# Patient Record
Sex: FEMALE | Race: WHITE | NOT HISPANIC OR LATINO | Employment: OTHER | URBAN - METROPOLITAN AREA
[De-identification: names, ages, dates, MRNs, and addresses within clinical notes are randomized per-mention and may not be internally consistent; named-entity substitution may affect disease eponyms.]

---

## 2017-01-12 DIAGNOSIS — R52 PAIN: ICD-10-CM

## 2017-01-12 NOTE — TELEPHONE ENCOUNTER
----- Message from Delvin Maloney sent at 1/12/2017 10:25 AM CST -----  Contact: Patient  Patient needs a refill on Hydrocodone called into   [facility] - [address] [address] [place] - [phone]  [place] [address]  Phone: [phone] Fax: [phone]  Please call patient at [phone] if you have any questions. Thanks!

## 2017-01-13 RX ORDER — HYDROCODONE BITARTRATE AND ACETAMINOPHEN 10; 325 MG/1; MG/1
1 TABLET ORAL 3 TIMES DAILY PRN
Qty: 90 TABLET | Refills: 0 | Status: SHIPPED | OUTPATIENT
Start: 2017-01-13 | End: 2017-02-13 | Stop reason: SDUPTHER

## 2017-01-25 ENCOUNTER — HOSPITAL ENCOUNTER (OUTPATIENT)
Dept: RADIOLOGY | Facility: HOSPITAL | Age: 82
Discharge: HOME | End: 2017-01-25
Attending: FAMILY MEDICINE
Payer: MEDICARE

## 2017-01-25 ENCOUNTER — OFFICE VISIT (OUTPATIENT)
Dept: FAMILY MEDICINE | Facility: CLINIC | Age: 82
End: 2017-01-25
Payer: MEDICARE

## 2017-01-25 VITALS
HEART RATE: 80 BPM | HEIGHT: 60 IN | DIASTOLIC BLOOD PRESSURE: 68 MMHG | RESPIRATION RATE: 16 BRPM | BODY MASS INDEX: 33.72 KG/M2 | SYSTOLIC BLOOD PRESSURE: 104 MMHG | WEIGHT: 171.75 LBS

## 2017-01-25 DIAGNOSIS — G40.909 SEIZURE DISORDER: ICD-10-CM

## 2017-01-25 DIAGNOSIS — K59.00 CONSTIPATION, UNSPECIFIED CONSTIPATION TYPE: ICD-10-CM

## 2017-01-25 DIAGNOSIS — S09.90XA HEAD INJURY, INITIAL ENCOUNTER: Primary | ICD-10-CM

## 2017-01-25 DIAGNOSIS — M51.36 DDD (DEGENERATIVE DISC DISEASE), LUMBAR: ICD-10-CM

## 2017-01-25 DIAGNOSIS — R05.3 CHRONIC COUGH: ICD-10-CM

## 2017-01-25 DIAGNOSIS — E78.5 HYPERLIPIDEMIA, UNSPECIFIED HYPERLIPIDEMIA TYPE: ICD-10-CM

## 2017-01-25 DIAGNOSIS — I10 ESSENTIAL HYPERTENSION: ICD-10-CM

## 2017-01-25 DIAGNOSIS — J44.9 CHRONIC OBSTRUCTIVE PULMONARY DISEASE, UNSPECIFIED COPD TYPE: ICD-10-CM

## 2017-01-25 DIAGNOSIS — I27.20 PULMONARY HYPERTENSION: ICD-10-CM

## 2017-01-25 DIAGNOSIS — M50.30 DDD (DEGENERATIVE DISC DISEASE), CERVICAL: ICD-10-CM

## 2017-01-25 PROCEDURE — 3078F DIAST BP <80 MM HG: CPT | Mod: S$GLB,,, | Performed by: FAMILY MEDICINE

## 2017-01-25 PROCEDURE — 74000 XR ABDOMEN AP 1 VIEW: CPT | Mod: 26,,, | Performed by: RADIOLOGY

## 2017-01-25 PROCEDURE — 99215 OFFICE O/P EST HI 40 MIN: CPT | Mod: S$GLB,,, | Performed by: FAMILY MEDICINE

## 2017-01-25 PROCEDURE — 1159F MED LIST DOCD IN RCRD: CPT | Mod: S$GLB,,, | Performed by: FAMILY MEDICINE

## 2017-01-25 PROCEDURE — 1125F AMNT PAIN NOTED PAIN PRSNT: CPT | Mod: S$GLB,,, | Performed by: FAMILY MEDICINE

## 2017-01-25 PROCEDURE — 1157F ADVNC CARE PLAN IN RCRD: CPT | Mod: S$GLB,,, | Performed by: FAMILY MEDICINE

## 2017-01-25 PROCEDURE — 99499 UNLISTED E&M SERVICE: CPT | Mod: S$GLB,,, | Performed by: FAMILY MEDICINE

## 2017-01-25 PROCEDURE — 74000 XR ABDOMEN AP 1 VIEW: CPT | Mod: TC,PO

## 2017-01-25 PROCEDURE — 1160F RVW MEDS BY RX/DR IN RCRD: CPT | Mod: S$GLB,,, | Performed by: FAMILY MEDICINE

## 2017-01-25 PROCEDURE — 3074F SYST BP LT 130 MM HG: CPT | Mod: S$GLB,,, | Performed by: FAMILY MEDICINE

## 2017-01-25 PROCEDURE — 99999 PR PBB SHADOW E&M-EST. PATIENT-LVL III: CPT | Mod: PBBFAC,,, | Performed by: FAMILY MEDICINE

## 2017-01-25 NOTE — PROGRESS NOTES
Subjective:     THIS DOCUMENT WAS MADE IN PART WITH [employer] SOFTWARE. OCCASIONALLY THIS SOFTWARE MAY MISINTERPRET WORDS OR PHRASES.     Patient ID: Summer Inman is a 81 y.o. female.    Chief Complaint: Shoulder Pain (right ); Abdominal Pain (lower abdomen); degenerative disc disease (3 month follow up ); and Gait Problem    HPI    she's here today with chronic pain. She gets some relief with her Norco but not complete. She has back pain, neck pain, right shoulder pain.   She is on chronic narcotics.     some reports worsening balance problem pupils. Has noticed speech change, sometimes mouse carisa versus weak voice versus hoarseness. Apparently she hit her head very hard with one fall a month ago. They did not contact anybody did not go to the emergency room. She does not think she lost consciousness. She doesn't remember all the details other than she fell backwards walking into her house and hit the back of her head.     She has a seizure disorder. She is not followed with neurology recently. She did not contact them after head injury. She did not contact them when they notice balance problems. Did not contact neurology when they noticed speech problems.     Complains of generalized abdominal discomfort. Migrating at times. No nausea or vomiting. No fever. She says she has frequent small round firm bowel movements. No diarrhea. No blood in her stool.      or hypertension and hyperlipidemia has been stable.     Just history of COPD, pulmonary hypertension and chronic dyspnea. It is stable. She is a frequent cough, typically nonproductive or sometimes a small white sputum.    Active Ambulatory Problems     Diagnosis Date Noted    Hyperlipidemia     Gastroesophageal reflux disease     Seizure disorder     Chronic cough 03/08/2016    Anemia 03/10/2016    DDD (degenerative disc disease), lumbar 06/15/2016    Lumbosacral radiculopathy 06/15/2016    Right hip pain 06/15/2016    Coronary artery  disease involving native coronary artery of native heart without angina pectoris 10/17/2016    Essential hypertension 10/17/2016    Chronic obstructive pulmonary disease 10/17/2016    History of stroke 10/17/2016    Depression, major, recurrent, mild 10/17/2016    Insomnia 10/17/2016    Pulmonary hypertension 10/17/2016    Osteopenia 10/17/2016     Resolved Ambulatory Problems     Diagnosis Date Noted    Chronic kidney disease, stage II (mild) 12/26/2012    Dysphagia 10/08/2014    Cervical radiculopathy 02/02/2015    UTI (urinary tract infection) 01/31/2016    Elevated lactic acid level 01/31/2016    Abdominal pain 01/31/2016    Cervical spondylosis with myelopathy 03/07/2016    Shortness of breath 03/08/2016    Obesity, Class I, BMI 30-34.9 03/08/2016    Hypokalemia 03/09/2016     Past Medical History   Diagnosis Date    Anticoagulant long-term use     Arthritis     Asthma     Atrial premature beats     Benign neoplasm of adrenal gland     Colon polyp     COPD (chronic obstructive pulmonary disease)     Coronary artery disease     Depression     Dizziness     First degree AV block     General anesthetics causing adverse effect in therapeutic use     Hypertension     Impaired mobility     Neck pain     Schatzki's ring     Seasonal allergies     Seizures     Stroke     Trouble in sleeping     Wears dentures     Wears glasses      Current Outpatient Prescriptions on File Prior to Visit   Medication Sig Dispense Refill    amlodipine (NORVASC) 5 MG tablet Take 1 tablet (5 mg total) by mouth once daily. (Patient taking differently: Take 5 mg by mouth once daily. ) 90 tablet 1    carvedilol (COREG) 6.25 MG tablet Take 1 tablet (6.25 mg total) by mouth 2 (two) times daily with meals. (Patient taking differently: Take 6.25 mg by mouth 2 (two) times daily with meals. ) 180 tablet 1    diclofenac sodium (VOLTAREN) 1 % Gel Apply 2 g topically once daily. (Patient taking differently:  Apply 2 g topically once daily. ) 300 g 5    donepezil (ARICEPT) 10 MG tablet Take 10 mg by mouth every morning.       duloxetine (CYMBALTA) 60 MG capsule Take 1 capsule (60 mg total) by mouth once daily. 90 capsule 1    gabapentin (NEURONTIN) 300 MG capsule Take 1 capsule (300 mg total) by mouth 3 (three) times daily. 270 capsule 1    hydrochlorothiazide (HYDRODIURIL) 25 MG tablet Take 1 tablet (25 mg total) by mouth once daily. (Patient taking differently: Take 25 mg by mouth once daily. ) 90 tablet 3    hydrocodone-acetaminophen 10-325mg (NORCO)  mg Tab Take 1 tablet by mouth 3 (three) times daily as needed. 90 tablet 0    hydrOXYzine pamoate (VISTARIL) 25 MG Cap Take 1 capsule (25 mg total) by mouth 2 (two) times daily as needed (for anxiety). 30 capsule 0    lamotrigine (LAMICTAL) 100 MG tablet Take 300 mg by mouth nightly.      lamotrigine (LAMICTAL) 200 MG tablet Take 200 mg by mouth every morning.       neomycin-polymyxin-dexamethasone (DEXACINE) 3.5 mg/g-10,000 unit/g-0.1 % Oint Apply to affected area right upper lid 2 times daily 3.5 g 0    nitroGLYCERIN (NITROSTAT) 0.4 MG SL tablet Place 1 tablet (0.4 mg total) under the tongue every 5 (five) minutes as needed for Chest pain. For chest pain. Repeat in five minutes if pain persists. May repeat again, for total of 3 tablets.  Call 911 at the third tablet. 20 tablet 3    omeprazole (PRILOSEC) 40 MG capsule Take 1 capsule (40 mg total) by mouth once daily. (Patient taking differently: Take 40 mg by mouth once daily. ) 90 capsule 3    ondansetron (ZOFRAN-ODT) 4 MG TbDL Take 1 tablet (4 mg total) by mouth every 8 (eight) hours as needed. 60 tablet 2    pravastatin (PRAVACHOL) 40 MG tablet Take 1 tablet (40 mg total) by mouth once daily. (Patient taking differently: Take 40 mg by mouth nightly. ) 90 tablet 3    ranitidine (ZANTAC) 300 MG tablet Take 1 tablet (300 mg total) by mouth every evening. 30 tablet 11    sertraline (ZOLOFT) 100 MG  tablet Take 1 tablet (100 mg total) by mouth once daily. (Patient taking differently: Take 100 mg by mouth once daily. ) 90 tablet 1    FLUZONE HIGH-DOSE 2016-17, PF, 180 mcg/0.5 mL Syrg       meclizine (ANTIVERT) 25 mg tablet Take 1 tablet (25 mg total) by mouth 3 (three) times daily as needed. (Patient taking differently: Take 25 mg by mouth 3 (three) times daily as needed for Dizziness. ) 15 tablet 0    PREVNAR 13, PF, 0.5 mL Syrg        No current facility-administered medications on file prior to visit.      Review of patient's allergies indicates:   Allergen Reactions    Codeine Shortness Of Breath     Tongue swelled    Nifedipine Anaphylaxis    Ace inhibitors      Other reaction(s): Angioedema    Ketorolac      Other reaction(s): tongue swell    Milk      Other reaction(s): Stomach upset    Tramadol      Other reaction(s): seizure     Social History   Substance Use Topics    Smoking status: Former Smoker     Packs/day: 0.50     Years: 19.00     Types: Cigarettes     Start date: 3/12/1984     Quit date: 10/8/2013    Smokeless tobacco: Never Used    Alcohol use No     Family History   Problem Relation Age of Onset    Heart disease Mother     Cancer Mother      Colon    Colon cancer Mother     Hypertension Daughter     Heart disease Father     Hypertension Son     Diabetes Son     Arthritis Son     Rheum arthritis Daughter     Cancer Daughter      Thyroid and Breast    Glaucoma Neg Hx          Review of Systems   Constitutional: Positive for appetite change and fatigue. Negative for fever and unexpected weight change.   HENT: Negative for congestion, sinus pressure and trouble swallowing.    Eyes: Positive for visual disturbance. Negative for photophobia.   Respiratory: Positive for cough and shortness of breath ( Stable).    Cardiovascular: Negative for chest pain, palpitations and leg swelling.   Gastrointestinal: Positive for abdominal pain and constipation. Negative for anal bleeding,  diarrhea, nausea and vomiting.   Endocrine: Negative for polydipsia and polyuria.   Genitourinary: Positive for difficulty urinating. Negative for dysuria, hematuria, pelvic pain and urgency.   Neurological: Positive for dizziness, speech difficulty, weakness, numbness and headaches. Negative for syncope and light-headedness.   Hematological: Negative for adenopathy.   Psychiatric/Behavioral: Positive for dysphoric mood. Negative for sleep disturbance and suicidal ideas. The patient is not nervous/anxious.        Objective:      Physical Exam   Constitutional: She is oriented to person, place, and time. She appears well-developed and well-nourished. No distress.   HENT:   Head: Normocephalic and atraumatic.   Right Ear: External ear normal.   Left Ear: External ear normal.   Nose: Nose normal.   Mouth/Throat: Oropharynx is clear and moist. No oropharyngeal exudate.   No visible or remaining trauma   Eyes: Conjunctivae and EOM are normal. Pupils are equal, round, and reactive to light. No scleral icterus.   Neck: Normal range of motion. Neck supple. No thyromegaly present.   Cardiovascular: Normal rate, regular rhythm and intact distal pulses.    Murmur heard.  Pulmonary/Chest: Effort normal and breath sounds normal. No respiratory distress. She has no wheezes. She exhibits no tenderness.   Abdominal:    Abdomen is soft. Bowel sounds are presentBut someone hypoactive. There is nonspecific mild diffuse tenderness. There is no focal tenderness. No guarding a rebound. Obese abdomen but no obvious distention   Musculoskeletal: Normal range of motion. She exhibits no edema or deformity.   Lymphadenopathy:     She has no cervical adenopathy.   Neurological: She is alert and oriented to person, place, and time. She has normal reflexes. She displays normal reflexes. Coordination normal.   Skin: Skin is warm and dry. She is not diaphoretic. No erythema.   Psychiatric: She has a normal mood and affect. Her behavior is normal.    Nursing note and vitals reviewed.      Assessment:       1. Head injury, initial encounter    2. Constipation, unspecified constipation type    3. Essential hypertension    4. Hyperlipidemia, unspecified hyperlipidemia type    5. Pulmonary hypertension    6. Chronic cough    7. Chronic obstructive pulmonary disease, unspecified COPD type    8. Seizure disorder    9. DDD (degenerative disc disease), lumbar    10. DDD (degenerative disc disease), cervical        Plan:       Summer was seen today for shoulder pain, abdominal pain, degenerative disc disease and gait problem.    Diagnoses and all orders for this visit:    Head injury, initial encounter  -     CT Head Without Contrast; Future   Advise that it is inappropriate to not have contacted us or sought emergency attention after head injury in this elderly female with multiple medical problems. Since it has been a month it's reasonable to schedule an outpatient CT in the next 24 hours. In the future though it is ever happens again she should go straight to the emergency room    Constipation, unspecified constipation type  -     X-Ray Abdomen AP 1 View; Future  Begin a stool softener 100 mg daily.  MiraLAX 1 dose daily  Initially can use milk of magnesia or magnesium citrate to help get things started . Suspect abdominal pain is constipation. No signs of infection or acute abdomen but they must watch closely.    Essential hypertension   stable    Hyperlipidemia, unspecified hyperlipidemia type   stable    Pulmonary hypertension  Chronic cough  Chronic obstructive pulmonary disease, unspecified COPD type   stable    Seizure disorder   she should follow back with her neurologist for this and other ongoing symptoms. If CT is normal then neurology needs to evaluate her to help us with her symptoms of poor balance and speech change    DDD (degenerative disc disease), lumbar  DDD (degenerative disc disease), cervical   stable, she will need to continue to follow back  with her neurosurgeon. I will continue to feel her pain medication but there's really nothing else I can do to help her with her pain. Advise the pain medication is contributing to her constipation so one possible she should reduce frequency of dosing.

## 2017-01-25 NOTE — MR AVS SNAPSHOT
[facility]  [address]  [place] [address]  Phone: [phone]  Fax: [phone]                  Summer Inman   2017 3:20 PM   Office Visit    Description:  Female : 1935   Provider:  Ludwig Wilson MD   Department:  [facility]           Reason for Visit     Shoulder Pain     Abdominal Pain     degenerative disc disease     Gait Problem           Diagnoses this Visit        Comments    Head injury, initial encounter    -  Primary     Constipation, unspecified constipation type                To Do List           Future Appointments        Provider Department Dept Phone    2017 4:15 PM [facility] XRFL1 [facility] [phone]    2017 2:30 PM [facility] CT1 LIMIT 450 LBS [facility] [phone]      Goals (5 Years of Data)     None      [facility]     [facility] Nurse Care Line - / Assistance  Registered nurses in the [facility] provide clinical advisement, health education, appointment booking, and other advisory services.  Call for this free service at [phone].             Medications           Message regarding Medications     Verify the changes and/or additions to your medication regime listed below are the same as discussed with your clinician today.  If any of these changes or additions are incorrect, please notify your healthcare provider.             Verify that the below list of medications is an accurate representation of the medications you are currently taking.  If none reported, the list may be blank. If incorrect, please contact your healthcare provider. Carry this list with you in case of emergency.           Current Medications     amlodipine (NORVASC) 5 MG tablet Take 1 tablet (5 mg total) by mouth once daily.    carvedilol (COREG) 6.25 MG tablet Take 1 tablet (6.25 mg total) by mouth 2 (two) times daily with meals.    diclofenac sodium (VOLTAREN) 1 % Gel Apply  2 g topically once daily.    donepezil (ARICEPT) 10 MG tablet Take 10 mg by mouth every morning.     duloxetine (CYMBALTA) 60 MG capsule Take 1 capsule (60 mg total) by mouth once daily.    gabapentin (NEURONTIN) 300 MG capsule Take 1 capsule (300 mg total) by mouth 3 (three) times daily.    hydrochlorothiazide (HYDRODIURIL) 25 MG tablet Take 1 tablet (25 mg total) by mouth once daily.    hydrocodone-acetaminophen 10-325mg (NORCO)  mg Tab Take 1 tablet by mouth 3 (three) times daily as needed.    hydrOXYzine pamoate (VISTARIL) 25 MG Cap Take 1 capsule (25 mg total) by mouth 2 (two) times daily as needed (for anxiety).    lamotrigine (LAMICTAL) 100 MG tablet Take 300 mg by mouth nightly.    lamotrigine (LAMICTAL) 200 MG tablet Take 200 mg by mouth every morning.     neomycin-polymyxin-dexamethasone (DEXACINE) 3.5 mg/g-10,000 unit/g-0.1 % Oint Apply to affected area right upper lid 2 times daily    nitroGLYCERIN (NITROSTAT) 0.4 MG SL tablet Place 1 tablet (0.4 mg total) under the tongue every 5 (five) minutes as needed for Chest pain. For chest pain. Repeat in five minutes if pain persists. May repeat again, for total of 3 tablets.  Call 911 at the third tablet.    omeprazole (PRILOSEC) 40 MG capsule Take 1 capsule (40 mg total) by mouth once daily.    ondansetron (ZOFRAN-ODT) 4 MG TbDL Take 1 tablet (4 mg total) by mouth every 8 (eight) hours as needed.    pravastatin (PRAVACHOL) 40 MG tablet Take 1 tablet (40 mg total) by mouth once daily.    ranitidine (ZANTAC) 300 MG tablet Take 1 tablet (300 mg total) by mouth every evening.    sertraline (ZOLOFT) 100 MG tablet Take 1 tablet (100 mg total) by mouth once daily.    FLUZONE HIGH-DOSE 2016-17, PF, 180 mcg/0.5 mL Syrg     meclizine (ANTIVERT) 25 mg tablet Take 1 tablet (25 mg total) by mouth 3 (three) times daily as needed.    PREVNAR 13, PF, 0.5 mL Syrg            Clinical Reference Information           Vital Signs - Last Recorded  Most recent update:  1/25/2017  3:30 PM by Kaci Garg LPN    BP Pulse Resp Ht Wt BMI    104/68 80 16 5' (1.524 m) 77.9 kg (171 lb 11.8 oz) 33.54 kg/m2      Blood Pressure          Most Recent Value    BP  104/68      Allergies as of 1/25/2017     Codeine    Nifedipine    Ace Inhibitors    Ketorolac    Milk    Tramadol      Immunizations Administered on Date of Encounter - 1/25/2017     None      Orders Placed During Today's Visit     Future Labs/Procedures Expected by Expires    CT Head Without Contrast  1/25/2017 1/25/2018    X-Ray Abdomen AP 1 View  1/25/2017 1/25/2018

## 2017-01-26 ENCOUNTER — HOSPITAL ENCOUNTER (OUTPATIENT)
Dept: RADIOLOGY | Facility: HOSPITAL | Age: 82
Discharge: HOME | End: 2017-01-26
Attending: FAMILY MEDICINE
Payer: MEDICARE

## 2017-01-26 DIAGNOSIS — S09.90XA HEAD INJURY, INITIAL ENCOUNTER: ICD-10-CM

## 2017-01-26 PROCEDURE — 70450 CT HEAD/BRAIN W/O DYE: CPT | Mod: 26,,, | Performed by: RADIOLOGY

## 2017-01-26 PROCEDURE — 70450 CT HEAD/BRAIN W/O DYE: CPT | Mod: TC,PO

## 2017-02-10 ENCOUNTER — TELEPHONE (OUTPATIENT)
Dept: FAMILY MEDICINE | Facility: CLINIC | Age: 82
End: 2017-02-10

## 2017-02-10 NOTE — TELEPHONE ENCOUNTER
Spoke with daughter and she stated that the cymbalta is not working for patient depression. She said it may be helping with the pain but not the depression. She would like to see about getting patient back on the zoloft because it seemed to help her depression better. Please advise

## 2017-02-13 DIAGNOSIS — R52 PAIN: ICD-10-CM

## 2017-02-13 RX ORDER — HYDROCODONE BITARTRATE AND ACETAMINOPHEN 10; 325 MG/1; MG/1
1 TABLET ORAL 3 TIMES DAILY PRN
Qty: 90 TABLET | Refills: 0 | Status: SHIPPED | OUTPATIENT
Start: 2017-02-13 | End: 2017-03-13 | Stop reason: SDUPTHER

## 2017-02-13 NOTE — TELEPHONE ENCOUNTER
----- Message from Aure Russell sent at 2/13/2017  1:04 PM CST -----  Contact: self   Patient need refill medication on hydrocodone please send to     [facility] - [address] [place] - [id number]  [address]  [place] [address]  Phone: [phone] Fax: [phone]

## 2017-02-14 NOTE — TELEPHONE ENCOUNTER
Spoke with pt daughter, she would like to take a lower dose of the zoloft and the cymbalta. Send to [facility] for the first month,  Then to [facility] pharmacy.   She has episodes of crying and the daughter said it probably would not be a bad idea for her to see psychiatry. She will talk to mother first. Has to be careful to consult with her mother first, so she wont feel like the children are making decisions for her.

## 2017-03-13 DIAGNOSIS — R52 PAIN: ICD-10-CM

## 2017-03-13 RX ORDER — HYDROCODONE BITARTRATE AND ACETAMINOPHEN 10; 325 MG/1; MG/1
TABLET ORAL
Qty: 90 TABLET | Refills: 0 | Status: SHIPPED | OUTPATIENT
Start: 2017-03-13 | End: 2017-05-09 | Stop reason: SDUPTHER

## 2017-03-13 RX ORDER — HYDROCODONE BITARTRATE AND ACETAMINOPHEN 10; 325 MG/1; MG/1
1 TABLET ORAL 3 TIMES DAILY PRN
Qty: 90 TABLET | Refills: 0 | Status: SHIPPED | OUTPATIENT
Start: 2017-03-13 | End: 2017-04-07 | Stop reason: SDUPTHER

## 2017-03-13 NOTE — TELEPHONE ENCOUNTER
----- Message from Brisa Culver sent at 3/13/2017  1:47 PM CDT -----  Contact: self  Patient is requesting a refill on Norco.      Please send to    [facility] - [address] [place] - [id number]  [id number]  [place] [address]  Phone: [phone] Fax: [phone]

## 2017-04-06 ENCOUNTER — TELEPHONE (OUTPATIENT)
Dept: FAMILY MEDICINE | Facility: CLINIC | Age: 82
End: 2017-04-06

## 2017-04-06 NOTE — TELEPHONE ENCOUNTER
Her eye hurts. She thinks she has a sty on her eye. It hurts. Appointment made for tomorrow she is so happy

## 2017-04-06 NOTE — TELEPHONE ENCOUNTER
----- Message from Ana Inman sent at 4/6/2017  3:15 PM CDT -----  Please call [phone] Pt is requesting an antibiotic / states she has had the problem before (can't remember name)

## 2017-04-07 ENCOUNTER — OFFICE VISIT (OUTPATIENT)
Dept: FAMILY MEDICINE | Facility: CLINIC | Age: 82
End: 2017-04-07
Payer: MEDICARE

## 2017-04-07 VITALS
OXYGEN SATURATION: 95 % | WEIGHT: 172.19 LBS | HEIGHT: 60 IN | RESPIRATION RATE: 17 BRPM | DIASTOLIC BLOOD PRESSURE: 62 MMHG | BODY MASS INDEX: 33.8 KG/M2 | HEART RATE: 75 BPM | SYSTOLIC BLOOD PRESSURE: 120 MMHG

## 2017-04-07 DIAGNOSIS — H02.823: ICD-10-CM

## 2017-04-07 DIAGNOSIS — I10 ESSENTIAL HYPERTENSION: ICD-10-CM

## 2017-04-07 DIAGNOSIS — R52 PAIN: ICD-10-CM

## 2017-04-07 DIAGNOSIS — F32.A DEPRESSION, UNSPECIFIED DEPRESSION TYPE: ICD-10-CM

## 2017-04-07 DIAGNOSIS — M50.30 DEGENERATIVE CERVICAL DISC: Primary | ICD-10-CM

## 2017-04-07 DIAGNOSIS — R05.9 COUGH: ICD-10-CM

## 2017-04-07 PROCEDURE — 3074F SYST BP LT 130 MM HG: CPT | Mod: S$GLB,,, | Performed by: FAMILY MEDICINE

## 2017-04-07 PROCEDURE — 3078F DIAST BP <80 MM HG: CPT | Mod: S$GLB,,, | Performed by: FAMILY MEDICINE

## 2017-04-07 PROCEDURE — 1159F MED LIST DOCD IN RCRD: CPT | Mod: S$GLB,,, | Performed by: FAMILY MEDICINE

## 2017-04-07 PROCEDURE — 1160F RVW MEDS BY RX/DR IN RCRD: CPT | Mod: S$GLB,,, | Performed by: FAMILY MEDICINE

## 2017-04-07 PROCEDURE — 99499 UNLISTED E&M SERVICE: CPT | Mod: S$GLB,,, | Performed by: FAMILY MEDICINE

## 2017-04-07 PROCEDURE — 99214 OFFICE O/P EST MOD 30 MIN: CPT | Mod: S$GLB,,, | Performed by: FAMILY MEDICINE

## 2017-04-07 PROCEDURE — 1126F AMNT PAIN NOTED NONE PRSNT: CPT | Mod: S$GLB,,, | Performed by: FAMILY MEDICINE

## 2017-04-07 PROCEDURE — 99999 PR PBB SHADOW E&M-EST. PATIENT-LVL III: CPT | Mod: PBBFAC,,, | Performed by: FAMILY MEDICINE

## 2017-04-07 RX ORDER — HYDROCODONE BITARTRATE AND ACETAMINOPHEN 10; 325 MG/1; MG/1
1 TABLET ORAL 3 TIMES DAILY PRN
Qty: 90 TABLET | Refills: 0 | Status: SHIPPED | OUTPATIENT
Start: 2017-04-07 | End: 2017-10-05 | Stop reason: SDUPTHER

## 2017-04-07 RX ORDER — SERTRALINE HYDROCHLORIDE 100 MG/1
100 TABLET, FILM COATED ORAL DAILY
Qty: 90 TABLET | Refills: 1 | Status: SHIPPED | OUTPATIENT
Start: 2017-04-07 | End: 2017-06-26 | Stop reason: SDUPTHER

## 2017-04-07 RX ORDER — DULOXETIN HYDROCHLORIDE 60 MG/1
60 CAPSULE, DELAYED RELEASE ORAL DAILY
Qty: 90 CAPSULE | Refills: 1 | Status: CANCELLED | OUTPATIENT
Start: 2017-04-07 | End: 2018-04-07

## 2017-04-07 RX ORDER — NEOMYCIN SULFATE, POLYMYXIN B SULFATE, AND DEXAMETHASONE 3.5; 10000; 1 MG/G; [USP'U]/G; MG/G
OINTMENT OPHTHALMIC
Qty: 3.5 G | Refills: 0 | Status: SHIPPED | OUTPATIENT
Start: 2017-04-07 | End: 2018-08-28

## 2017-04-07 NOTE — PROGRESS NOTES
Subjective:     THIS DOCUMENT WAS MADE IN PART WITH [employer] SOFTWARE.  OCCASIONALLY THIS SOFTWARE WILL MISINTERPRET WORDS OR PHRASES.     Patient ID: Summer Inman is a 81 y.o. female.    Chief Complaint: Eye Pain (right eye/ pt states it burst on last night)    HPI     Depression/ temper, worse on cymbalta, wants to go back to zoloft. I had changed to Cymbalta thinking it would help with her chronic pain but she seems to be worsening. No suicidal thoughts, just more on edge    Right eye sty, already drained, spontaneously. Previously seen by optometry, question refill and antibiotic ointment     reports recent exacerbation in cough, exposed to some chemicals at house remodeling. Not shortness of breath, no wheezing. No fever, no hemoptysis,     cervical degenerative disc disease with chronic pain, stable.    Active Ambulatory Problems     Diagnosis Date Noted    Hyperlipidemia     Gastroesophageal reflux disease     Seizure disorder     Chronic cough 03/08/2016    Anemia 03/10/2016    DDD (degenerative disc disease), lumbar 06/15/2016    Lumbosacral radiculopathy 06/15/2016    Right hip pain 06/15/2016    Coronary artery disease involving native coronary artery of native heart without angina pectoris 10/17/2016    Essential hypertension 10/17/2016    Chronic obstructive pulmonary disease 10/17/2016    History of stroke 10/17/2016    Depression, major, recurrent, mild 10/17/2016    Insomnia 10/17/2016    Pulmonary hypertension 10/17/2016    Osteopenia 10/17/2016     Resolved Ambulatory Problems     Diagnosis Date Noted    Chronic kidney disease, stage II (mild) 12/26/2012    Dysphagia 10/08/2014    Cervical radiculopathy 02/02/2015    UTI (urinary tract infection) 01/31/2016    Elevated lactic acid level 01/31/2016    Abdominal pain 01/31/2016    Cervical spondylosis with myelopathy 03/07/2016    Shortness of breath 03/08/2016    Obesity, Class I, BMI 30-34.9 03/08/2016     Hypokalemia 03/09/2016     Past Medical History:   Diagnosis Date    Anticoagulant long-term use     Arthritis     Asthma     Atrial premature beats     Benign neoplasm of adrenal gland     Colon polyp     COPD (chronic obstructive pulmonary disease)     Coronary artery disease     Depression     Dizziness     First degree AV block     Gastroesophageal reflux disease     General anesthetics causing adverse effect in therapeutic use     Hyperlipidemia     Hypertension     Impaired mobility     Neck pain     Schatzki's ring     Seasonal allergies     Seizures     Stroke     Trouble in sleeping     Wears dentures     Wears glasses          Review of Systems   Constitutional: Positive for fatigue. Negative for chills and fever.   Respiratory: Positive for cough. Negative for shortness of breath and wheezing.    Cardiovascular: Negative for chest pain.   Musculoskeletal: Positive for arthralgias, back pain, neck pain and neck stiffness.   Skin: Negative for rash.   Psychiatric/Behavioral: Positive for dysphoric mood. Negative for suicidal ideas. The patient is nervous/anxious.        Objective:      Physical Exam   Constitutional: She is oriented to person, place, and time. She appears well-developed and well-nourished. No distress.   HENT:   Head: Normocephalic and atraumatic.   Eyes: No scleral icterus.   Sty right lower eyelid. Mild redness. No pustule. Conjunctiva or normal bilaterally.   Pulmonary/Chest: Effort normal and breath sounds normal. No respiratory distress.   Lungs were clear. No  Wheezing,  No course breath sounds   Neurological: She is alert and oriented to person, place, and time.   Ambulatory with a walker   Skin: She is not diaphoretic.   Psychiatric: She has a normal mood and affect. Her behavior is normal.   Vitals reviewed.      Assessment:       1. Degenerative cervical disc    2. Pain    3. Cyst, eyelid sebaceous, right    4. Depression, unspecified depression type    5.  Cough    6. Essential hypertension        Plan:       Summer was seen today for eye pain.    Diagnoses and all orders for this visit:    Degenerative cervical disc    Pain  -     hydrocodone-acetaminophen 10-325mg (NORCO)  mg Tab; Take 1 tablet by mouth 3 (three) times daily as needed.    Cyst, eyelid sebaceous, right  -     neomycin-polymyxin-dexamethasone (DEXACINE) 3.5 mg/g-10,000 unit/g-0.1 % Oint; Apply to affected area right upper lid 2 times daily    Depression, unspecified depression type  Resume Zoloft. Cymbalta    Cough   lungs are clear. Possibly allergies are possibly mild chemical irritation/pneumonitis. No specific treatment other than closely monitoring symptoms and reporting of the worsen    Essential hypertension    Other orders  -     sertraline (ZOLOFT) 100 MG tablet; Take 1 tablet (100 mg total) by mouth once daily.  -     Cancel: duloxetine (CYMBALTA) 60 MG capsule; Take 1 capsule (60 mg total) by mouth once daily.

## 2017-04-07 NOTE — MR AVS SNAPSHOT
[facility]  [address]  [place] [address]  Phone: [phone]  Fax: [phone]                  Summer Inman   2017 1:40 PM   Office Visit    Description:  Female : 1935   Provider:  Ludwig Wilson MD   Department:  [facility]           Reason for Visit     Eye Pain           Diagnoses this Visit        Comments    Pain         Cyst, eyelid sebaceous, right                To Do List           Future Appointments        Provider Department Dept Phone    2017 3:40 PM Ludwig Wilson MD [facility] [phone]      Goals (5 Years of Data)     None       These Medications        Disp Refills Start End    sertraline (ZOLOFT) 100 MG tablet 90 tablet 1 2017     Take 1 tablet (100 mg total) by mouth once daily. - Oral    Pharmacy: [facility] [place] - [address] [address] Ph #: [phone]       hydrocodone-acetaminophen 10-325mg (NORCO)  mg Tab 90 tablet 0 2017     Take 1 tablet by mouth 3 (three) times daily as needed. - Oral    Pharmacy: [facility] [place]  [address] [address] Ph #: [phone]       neomycin-polymyxin-dexamethasone (DEXACINE) 3.5 mg/g-10,000 unit/g-0.1 % Oint 3.5 g 0 2017     Apply to affected area right upper lid 2 times daily    [facility] [place] [facility] [address] [address] Ph #: [phone]         [facility]     [facility] -  Assistance  Unless otherwise directed by your provider, please contact [facility], our nurse care line that is available for  assistance.     Registered nurses in the [facility] provide: appointment scheduling, clinical advisement, health education, and other advisory services.  Call: [phone] (toll free)               Medications           Message regarding Medications     Verify the changes and/or additions to your medication  regime listed below are the same as discussed with your clinician today.  If any of these changes or additions are incorrect, please notify your healthcare provider.        STOP taking these medications     FLUZONE HIGH-DOSE 2016-17, PF, 180 mcg/0.5 mL Syrg     PREVNAR 13, PF, 0.5 mL Syrg            Verify that the below list of medications is an accurate representation of the medications you are currently taking.  If none reported, the list may be blank. If incorrect, please contact your healthcare provider. Carry this list with you in case of emergency.           Current Medications     amlodipine (NORVASC) 5 MG tablet Take 1 tablet (5 mg total) by mouth once daily.    carvedilol (COREG) 6.25 MG tablet Take 1 tablet (6.25 mg total) by mouth 2 (two) times daily with meals.    diclofenac sodium (VOLTAREN) 1 % Gel Apply 2 g topically once daily.    donepezil (ARICEPT) 10 MG tablet Take 10 mg by mouth every morning.     duloxetine (CYMBALTA) 60 MG capsule Take 1 capsule (60 mg total) by mouth once daily.    gabapentin (NEURONTIN) 300 MG capsule Take 1 capsule (300 mg total) by mouth 3 (three) times daily.    hydrochlorothiazide (HYDRODIURIL) 25 MG tablet Take 1 tablet (25 mg total) by mouth once daily.    hydrocodone-acetaminophen 10-325mg (NORCO)  mg Tab Take 1 tablet by mouth 3 (three) times daily as needed.    hydrOXYzine pamoate (VISTARIL) 25 MG Cap Take 1 capsule (25 mg total) by mouth 2 (two) times daily as needed (for anxiety).    lamotrigine (LAMICTAL) 100 MG tablet Take 300 mg by mouth nightly.    lamotrigine (LAMICTAL) 200 MG tablet Take 200 mg by mouth every morning.     meclizine (ANTIVERT) 25 mg tablet Take 1 tablet (25 mg total) by mouth 3 (three) times daily as needed.    neomycin-polymyxin-dexamethasone (DEXACINE) 3.5 mg/g-10,000 unit/g-0.1 % Oint Apply to affected area right upper lid 2 times daily    nitroGLYCERIN (NITROSTAT) 0.4 MG SL tablet Place 1 tablet (0.4 mg total) under the tongue every  5 (five) minutes as needed for Chest pain. For chest pain. Repeat in five minutes if pain persists. May repeat again, for total of 3 tablets.  Call 911 at the third tablet.    omeprazole (PRILOSEC) 40 MG capsule Take 1 capsule (40 mg total) by mouth once daily.    ondansetron (ZOFRAN-ODT) 4 MG TbDL Take 1 tablet (4 mg total) by mouth every 8 (eight) hours as needed.    pravastatin (PRAVACHOL) 40 MG tablet Take 1 tablet (40 mg total) by mouth once daily.    ranitidine (ZANTAC) 300 MG tablet Take 1 tablet (300 mg total) by mouth every evening.    sertraline (ZOLOFT) 100 MG tablet Take 1 tablet (100 mg total) by mouth once daily.    hydrocodone-acetaminophen 10-325mg (NORCO)  mg Tab TAKE ONE TABLET BY MOUTH THREE TIMES DAILY AS NEEDED           Clinical Reference Information           Your Vitals Were     BP Pulse Resp Height Weight SpO2    120/62 (BP Location: Left arm, Patient Position: Sitting, BP Method: Manual) 75 17 5' (1.524 m) 78.1 kg (172 lb 2.9 oz) 95%    BMI                33.63 kg/m2          Blood Pressure          Most Recent Value    BP  120/62      Allergies as of 4/7/2017     Codeine    Nifedipine    Ace Inhibitors    Ketorolac    Milk    Tramadol      Immunizations Administered on Date of Encounter - 4/7/2017     None      Language Assistance Services     ATTENTION: Language assistance services are available, free of charge. Please call [phone].      ATENCIÓN: Si habla español, tiene a galvan disposición servicios gratuitos de asistencia lingüística. Llame al [phone].     CHÚ Ý: N?u b?n nói Ti?ng Vi?t, có các d?ch v? h? tr? ngôn ng? mi?n phí dành cho b?n. G?i s? [phone].         [facility] complies with applicable Federal civil rights laws and does not discriminate on the basis of race, color, national origin, age, disability, or sex.

## 2017-04-20 DIAGNOSIS — R11.0 NAUSEA: ICD-10-CM

## 2017-04-20 DIAGNOSIS — K21.9 GASTROESOPHAGEAL REFLUX DISEASE WITHOUT ESOPHAGITIS: ICD-10-CM

## 2017-04-20 RX ORDER — CARVEDILOL 6.25 MG/1
TABLET ORAL
Qty: 180 TABLET | Refills: 1 | Status: SHIPPED | OUTPATIENT
Start: 2017-04-20 | End: 2018-01-16 | Stop reason: SDUPTHER

## 2017-04-20 RX ORDER — ONDANSETRON 4 MG/1
TABLET, ORALLY DISINTEGRATING ORAL
Qty: 60 TABLET | Refills: 2 | Status: SHIPPED | OUTPATIENT
Start: 2017-04-20 | End: 2018-01-17 | Stop reason: SDUPTHER

## 2017-04-20 RX ORDER — HYDROCHLOROTHIAZIDE 25 MG/1
TABLET ORAL
Qty: 90 TABLET | Refills: 3 | Status: SHIPPED | OUTPATIENT
Start: 2017-04-20 | End: 2017-12-13 | Stop reason: SDUPTHER

## 2017-04-20 RX ORDER — PRAVASTATIN SODIUM 40 MG/1
TABLET ORAL
Qty: 90 TABLET | Refills: 3 | Status: SHIPPED | OUTPATIENT
Start: 2017-04-20 | End: 2018-01-16 | Stop reason: SDUPTHER

## 2017-04-20 RX ORDER — OMEPRAZOLE 40 MG/1
CAPSULE, DELAYED RELEASE ORAL
Qty: 90 CAPSULE | Refills: 3 | Status: SHIPPED | OUTPATIENT
Start: 2017-04-20 | End: 2018-02-05 | Stop reason: SDUPTHER

## 2017-04-20 RX ORDER — AMLODIPINE BESYLATE 5 MG/1
TABLET ORAL
Qty: 90 TABLET | Refills: 1 | Status: SHIPPED | OUTPATIENT
Start: 2017-04-20 | End: 2018-01-16 | Stop reason: SDUPTHER

## 2017-04-21 ENCOUNTER — TELEPHONE (OUTPATIENT)
Dept: FAMILY MEDICINE | Facility: CLINIC | Age: 82
End: 2017-04-21

## 2017-04-21 NOTE — TELEPHONE ENCOUNTER
GIANNI: Spoke with daughter and she stated that patient has ortho appt on Wed. Gave Dr Pace recommendations. Daughter stated that she  was going to call the orthopedic office and see if they could do something for splinting until her appointment.

## 2017-04-21 NOTE — TELEPHONE ENCOUNTER
She really should be talking to orthopedics about that because the x-ray reports that she has a distal radius fracture.  Has she seen an orthopedic surgeon yet?  Anytime somebody has a fracture they should've been referred to orthopedics

## 2017-04-21 NOTE — TELEPHONE ENCOUNTER
----- Message from Kacy Campbellan sent at 4/21/2017 10:22 AM CDT -----  Patients daughter states that she need to know if the doctor can put a split on patients arm in the office.  Call Stefani at [phone].

## 2017-04-21 NOTE — TELEPHONE ENCOUNTER
Spoke with pt daughter brought mom to ER and splint was put on arm, pt has taken splint off stated it was rubbing against arm hand, cast is to be put on on Wed. Can pt come to office and get a splint put on or does pt needs to back to ER. Please advise.

## 2017-04-24 RX ORDER — GABAPENTIN 300 MG/1
CAPSULE ORAL
Qty: 270 CAPSULE | Refills: 1 | Status: SHIPPED | OUTPATIENT
Start: 2017-04-24 | End: 2017-10-24 | Stop reason: SDUPTHER

## 2017-04-26 PROBLEM — S52.531A CLOSED COLLES' FRACTURE OF RIGHT RADIUS: Status: ACTIVE | Noted: 2017-04-26

## 2017-05-09 DIAGNOSIS — R52 PAIN: ICD-10-CM

## 2017-05-09 RX ORDER — DULOXETIN HYDROCHLORIDE 60 MG/1
60 CAPSULE, DELAYED RELEASE ORAL DAILY
Qty: 90 CAPSULE | Refills: 1 | Status: SHIPPED | OUTPATIENT
Start: 2017-05-09 | End: 2018-03-26

## 2017-05-09 RX ORDER — HYDROCODONE BITARTRATE AND ACETAMINOPHEN 10; 325 MG/1; MG/1
1 TABLET ORAL 3 TIMES DAILY PRN
Qty: 90 TABLET | Refills: 0 | Status: SHIPPED | OUTPATIENT
Start: 2017-05-09 | End: 2017-06-06 | Stop reason: SDUPTHER

## 2017-05-09 NOTE — TELEPHONE ENCOUNTER
----- Message from Santi Crandall sent at 5/9/2017  1:58 PM CDT -----  Contact: pt  Pt is requesting a refill on her Cymblta  Call Back#[phone]  Thanks      [facility] [place] [facility] [address] [address]  [address]  [place] [address]  Phone: [phone] Fax: [phone]

## 2017-05-09 NOTE — TELEPHONE ENCOUNTER
----- Message from Adrianne Burr sent at 5/9/2017 12:55 PM CDT -----  Patient requested refill on  [place]  call into [facility] at  [phone]. Please call patient at  [phone] if you have any questions. Thank you.

## 2017-05-22 ENCOUNTER — PATIENT OUTREACH (OUTPATIENT)
Dept: ADMINISTRATIVE | Facility: HOSPITAL | Age: 82
End: 2017-05-22
Payer: MEDICARE

## 2017-05-22 DIAGNOSIS — I10 ESSENTIAL HYPERTENSION: Primary | ICD-10-CM

## 2017-05-22 DIAGNOSIS — M85.80 OSTEOPENIA, UNSPECIFIED LOCATION: ICD-10-CM

## 2017-05-22 DIAGNOSIS — M85.831 OTHER SPECIFIED DISORDERS OF BONE DENSITY AND STRUCTURE, RIGHT FOREARM: ICD-10-CM

## 2017-05-22 DIAGNOSIS — S52.531D CLOSED COLLES' FRACTURE OF RIGHT RADIUS WITH ROUTINE HEALING, SUBSEQUENT ENCOUNTER: ICD-10-CM

## 2017-05-22 NOTE — PROGRESS NOTES
Due for DEXA per [facility] [facility] report,     Also due for lipids and shingles immunization    Scheduled dexa

## 2017-06-06 ENCOUNTER — TELEPHONE (OUTPATIENT)
Dept: FAMILY MEDICINE | Facility: CLINIC | Age: 82
End: 2017-06-06

## 2017-06-06 DIAGNOSIS — R52 PAIN: ICD-10-CM

## 2017-06-06 RX ORDER — HYDROCODONE BITARTRATE AND ACETAMINOPHEN 10; 325 MG/1; MG/1
1 TABLET ORAL 3 TIMES DAILY PRN
Qty: 90 TABLET | Refills: 0 | Status: SHIPPED | OUTPATIENT
Start: 2017-06-06 | End: 2017-07-07 | Stop reason: SDUPTHER

## 2017-06-06 NOTE — TELEPHONE ENCOUNTER
----- Message from Ana Inman sent at 6/6/2017  2:05 PM CDT -----  Pt requesting refill for hydrocodone-acetaminophen 10-325mg (NORCO)  mg Tab /please call pt at [phone]   [facility] [place] [address] [address]  [address] [address]  [facility] [address]  Phone: [phone] Fax: [phone]

## 2017-06-08 ENCOUNTER — HOSPITAL ENCOUNTER (OUTPATIENT)
Dept: RADIOLOGY | Facility: HOSPITAL | Age: 82
Discharge: HOME | End: 2017-06-08
Attending: FAMILY MEDICINE
Payer: MEDICARE

## 2017-06-08 DIAGNOSIS — M85.831 OTHER SPECIFIED DISORDERS OF BONE DENSITY AND STRUCTURE, RIGHT FOREARM: ICD-10-CM

## 2017-06-08 DIAGNOSIS — S52.531D CLOSED COLLES' FRACTURE OF RIGHT RADIUS WITH ROUTINE HEALING, SUBSEQUENT ENCOUNTER: ICD-10-CM

## 2017-06-08 PROCEDURE — 77080 DXA BONE DENSITY AXIAL: CPT | Mod: 26,,, | Performed by: RADIOLOGY

## 2017-06-08 PROCEDURE — 77080 DXA BONE DENSITY AXIAL: CPT | Mod: TC,PO

## 2017-06-22 ENCOUNTER — PATIENT OUTREACH (OUTPATIENT)
Dept: ADMINISTRATIVE | Facility: HOSPITAL | Age: 82
End: 2017-06-22

## 2017-06-22 NOTE — LETTER
June 28, 2017    Summer Inman  [address] [facility] [address]             [facility]  [address]  [facility] [address]  Phone: [phone] Dear Mrs. Inman:    We have tried to reach you by mychart unsuccessfully.    [facility] is committed to your overall health.  To help you get the most out of each of your visits, we will review your information to make sure you are up to date on all of your recommended tests and/or procedures.       Dr. Ludwig Wilson has found that you may be due for your fasting cholesterol labs and a shingles immunization.     Medicare does not cover all immunizations to be given in the clinic.  Check your benefits to ensure that you do not need to receive your immunizations at the pharmacy.     If you have had any of the above done at another facility, please bring the records or information with you so that your record at [facility] will be complete.  If you would like to schedule any of these, please contact me.     If you are currently taking medication, please bring it with you to your appointment for review.     Also, if you have any type of Advanced Directives, please bring them with you to your office visit so we may scan them into your chart.     If you have any questions or concerns, please don't hesitate to call.    Thank you for letting us care for you,  Stephenie Culver LPN Clinical Care Coordinator  [facility] [place]  [phone]

## 2017-06-26 RX ORDER — SERTRALINE HYDROCHLORIDE 100 MG/1
100 TABLET, FILM COATED ORAL DAILY
Qty: 90 TABLET | Refills: 1 | Status: SHIPPED | OUTPATIENT
Start: 2017-06-26 | End: 2017-10-17

## 2017-06-26 NOTE — TELEPHONE ENCOUNTER
----- Message from Rebekah Acevedo sent at 6/26/2017  9:21 AM CDT -----  Patient is requesting to change her pharmacy to [facility] please call [phone]

## 2017-07-07 ENCOUNTER — NURSE TRIAGE (OUTPATIENT)
Dept: ADMINISTRATIVE | Facility: CLINIC | Age: 82
End: 2017-07-07

## 2017-07-07 ENCOUNTER — OFFICE VISIT (OUTPATIENT)
Dept: FAMILY MEDICINE | Facility: CLINIC | Age: 82
End: 2017-07-07
Payer: MEDICARE

## 2017-07-07 VITALS
SYSTOLIC BLOOD PRESSURE: 98 MMHG | RESPIRATION RATE: 18 BRPM | BODY MASS INDEX: 32.59 KG/M2 | HEART RATE: 70 BPM | DIASTOLIC BLOOD PRESSURE: 74 MMHG | HEIGHT: 60 IN | WEIGHT: 166 LBS

## 2017-07-07 DIAGNOSIS — R52 PAIN: ICD-10-CM

## 2017-07-07 DIAGNOSIS — I10 ESSENTIAL HYPERTENSION: ICD-10-CM

## 2017-07-07 DIAGNOSIS — M50.30 DEGENERATIVE CERVICAL DISC: Primary | ICD-10-CM

## 2017-07-07 DIAGNOSIS — R29.6 FREQUENT FALLS: ICD-10-CM

## 2017-07-07 DIAGNOSIS — F11.20 CHRONIC NARCOTIC DEPENDENCE: ICD-10-CM

## 2017-07-07 PROCEDURE — 99999 PR PBB SHADOW E&M-EST. PATIENT-LVL III: CPT | Mod: PBBFAC,,, | Performed by: FAMILY MEDICINE

## 2017-07-07 PROCEDURE — 1159F MED LIST DOCD IN RCRD: CPT | Mod: S$GLB,,, | Performed by: FAMILY MEDICINE

## 2017-07-07 PROCEDURE — 1125F AMNT PAIN NOTED PAIN PRSNT: CPT | Mod: S$GLB,,, | Performed by: FAMILY MEDICINE

## 2017-07-07 PROCEDURE — 99214 OFFICE O/P EST MOD 30 MIN: CPT | Mod: S$GLB,,, | Performed by: FAMILY MEDICINE

## 2017-07-07 PROCEDURE — 99499 UNLISTED E&M SERVICE: CPT | Mod: S$GLB,,, | Performed by: FAMILY MEDICINE

## 2017-07-07 RX ORDER — HYDROCODONE BITARTRATE AND ACETAMINOPHEN 10; 325 MG/1; MG/1
TABLET ORAL
Qty: 90 TABLET | Refills: 0 | Status: SHIPPED | OUTPATIENT
Start: 2017-07-07 | End: 2017-08-04 | Stop reason: SDUPTHER

## 2017-07-07 RX ORDER — HYDROCODONE BITARTRATE AND ACETAMINOPHEN 10; 325 MG/1; MG/1
1 TABLET ORAL 3 TIMES DAILY PRN
Qty: 90 TABLET | Refills: 0 | Status: CANCELLED | OUTPATIENT
Start: 2017-07-07

## 2017-07-07 RX ORDER — ALENDRONATE SODIUM 70 MG/1
70 TABLET ORAL
Qty: 4 TABLET | Refills: 11 | Status: SHIPPED | OUTPATIENT
Start: 2017-07-07 | End: 2018-11-20 | Stop reason: SDUPTHER

## 2017-07-07 RX ORDER — NAPROXEN 500 MG/1
TABLET ORAL
Qty: 30 TABLET | Refills: 0 | Status: SHIPPED | OUTPATIENT
Start: 2017-07-07 | End: 2018-02-26 | Stop reason: ALTCHOICE

## 2017-07-07 NOTE — PROGRESS NOTES
Subjective:     THIS DOCUMENT WAS MADE IN PART WITH [employer] DICTATION SOFTWARE.  OCCASIONALLY THIS SOFTWARE WILL MISINTERPRET WORDS OR PHRASES.     Patient ID: Summer Inman is a 81 y.o. female.    Chief Complaint: Headache (patient states that she just does not feel good; this started today ); Nausea; Dizziness; Arm Pain (right arm pain; ortho appt Tuesday ); Insomnia (sleeps during day and stays up all night ); and Fall (July 3 denies any injury )    HPI      she's here with her son and daughter. Predominately year for pain management. She has chronic back pain especially of the neck. She has been controlled with hydrocodone for some time now. Recently she had a fall and a fracture and has been on oxycodone for orthopedics. She suffered a nondisplaced radial fracture. She is currently out of the splint but complains of moderate to severe pain.     She said multiple falls mostly trips because she wasn't careful she reports. No syncope no loss of consciousness.     She reports emulation is impaired because she has difficulty using the walker because of pain in her right hand there's a tight tender band of tissue between the base of the thumb and finger this hurts when she grabs her walker     hypertension has been stable and well-controlled    Active Ambulatory Problems     Diagnosis Date Noted    Hyperlipidemia     Gastroesophageal reflux disease     Seizure disorder     Chronic cough 03/08/2016    Anemia 03/10/2016    DDD (degenerative disc disease), lumbar 06/15/2016    Lumbosacral radiculopathy 06/15/2016    Right hip pain 06/15/2016    Coronary artery disease involving native coronary artery of native heart without angina pectoris 10/17/2016    Essential hypertension 10/17/2016    Chronic obstructive pulmonary disease 10/17/2016    History of stroke 10/17/2016    Depression, major, recurrent, mild 10/17/2016    Insomnia 10/17/2016    Pulmonary hypertension 10/17/2016    Osteopenia 10/17/2016     Closed Colles' fracture of right radius 04/26/2017     Resolved Ambulatory Problems     Diagnosis Date Noted    Chronic kidney disease, stage II (mild) 12/26/2012    Dysphagia 10/08/2014    Cervical radiculopathy 02/02/2015    UTI (urinary tract infection) 01/31/2016    Elevated lactic acid level 01/31/2016    Abdominal pain 01/31/2016    Cervical spondylosis with myelopathy 03/07/2016    Shortness of breath 03/08/2016    Obesity, Class I, BMI 30-34.9 03/08/2016    Hypokalemia 03/09/2016     Past Medical History:   Diagnosis Date    Anticoagulant long-term use     Arthritis     Asthma     Atrial premature beats     Benign neoplasm of adrenal gland     Colon polyp     COPD (chronic obstructive pulmonary disease)     Coronary artery disease     Depression     Dizziness     First degree AV block     Gastroesophageal reflux disease     General anesthetics causing adverse effect in therapeutic use     Hyperlipidemia     Hypertension     Impaired mobility     Neck pain     Schatzki's ring     Seasonal allergies     Seizures     Stroke     Trouble in sleeping     Wears dentures     Wears glasses      Current Outpatient Prescriptions on File Prior to Visit   Medication Sig Dispense Refill    amlodipine (NORVASC) 5 MG tablet TAKE 1 TABLET (5 MG TOTAL) BY MOUTH ONCE DAILY. 90 tablet 1    benzocaine-menthol 15-2.6 mg Lozg Take 1 lozenge by mouth 3 (three) times daily.       carvedilol (COREG) 6.25 MG tablet TAKE 1 TABLET (6.25 MG TOTAL) BY MOUTH 2 (TWO) TIMES DAILY WITH MEALS. 180 tablet 1    diclofenac sodium (VOLTAREN) 1 % Gel Apply 2 g topically once daily. (Patient taking differently: Apply 2 g topically once daily. ) 300 g 5    diphenhydrAMINE (BENADRYL) 25 mg capsule Take 25 mg by mouth every 6 (six) hours as needed for Itching.      donepezil (ARICEPT) 10 MG tablet Take 10 mg by mouth every morning.       duloxetine (CYMBALTA) 60 MG capsule Take 1 capsule (60 mg total) by mouth  once daily. 90 capsule 1    gabapentin (NEURONTIN) 300 MG capsule TAKE 1 CAPSULE THREE TIMES DAILY 270 capsule 1    hydrochlorothiazide (HYDRODIURIL) 25 MG tablet TAKE 1 TABLET (25 MG TOTAL) BY MOUTH ONCE DAILY. 90 tablet 3    hydrocodone-acetaminophen 10-325mg (NORCO)  mg Tab Take 1 tablet by mouth 3 (three) times daily as needed. 90 tablet 0    hydrOXYzine pamoate (VISTARIL) 25 MG Cap Take 1 capsule (25 mg total) by mouth 2 (two) times daily as needed (for anxiety). 30 capsule 0    lamotrigine (LAMICTAL) 100 MG tablet Take 300 mg by mouth nightly.      lamotrigine (LAMICTAL) 100 MG tablet Take 200 mg by mouth every morning.      meclizine (ANTIVERT) 25 mg tablet Take 1 tablet (25 mg total) by mouth 3 (three) times daily as needed. (Patient taking differently: Take 25 mg by mouth 3 (three) times daily as needed for Dizziness. ) 15 tablet 0    neomycin-polymyxin-dexamethasone (DEXACINE) 3.5 mg/g-10,000 unit/g-0.1 % Oint Apply to affected area right upper lid 2 times daily 3.5 g 0    nitroGLYCERIN (NITROSTAT) 0.4 MG SL tablet Place 1 tablet (0.4 mg total) under the tongue every 5 (five) minutes as needed for Chest pain. For chest pain. Repeat in five minutes if pain persists. May repeat again, for total of 3 tablets.  Call 911 at the third tablet. 20 tablet 3    omeprazole (PRILOSEC) 40 MG capsule TAKE 1 CAPSULE (40 MG TOTAL) BY MOUTH ONCE DAILY. 90 capsule 3    ondansetron (ZOFRAN-ODT) 4 MG TbDL DISSOLVE 1 TABLET IN MOUTH EVERY 8 HOURS AS NEEDED 60 tablet 2    oxycodone-acetaminophen (PERCOCET) 5-325 mg per tablet Take 1 tablet by mouth every 6 (six) hours as needed for Pain. 10 tablet 0    oxycodone-acetaminophen (PERCOCET) 5-325 mg per tablet Take 1 tablet by mouth every 8 (eight) hours as needed for Pain. 30 tablet 0    pravastatin (PRAVACHOL) 40 MG tablet TAKE 1 TABLET (40 MG TOTAL) BY MOUTH ONCE DAILY. 90 tablet 3    ranitidine (ZANTAC) 300 MG tablet TAKE 1 TABLET EVERY EVENING 90 tablet 1     sertraline (ZOLOFT) 100 MG tablet Take 1 tablet (100 mg total) by mouth once daily. 90 tablet 1     No current facility-administered medications on file prior to visit.      Review of patient's allergies indicates:   Allergen Reactions    Codeine Shortness Of Breath     Tongue swelled    Nifedipine Anaphylaxis    Ace inhibitors      Other reaction(s): Angioedema    Ketorolac      Other reaction(s): tongue swell    Milk      Other reaction(s): Stomach upset    Tramadol      Other reaction(s): seizure         Review of Systems   Constitutional: Positive for fatigue. Negative for appetite change and unexpected weight change.   HENT: Negative for trouble swallowing and voice change.    Respiratory: Negative for shortness of breath.    Cardiovascular: Negative for chest pain and leg swelling.   Gastrointestinal: Negative for blood in stool, constipation and vomiting.   Genitourinary: Negative for dysuria.   Musculoskeletal: Positive for arthralgias, back pain, gait problem and neck pain.   Psychiatric/Behavioral: Negative for dysphoric mood and suicidal ideas. The patient is nervous/anxious.        Objective:      Physical Exam   Constitutional: She is oriented to person, place, and time. She appears well-developed and well-nourished. No distress.   HENT:   Head: Normocephalic and atraumatic.   Right Ear: External ear normal.   Left Ear: External ear normal.   Mouth/Throat: No oropharyngeal exudate.   Eyes: No scleral icterus.   Neck:   As muscle tightness and decreased range of motion in the cervical and trapezius region. No noticeable change   Cardiovascular: Normal rate, regular rhythm and normal heart sounds.    Pulmonary/Chest: Effort normal and breath sounds normal. No respiratory distress.   Musculoskeletal:   Right hand reveals mild Dupuytren's  Contracture. Also there is a tight band between the base of the thumb and second finger that is tender   Neurological: She is alert and oriented to person,  place, and time.   Ambulatory with a walker   Skin: She is not diaphoretic.   Psychiatric: She has a normal mood and affect. Her behavior is normal.   Vitals reviewed.      Assessment:       1. Degenerative cervical disc    2. Pain    3. Essential hypertension    4. Chronic narcotic dependence    5. Frequent falls        Plan:       Summer was seen today for headache, nausea, dizziness, arm pain, insomnia and fall.    Diagnoses and all orders for this visit:    Degenerative cervical disc   stable see below    Pain  -     hydrocodone-acetaminophen 10-325mg (NORCO)  mg Tab; Take 1 tablet by mouth 3 (three) times daily as needed.    Essential hypertension   stable    Chronic narcotic dependence   stable, she should try to reduce her narcotic usage. Time to get off of the oxycodone that she was given for the fracture. I'll refill the hydrocodone but I need her to start limiting this over time at least attempt to    Other orders  -     alendronate (FOSAMAX) 70 MG tablet; Take 1 tablet (70 mg total) by mouth every 7 days.            right hand pain she has a classic but mild contraction of the fascia but has an unusual type band between the base of the first and second digits. Recommended that she may want to see a hand specialist because this causes pain when using a cane or walker. But she declined to let me arrange that now. I've also recommended physical therapy help improve gait and balance. States that she's willing to just doesn't know if she can get transportation or not. I've asked family to look into this and see what they can do. Home health can be considered but she's not completely homebound and I think they could do more for her at an outpatient clinic

## 2017-07-07 NOTE — TELEPHONE ENCOUNTER
Spoke to on call provider who agreed to prescribe naproxen 500 mg bid prn. Called in to [facility] pharmacy. Advised patient.    Noted that naproxen comes up as a contraindication with ketoralac allergy but spoke to on call MD who recommended if she has taken aleve before without difficulty she could take naproxen. Daughter states that she has taken it before.

## 2017-07-07 NOTE — TELEPHONE ENCOUNTER
"    Reason for Disposition   [1] Request for URGENT new prescription or refill of "essential" medication (i.e., likelihood of harm to patient if not taken) AND [2] triager unable to fill per unit policy    Protocols used: ST MEDICATION QUESTION CALL-A-AH    Daughter is calling on patient's behalf stating her mother was supposed to receive a refill on pain medication from Dr. Wilson. Was going to see if on call MD could reach Dr Wilson regarding this patient he saw today. Attempted 3x to reach on call provider without success. Sent a message to Dr. Wilson as well through epic. Advised daughter I will call her if I hear anything from on call MD over the next hour. She would like prescription sent to local pharmacy listed in chart. Please contact caller with any further care advice.   "

## 2017-07-27 ENCOUNTER — PATIENT MESSAGE (OUTPATIENT)
Dept: FAMILY MEDICINE | Facility: CLINIC | Age: 82
End: 2017-07-27

## 2017-07-27 DIAGNOSIS — M79.641 BILATERAL HAND PAIN: Primary | ICD-10-CM

## 2017-07-27 DIAGNOSIS — M79.642 BILATERAL HAND PAIN: Primary | ICD-10-CM

## 2017-08-04 DIAGNOSIS — R52 PAIN: ICD-10-CM

## 2017-08-04 RX ORDER — HYDROCODONE BITARTRATE AND ACETAMINOPHEN 10; 325 MG/1; MG/1
1 TABLET ORAL 3 TIMES DAILY PRN
Qty: 90 TABLET | Refills: 0 | Status: SHIPPED | OUTPATIENT
Start: 2017-08-04 | End: 2017-09-06 | Stop reason: SDUPTHER

## 2017-08-04 NOTE — TELEPHONE ENCOUNTER
----- Message from Kacy Ximena sent at 8/4/2017 12:38 PM CDT -----  Refill on pain medication.  [facility].  Call patient when called in at [phone].

## 2017-09-05 DIAGNOSIS — R52 PAIN: ICD-10-CM

## 2017-09-05 NOTE — TELEPHONE ENCOUNTER
----- Message from Israel Morgan sent at 9/5/2017  9:20 AM CDT -----  Contact: same  Patient called in and has requested a refill on her Rx [place] 10/325.      [facility] [place] - [address]  [address]  [place] [address]  Phone: [phone] Fax: [phone]    Patient call back number is [phone]

## 2017-09-06 RX ORDER — HYDROCODONE BITARTRATE AND ACETAMINOPHEN 10; 325 MG/1; MG/1
1 TABLET ORAL 3 TIMES DAILY PRN
Qty: 90 TABLET | Refills: 0 | Status: SHIPPED | OUTPATIENT
Start: 2017-09-06 | End: 2017-12-01 | Stop reason: SDUPTHER

## 2017-09-06 NOTE — TELEPHONE ENCOUNTER
----- Message from Cynthia Rojas sent at 9/6/2017 10:20 AM CDT -----  Contact: Patient  Patient states that she left a previous message about a refill for her medication.  It's for the hydrocodone-acetaminophen 10-325mg (NORCO)  mg Tablets.  Can you please look in to this matter and if any questions, please call [phone].  Thank you      [facility] [place]  [address]  [address]  [place] [address]  Phone: [phone] Fax: [phone]

## 2017-09-25 ENCOUNTER — PATIENT OUTREACH (OUTPATIENT)
Dept: ADMINISTRATIVE | Facility: HOSPITAL | Age: 82
End: 2017-09-25

## 2017-09-25 NOTE — LETTER
October 2, 2017    Summer Inman  [address] [facility] [address]             [facility]  [address]  [facility] [address]  Phone: [phone] Dear Mrs. Inman:    We have tried to reach you by mychart unsuccessfully.    [facility] is committed to your overall health.  To help you get the most out of each of your visits, we will review your information to make sure you are up to date on all of your recommended tests and/or procedures.       Dr. Ludwig Wilson has found that you may be due for your fasting cholesterol labs and possibly shingles and flu immunizations.     Medicare does not cover all immunizations to be given in the clinic.  Check your benefits to ensure that you do not need to receive your immunizations at the pharmacy.     If you have had any of the above done at another facility, please bring the records or information with you so that your record at [facility] will be complete.  If you would like to schedule any of these, please contact me.     If you are currently taking medication, please bring it with you to your appointment for review.     Also, if you have any type of Advanced Directives, please bring them with you to your office visit so we may scan them into your chart.     If you have any questions or concerns, please don't hesitate to call.    Thank you for letting us care for you,  Stephenie Culver LPN Clinical Care Coordinator  [facility] [place]  [phone]

## 2017-10-05 DIAGNOSIS — R52 PAIN: ICD-10-CM

## 2017-10-05 RX ORDER — HYDROCODONE BITARTRATE AND ACETAMINOPHEN 10; 325 MG/1; MG/1
1 TABLET ORAL 3 TIMES DAILY PRN
Qty: 90 TABLET | Refills: 0 | Status: SHIPPED | OUTPATIENT
Start: 2017-10-05 | End: 2017-11-03 | Stop reason: SDUPTHER

## 2017-10-05 NOTE — TELEPHONE ENCOUNTER
----- Message from Afua Laughlin sent at 10/5/2017 11:54 AM CDT -----  Contact: self   Patient need a refill for rx hydrocodone-acetaminophen 10-325mg (NORCO)  mg Tab, please send to [facility],any questions please call back at [phone]      [facility] [place] - [address]  [address]  [place] [address]  Phone: [phone] Fax: [phone]

## 2017-10-11 ENCOUNTER — OFFICE VISIT (OUTPATIENT)
Dept: FAMILY MEDICINE | Facility: CLINIC | Age: 82
End: 2017-10-11
Payer: MEDICARE

## 2017-10-11 ENCOUNTER — LAB VISIT (OUTPATIENT)
Dept: LAB | Facility: HOSPITAL | Age: 82
End: 2017-10-11
Attending: FAMILY MEDICINE
Payer: MEDICARE

## 2017-10-11 VITALS
HEIGHT: 60 IN | WEIGHT: 154 LBS | TEMPERATURE: 98 F | BODY MASS INDEX: 30.23 KG/M2 | SYSTOLIC BLOOD PRESSURE: 126 MMHG | OXYGEN SATURATION: 95 % | HEART RATE: 74 BPM | DIASTOLIC BLOOD PRESSURE: 70 MMHG

## 2017-10-11 DIAGNOSIS — F33.0 DEPRESSION, MAJOR, RECURRENT, MILD: ICD-10-CM

## 2017-10-11 DIAGNOSIS — M25.50 MULTIPLE JOINT PAIN: ICD-10-CM

## 2017-10-11 DIAGNOSIS — M79.10 MYALGIA: Primary | ICD-10-CM

## 2017-10-11 DIAGNOSIS — G47.00 INSOMNIA, UNSPECIFIED TYPE: ICD-10-CM

## 2017-10-11 DIAGNOSIS — I10 ESSENTIAL HYPERTENSION: ICD-10-CM

## 2017-10-11 DIAGNOSIS — I25.10 CORONARY ARTERY DISEASE INVOLVING NATIVE CORONARY ARTERY OF NATIVE HEART WITHOUT ANGINA PECTORIS: ICD-10-CM

## 2017-10-11 DIAGNOSIS — F11.20 CHRONIC NARCOTIC DEPENDENCE: ICD-10-CM

## 2017-10-11 DIAGNOSIS — M54.9 BACK PAIN, UNSPECIFIED BACK LOCATION, UNSPECIFIED BACK PAIN LATERALITY, UNSPECIFIED CHRONICITY: ICD-10-CM

## 2017-10-11 DIAGNOSIS — M50.30 DDD (DEGENERATIVE DISC DISEASE), CERVICAL: ICD-10-CM

## 2017-10-11 DIAGNOSIS — Z86.73 HISTORY OF STROKE: ICD-10-CM

## 2017-10-11 PROCEDURE — 99999 PR PBB SHADOW E&M-EST. PATIENT-LVL III: CPT | Mod: PBBFAC,,, | Performed by: FAMILY MEDICINE

## 2017-10-11 PROCEDURE — 99499 UNLISTED E&M SERVICE: CPT | Mod: S$GLB,,, | Performed by: FAMILY MEDICINE

## 2017-10-11 PROCEDURE — 99215 OFFICE O/P EST HI 40 MIN: CPT | Mod: S$GLB,,, | Performed by: FAMILY MEDICINE

## 2017-10-11 RX ORDER — NITROGLYCERIN 0.4 MG/1
0.4 TABLET SUBLINGUAL EVERY 5 MIN PRN
Qty: 20 TABLET | Refills: 3 | Status: SHIPPED | OUTPATIENT
Start: 2017-10-11 | End: 2022-08-11 | Stop reason: SDUPTHER

## 2017-10-11 NOTE — PROGRESS NOTES
Subjective:     THIS DOCUMENT WAS MADE IN PART WITH [employer] DICTATION SOFTWARE. OCCASIONALLY THIS SOFTWARE MAY MISINTERPRET WORDS OR PHRASES.     Patient ID: Summer Inman is a 81 y.o. female.    Chief Complaint: Follow-up (3 month follow up) and Flank Pain (pt c/o kidney pain, pt seems slightly confused)    HPI     Feeling bad, 'I just took sick'  Tired, not sleeping, chills, not sure about fever  Constipation, nausea  Worsening low back pain  'skin hurts', muscles hurt   No chest pain. No shortness of breath. No headache. No seizures.     She has multiple chronic problems that will be addressed below. But it's important to mention that after I had spoken to her for about 10 minutes she suddenly stated oh yes and I have been taking in my medications for two weeks. She can explain why. She just stopped taking them. Her son was apparently surprised at this. Apparently she was taking her pain medication but nothing else.     She has issued a seizure disorder. No recent seizures but she stopped her medicine two weeks ago     hypertension, even though she has been off medication her blood pressure today is satisfactory     chronic cough and COPD, occasional cough, no acute dyspnea or wheezing     coronary artery disease. She does state she has occasional angina but not in the last week. Last took Ntg about 10 days ago and it worked well within a few minutes. She does not know when she last saw cardiology    Active Ambulatory Problems     Diagnosis Date Noted    Hyperlipidemia     Gastroesophageal reflux disease     Seizure disorder     Chronic cough 03/08/2016    Anemia 03/10/2016    DDD (degenerative disc disease), lumbar 06/15/2016    Lumbosacral radiculopathy 06/15/2016    Right hip pain 06/15/2016    Coronary artery disease involving native coronary artery of native heart without angina pectoris 10/17/2016    Essential hypertension 10/17/2016    Chronic obstructive pulmonary disease 10/17/2016    History  of stroke 10/17/2016    Depression, major, recurrent, mild 10/17/2016    Insomnia 10/17/2016    Pulmonary hypertension 10/17/2016    Osteopenia 10/17/2016    Closed Colles' fracture of right radius 04/26/2017     Resolved Ambulatory Problems     Diagnosis Date Noted    Chronic kidney disease, stage II (mild) 12/26/2012    Dysphagia 10/08/2014    Cervical radiculopathy 02/02/2015    UTI (urinary tract infection) 01/31/2016    Elevated lactic acid level 01/31/2016    Abdominal pain 01/31/2016    Cervical spondylosis with myelopathy 03/07/2016    Shortness of breath 03/08/2016    Obesity, Class I, BMI 30-34.9 03/08/2016    Hypokalemia 03/09/2016     Past Medical History:   Diagnosis Date    Anticoagulant long-term use     Arthritis     Asthma     Atrial premature beats     Benign neoplasm of adrenal gland     Colon polyp     COPD (chronic obstructive pulmonary disease)     Coronary artery disease     Depression     Dizziness     First degree AV block     Gastroesophageal reflux disease     General anesthetics causing adverse effect in therapeutic use     Hyperlipidemia     Hypertension     Impaired mobility     Neck pain     Schatzki's ring     Seasonal allergies     Seizures     Stroke     Trouble in sleeping     Wears dentures     Wears glasses      Current Outpatient Prescriptions on File Prior to Visit   Medication Sig Dispense Refill    alendronate (FOSAMAX) 70 MG tablet Take 1 tablet (70 mg total) by mouth every 7 days. 4 tablet 11    amlodipine (NORVASC) 5 MG tablet TAKE 1 TABLET (5 MG TOTAL) BY MOUTH ONCE DAILY. 90 tablet 1    benzocaine-menthol 15-2.6 mg Lozg Take 1 lozenge by mouth 3 (three) times daily.       carvedilol (COREG) 6.25 MG tablet TAKE 1 TABLET (6.25 MG TOTAL) BY MOUTH 2 (TWO) TIMES DAILY WITH MEALS. 180 tablet 1    diclofenac sodium (VOLTAREN) 1 % Gel Apply 2 g topically once daily. (Patient taking differently: Apply 2 g topically once daily. ) 300 g 5     diphenhydrAMINE (BENADRYL) 25 mg capsule Take 25 mg by mouth every 6 (six) hours as needed for Itching.      donepezil (ARICEPT) 10 MG tablet Take 10 mg by mouth every morning.       duloxetine (CYMBALTA) 60 MG capsule Take 1 capsule (60 mg total) by mouth once daily. 90 capsule 1    gabapentin (NEURONTIN) 300 MG capsule TAKE 1 CAPSULE THREE TIMES DAILY 270 capsule 1    hydrochlorothiazide (HYDRODIURIL) 25 MG tablet TAKE 1 TABLET (25 MG TOTAL) BY MOUTH ONCE DAILY. 90 tablet 3    hydrocodone-acetaminophen 10-325mg (NORCO)  mg Tab Take 1 tablet by mouth 3 (three) times daily as needed. 90 tablet 0    hydrOXYzine pamoate (VISTARIL) 25 MG Cap Take 1 capsule (25 mg total) by mouth 2 (two) times daily as needed (for anxiety). 30 capsule 0    lamotrigine (LAMICTAL) 100 MG tablet Take 300 mg by mouth nightly.      meclizine (ANTIVERT) 25 mg tablet Take 1 tablet (25 mg total) by mouth 3 (three) times daily as needed. (Patient taking differently: Take 25 mg by mouth 3 (three) times daily as needed for Dizziness. ) 15 tablet 0    neomycin-polymyxin-dexamethasone (DEXACINE) 3.5 mg/g-10,000 unit/g-0.1 % Oint Apply to affected area right upper lid 2 times daily 3.5 g 0    omeprazole (PRILOSEC) 40 MG capsule TAKE 1 CAPSULE (40 MG TOTAL) BY MOUTH ONCE DAILY. 90 capsule 3    ondansetron (ZOFRAN-ODT) 4 MG TbDL DISSOLVE 1 TABLET IN MOUTH EVERY 8 HOURS AS NEEDED 60 tablet 2    pravastatin (PRAVACHOL) 40 MG tablet TAKE 1 TABLET (40 MG TOTAL) BY MOUTH ONCE DAILY. 90 tablet 3    ranitidine (ZANTAC) 300 MG tablet TAKE 1 TABLET EVERY EVENING 90 tablet 1    sertraline (ZOLOFT) 100 MG tablet Take 1 tablet (100 mg total) by mouth once daily. 90 tablet 1    [DISCONTINUED] nitroGLYCERIN (NITROSTAT) 0.4 MG SL tablet Place 1 tablet (0.4 mg total) under the tongue every 5 (five) minutes as needed for Chest pain. For chest pain. Repeat in five minutes if pain persists. May repeat again, for total of 3 tablets.  Call 911 at  the third tablet. 20 tablet 3    hydrocodone-acetaminophen 10-325mg (NORCO)  mg Tab Take 1 tablet by mouth 3 (three) times daily as needed. 90 tablet 0    lamotrigine (LAMICTAL) 100 MG tablet Take 200 mg by mouth every morning.      naproxen (EC NAPROSYN) 500 MG EC tablet Take 1 tablet by mouth twice daily as needed for pain. 30 tablet 0    oxycodone-acetaminophen (PERCOCET) 5-325 mg per tablet Take 1 tablet by mouth every 8 (eight) hours as needed for Pain. 30 tablet 0    [DISCONTINUED] oxycodone-acetaminophen (PERCOCET) 5-325 mg per tablet Take 1 tablet by mouth every 6 (six) hours as needed for Pain. 10 tablet 0     No current facility-administered medications on file prior to visit.      Review of patient's allergies indicates:   Allergen Reactions    Codeine Shortness Of Breath     Tongue swelled    Nifedipine Anaphylaxis    Ace inhibitors      Other reaction(s): Angioedema    Ketorolac      Other reaction(s): tongue swell    Milk      Other reaction(s): Stomach upset    Tramadol      Other reaction(s): seizure     Social History   Substance Use Topics    Smoking status: Former Smoker     Packs/day: 0.50     Years: 19.00     Types: Cigarettes     Start date: 3/12/1984     Quit date: 10/8/2013    Smokeless tobacco: Never Used    Alcohol use No         Review of Systems   Constitutional: Positive for chills and fatigue. Negative for appetite change, fever and unexpected weight change.   HENT: Negative for congestion, sore throat, trouble swallowing and voice change.    Eyes: Negative for visual disturbance ( no acute visual changes).   Respiratory: Negative for shortness of breath.    Cardiovascular: Positive for chest pain ( none recently, see above). Negative for leg swelling.   Gastrointestinal: Negative for blood in stool, constipation and vomiting.   Genitourinary: Positive for urgency. Negative for dysuria, frequency and hematuria.   Musculoskeletal: Positive for arthralgias, back pain,  gait problem, myalgias, neck pain and neck stiffness.   Neurological: Positive for weakness, light-headedness and headaches. Negative for seizures and speech difficulty.   Psychiatric/Behavioral: Negative for dysphoric mood and suicidal ideas. The patient is nervous/anxious.        Objective:      Physical Exam   Constitutional: She is oriented to person, place, and time.  Non-toxic appearance. No distress.   Elderly  female. She's in a wheelchair, she's awake and alert. She is not distressed   HENT:   Head: Normocephalic and atraumatic.   Right Ear: Tympanic membrane, external ear and ear canal normal.   Left Ear: Tympanic membrane, external ear and ear canal normal.   Nose: Nose normal.   Mouth/Throat: Oropharynx is clear and moist. No oropharyngeal exudate.   Eyes: Conjunctivae and EOM are normal. Pupils are equal, round, and reactive to light. No scleral icterus.   Neck: Normal range of motion. Neck supple. No JVD present. No tracheal deviation present. No thyromegaly present.   Cardiovascular: Normal rate, regular rhythm and intact distal pulses.  PMI is not displaced.  Exam reveals no gallop and no friction rub.    Murmur heard.  Pulmonary/Chest: Effort normal and breath sounds normal. No respiratory distress. She has no wheezes. She has no rales.   Abdominal: Soft. Bowel sounds are normal. She exhibits no distension and no mass. There is no tenderness. There is no rebound and no guarding.   Musculoskeletal:    There is some mild tenderness with palpation over the shoulders upper arms hips and thighs.  No joint redness or swelling   Lymphadenopathy:     She has no cervical adenopathy.   Neurological: She is alert and oriented to person, place, and time. She displays normal reflexes. No cranial nerve deficit. She exhibits normal muscle tone.   Skin: Skin is dry. No rash noted. She is not diaphoretic. No pallor.   Psychiatric: She has a normal mood and affect. Her behavior is normal.   Vitals  reviewed.      Assessment:       1. Myalgia    2. Multiple joint pain    3. Back pain, unspecified back location, unspecified back pain laterality, unspecified chronicity    4. Essential hypertension    5. Chronic narcotic dependence    6. Coronary artery disease involving native coronary artery of native heart without angina pectoris    7. DDD (degenerative disc disease), cervical    8. History of stroke    9. Insomnia, unspecified type    10. Depression, major, recurrent, mild        Plan:       Summer was seen today for follow-up and flank pain.    Diagnoses and all orders for this visit:    Myalgia  -     C-reactive protein; Future  -     Sedimentation rate, manual; Future  -     TSH; Future  -     CBC auto differential; Future    Multiple joint pain  -     C-reactive protein; Future  -     Sedimentation rate, manual; Future  -     TSH; Future  -     CBC auto differential; Future    Back pain, unspecified back location, unspecified back pain laterality, unspecified chronicity  -     Urinalysis; Future  -     Urine culture; Future  -     Urinalysis Microscopic; Future    Essential hypertension   satisfactory today even though she's off her medication. But will hold HCTZ and monitor blood pressure at home. She should resume or other blood pressure medications and cardiac medications    Chronic narcotic dependence   stable because of possible forgetfulness and medication noncompliance I have asked her son to help her carefully with her pain medicine as well as all other medications    Coronary artery disease involving native coronary artery of native heart without angina pectoris   she has had some occasional angina that has resolved with sublingual and glycerin. No signs of unstable symptoms but she should monitor and follow with her cardiologist    DDD (degenerative disc disease), cervical   as above    History of stroke    Insomnia, unspecified type   careful with medications, some will help  supervise    Depression, major, recurrent, mild   she's been off Cymbalta and sertraline for two weeks and I think some of her symptoms or withdrawal. I do not want her to restart the search at this point. But she should restart the Cymbalta which will help with depression and may also help with her chronic pains    Other orders  -     nitroGLYCERIN (NITROSTAT) 0.4 MG SL tablet; Place 1 tablet (0.4 mg total) under the tongue every 5 (five) minutes as needed for Chest pain. For chest pain. Repeat in five minutes if pain persists. May repeat again, for total of 3 tablets.  Call 911 at the third tablet.           Stop sertraline  Hold HCTZ   follow back in two weeks

## 2017-10-12 ENCOUNTER — TELEPHONE (OUTPATIENT)
Dept: FAMILY MEDICINE | Facility: CLINIC | Age: 82
End: 2017-10-12

## 2017-10-12 DIAGNOSIS — E87.6 HYPOKALEMIA: Primary | ICD-10-CM

## 2017-10-12 RX ORDER — POTASSIUM CHLORIDE 750 MG/1
TABLET, EXTENDED RELEASE ORAL
Qty: 20 TABLET | Refills: 1 | Status: SHIPPED | OUTPATIENT
Start: 2017-10-12 | End: 2018-11-13

## 2017-10-12 NOTE — TELEPHONE ENCOUNTER
Discussed with pt's son, he states pt is same as yesterday, he will  RX for pt and have labs done early next week.

## 2017-10-12 NOTE — TELEPHONE ENCOUNTER
Received call from Summer with [facility] lab with CV for pt's labs.     K+ 2.7. Dr. Wilson notified immediately.

## 2017-10-13 ENCOUNTER — TELEPHONE (OUTPATIENT)
Dept: FAMILY MEDICINE | Facility: CLINIC | Age: 82
End: 2017-10-13

## 2017-10-13 RX ORDER — PREDNISONE 5 MG/1
15 TABLET ORAL DAILY
Qty: 30 TABLET | Refills: 0 | Status: SHIPPED | OUTPATIENT
Start: 2017-10-13 | End: 2017-10-23

## 2017-10-13 NOTE — TELEPHONE ENCOUNTER
Please call her son or a responsible family member with lab results.     The patient's inflammation levels are very high. I'm concerned about a condition called polymyalgia rheumatica.     The treatment consists of prednisone. I want her to start this immediately. Ultimately I will need to get her in to see  Rheumatology but I don't want to delay starting treatment.     I sent in a prescription for prednisone 5 mg, she will take three pills daily ( 15 mg daily) at least for the first week or so.     If she feels almost immediately better than this is highly suggested PMR. I want her to update me Monday or Tuesday with her response.     It is very important that she start the prednisone right away.     Please let me know when you spoke to the family so I know she is starting.

## 2017-10-13 NOTE — TELEPHONE ENCOUNTER
Spoke w/ pts daughter, raul. Informed jasen about the medication sent to pharmacy and that this needs to be started ASAP. Also that the medication needed to be taken 3 times daily. Jasen understood and verbalized that she was picking up the medication in just a moment, and giving pt the first dose upon getting home.    Informed jasen about pts inflammation noted on lab work and informed her about possible PMR. Then further explained what PMR was, and that the pts pcp was going to place a referral for rheumatology, but this will wait until possibly Monday or Tuesday.     Jasen was then informed to call office back on Monday to let us know if pt is doing the same or is she is doing better. Jasen stated that she will definitely call us on Monday and if she forgot she will take a call from us later in the afternoon.    Jasen then requested that I send her the name of the possible diagnosis. Informed jasen that I would along with some information I found to simplify the medical term into layman's terms. She then stated understanding and that she will check on the message later and more into PMR.

## 2017-10-13 NOTE — TELEPHONE ENCOUNTER
Tried to reach pt's family. No answer, left msg to call back.    Tried both numbers list for family members. Will try again later before leaving this afternoon.

## 2017-10-16 NOTE — TELEPHONE ENCOUNTER
Spoke w/ jasen. Jasen stated that pt was not doing well yesterday, but will check later today and give a call back.

## 2017-10-17 ENCOUNTER — TELEPHONE (OUTPATIENT)
Dept: FAMILY MEDICINE | Facility: CLINIC | Age: 82
End: 2017-10-17

## 2017-10-17 ENCOUNTER — OFFICE VISIT (OUTPATIENT)
Dept: FAMILY MEDICINE | Facility: CLINIC | Age: 82
End: 2017-10-17
Payer: MEDICARE

## 2017-10-17 VITALS
WEIGHT: 161.69 LBS | DIASTOLIC BLOOD PRESSURE: 72 MMHG | SYSTOLIC BLOOD PRESSURE: 120 MMHG | BODY MASS INDEX: 31.74 KG/M2 | TEMPERATURE: 98 F | HEIGHT: 60 IN | HEART RATE: 80 BPM

## 2017-10-17 DIAGNOSIS — R89.9 ABNORMAL LABORATORY TEST: ICD-10-CM

## 2017-10-17 DIAGNOSIS — R11.2 NAUSEA AND VOMITING, INTRACTABILITY OF VOMITING NOT SPECIFIED, UNSPECIFIED VOMITING TYPE: ICD-10-CM

## 2017-10-17 DIAGNOSIS — R30.0 DYSURIA: Primary | ICD-10-CM

## 2017-10-17 DIAGNOSIS — R10.84 GENERALIZED ABDOMINAL PAIN: ICD-10-CM

## 2017-10-17 DIAGNOSIS — R82.81 PYURIA: ICD-10-CM

## 2017-10-17 LAB
BILIRUB SERPL-MCNC: ABNORMAL MG/DL
BLOOD URINE, POC: ABNORMAL
COLOR, POC UA: YELLOW
GLUCOSE UR QL STRIP: NORMAL
KETONES UR QL STRIP: NEGATIVE
LEUKOCYTE ESTERASE URINE, POC: ABNORMAL
NITRITE, POC UA: ABNORMAL
PH, POC UA: 5
PROTEIN, POC: ABNORMAL
SPECIFIC GRAVITY, POC UA: 1
UROBILINOGEN, POC UA: NORMAL

## 2017-10-17 PROCEDURE — 99214 OFFICE O/P EST MOD 30 MIN: CPT | Mod: 25,S$GLB,, | Performed by: PHYSICIAN ASSISTANT

## 2017-10-17 PROCEDURE — 87186 SC STD MICRODIL/AGAR DIL: CPT

## 2017-10-17 PROCEDURE — 87088 URINE BACTERIA CULTURE: CPT

## 2017-10-17 PROCEDURE — 99999 PR PBB SHADOW E&M-EST. PATIENT-LVL V: CPT | Mod: PBBFAC,,, | Performed by: PHYSICIAN ASSISTANT

## 2017-10-17 PROCEDURE — 87077 CULTURE AEROBIC IDENTIFY: CPT

## 2017-10-17 PROCEDURE — 87086 URINE CULTURE/COLONY COUNT: CPT

## 2017-10-17 PROCEDURE — 81001 URINALYSIS AUTO W/SCOPE: CPT | Mod: S$GLB,,, | Performed by: PHYSICIAN ASSISTANT

## 2017-10-17 NOTE — PROGRESS NOTES
Subjective:       Patient ID: Summer Inman is a 81 y.o. female.    Chief Complaint: Urinary Tract Infection    HPI   pt in for dysuria x 2 or 3 days  Pt also had abdominal pain x 6 wks  Constipation until 1 wk ago and started with diarrhea pt says stools are now partially formed x 4 daily  Eating very little with active nausea and vomiting with everything she ate until yesterday  Food make abd. Pain worse   Current pain level 9 on pain scale  Pt has past hx of bowel obstruction  Pt. Also has had worsening sx of arthralgias and myalgias all over  Recent rx with prednisone  Recent lab showed elevated ESR and CRP also with elevated liver enzymes   Review of Systems   Constitutional: Positive for activity change, appetite change, fatigue and unexpected weight change. Negative for chills, diaphoresis and fever.   HENT: Negative.    Eyes: Negative.    Respiratory: Negative.  Negative for cough, shortness of breath and wheezing.    Cardiovascular: Negative.  Negative for chest pain, palpitations and leg swelling.   Gastrointestinal: Positive for abdominal distention, abdominal pain, constipation, diarrhea, nausea and vomiting. Negative for anal bleeding, blood in stool and rectal pain.   Endocrine: Negative.    Genitourinary: Positive for dysuria, enuresis and flank pain. Negative for frequency, hematuria, pelvic pain and urgency.   Musculoskeletal: Positive for arthralgias and myalgias.   Skin: Negative.  Negative for rash.       Objective:      Physical Exam   Constitutional: She appears well-developed and well-nourished. No distress.   HENT:   Head: Normocephalic and atraumatic.   Right Ear: External ear normal.   Left Ear: External ear normal.   Nose: Nose normal.   Mouth/Throat: Oropharynx is clear and moist. No oropharyngeal exudate.   Sinuses non tender   Eyes: Conjunctivae are normal. No scleral icterus.   Neck: Normal range of motion. Neck supple. No tracheal deviation present. No thyromegaly present.    Cardiovascular: Normal rate, regular rhythm, normal heart sounds and intact distal pulses.  Exam reveals no gallop and no friction rub.    No murmur heard.  Pulmonary/Chest: Effort normal and breath sounds normal. No respiratory distress. She has no wheezes. She has no rales.   Abdominal: She exhibits distension. She exhibits no mass. There is tenderness. There is rebound and guarding.   Nearly absent bowel sounds  Very tender mid and upper abd.  Distended abd with 1 to 2 + rebound tenderness  Moderate R CVA tenderness     Musculoskeletal: She exhibits no edema.   Lymphadenopathy:     She has no cervical adenopathy.   Skin: Skin is warm and dry. No rash noted.   Vitals reviewed.      Assessment:       1. Dysuria    2. Generalized abdominal pain    3. Nausea and vomiting, intractability of vomiting not specified, unspecified vomiting type    4. Abnormal laboratory test    5. Pyuria        Plan:       Summer was seen today for urinary tract infection.    Diagnoses and all orders for this visit:    Dysuria  -     Urine culture  -     POCT urinalysis, dipstick or tablet reag    Generalized abdominal pain    Nausea and vomiting, intractability of vomiting not specified, unspecified vomiting type    UA shows 2 + leukocytes  Discussed Pt. With Dr márquez and we agree Pt. Has evidence of bowel obstruction or acute abd.  Pt has been sent to [facility] for further eval  Pt is with her son today that will transport her  [facility] ER has been notified

## 2017-10-17 NOTE — TELEPHONE ENCOUNTER
----- Message from Afua Laughlin sent at 10/17/2017 11:24 AM CDT -----  Contact: daughter Stefani   Daughter Stefain want to speak with the nurse regarding patient current condition. Please call back at [phone]

## 2017-10-17 NOTE — TELEPHONE ENCOUNTER
Spoke w/ pt daughter.    Pt is thinking better and her head feels less cloudy.    Pain in hip is slightly better, however pt is having pain in abd and thinks it might be related to a UTI.    Pt has an apt today to see QUEENIE Segura, at 1600.

## 2017-10-17 NOTE — TELEPHONE ENCOUNTER
Sounds good, have Mr. Segura notify me when she is here because I would like to discuss the case with him.

## 2017-10-19 ENCOUNTER — TELEPHONE (OUTPATIENT)
Dept: FAMILY MEDICINE | Facility: CLINIC | Age: 82
End: 2017-10-19

## 2017-10-19 DIAGNOSIS — N39.0 URINARY TRACT INFECTION WITHOUT HEMATURIA, SITE UNSPECIFIED: Primary | ICD-10-CM

## 2017-10-19 RX ORDER — CIPROFLOXACIN 500 MG/1
500 TABLET ORAL 2 TIMES DAILY
Qty: 20 TABLET | Refills: 0 | Status: SHIPPED | OUTPATIENT
Start: 2017-10-19 | End: 2018-02-26

## 2017-10-19 NOTE — TELEPHONE ENCOUNTER
Called pt and notified her the urine culture shows infection  Pt still has abd. Sx after ER eval  Pt to see Dr Pinto next week if not improved on cipro

## 2017-10-20 LAB — BACTERIA UR CULT: NORMAL

## 2017-10-24 RX ORDER — GABAPENTIN 300 MG/1
CAPSULE ORAL
Qty: 270 CAPSULE | Refills: 1 | Status: SHIPPED | OUTPATIENT
Start: 2017-10-24 | End: 2018-02-05 | Stop reason: SDUPTHER

## 2017-10-25 ENCOUNTER — TELEPHONE (OUTPATIENT)
Dept: FAMILY MEDICINE | Facility: CLINIC | Age: 82
End: 2017-10-25

## 2017-10-25 NOTE — TELEPHONE ENCOUNTER
----- Message from Rebekah Acevedo sent at 10/25/2017  2:41 PM CDT -----  Please call patient in regards to a appt on 11/07/17 for a follow up, patient was offered first available & declined, [phone] (home)

## 2017-11-03 DIAGNOSIS — R52 PAIN: ICD-10-CM

## 2017-11-03 RX ORDER — HYDROCODONE BITARTRATE AND ACETAMINOPHEN 10; 325 MG/1; MG/1
1 TABLET ORAL 3 TIMES DAILY PRN
Qty: 90 TABLET | Refills: 0 | Status: SHIPPED | OUTPATIENT
Start: 2017-11-03 | End: 2017-12-28 | Stop reason: SDUPTHER

## 2017-11-03 NOTE — TELEPHONE ENCOUNTER
----- Message from Ana De La Cruz sent at 11/3/2017  3:59 PM CDT -----  Contact: self  Patient needs to get refill on hydrocodone-acetaminophen 10-325mg (NORCO)  mg Tab        [facility] [place] - [address]  [place] [address]  Phone: [phone] Fax: [phone]

## 2017-11-03 NOTE — TELEPHONE ENCOUNTER
Last seen on: 10-11-17     Last refill: 10-5-17    Allergies: confirmed     Pharmacy: [facility]    Please review! Thank you!

## 2017-11-07 NOTE — TELEPHONE ENCOUNTER
Tried to reach pt. Not available. Left msg w/ pt's son Tyrone for her to call back to schedule her appt.

## 2017-11-07 NOTE — TELEPHONE ENCOUNTER
Tried to reach pt. No answer, left msg to call back.    Contacting to see how pt is doing. [phone] was dialed for call.

## 2017-11-07 NOTE — TELEPHONE ENCOUNTER
Spoke w/ Levon. Pt is dizzy, weak, and nauseated. BP is 129/79, P 66. Pt has been sleeping a lot. Pt has fall x2 w/o injury.    Advised  to monitor BP and if systolic get over 140 or diastolic gets over 100 pt needs to go directly to [facility].  repeated orders back and stated he will take her to [facility].     Pt has an appt w/ Colton JEROME on 11-9-17 @ 1600, and pt plans to keep appt unless she goes to [facility].

## 2017-11-07 NOTE — TELEPHONE ENCOUNTER
Tried to reach pt. No answer, left msg to call back.    Contacting to see about pt. Called [phone].

## 2017-11-07 NOTE — TELEPHONE ENCOUNTER
----- Message from Israel Morgan sent at 11/7/2017  1:34 PM CST -----  Contact: /Levon  Levon called in regarding the attached patient (wife) and wanted to see if Jose Segura had any appts this week?  Levon stated patient has been feeling weak & dizzy and if it was to get worse Levon stated he will bring patient to the ER .  Levon's call back number is [phone] or [phone]

## 2017-11-09 ENCOUNTER — TELEPHONE (OUTPATIENT)
Dept: FAMILY MEDICINE | Facility: CLINIC | Age: 82
End: 2017-11-09

## 2017-11-09 NOTE — TELEPHONE ENCOUNTER
Pt came to [facility] [place] and stated she had an appt with Jose Segura, and was unaware he was in [place].  Pt stated she had a swollen tongue and had been feeling dizzy.  Advised pt and pt's son to go to the urgent care down the street, both agreed.  Gave pt's son the address and directions to urgent care.  Stated they planned to go there upon leaving this facility.  Advised them to call us if they have any further questions or concerns.

## 2017-12-01 DIAGNOSIS — R52 PAIN: ICD-10-CM

## 2017-12-01 RX ORDER — HYDROCODONE BITARTRATE AND ACETAMINOPHEN 10; 325 MG/1; MG/1
1 TABLET ORAL 3 TIMES DAILY PRN
Qty: 90 TABLET | Refills: 0 | Status: SHIPPED | OUTPATIENT
Start: 2017-12-01 | End: 2018-02-26 | Stop reason: SDUPTHER

## 2017-12-01 NOTE — TELEPHONE ENCOUNTER
----- Message from Rebekah Acevedo sent at 12/1/2017  1:51 PM CST -----  hydrocodone-acetaminophen 10-325mg (NORCO)  mg Tab refill    [phone] (home)     [facility] - [place] - [address]  [address]  [place] [address]  Phone: [phone] Fax: [phone]

## 2017-12-04 ENCOUNTER — OFFICE VISIT (OUTPATIENT)
Dept: FAMILY MEDICINE | Facility: CLINIC | Age: 82
End: 2017-12-04
Payer: MEDICARE

## 2017-12-04 VITALS
HEIGHT: 60 IN | TEMPERATURE: 98 F | SYSTOLIC BLOOD PRESSURE: 132 MMHG | OXYGEN SATURATION: 97 % | HEART RATE: 68 BPM | WEIGHT: 155.19 LBS | BODY MASS INDEX: 30.47 KG/M2 | DIASTOLIC BLOOD PRESSURE: 78 MMHG

## 2017-12-04 DIAGNOSIS — M50.30 DDD (DEGENERATIVE DISC DISEASE), CERVICAL: ICD-10-CM

## 2017-12-04 DIAGNOSIS — I25.10 CORONARY ARTERY DISEASE INVOLVING NATIVE CORONARY ARTERY OF NATIVE HEART WITHOUT ANGINA PECTORIS: ICD-10-CM

## 2017-12-04 DIAGNOSIS — R19.4 CHANGE IN BOWEL HABITS: ICD-10-CM

## 2017-12-04 DIAGNOSIS — R10.13 EPIGASTRIC PAIN: ICD-10-CM

## 2017-12-04 DIAGNOSIS — R63.4 WEIGHT LOSS: ICD-10-CM

## 2017-12-04 DIAGNOSIS — K21.9 GASTROESOPHAGEAL REFLUX DISEASE WITHOUT ESOPHAGITIS: ICD-10-CM

## 2017-12-04 DIAGNOSIS — R52 PAIN: ICD-10-CM

## 2017-12-04 DIAGNOSIS — I10 ESSENTIAL HYPERTENSION: Primary | ICD-10-CM

## 2017-12-04 DIAGNOSIS — R70.0 ELEVATED SED RATE: ICD-10-CM

## 2017-12-04 PROCEDURE — 90662 IIV NO PRSV INCREASED AG IM: CPT | Mod: S$GLB,,, | Performed by: FAMILY MEDICINE

## 2017-12-04 PROCEDURE — 99499 UNLISTED E&M SERVICE: CPT | Mod: S$GLB,,, | Performed by: FAMILY MEDICINE

## 2017-12-04 PROCEDURE — 99999 PR PBB SHADOW E&M-EST. PATIENT-LVL IV: CPT | Mod: PBBFAC,,, | Performed by: FAMILY MEDICINE

## 2017-12-04 PROCEDURE — G0008 ADMIN INFLUENZA VIRUS VAC: HCPCS | Mod: S$GLB,,, | Performed by: FAMILY MEDICINE

## 2017-12-04 PROCEDURE — 99215 OFFICE O/P EST HI 40 MIN: CPT | Mod: S$GLB,,, | Performed by: FAMILY MEDICINE

## 2017-12-04 NOTE — PROGRESS NOTES
"Subjective:       Patient ID: Summer Inman is a 82 y.o. female.    Chief Complaint: Follow-up    HPI    Follow up several chronic conditions. Overall she reports she has improved since last seen.     She has long-standing hypertension which is stable and satisfactory. She has a history of coronary artery disease. She has no chest pains. She does have mild disc with exertion but this is stable.   She does report that she was advised I believe in the hospital to stop her HCTZ. She says after several weeks she began to cough and have some orthopnea and shortness of breath. She resorted the hard-core thiazide and symptoms are better. No orthopnea currently.    She does have chronic pain, known degenerative disc disease, she has been on chronic narcotics but stable. Recently had an increase in diffuse muscle pains, sedimentation rate was elevated. So I placed her on prednisone as a precaution. But she didn't appear to have any dramatic improvement in the family said she became "too mean" on the medicine and stopped it. History is somewhat sketchy but doesn't appear to be any dramatic improvement as would be expected in the case of PMR but is unclear how long she actually remained on the prednisone. Her son doesn't recall and she doesn't recall.     She still reports abdominal pain predominately bilateral lower but sometimes mid abdominal pain. She has occasional constipation which is helped by Miralax. But typically one or two doses of Miralax will cause a bowel movement followed by some loose stools so she doesn't take this regularly. She also has been continuing to gradually lose weight she reports decrease in appetite and abdominal symptoms exacerbated by eating. She said no vomiting recently. Transient nausea. No blood in her stools.    Active Ambulatory Problems     Diagnosis Date Noted    Hyperlipidemia     Gastroesophageal reflux disease     Seizure disorder     Chronic cough 03/08/2016    Anemia 03/10/2016 "    DDD (degenerative disc disease), lumbar 06/15/2016    Lumbosacral radiculopathy 06/15/2016    Right hip pain 06/15/2016    Coronary artery disease involving native coronary artery of native heart without angina pectoris 10/17/2016    Essential hypertension 10/17/2016    Chronic obstructive pulmonary disease 10/17/2016    History of stroke 10/17/2016    Depression, major, recurrent, mild 10/17/2016    Insomnia 10/17/2016    Pulmonary hypertension 10/17/2016    Osteopenia 10/17/2016    Closed Colles' fracture of right radius 04/26/2017     Resolved Ambulatory Problems     Diagnosis Date Noted    Chronic kidney disease, stage II (mild) 12/26/2012    Dysphagia 10/08/2014    Cervical radiculopathy 02/02/2015    UTI (urinary tract infection) 01/31/2016    Elevated lactic acid level 01/31/2016    Abdominal pain 01/31/2016    Cervical spondylosis with myelopathy 03/07/2016    Shortness of breath 03/08/2016    Obesity, Class I, BMI 30-34.9 03/08/2016    Hypokalemia 03/09/2016     Past Medical History:   Diagnosis Date    Anticoagulant long-term use     Arthritis     Asthma     Atrial premature beats     Benign neoplasm of adrenal gland     Colon polyp     COPD (chronic obstructive pulmonary disease)     Coronary artery disease     Depression     Dizziness     First degree AV block     Gastroesophageal reflux disease     General anesthetics causing adverse effect in therapeutic use     Hyperlipidemia     Hypertension     Impaired mobility     Neck pain     Schatzki's ring     Seasonal allergies     Seizures     Stroke     Trouble in sleeping     Wears dentures     Wears glasses      Current Outpatient Prescriptions on File Prior to Visit   Medication Sig Dispense Refill    alendronate (FOSAMAX) 70 MG tablet Take 1 tablet (70 mg total) by mouth every 7 days. 4 tablet 11    amlodipine (NORVASC) 5 MG tablet TAKE 1 TABLET (5 MG TOTAL) BY MOUTH ONCE DAILY. 90 tablet 1     carvedilol (COREG) 6.25 MG tablet TAKE 1 TABLET (6.25 MG TOTAL) BY MOUTH 2 (TWO) TIMES DAILY WITH MEALS. 180 tablet 1    diclofenac sodium (VOLTAREN) 1 % Gel Apply 2 g topically once daily. (Patient taking differently: Apply 2 g topically once daily. ) 300 g 5    diphenhydrAMINE (BENADRYL) 25 mg capsule Take 25 mg by mouth every 6 (six) hours as needed for Itching.      donepezil (ARICEPT) 10 MG tablet Take 10 mg by mouth every morning.       duloxetine (CYMBALTA) 60 MG capsule Take 1 capsule (60 mg total) by mouth once daily. 90 capsule 1    gabapentin (NEURONTIN) 300 MG capsule TAKE 1 CAPSULE THREE TIMES DAILY 270 capsule 1    hydrochlorothiazide (HYDRODIURIL) 25 MG tablet TAKE 1 TABLET (25 MG TOTAL) BY MOUTH ONCE DAILY. 90 tablet 3    hydrocodone-acetaminophen 10-325mg (NORCO)  mg Tab Take 1 tablet by mouth 3 (three) times daily as needed. 90 tablet 0    hydrocodone-acetaminophen 10-325mg (NORCO)  mg Tab Take 1 tablet by mouth 3 (three) times daily as needed. 90 tablet 0    hydrOXYzine pamoate (VISTARIL) 25 MG Cap Take 1 capsule (25 mg total) by mouth 2 (two) times daily as needed (for anxiety). 30 capsule 0    lamotrigine (LAMICTAL) 100 MG tablet Take 300 mg by mouth nightly.      lamotrigine (LAMICTAL) 100 MG tablet Take 200 mg by mouth every morning.      meclizine (ANTIVERT) 25 mg tablet Take 1 tablet (25 mg total) by mouth 3 (three) times daily as needed. (Patient taking differently: Take 25 mg by mouth 3 (three) times daily as needed for Dizziness. ) 15 tablet 0    naproxen (EC NAPROSYN) 500 MG EC tablet Take 1 tablet by mouth twice daily as needed for pain. 30 tablet 0    neomycin-polymyxin-dexamethasone (DEXACINE) 3.5 mg/g-10,000 unit/g-0.1 % Oint Apply to affected area right upper lid 2 times daily 3.5 g 0    nitroGLYCERIN (NITROSTAT) 0.4 MG SL tablet Place 1 tablet (0.4 mg total) under the tongue every 5 (five) minutes as needed for Chest pain. For chest pain. Repeat in five  minutes if pain persists. May repeat again, for total of 3 tablets.  Call 911 at the third tablet. 20 tablet 3    omeprazole (PRILOSEC) 40 MG capsule TAKE 1 CAPSULE (40 MG TOTAL) BY MOUTH ONCE DAILY. 90 capsule 3    ondansetron (ZOFRAN) 4 MG tablet Take 1 tablet (4 mg total) by mouth every 6 (six) hours as needed. 12 tablet 0    ondansetron (ZOFRAN-ODT) 4 MG TbDL DISSOLVE 1 TABLET IN MOUTH EVERY 8 HOURS AS NEEDED 60 tablet 2    oxycodone-acetaminophen (PERCOCET) 5-325 mg per tablet Take 1 tablet by mouth every 8 (eight) hours as needed for Pain. 30 tablet 0    polyethylene glycol (GLYCOLAX) 17 gram/dose powder Take 17 g by mouth once daily. 510 g 0    potassium chloride SA (K-DUR,KLOR-CON) 10 MEQ tablet Take 2 pills immediately.  Then take 1 pill every 8 hours for 3 days.  Then 1 pill twice a day for 3 days. 20 tablet 1    pravastatin (PRAVACHOL) 40 MG tablet TAKE 1 TABLET (40 MG TOTAL) BY MOUTH ONCE DAILY. 90 tablet 3    [DISCONTINUED] ranitidine (ZANTAC) 300 MG tablet TAKE 1 TABLET EVERY EVENING 90 tablet 1    benzocaine-menthol 15-2.6 mg Lozg Take 1 lozenge by mouth 3 (three) times daily.       ciprofloxacin HCl (CIPRO) 500 MG tablet Take 1 tablet (500 mg total) by mouth 2 (two) times daily. 20 tablet 0     No current facility-administered medications on file prior to visit.      Review of patient's allergies indicates:   Allergen Reactions    Codeine Shortness Of Breath     Tongue swelled    Nifedipine Anaphylaxis    Ace inhibitors      Other reaction(s): Angioedema    Ketorolac      Other reaction(s): tongue swell    Milk      Other reaction(s): Stomach upset    Tramadol      Other reaction(s): seizure     Social History   Substance Use Topics    Smoking status: Former Smoker     Packs/day: 0.50     Years: 19.00     Types: Cigarettes     Start date: 3/12/1984     Quit date: 10/8/2013    Smokeless tobacco: Never Used    Alcohol use No         Review of Systems   Constitutional: Positive for  activity change, appetite change, fatigue and unexpected weight change. Negative for fever.   HENT: Positive for trouble swallowing. Negative for voice change.    Respiratory: Positive for cough and shortness of breath.    Cardiovascular: Negative for chest pain and leg swelling.   Gastrointestinal: Positive for abdominal pain, constipation, diarrhea and nausea. Negative for blood in stool and vomiting.   Endocrine: Negative for polydipsia.   Genitourinary: Negative for dysuria and hematuria.   Musculoskeletal: Positive for arthralgias, back pain, gait problem, myalgias, neck pain and neck stiffness.   Neurological: Positive for weakness.   Psychiatric/Behavioral: Positive for dysphoric mood ( Although improved). Negative for suicidal ideas.       Objective:      Physical Exam   Constitutional: She is oriented to person, place, and time.  Non-toxic appearance. No distress.   Elderly  female. She's in a wheelchair, she's awake and alert. She is not distressed   HENT:   Head: Normocephalic and atraumatic.   Right Ear: Tympanic membrane, external ear and ear canal normal.   Left Ear: Tympanic membrane, external ear and ear canal normal.   Nose: Nose normal.   Mouth/Throat: Oropharynx is clear and moist. No oropharyngeal exudate.   Eyes: Conjunctivae and EOM are normal. Pupils are equal, round, and reactive to light. No scleral icterus.   Neck: Normal range of motion. Neck supple. No JVD present. No tracheal deviation present. No thyromegaly present.   Cardiovascular: Normal rate, regular rhythm and intact distal pulses.  PMI is not displaced.  Exam reveals no gallop and no friction rub.    Murmur heard.  Pulmonary/Chest: Effort normal and breath sounds normal. No respiratory distress. She has no wheezes. She has no rales.   Abdominal: Soft. Bowel sounds are normal. She exhibits no distension and no mass. There is no tenderness. There is no rebound and no guarding.   Musculoskeletal:   There is muscle tightness  and mild tenderness in the trapezius region and cervical paraspinous muscles. She's not tender with palpation over the upper arms, shoulders, or thighs. She is able to stand but requires using her hands to help push off the chair. She uses a walker.   Lymphadenopathy:     She has no cervical adenopathy.   Neurological: She is alert and oriented to person, place, and time. She displays normal reflexes. No cranial nerve deficit. She exhibits normal muscle tone.   Skin: Skin is dry. No rash noted. She is not diaphoretic. No pallor.   Psychiatric: She has a normal mood and affect. Her behavior is normal.   Vitals reviewed.      Assessment:       1. Essential hypertension    2. Elevated sed rate    3. DDD (degenerative disc disease), cervical    4. Pain    5. Epigastric pain    6. Weight loss    7. Gastroesophageal reflux disease without esophagitis    8. Coronary artery disease involving native coronary artery of native heart without angina pectoris    9. Change in bowel habits        Plan:       Summer was seen today for follow-up.    Diagnoses and all orders for this visit:    Essential hypertension  -     Comprehensive metabolic panel; Future  -     TSH; Future    Elevated sed rate  -     Sedimentation rate, manual; Future   reevaluate sedimentation rate. She did not appear to have a rapid dramatic improvement after I began prednisone but it's unclear how long she remained on this and history is unclear. If this remains elevated I may wish to try a short course of prednisone again or consider rheumatology consultation.    DDD (degenerative disc disease), cervical   Chronic, stable but with chronic pain, shall continue her hydrocodone and follow every three months    Pain   as above    Epigastric pain  -     Ambulatory referral to Gastroenterology    Weight loss  -     Ambulatory referral to Gastroenterology    Gastroesophageal reflux disease without esophagitis  -     ranitidine (ZANTAC) 300 MG tablet; Take 1 tablet  (300 mg total) by mouth every evening.    Coronary artery disease involving native coronary artery of native heart without angina pectoris   she's not having any angina    Change in bowel habits   possible chronic constipation with intermittent overflow diarrhea. Recommend that she take the Miralax every day and drink more water.   Reviewed her emergency room consultation and CT scan from a month ago, no acute intra-abdominal process was apparent  Other orders  -     Influenza - High Dose (65+) (PF) (IM)           miralax to daily  Increase water intake

## 2017-12-04 NOTE — TELEPHONE ENCOUNTER
Spoke with pt and advised her NORCO was called in to her pharmacy.  Also confirmed appt for today.

## 2017-12-11 ENCOUNTER — LAB VISIT (OUTPATIENT)
Dept: LAB | Facility: HOSPITAL | Age: 82
End: 2017-12-11
Attending: FAMILY MEDICINE
Payer: MEDICARE

## 2017-12-11 DIAGNOSIS — R70.0 ELEVATED SED RATE: ICD-10-CM

## 2017-12-11 DIAGNOSIS — I10 ESSENTIAL HYPERTENSION: ICD-10-CM

## 2017-12-11 DIAGNOSIS — E87.6 HYPOKALEMIA: ICD-10-CM

## 2017-12-11 LAB
ALBUMIN SERPL BCP-MCNC: 3.5 G/DL
ALP SERPL-CCNC: 135 U/L
ALT SERPL W/O P-5'-P-CCNC: 12 U/L
ANION GAP SERPL CALC-SCNC: 8 MMOL/L
ANION GAP SERPL CALC-SCNC: 8 MMOL/L
AST SERPL-CCNC: 16 U/L
BILIRUB SERPL-MCNC: 0.4 MG/DL
BUN SERPL-MCNC: 21 MG/DL
BUN SERPL-MCNC: 21 MG/DL
CALCIUM SERPL-MCNC: 10 MG/DL
CALCIUM SERPL-MCNC: 10 MG/DL
CHLORIDE SERPL-SCNC: 100 MMOL/L
CHLORIDE SERPL-SCNC: 100 MMOL/L
CO2 SERPL-SCNC: 30 MMOL/L
CO2 SERPL-SCNC: 30 MMOL/L
CREAT SERPL-MCNC: 1.2 MG/DL
CREAT SERPL-MCNC: 1.2 MG/DL
ERYTHROCYTE [SEDIMENTATION RATE] IN BLOOD BY WESTERGREN METHOD: 28 MM/HR
EST. GFR  (AFRICAN AMERICAN): 49 ML/MIN/1.73 M^2
EST. GFR  (AFRICAN AMERICAN): 49 ML/MIN/1.73 M^2
EST. GFR  (NON AFRICAN AMERICAN): 42.5 ML/MIN/1.73 M^2
EST. GFR  (NON AFRICAN AMERICAN): 42.5 ML/MIN/1.73 M^2
GLUCOSE SERPL-MCNC: 105 MG/DL
GLUCOSE SERPL-MCNC: 105 MG/DL
POTASSIUM SERPL-SCNC: 3.2 MMOL/L
POTASSIUM SERPL-SCNC: 3.2 MMOL/L
PROT SERPL-MCNC: 7.4 G/DL
SODIUM SERPL-SCNC: 138 MMOL/L
SODIUM SERPL-SCNC: 138 MMOL/L
TSH SERPL DL<=0.005 MIU/L-ACNC: 1.77 UIU/ML

## 2017-12-11 PROCEDURE — 85651 RBC SED RATE NONAUTOMATED: CPT | Mod: PO

## 2017-12-11 PROCEDURE — 84443 ASSAY THYROID STIM HORMONE: CPT

## 2017-12-11 PROCEDURE — 36415 COLL VENOUS BLD VENIPUNCTURE: CPT | Mod: PO

## 2017-12-11 PROCEDURE — 80053 COMPREHEN METABOLIC PANEL: CPT

## 2017-12-13 ENCOUNTER — TELEPHONE (OUTPATIENT)
Dept: FAMILY MEDICINE | Facility: CLINIC | Age: 82
End: 2017-12-13

## 2017-12-13 RX ORDER — HYDROCHLOROTHIAZIDE 25 MG/1
12.5 TABLET ORAL DAILY
Refills: 0
Start: 2017-12-13 | End: 2018-01-16 | Stop reason: SDUPTHER

## 2017-12-13 NOTE — TELEPHONE ENCOUNTER
Spoke with pt and stated advised of lab results per Dr. Wilson- potassium mildly low so increase dietary potassium.  Gave pt some suggestions of potassium rich foods, white neans, spinach, dark leafy greens, salmon, etc.  Pt verbalized understanding.  Also advised pt per Dr. Wilson to start taking only half of hydrochlorothiazide pill in the morning.  Currently taking 25 mg and to start taking half of the tablet in the morning.

## 2017-12-13 NOTE — TELEPHONE ENCOUNTER
Please call with lab results.  Her potassium is mildly low.  I recommend that she increase dietary potassium.  And I also recommend that she reduce the dose of her hydrochlorothiazide.  Currently on a 25 mg tablet.  I recommend she start taking one half in the morning.

## 2017-12-14 ENCOUNTER — TELEPHONE (OUTPATIENT)
Dept: FAMILY MEDICINE | Facility: CLINIC | Age: 82
End: 2017-12-14

## 2017-12-14 NOTE — TELEPHONE ENCOUNTER
Spoke with pt and advised per yesterdays message from Dr. Wilson that she should only be taking half of her hydrochlorothiazide pill.  Pt states she forgot what we had discussed yesterday, so she is writing it down this time and keeping it somewhere where she will easily be able to see it.  Advised that she change it on her list of medications, instead of 25 mg to only take half of the tablet every morning.  Pt verbalized understanding.

## 2017-12-14 NOTE — TELEPHONE ENCOUNTER
----- Message from Quyen Luis sent at 12/14/2017  8:11 AM CST -----  Contact: Patient  Patient needs someone to call her because she said that the doctor changed up some of the dosages on her medications and she doesn't remember which ones and she has been up all night because of this, please give her a call as soon as possible.  Call Back#[phone]  Thanks

## 2017-12-28 DIAGNOSIS — R52 PAIN: ICD-10-CM

## 2017-12-28 RX ORDER — HYDROCODONE BITARTRATE AND ACETAMINOPHEN 10; 325 MG/1; MG/1
1 TABLET ORAL 3 TIMES DAILY PRN
Qty: 90 TABLET | Refills: 0 | Status: SHIPPED | OUTPATIENT
Start: 2017-12-28 | End: 2018-01-27 | Stop reason: SDUPTHER

## 2017-12-28 NOTE — TELEPHONE ENCOUNTER
----- Message from Mar Reyes sent at 12/28/2017  9:09 AM CST -----  Contact: patient  Patient calling to request a refill for Hydrocodone. Please advise.  Call back   Thanks!    [facility] - [place] - [address]  [address]  [place] [address]  Phone: [phone] Fax: [phone]

## 2017-12-28 NOTE — TELEPHONE ENCOUNTER
*Dr. Wilosn's pt*    Refill request for hydrocodone     LOV: 12/4/2017    [facility]

## 2018-01-16 ENCOUNTER — TELEPHONE (OUTPATIENT)
Dept: GASTROENTEROLOGY | Facility: CLINIC | Age: 83
End: 2018-01-16

## 2018-01-16 DIAGNOSIS — K21.9 GASTROESOPHAGEAL REFLUX DISEASE WITHOUT ESOPHAGITIS: ICD-10-CM

## 2018-01-16 DIAGNOSIS — I10 ESSENTIAL HYPERTENSION: Primary | ICD-10-CM

## 2018-01-16 NOTE — TELEPHONE ENCOUNTER
Spoke with pt and she is having trouble with [employer] since 1st of year and states she is out of a few of her medications.  No record of her taking zoloft currently although she requested refill.      Pt requesting refills on:    Zantac  zoloft  Hydrochlorothiazide  pravastatin      [facility] [facility]

## 2018-01-16 NOTE — TELEPHONE ENCOUNTER
----- Message from Kathie Chopra sent at 1/16/2018  9:26 AM CST -----    Calling to  Get  Script re filled 11 refills // please call for details// please call [phone]  [facility] - [place], [place] - [address]   [address]  [facility] [address]  Phone: [phone] Fax: [phone]

## 2018-01-17 RX ORDER — PRAVASTATIN SODIUM 40 MG/1
40 TABLET ORAL DAILY
Qty: 90 TABLET | Refills: 2 | Status: SHIPPED | OUTPATIENT
Start: 2018-01-17 | End: 2018-11-20 | Stop reason: SDUPTHER

## 2018-01-17 RX ORDER — HYDROCHLOROTHIAZIDE 25 MG/1
12.5 TABLET ORAL DAILY
Qty: 45 TABLET | Refills: 0 | Status: SHIPPED | OUTPATIENT
Start: 2018-01-17 | End: 2018-02-26 | Stop reason: SDUPTHER

## 2018-01-17 RX ORDER — CARVEDILOL 6.25 MG/1
TABLET ORAL
Qty: 180 TABLET | Refills: 3 | Status: SHIPPED | OUTPATIENT
Start: 2018-01-17 | End: 2018-10-10 | Stop reason: SDUPTHER

## 2018-01-17 RX ORDER — AMLODIPINE BESYLATE 5 MG/1
TABLET ORAL
Qty: 90 TABLET | Refills: 3 | Status: SHIPPED | OUTPATIENT
Start: 2018-01-17 | End: 2018-10-10 | Stop reason: SDUPTHER

## 2018-01-17 NOTE — PROGRESS NOTES
Refill Authorization Note     is requesting a refill authorization.    Brief assessment and rationale for refill: Quick DC: ranitidine (KPrather sent in already) / DENY: sertraline (Last commented as stopped) / DEFER: HCTZ / APPROVE: pravastatin  Amount/Quantity of medication ordered: 90d         Refills Authorized: Yes  If authorized number of refills: 0        Medication-related problems identified:   Adverse drug effects  Dose adjustment  Requires labs  Medication Therapy Plan: Pt was hypoK from last lab; was prescribed K supplement at that time.  Will require updated labs.  Will order RFP.  Lipids UTD: approve per protocol.  Name and strength of medication: hctz / pravastatin  How patient will take medication: t1t po daily   Medication reconciliation completed: Yes  Comments: Comments: Dc'd duplicate meds    Lab Results   Component Value Date    CREATININE 1.2 12/11/2017    CREATININE 1.2 12/11/2017    BUN 21 12/11/2017    BUN 21 12/11/2017     12/11/2017     12/11/2017    K 3.2 (L) 12/11/2017    K 3.2 (L) 12/11/2017     12/11/2017     12/11/2017    CO2 30 (H) 12/11/2017    CO2 30 (H) 12/11/2017      BP Readings from Last 3 Encounters:   12/04/17 132/78   10/17/17 138/82   10/17/17 120/72      Lab Results   Component Value Date    CHOL 168 10/11/2017    CHOL 193 03/12/2014    CHOL 188 12/27/2012     Lab Results   Component Value Date    HDL 27 (L) 10/11/2017    HDL 57 03/12/2014    HDL 51 12/27/2012     Lab Results   Component Value Date    LDLCALC 104.4 10/11/2017    LDLCALC 111.0 03/12/2014    LDLCALC 107.0 12/27/2012     Lab Results   Component Value Date    TRIG 183 (H) 10/11/2017    TRIG 125 03/12/2014    TRIG 148 12/27/2012     Lab Results   Component Value Date    CHOLHDL 16.1 (L) 10/11/2017    CHOLHDL 29.5 03/12/2014    CHOLHDL 27.1 12/27/2012

## 2018-01-17 NOTE — PROGRESS NOTES
Refill Authorization Note     is requesting a refill authorization.    Brief assessment and rationale for refill: APPROVE: prr  Amount/Quantity of medication ordered: 90d         Refills Authorized: Yes  If authorized number of refills: 3           Medication Therapy Plan: Recently seen; BP controlled; vanessa 12 mo   Name and strength of medication: CARVEDILOL 6.25 MG Tablet / amlodipine  How patient will take medication: t1t po BID / t1t po daily   Medication reconciliation completed: No  Comments:   BP Readings from Last 3 Encounters:   12/04/17 132/78   10/17/17 138/82   10/17/17 120/72      Pulse Readings from Last 3 Encounters:   12/04/17 68   10/17/17 78   10/17/17 80

## 2018-01-17 NOTE — TELEPHONE ENCOUNTER
Please inform the patient that I sent in a one time refill for zantac and she is overdue for a follow-up visit (last seen over a year ago) for continued evaluation and management.  Thanks  ADAM

## 2018-01-18 NOTE — TELEPHONE ENCOUNTER
Unable to reach pt after multiple attempts with all #'s, letter sent to inform pt time for a f/u appt

## 2018-01-26 ENCOUNTER — TELEPHONE (OUTPATIENT)
Dept: FAMILY MEDICINE | Facility: CLINIC | Age: 83
End: 2018-01-26

## 2018-01-26 DIAGNOSIS — R52 PAIN: ICD-10-CM

## 2018-01-26 RX ORDER — HYDROCODONE BITARTRATE AND ACETAMINOPHEN 10; 325 MG/1; MG/1
1 TABLET ORAL 3 TIMES DAILY PRN
Qty: 90 TABLET | Refills: 0 | Status: CANCELLED | OUTPATIENT
Start: 2018-01-26

## 2018-01-26 NOTE — TELEPHONE ENCOUNTER
----- Message from Chrissie Gannon sent at 1/26/2018  4:04 PM CST -----  Contact: patient  Patient called requesting refill on hydrocodone. Send to [facility] pharmacy. Please call back at [phone] when script has been sent. Thanks,

## 2018-01-27 DIAGNOSIS — R52 PAIN: ICD-10-CM

## 2018-01-29 RX ORDER — HYDROCODONE BITARTRATE AND ACETAMINOPHEN 10; 325 MG/1; MG/1
TABLET ORAL
Qty: 90 TABLET | Refills: 0 | Status: SHIPPED | OUTPATIENT
Start: 2018-01-29 | End: 2018-03-02 | Stop reason: SDUPTHER

## 2018-02-05 RX ORDER — GABAPENTIN 300 MG/1
CAPSULE ORAL
Qty: 270 CAPSULE | Refills: 1 | Status: SHIPPED | OUTPATIENT
Start: 2018-02-05 | End: 2018-07-19 | Stop reason: SDUPTHER

## 2018-02-06 ENCOUNTER — TELEPHONE (OUTPATIENT)
Dept: GASTROENTEROLOGY | Facility: CLINIC | Age: 83
End: 2018-02-06

## 2018-02-06 DIAGNOSIS — K21.9 GASTROESOPHAGEAL REFLUX DISEASE WITHOUT ESOPHAGITIS: ICD-10-CM

## 2018-02-06 RX ORDER — OMEPRAZOLE 40 MG/1
CAPSULE, DELAYED RELEASE ORAL
Qty: 90 CAPSULE | Refills: 0 | Status: SHIPPED | OUTPATIENT
Start: 2018-02-06 | End: 2018-04-25 | Stop reason: SDUPTHER

## 2018-02-06 RX ORDER — HYDROCHLOROTHIAZIDE 25 MG/1
TABLET ORAL
Qty: 45 TABLET | Refills: 0 | OUTPATIENT
Start: 2018-02-06

## 2018-02-06 NOTE — PROGRESS NOTES
Refill Authorization Note     is requesting a refill authorization.    Brief assessment and rationale for refill: Question: 3rd time pt is requesting sertraline but commented as not taking actively / Quick DC: hctz (already sent in) / APPROVE: omeprazole   Amount/Quantity of medication ordered: 90d         Refills Authorized: Yes  If authorized number of refills: 0        Medication-related problems identified:   Therapeutic duplication  Medication education needs  Non-adherence (knowledge deficit) non-intentional  [facility] so can't figure out if she's been taking it unknowingly.  Of note; pt was prescribe ranitidine in Dec, GERd worsening?  May need education or referral if still having sx using PPi and H2Ra.    Name and strength of medication: omeprazole  How patient will take medication: t1t po daily   Medication reconciliation completed: Yes  Comments: Dc'd duplicate meds    Lab Results   Component Value Date    WBC 13.80 (H) 10/17/2017    HGB 10.9 (L) 10/17/2017    HCT 34.7 (L) 10/17/2017    MCV 84 10/17/2017     10/17/2017

## 2018-02-06 NOTE — TELEPHONE ENCOUNTER
----- Message from Sherron Trevizo sent at 2/6/2018 11:19 AM CST -----  Contact: self  Patient called requesting a medication refill. Please see details below.  Medication Name:Sertraline  Medication Strength:100 mg  Preferred pharmacy:[facility]  First time calling about this refill (y/n):y  Call Back Number:[phone]      [facility] - [place], [place] [address]  [address]  [facility] [address]  Phone: [phone] Fax: [phone]

## 2018-02-06 NOTE — TELEPHONE ENCOUNTER
----- Message from Sherron Trevizo sent at 2/6/2018 11:22 AM CST -----  Contact: self  Patient called requesting a medication refill. Please see details below.  Medication Name:Ranitidine  Medication Strength:300 mg  Preferred pharmacy:[facility]  First time calling about this refill (y/n):y  Call Back Number:[phone]      [facility] - [place], [place] - [address]   [address]  [facility] [address]  Phone: [phone] Fax: [phone]

## 2018-02-19 ENCOUNTER — OUTPATIENT CASE MANAGEMENT (OUTPATIENT)
Dept: ADMINISTRATIVE | Facility: OTHER | Age: 83
End: 2018-02-19

## 2018-02-19 ENCOUNTER — TELEPHONE (OUTPATIENT)
Dept: ADMINISTRATIVE | Facility: HOSPITAL | Age: 83
End: 2018-02-19

## 2018-02-19 ENCOUNTER — PATIENT OUTREACH (OUTPATIENT)
Dept: ADMINISTRATIVE | Facility: HOSPITAL | Age: 83
End: 2018-02-19

## 2018-02-19 DIAGNOSIS — M54.17 LUMBOSACRAL RADICULOPATHY: ICD-10-CM

## 2018-02-19 DIAGNOSIS — M25.551 RIGHT HIP PAIN: ICD-10-CM

## 2018-02-19 DIAGNOSIS — R42 DIZZINESS: ICD-10-CM

## 2018-02-19 DIAGNOSIS — Z74.09 IMPAIRED MOBILITY: ICD-10-CM

## 2018-02-19 DIAGNOSIS — I27.20 PULMONARY HYPERTENSION: ICD-10-CM

## 2018-02-19 DIAGNOSIS — D50.8 OTHER IRON DEFICIENCY ANEMIA: ICD-10-CM

## 2018-02-19 DIAGNOSIS — J42 CHRONIC BRONCHITIS, UNSPECIFIED CHRONIC BRONCHITIS TYPE: ICD-10-CM

## 2018-02-19 DIAGNOSIS — I44.0 FIRST DEGREE AV BLOCK: ICD-10-CM

## 2018-02-19 DIAGNOSIS — I49.1 ATRIAL PREMATURE BEATS: ICD-10-CM

## 2018-02-19 DIAGNOSIS — F32.0 CURRENT MILD EPISODE OF MAJOR DEPRESSIVE DISORDER WITHOUT PRIOR EPISODE: ICD-10-CM

## 2018-02-19 DIAGNOSIS — Z97.3 WEARS GLASSES: Primary | ICD-10-CM

## 2018-02-19 DIAGNOSIS — M19.90 ARTHRITIS: ICD-10-CM

## 2018-02-19 DIAGNOSIS — M51.36 DDD (DEGENERATIVE DISC DISEASE), LUMBAR: ICD-10-CM

## 2018-02-19 DIAGNOSIS — J30.1 SEASONAL ALLERGIC RHINITIS DUE TO POLLEN, UNSPECIFIED CHRONICITY: ICD-10-CM

## 2018-02-19 DIAGNOSIS — K21.9 GASTROESOPHAGEAL REFLUX DISEASE, ESOPHAGITIS PRESENCE NOT SPECIFIED: ICD-10-CM

## 2018-02-19 DIAGNOSIS — Z97.2 WEARS DENTURES: ICD-10-CM

## 2018-02-19 DIAGNOSIS — J45.20 MILD INTERMITTENT ASTHMA WITHOUT COMPLICATION: ICD-10-CM

## 2018-02-19 DIAGNOSIS — K63.5 POLYP OF SIGMOID COLON, UNSPECIFIED TYPE: ICD-10-CM

## 2018-02-19 DIAGNOSIS — I25.10 CORONARY ARTERY DISEASE INVOLVING NATIVE CORONARY ARTERY OF NATIVE HEART WITHOUT ANGINA PECTORIS: ICD-10-CM

## 2018-02-19 DIAGNOSIS — M85.80 OSTEOPENIA, UNSPECIFIED LOCATION: ICD-10-CM

## 2018-02-19 DIAGNOSIS — Z79.01 ANTICOAGULANT LONG-TERM USE: ICD-10-CM

## 2018-02-19 DIAGNOSIS — G47.9 TROUBLE IN SLEEPING: ICD-10-CM

## 2018-02-19 DIAGNOSIS — M54.2 NECK PAIN: ICD-10-CM

## 2018-02-19 DIAGNOSIS — I63.9 CEREBROVASCULAR ACCIDENT (CVA), UNSPECIFIED MECHANISM: ICD-10-CM

## 2018-02-19 DIAGNOSIS — K22.2 SCHATZKI'S RING: ICD-10-CM

## 2018-02-19 DIAGNOSIS — I10 HYPERTENSION, UNSPECIFIED TYPE: ICD-10-CM

## 2018-02-19 DIAGNOSIS — E78.5 HYPERLIPIDEMIA, UNSPECIFIED HYPERLIPIDEMIA TYPE: ICD-10-CM

## 2018-02-19 DIAGNOSIS — R56.9 SEIZURES: ICD-10-CM

## 2018-02-19 NOTE — PROGRESS NOTES
Thank you for the referral. The following patient has been assigned to Lien Sloan RN with Outpatient Complex Care Management for high risk screening.    Reason for referral:  High risk    Wears glasses  Wears dentures  Trouble in sleeping  Cerebrovascular accident (CVA), unspecified mechanism  Seizures  Seasonal allergic rhinitis due to pollen, unspecified chronicity  Schatzki's ring  Neck pain  Impaired mobility  Hypertension, unspecified type  Hyperlipidemia, unspecified hyperlipidemia type  Gastroesophageal reflux disease, esophagitis presence not specified  First degree AV block  Dizziness  Current mild episode of major depressive disorder without prior episode  Coronary artery disease involving native coronary artery of native heart without angina pectoris  Chronic bronchitis, unspecified chronic bronchitis type  Polyp of sigmoid colon, unspecified type  Atrial premature beats  Mild intermittent asthma without complication  Arthritis  Anticoagulant long-term use  Right hip pain  Pulmonary hypertension  Osteopenia, unspecified location  Lumbosacral radiculopathy  DDD (degenerative disc disease), lumbar  Other iron deficiency anemia    Please contact [facility] at [phone] with any questions.    Thank you,    Dulce Jay, [facility]  Outpatient Complex Care Mgmt  Ext [phone]

## 2018-02-19 NOTE — PROGRESS NOTES
Pre-visit Health Maintenance Review    GREAT NEWS!  You are qualified for this entirely FREE service!      Should you decide you are interested in this free program; the Care Management Team will work with you and Dr. Wilson to help develop a personalized care plan for your healthcare goals.  Please feel free to contact your Primary Care Physician to discuss this opportunity.  They will be happy to discuss any questions or concerns that you may have regarding this exciting program.      Pt is interested in referral.  Entered.

## 2018-02-19 NOTE — TELEPHONE ENCOUNTER
----- Message from Dulce Jay sent at 2/19/2018  1:46 PM CST -----  Thank you for the referral. The following patient has been assigned to Lien Sloan RN with Outpatient Complex Care Management for high risk screening.     Reason for referral:  High risk     Wears glasses  Wears dentures  Trouble in sleeping  Cerebrovascular accident (CVA), unspecified mechanism  Seizures  Seasonal allergic rhinitis due to pollen, unspecified chronicity  Schatzki's ring  Neck pain  Impaired mobility  Hypertension, unspecified type  Hyperlipidemia, unspecified hyperlipidemia type  Gastroesophageal reflux disease, esophagitis presence not specified  First degree AV block  Dizziness  Current mild episode of major depressive disorder without prior episode  Coronary artery disease involving native coronary artery of native heart without angina pectoris  Chronic bronchitis, unspecified chronic bronchitis type  Polyp of sigmoid colon, unspecified type  Atrial premature beats  Mild intermittent asthma without complication  Arthritis  Anticoagulant long-term use  Right hip pain  Pulmonary hypertension  Osteopenia, unspecified location  Lumbosacral radiculopathy  DDD (degenerative disc disease), lumbar  Other iron deficiency anemia     Please contact [facility] at [phone] with any questions.     Thank you,     Dulce Jay, [facility]  Ext [phone]

## 2018-02-26 ENCOUNTER — LAB VISIT (OUTPATIENT)
Dept: LAB | Facility: HOSPITAL | Age: 83
End: 2018-02-26
Attending: FAMILY MEDICINE
Payer: MEDICARE

## 2018-02-26 ENCOUNTER — OFFICE VISIT (OUTPATIENT)
Dept: GASTROENTEROLOGY | Facility: CLINIC | Age: 83
End: 2018-02-26
Payer: MEDICARE

## 2018-02-26 VITALS
HEART RATE: 71 BPM | SYSTOLIC BLOOD PRESSURE: 135 MMHG | BODY MASS INDEX: 30.43 KG/M2 | WEIGHT: 155 LBS | HEIGHT: 60 IN | DIASTOLIC BLOOD PRESSURE: 78 MMHG

## 2018-02-26 DIAGNOSIS — Z80.0 FAMILY HISTORY OF COLON CANCER IN MOTHER: ICD-10-CM

## 2018-02-26 DIAGNOSIS — R13.14 PHARYNGOESOPHAGEAL DYSPHAGIA: ICD-10-CM

## 2018-02-26 DIAGNOSIS — R63.0 DECREASED APPETITE: Primary | ICD-10-CM

## 2018-02-26 DIAGNOSIS — R11.2 NON-INTRACTABLE VOMITING WITH NAUSEA, UNSPECIFIED VOMITING TYPE: ICD-10-CM

## 2018-02-26 DIAGNOSIS — I10 ESSENTIAL HYPERTENSION: ICD-10-CM

## 2018-02-26 DIAGNOSIS — K21.9 GASTROESOPHAGEAL REFLUX DISEASE WITHOUT ESOPHAGITIS: ICD-10-CM

## 2018-02-26 DIAGNOSIS — R10.9 RIGHT SIDED ABDOMINAL PAIN: ICD-10-CM

## 2018-02-26 DIAGNOSIS — R05.9 COUGH: ICD-10-CM

## 2018-02-26 PROCEDURE — 99999 PR PBB SHADOW E&M-EST. PATIENT-LVL V: CPT | Mod: PBBFAC,,, | Performed by: NURSE PRACTITIONER

## 2018-02-26 PROCEDURE — 99499 UNLISTED E&M SERVICE: CPT | Mod: S$GLB,,, | Performed by: NURSE PRACTITIONER

## 2018-02-26 PROCEDURE — 84132 ASSAY OF SERUM POTASSIUM: CPT

## 2018-02-26 PROCEDURE — 3075F SYST BP GE 130 - 139MM HG: CPT | Mod: S$GLB,,, | Performed by: NURSE PRACTITIONER

## 2018-02-26 PROCEDURE — 84295 ASSAY OF SERUM SODIUM: CPT

## 2018-02-26 PROCEDURE — 3078F DIAST BP <80 MM HG: CPT | Mod: S$GLB,,, | Performed by: NURSE PRACTITIONER

## 2018-02-26 PROCEDURE — 82565 ASSAY OF CREATININE: CPT

## 2018-02-26 PROCEDURE — 99214 OFFICE O/P EST MOD 30 MIN: CPT | Mod: S$GLB,,, | Performed by: NURSE PRACTITIONER

## 2018-02-26 PROCEDURE — 36415 COLL VENOUS BLD VENIPUNCTURE: CPT | Mod: PO

## 2018-02-26 RX ORDER — ONDANSETRON 4 MG/1
TABLET, ORALLY DISINTEGRATING ORAL
COMMUNITY
Start: 2018-01-18 | End: 2018-02-26 | Stop reason: SDUPTHER

## 2018-02-26 NOTE — PROGRESS NOTES
Subjective:       Patient ID: Summer Inman is a 82 y.o. female Body mass index is 30.27 kg/m².    Chief Complaint: Nausea    Established patient of Dr. Nguyen & myself.    Gastroesophageal Reflux   She complains of abdominal pain (lower abdominal, unchanged), choking (sometimes), coughing (productive clear mucus), dysphagia (CHIEF COMPLAINT: with food, pills & liquids; reports had some improvement after egd with dilation but since neck surgery trouble swallowing has recurred; had gradual worsening since then), globus sensation (occasional), a hoarse voice (occasional), nausea and water brash. She reports no belching, no chest pain, no early satiety, no heartburn or no sore throat. This is a chronic problem. Episode onset: several months. The problem occurs frequently. The problem has been unchanged. The heartburn wakes (wakes up short of breath) her from sleep. Nothing aggravates the symptoms. Pertinent negatives include no fatigue, melena or weight loss (stable lately). Risk factors include obesity. She has tried a PPI, a histamine-2 antagonist and head elevation (prilosec 40 mg once daily & zantac 300 mg once daily at night) for the symptoms. Improvement on treatment: for heartburn. Past procedures include an EGD.   Nausea   This is a chronic problem. The current episode started more than 1 month ago (started around 10/2017). The problem occurs 2 to 4 times per day. The problem has been unchanged. Associated symptoms include abdominal pain (lower abdominal, unchanged), coughing (productive clear mucus), nausea and vomiting (once daily; emesis of food and bile; denies bright red blood or coffee ground emesis). Pertinent negatives include no change in bowel habit, chest pain, chills, diaphoresis, fatigue, fever, sore throat or weakness. Treatments tried: zofran prn. The treatment provided moderate relief.     Review of Systems   Constitutional: Negative for appetite change, chills, diaphoresis, fatigue, fever,  unexpected weight change and weight loss (stable lately).   HENT: Positive for hoarse voice (occasional), trouble swallowing (patient reports currently has food pureed and seeing speech therapist) and voice change. Negative for mouth sores and sore throat.    Respiratory: Positive for cough (productive clear mucus) and choking (sometimes). Negative for chest tightness and shortness of breath.    Cardiovascular: Negative for chest pain and palpitations.   Gastrointestinal: Positive for abdominal pain (lower abdominal, unchanged), dysphagia (CHIEF COMPLAINT: with food, pills & liquids; reports had some improvement after egd with dilation but since neck surgery trouble swallowing has recurred; had gradual worsening since then), nausea and vomiting (once daily; emesis of food and bile; denies bright red blood or coffee ground emesis). Negative for abdominal distention, anal bleeding, blood in stool, change in bowel habit, constipation, diarrhea, heartburn, melena and rectal pain.        Bowel movements daily- takes glycolax PRN; takes norco TID, naproxen prn rare   Genitourinary: Negative for difficulty urinating, dysuria, flank pain, frequency, hematuria, pelvic pain and urgency.   Musculoskeletal: Negative for back pain.   Neurological: Negative for weakness.       Past Medical History:   Diagnosis Date    Anticoagulant long-term use     ASA 81mg    Arthritis     Asthma     Atrial premature beats     Benign neoplasm of adrenal gland     Colon polyp     COPD (chronic obstructive pulmonary disease)     Coronary artery disease     nonocclusive disease, no prior intervention    Depression     Dizziness     and balance issues    First degree AV block     Gastroesophageal reflux disease     General anesthetics causing adverse effect in therapeutic use     hard to wake up    Hepatomegaly     Hyperlipidemia     Hypertension     Impaired mobility     uses walker or cane    Neck pain     into chest and arms  with numbness in arms    Schatzki's ring     Seasonal allergies     Seizures     Last one recently    Stroke     tia    Trouble in sleeping     Wears dentures     upper    Wears glasses      Past Surgical History:   Procedure Laterality Date    APPENDECTOMY      CERVICAL FUSION  3/2016 Dr. Cuevas    COLONOSCOPY  2008 Dr. Nguyen    internal hemorrhoids otherwise normal findings; repeat in 4 years    ESOPHAGOGASTRODUODENOSCOPY      EYE SURGERY      phaco bilateral    HYSTERECTOMY      partial hysterectomy    mesh implant      TONSILLECTOMY      UPPER GASTROINTESTINAL ENDOSCOPY  2014 Dr. Nguyen    UPPER GASTROINTESTINAL ENDOSCOPY  02/01/2016    Dr. Nguyen     Family History   Problem Relation Age of Onset    Heart disease Mother     Cancer Mother      Colon    Colon cancer Mother 74    Hypertension Daughter     Heart disease Father     Hypertension Son     Diabetes Son     Arthritis Son     Rheum arthritis Daughter     Cancer Daughter      Thyroid and Breast    Glaucoma Neg Hx     Crohn's disease Neg Hx     Ulcerative colitis Neg Hx     Stomach cancer Neg Hx     Esophageal cancer Neg Hx      Wt Readings from Last 10 Encounters:   02/26/18 70.3 kg (154 lb 15.7 oz)   12/04/17 70.4 kg (155 lb 3.3 oz)   10/17/17 73.8 kg (162 lb 11.2 oz)   10/17/17 73.4 kg (161 lb 11.3 oz)   10/11/17 69.8 kg (153 lb 15.9 oz)   07/07/17 75.3 kg (166 lb 0.1 oz)   05/24/17 77.1 kg (170 lb)   04/27/17 77.1 kg (170 lb)   04/26/17 77.1 kg (170 lb)   04/13/17 77.1 kg (170 lb)     Reviewed prior medical records including radiology report of 10/17/17 ct abdomen pelvis, x-ray and ER visit note; 1/31/16 ct abdomen pelvis; & endoscopy history (see surgical history).    2/1/2016:  DATE OF PROCEDURE:  02/01/2016.   PROCEDURE PERFORMED:  Upper endoscopy with esophageal dilation.   PREPROCEDURE DIAGNOSIS:  Dysphagia.   POSTPROCEDURE DIAGNOSIS:  Lower esophageal ring.   MEDICATIONS:  Sedation provided by Anesthesiology  "- see Anesthesiology report    for details.   PROCEDURE IN DETAIL:  After I obtained informed consent from the patient, the    endoscope was introduced into the patient's mouth, advanced down the esophagus    into the stomach and proximal duodenum.  Careful inspection was performed.  A    subtle lower esophageal ring was noted at the GE junction.  The remainder of    examination of the stomach to the second portion of duodenum was normal.  A    Savary guidewire was passed under direct vision into the stomach, then the    endoscope was withdrawn.  A 48-[place] Savary dilator was passed over the wire    with minimal resistance.  The patient tolerated the procedure well without    immediate complication.   IMPRESSION:  Lower esophageal ring - status post dilation.   RECOMMENDATIONS:  Resume diet as tolerated"  Objective:      Physical Exam   Constitutional: She is oriented to person, place, and time. She appears well-developed and well-nourished. No distress.   Patient uses walker for assistance with ambulation   HENT:   Head: Atraumatic.   Mouth/Throat: Oropharynx is clear and moist and mucous membranes are normal. No oral lesions. No oropharyngeal exudate.   Eyes: Conjunctivae are normal. No scleral icterus.   Cardiovascular: Normal rate and regular rhythm.    Pulmonary/Chest: Effort normal and breath sounds normal. No respiratory distress. She has no wheezes. She has no rhonchi. She has no rales.   Abdominal: Soft. Normal appearance and bowel sounds are normal. She exhibits no distension, no abdominal bruit, no ascites and no mass. There is no hepatosplenomegaly. There is tenderness (mild to right abdomen). There is no rigidity, no rebound, no guarding, no tenderness at McBurney's point and negative Marroquin's sign. No hernia.   Neurological: She is alert and oriented to person, place, and time.   Skin: Skin is warm and dry. No rash noted. She is not diaphoretic. No erythema. No pallor.   Non-jaundiced   Psychiatric: " She has a normal mood and affect. Her behavior is normal.   Nursing note and vitals reviewed.      Assessment:       1. Decreased appetite    2. Right sided abdominal pain    3. Non-intractable vomiting with nausea, unspecified vomiting type    4. Family history of colon cancer in mother    5. Gastroesophageal reflux disease without esophagitis    6. Pharyngoesophageal dysphagia    7. Cough        Plan:       Decreased appetite  -     US Abdomen Complete; Future; Expected date: 02/26/2018  - schedule EGD, discussed procedure with patient, patient verbalized understanding  - encouraged PO intake and daily calorie counts to ensure adequate nutrition is taken in, recommend at least 1,800-2,000 calories a day  - recommend nutritional drinks, such as Boost, Ensure or Glucerna, to supplement nutrition needs    Right sided abdominal pain  -     US Abdomen Complete; Future; Expected date: 02/26/2018  -     Hepatic function panel; Future; Expected date: 02/26/2018  -     Lipase; Future; Expected date: 02/26/2018  - schedule EGD, discussed procedure with patient, patient verbalized understanding  - Possible CT scan pending results of testing and if symptoms persist    Non-intractable vomiting with nausea, unspecified vomiting type  -     US Abdomen Complete; Future; Expected date: 02/26/2018  -     Hepatic function panel; Future; Expected date: 02/26/2018  -     Lipase; Future; Expected date: 02/26/2018  - schedule EGD, discussed procedure with patient, patient verbalized understanding    Gastroesophageal reflux disease without esophagitis  - REFILL    ranitidine (ZANTAC) 300 MG tablet; Take 1 tablet (300 mg total) by mouth every evening.  Dispense: 90 tablet; Refill: 2  - continue prilosec 40 mg once daily as directed,take in the morning before breakfast, discussed about possible long term use of medication and discussed risk with taking PPI's long term, and to take OTC calcium and vitamin d supplements as directed (such as  Citracal +D), pt verbalized understanding  -continue lifestyle modifications to help control reflux including: avoid large meals, avoid eating within 2-3 hours of bedtime (avoid late night eating & lying down soon after eating), elevate head of bed if nocturnal symptoms are present, smoking cessation (if current smoker), & weight loss.   - avoid known foods which trigger reflux symptoms & to minimize/avoid high-fat foods, chocolate, caffeine, citrus, alcohol, & tomato products.  - avoid/limit use of NSAID's, since they can cause GI upset, bleeding, and/or ulcers. If needed, take with food.  - schedule EGD, discussed procedure with patient, patient verbalized understanding    Pharyngoesophageal dysphagia  - schedule EGD, discussed procedure with patient and possible esophageal dilation may be performed during procedure if indicated, patient verbalized understanding  - educated patient to eat smaller more frequent meals and to eat slowly and advised to eat a soft diet.  - possible UGI/esophagram/esophageal manometry if symptoms persist     Family history of colon cancer in mother  - schedule Colonoscopy once dysphagia have resolved/improved, discussed procedure with the patient; patient verbalized understanding and declined colonoscopy    Cough  Recommend follow-up with Primary Care Provider for continued evaluation and management.    Follow-up in about 1 month (around 3/26/2018), or if symptoms worsen or fail to improve.    If no improvement in symptoms or symptoms worsen, call/follow-up at clinic or go to ER.

## 2018-02-26 NOTE — PATIENT INSTRUCTIONS
Abdominal Pain    Abdominal pain is pain in the stomach or belly area. Everyone has this pain from time to time. In many cases it goes away on its own. But abdominal pain can sometimes be due to a serious problem, such as appendicitis. So its important to know when to seek help.  Causes of abdominal pain  There are many possible causes of abdominal pain. Common causes in adults include:  · Constipation, diarrhea, or gas  · Stomach acid flowing back up into the esophagus (acid reflux or heartburn)  · Severe acid reflux, called GERD (gastroesophageal reflux disease)  · A sore in the lining of the stomach or small intestine (peptic ulcer)  · Inflammation of the gallbladder, liver, or pancreas  · Gallstones or kidney stones  · Appendicitis   · Intestinal blockage   · An internal organ pushing through a muscle or other tissue (hernia)  · Urinary tract infections  · In women, menstrual cramps, fibroids, or endometriosis  · Inflammation or infection of the intestines  Diagnosing the cause of abdominal pain  Your healthcare provider will do a physical exam help find the cause of your pain. If needed, tests will be ordered. Belly pain has many possible causes. So it can be hard to find the reason for your pain. Giving details about your pain can help. Tell your provider where and when you feel the pain, and what makes it better or worse. Also let your provider know if you have other symptoms such as:  · Fever  · Tiredness  · Upset stomach (nausea)  · Vomiting  · Changes in bathroom habits  Treating abdominal pain  Some causes of pain need emergency medical treatment right away. These include appendicitis or a bowel blockage. Other problems can be treated with rest, fluids, or medicines. Your healthcare provider can give you specific instructions for treatment or self-care based on what is causing your pain.  If you have vomiting or diarrhea, sip water or other clear fluids. When you are ready to eat solid foods again,  start with small amounts of easy-to-digest, low-fat foods. These include apple sauce, toast, or crackers.   When to seek medical care  Call 911 or go to the hospital right away if you:  · Cant pass stool and are vomiting  · Are vomiting blood or have bloody diarrhea or black, tarry diarrhea  · Have chest, neck, or shoulder pain  · Feel like you might pass out  · Have pain in your shoulder blades with nausea  · Have sudden, severe belly pain  · Have new, severe pain unlike any you have felt before  · Have a belly that is rigid, hard, and tender to touch  Call your healthcare provider if you have:  · Pain for more than 5 days  · Bloating for more than 2 days  · Diarrhea for more than 5 days  · A fever of 100.4°F (38.0°C) or higher, or as directed by your provider  · Pain that gets worse  · Weight loss for no reason  · Continued lack of appetite  · Blood in your stool  How to prevent abdominal pain  Here are some tips to help prevent abdominal pain:  · Eat smaller amounts of food at one time.  · Avoid greasy, fried, or other high-fat foods.  · Avoid foods that give you gas.  · Exercise regularly.  · Drink plenty of fluids.  To help prevent GERD symptoms:  · Quit smoking.  · Reduce alcohol and certain foods that increase stomach acid.  · Avoid aspirin and over-the-counter pain and fever medicines (NSAIDS or nonsteroidal anti-inflammatory drugs), if possible  · Lose extra weight.  · Finish eating at least 2 hours before you go to bed or lie down.  · Raise the head of your bed.  Date Last Reviewed: 7/1/2016  © 9094-1936 [employer]. [address] [place] [address], [place] [address]. All rights reserved. This information is not intended as a substitute for professional medical care. Always follow your healthcare professional's instructions.

## 2018-02-27 LAB
CREAT SERPL-MCNC: 1.1 MG/DL
EST. GFR  (AFRICAN AMERICAN): 54.4 ML/MIN/1.73 M^2
EST. GFR  (NON AFRICAN AMERICAN): 47.2 ML/MIN/1.73 M^2
POTASSIUM SERPL-SCNC: 3.7 MMOL/L
SODIUM SERPL-SCNC: 138 MMOL/L

## 2018-02-27 RX ORDER — SERTRALINE HYDROCHLORIDE 100 MG/1
100 TABLET, FILM COATED ORAL DAILY
Qty: 30 TABLET | Refills: 1 | Status: SHIPPED | OUTPATIENT
Start: 2018-02-27 | End: 2018-03-16 | Stop reason: SDUPTHER

## 2018-02-27 RX ORDER — HYDROCHLOROTHIAZIDE 25 MG/1
TABLET ORAL
Qty: 45 TABLET | Refills: 1 | Status: SHIPPED | OUTPATIENT
Start: 2018-02-27 | End: 2018-06-12 | Stop reason: SDUPTHER

## 2018-02-27 RX ORDER — SERTRALINE HYDROCHLORIDE 100 MG/1
100 TABLET, FILM COATED ORAL DAILY
Qty: 90 TABLET | Refills: 3 | Status: SHIPPED | OUTPATIENT
Start: 2018-02-27 | End: 2018-03-26 | Stop reason: SDUPTHER

## 2018-02-27 RX ORDER — PRAVASTATIN SODIUM 40 MG/1
TABLET ORAL
Qty: 90 TABLET | Refills: 3 | OUTPATIENT
Start: 2018-02-27

## 2018-02-27 NOTE — PROGRESS NOTES
Refill Authorization Note     is requesting a refill authorization.    Brief assessment and rationale for refill: Quick DC: pravastatin (refill too soon) / APPROVE: hctz  Amount/Quantity of medication ordered: 90d         Refills Authorized: Yes  If authorized number of refills: 1           Medication Therapy Plan: Updated renal labs show normokalemia; approve 6 more mo   Name and strength of medication: HYDROCHLOROTHIAZIDE 25 MG Tablet  How patient will take medication: t1t po daily   Medication reconciliation completed: No  Comments:   Lab Results   Component Value Date    CREATININE 1.1 02/26/2018    BUN 21 12/11/2017    BUN 21 12/11/2017     02/26/2018    K 3.7 02/26/2018     12/11/2017     12/11/2017    CO2 30 (H) 12/11/2017    CO2 30 (H) 12/11/2017      BP Readings from Last 3 Encounters:   02/26/18 135/78   12/04/17 132/78   10/17/17 138/82

## 2018-02-27 NOTE — TELEPHONE ENCOUNTER
----- Message from Leah Tracy sent at 2/27/2018  8:14 AM CST -----  Contact: Daughter Elizabeth  Patients daughter is requesting a refill of her mothers Zoloft 100 mg.  She is out completely so needs 1 refill to local pharmacy listed below then the rest to [facility] mail order.  Thanks    [facility] - [place], [place] - [address] [address] [address]  [place] [address]  Phone: [phone] Fax: [phone]    [facility] - [place], [place] - [address]  [address] [address]  Phone: [phone] Fax: [phone]

## 2018-02-27 NOTE — TELEPHONE ENCOUNTER
The patient should be on Cymbalta/duloxetine.  The sertraline was stopped in October.    If she has been on the sertraline and not cymbalta and doing well then I can continue but should not be on both

## 2018-02-27 NOTE — TELEPHONE ENCOUNTER
Daughter states pt is not on cymbalta she is on zoloft 100 mg daily and is doing well on that medication

## 2018-03-02 ENCOUNTER — TELEPHONE (OUTPATIENT)
Dept: GASTROENTEROLOGY | Facility: CLINIC | Age: 83
End: 2018-03-02

## 2018-03-02 ENCOUNTER — TELEPHONE (OUTPATIENT)
Dept: FAMILY MEDICINE | Facility: CLINIC | Age: 83
End: 2018-03-02

## 2018-03-02 DIAGNOSIS — R52 PAIN: ICD-10-CM

## 2018-03-02 RX ORDER — HYDROCODONE BITARTRATE AND ACETAMINOPHEN 10; 325 MG/1; MG/1
1 TABLET ORAL 3 TIMES DAILY PRN
Qty: 90 TABLET | Refills: 0 | Status: SHIPPED | OUTPATIENT
Start: 2018-03-02 | End: 2018-04-02 | Stop reason: SDUPTHER

## 2018-03-02 NOTE — TELEPHONE ENCOUNTER
----- Message from Adrianne Hills sent at 3/2/2018  8:32 AM CST -----  Patient requesting to reschedule upcoming Ultrasound Appointment on Monday, March 5th/please call patient back at [phone] to reschedule with patient.

## 2018-03-02 NOTE — TELEPHONE ENCOUNTER
----- Message from Adrianne Hills sent at 3/2/2018  8:29 AM CST -----  Patient needs a refill of medication: Hydrocodone-acetaminophen 10-325mg (NORCO)  mg Tab called into [facility] pharmacy. Please order or call patient back at [phone] if you have any questions. Thanks!

## 2018-03-07 ENCOUNTER — TELEPHONE (OUTPATIENT)
Dept: ENDOSCOPY | Facility: HOSPITAL | Age: 83
End: 2018-03-07

## 2018-03-07 ENCOUNTER — ANESTHESIA EVENT (OUTPATIENT)
Dept: ENDOSCOPY | Facility: HOSPITAL | Age: 83
End: 2018-03-07
Payer: MEDICARE

## 2018-03-07 NOTE — TELEPHONE ENCOUNTER
I spoke with patient she wants to cancel tomorrow's procedure until she has the ultra sound completed . Please give her a call

## 2018-03-08 ENCOUNTER — OFFICE VISIT (OUTPATIENT)
Dept: FAMILY MEDICINE | Facility: CLINIC | Age: 83
End: 2018-03-08
Payer: MEDICARE

## 2018-03-08 ENCOUNTER — SURGERY (OUTPATIENT)
Age: 83
End: 2018-03-08

## 2018-03-08 ENCOUNTER — TELEPHONE (OUTPATIENT)
Dept: GASTROENTEROLOGY | Facility: CLINIC | Age: 83
End: 2018-03-08

## 2018-03-08 ENCOUNTER — HOSPITAL ENCOUNTER (OUTPATIENT)
Facility: HOSPITAL | Age: 83
Discharge: HOME | End: 2018-03-08
Attending: INTERNAL MEDICINE | Admitting: INTERNAL MEDICINE
Payer: MEDICARE

## 2018-03-08 ENCOUNTER — OUTPATIENT CASE MANAGEMENT (OUTPATIENT)
Dept: ADMINISTRATIVE | Facility: OTHER | Age: 83
End: 2018-03-08

## 2018-03-08 ENCOUNTER — ANESTHESIA (OUTPATIENT)
Dept: ENDOSCOPY | Facility: HOSPITAL | Age: 83
End: 2018-03-08
Payer: MEDICARE

## 2018-03-08 VITALS
HEART RATE: 78 BPM | DIASTOLIC BLOOD PRESSURE: 60 MMHG | HEIGHT: 60 IN | SYSTOLIC BLOOD PRESSURE: 120 MMHG | RESPIRATION RATE: 18 BRPM | OXYGEN SATURATION: 98 % | WEIGHT: 154 LBS | BODY MASS INDEX: 30.23 KG/M2 | TEMPERATURE: 97 F

## 2018-03-08 VITALS
DIASTOLIC BLOOD PRESSURE: 70 MMHG | BODY MASS INDEX: 30.86 KG/M2 | TEMPERATURE: 98 F | OXYGEN SATURATION: 95 % | SYSTOLIC BLOOD PRESSURE: 138 MMHG | WEIGHT: 157.19 LBS | HEART RATE: 60 BPM | HEIGHT: 60 IN

## 2018-03-08 DIAGNOSIS — R63.4 WEIGHT LOSS: ICD-10-CM

## 2018-03-08 DIAGNOSIS — M50.30 DDD (DEGENERATIVE DISC DISEASE), CERVICAL: ICD-10-CM

## 2018-03-08 DIAGNOSIS — F33.0 DEPRESSION, MAJOR, RECURRENT, MILD: ICD-10-CM

## 2018-03-08 DIAGNOSIS — E87.6 HYPOKALEMIA: ICD-10-CM

## 2018-03-08 DIAGNOSIS — H57.12 EYE PAIN, LEFT: ICD-10-CM

## 2018-03-08 DIAGNOSIS — K44.9 HIATAL HERNIA: ICD-10-CM

## 2018-03-08 DIAGNOSIS — R05.3 CHRONIC COUGH: ICD-10-CM

## 2018-03-08 DIAGNOSIS — I10 ESSENTIAL HYPERTENSION: Primary | ICD-10-CM

## 2018-03-08 DIAGNOSIS — R13.10 DYSPHAGIA: ICD-10-CM

## 2018-03-08 DIAGNOSIS — J44.9 CHRONIC OBSTRUCTIVE PULMONARY DISEASE, UNSPECIFIED COPD TYPE: ICD-10-CM

## 2018-03-08 DIAGNOSIS — K29.70 GASTRITIS, PRESENCE OF BLEEDING UNSPECIFIED, UNSPECIFIED CHRONICITY, UNSPECIFIED GASTRITIS TYPE: ICD-10-CM

## 2018-03-08 DIAGNOSIS — G40.909 SEIZURE DISORDER: ICD-10-CM

## 2018-03-08 LAB — H PYLORI INDEX VALUE: NEGATIVE

## 2018-03-08 PROCEDURE — 87449 NOS EACH ORGANISM AG IA: CPT | Mod: PO | Performed by: INTERNAL MEDICINE

## 2018-03-08 PROCEDURE — 43239 EGD BIOPSY SINGLE/MULTIPLE: CPT | Mod: PO | Performed by: INTERNAL MEDICINE

## 2018-03-08 PROCEDURE — 99999 PR PBB SHADOW E&M-EST. PATIENT-LVL IV: CPT | Mod: PBBFAC,,, | Performed by: FAMILY MEDICINE

## 2018-03-08 PROCEDURE — 3075F SYST BP GE 130 - 139MM HG: CPT | Mod: S$GLB,,, | Performed by: FAMILY MEDICINE

## 2018-03-08 PROCEDURE — 63600175 PHARM REV CODE 636 W HCPCS: Mod: PO | Performed by: NURSE ANESTHETIST, CERTIFIED REGISTERED

## 2018-03-08 PROCEDURE — 37000008 HC ANESTHESIA 1ST 15 MINUTES: Mod: PO | Performed by: INTERNAL MEDICINE

## 2018-03-08 PROCEDURE — 99215 OFFICE O/P EST HI 40 MIN: CPT | Mod: S$GLB,,, | Performed by: FAMILY MEDICINE

## 2018-03-08 PROCEDURE — 3078F DIAST BP <80 MM HG: CPT | Mod: S$GLB,,, | Performed by: FAMILY MEDICINE

## 2018-03-08 PROCEDURE — D9220A PRA ANESTHESIA: Mod: CRNA,,, | Performed by: NURSE ANESTHETIST, CERTIFIED REGISTERED

## 2018-03-08 PROCEDURE — 43248 EGD GUIDE WIRE INSERTION: CPT | Mod: PO | Performed by: INTERNAL MEDICINE

## 2018-03-08 PROCEDURE — D9220A PRA ANESTHESIA: Mod: ANES,,, | Performed by: ANESTHESIOLOGY

## 2018-03-08 PROCEDURE — 25000003 PHARM REV CODE 250: Mod: PO | Performed by: ANESTHESIOLOGY

## 2018-03-08 PROCEDURE — 88305 TISSUE EXAM BY PATHOLOGIST: CPT | Mod: 26,,, | Performed by: PATHOLOGY

## 2018-03-08 PROCEDURE — 27201012 HC FORCEPS, HOT/COLD, DISP: Mod: PO | Performed by: INTERNAL MEDICINE

## 2018-03-08 PROCEDURE — 88305 TISSUE EXAM BY PATHOLOGIST: CPT | Mod: 59 | Performed by: PATHOLOGY

## 2018-03-08 PROCEDURE — 43239 EGD BIOPSY SINGLE/MULTIPLE: CPT | Mod: 59,,, | Performed by: INTERNAL MEDICINE

## 2018-03-08 PROCEDURE — 99499 UNLISTED E&M SERVICE: CPT | Mod: S$GLB,,, | Performed by: FAMILY MEDICINE

## 2018-03-08 PROCEDURE — 43248 EGD GUIDE WIRE INSERTION: CPT | Mod: ,,, | Performed by: INTERNAL MEDICINE

## 2018-03-08 PROCEDURE — 37000009 HC ANESTHESIA EA ADD 15 MINS: Mod: PO | Performed by: INTERNAL MEDICINE

## 2018-03-08 RX ORDER — LIDOCAINE HCL/PF 100 MG/5ML
SYRINGE (ML) INTRAVENOUS
Status: DISCONTINUED | OUTPATIENT
Start: 2018-03-08 | End: 2018-03-08

## 2018-03-08 RX ORDER — LIDOCAINE HYDROCHLORIDE 10 MG/ML
1 INJECTION INFILTRATION; PERINEURAL ONCE
Status: COMPLETED | OUTPATIENT
Start: 2018-03-08 | End: 2018-03-08

## 2018-03-08 RX ORDER — PROPOFOL 10 MG/ML
VIAL (ML) INTRAVENOUS
Status: DISCONTINUED | OUTPATIENT
Start: 2018-03-08 | End: 2018-03-08

## 2018-03-08 RX ORDER — SODIUM CHLORIDE, SODIUM LACTATE, POTASSIUM CHLORIDE, CALCIUM CHLORIDE 600; 310; 30; 20 MG/100ML; MG/100ML; MG/100ML; MG/100ML
INJECTION, SOLUTION INTRAVENOUS CONTINUOUS
Status: DISCONTINUED | OUTPATIENT
Start: 2018-03-08 | End: 2018-03-08 | Stop reason: HOSPADM

## 2018-03-08 RX ORDER — BENZONATATE 100 MG/1
200 CAPSULE ORAL 3 TIMES DAILY PRN
Qty: 60 CAPSULE | Refills: 2 | Status: SHIPPED | OUTPATIENT
Start: 2018-03-08 | End: 2018-03-14 | Stop reason: SDUPTHER

## 2018-03-08 RX ADMIN — PROPOFOL 30 MG: 10 INJECTION, EMULSION INTRAVENOUS at 12:03

## 2018-03-08 RX ADMIN — SODIUM CHLORIDE, SODIUM LACTATE, POTASSIUM CHLORIDE, CALCIUM CHLORIDE: 600; 310; 30; 20 INJECTION, SOLUTION INTRAVENOUS at 11:03

## 2018-03-08 RX ADMIN — LIDOCAINE HYDROCHLORIDE 1 ML: 10 INJECTION, SOLUTION EPIDURAL; INFILTRATION; INTRACAUDAL; PERINEURAL at 11:03

## 2018-03-08 RX ADMIN — LIDOCAINE HYDROCHLORIDE 100 MG: 20 INJECTION, SOLUTION INTRAVENOUS at 12:03

## 2018-03-08 NOTE — PROGRESS NOTES
03/08/2018  (Chart Review)  RN-IRVIN completed chart review and noted patient having Endoscopy procedure today. Will schedule patient for early next week to attempt initial screen for OPCM. Mark Yepez, CARL-OPCM

## 2018-03-08 NOTE — DISCHARGE SUMMARY
Discharge Note  Short Stay      SUMMARY     Admit Date: 3/8/2018    Attending Physician: Lucas Nguyen MD     Discharge Physician: Lucas Nguyen MD    Discharge Date: 3/8/2018 12:18 PM    Final Diagnosis: Nausea [R11.0]  Dysphagia, unspecified type [R13.10]  Right sided abdominal pain [R10.9]    Disposition: HOME OR SELF CARE    Patient Instructions:   Current Discharge Medication List      CONTINUE these medications which have NOT CHANGED    Details   amLODIPine (NORVASC) 5 MG tablet TAKE 1 TABLET (5 MG TOTAL) ONE TIME DAILY.  Qty: 90 tablet, Refills: 3    Associated Diagnoses: Essential hypertension      carvedilol (COREG) 6.25 MG tablet TAKE 1 TABLET (6.25 MG TOTAL) 2 TIMES DAILY WITH MEALS.  Qty: 180 tablet, Refills: 3    Associated Diagnoses: Essential hypertension      diclofenac sodium (VOLTAREN) 1 % Gel Apply 2 g topically once daily.  Qty: 300 g, Refills: 5      diphenhydrAMINE (BENADRYL) 25 mg capsule Take 25 mg by mouth every 6 (six) hours as needed for Itching.      donepezil (ARICEPT) 10 MG tablet Take 10 mg by mouth every morning.       duloxetine (CYMBALTA) 60 MG capsule Take 1 capsule (60 mg total) by mouth once daily.  Qty: 90 capsule, Refills: 1    Comments: Discontinue sertraline      gabapentin (NEURONTIN) 300 MG capsule TAKE 1 CAPSULE THREE TIMES DAILY  Qty: 270 capsule, Refills: 1      hydroCHLOROthiazide (HYDRODIURIL) 25 MG tablet TAKE 1/2 TABLET EVERY DAY  Qty: 45 tablet, Refills: 1      hydrocodone-acetaminophen 10-325mg (NORCO)  mg Tab Take 1 tablet by mouth 3 (three) times daily as needed.  Qty: 90 tablet, Refills: 0    Associated Diagnoses: Pain      hydrOXYzine pamoate (VISTARIL) 25 MG Cap Take 1 capsule (25 mg total) by mouth 2 (two) times daily as needed (for anxiety).  Qty: 30 capsule, Refills: 0    Associated Diagnoses: Anxiety      lamotrigine (LAMICTAL) 100 MG tablet Take 200 mg by mouth every morning.      meclizine (ANTIVERT) 25 mg tablet Take 1 tablet (25 mg  total) by mouth 3 (three) times daily as needed.  Qty: 15 tablet, Refills: 0    Associated Diagnoses: Vertigo      neomycin-polymyxin-dexamethasone (DEXACINE) 3.5 mg/g-10,000 unit/g-0.1 % Oint Apply to affected area right upper lid 2 times daily  Qty: 3.5 g, Refills: 0    Associated Diagnoses: Cyst, eyelid sebaceous, right      omeprazole (PRILOSEC) 40 MG capsule TAKE 1 CAPSULE (40 MG TOTAL) ONE TIME DAILY.  Qty: 90 capsule, Refills: 0      potassium chloride SA (K-DUR,KLOR-CON) 10 MEQ tablet Take 2 pills immediately.  Then take 1 pill every 8 hours for 3 days.  Then 1 pill twice a day for 3 days.  Qty: 20 tablet, Refills: 1    Associated Diagnoses: Hypokalemia      ranitidine (ZANTAC) 300 MG tablet Take 1 tablet (300 mg total) by mouth every evening.  Qty: 90 tablet, Refills: 2    Comments: 30 day supply only; Patient needs to follow-up for further refills  Associated Diagnoses: Gastroesophageal reflux disease without esophagitis      !! sertraline (ZOLOFT) 100 MG tablet Take 1 tablet (100 mg total) by mouth once daily.  Qty: 30 tablet, Refills: 1      alendronate (FOSAMAX) 70 MG tablet Take 1 tablet (70 mg total) by mouth every 7 days.  Qty: 4 tablet, Refills: 11      benzocaine-menthol 15-2.6 mg Lozg Take 1 lozenge by mouth 3 (three) times daily.       nitroGLYCERIN (NITROSTAT) 0.4 MG SL tablet Place 1 tablet (0.4 mg total) under the tongue every 5 (five) minutes as needed for Chest pain. For chest pain. Repeat in five minutes if pain persists. May repeat again, for total of 3 tablets.  Call 911 at the third tablet.  Qty: 20 tablet, Refills: 3      ondansetron (ZOFRAN) 4 MG tablet Take 1 tablet (4 mg total) by mouth every 6 (six) hours as needed.  Qty: 12 tablet, Refills: 0      polyethylene glycol (GLYCOLAX) 17 gram/dose powder Take 17 g by mouth once daily.  Qty: 510 g, Refills: 0      pravastatin (PRAVACHOL) 40 MG tablet Take 1 tablet (40 mg total) by mouth once daily.  Qty: 90 tablet, Refills: 2      !!  sertraline (ZOLOFT) 100 MG tablet Take 1 tablet (100 mg total) by mouth once daily.  Qty: 90 tablet, Refills: 3       !! - Potential duplicate medications found. Please discuss with provider.          Discharge Procedure Orders (must include Diet, Follow-up, Activity)    Follow Up:  Follow up with PCP as previously scheduled  Resume routine diet.  Activity as tolerated.    No driving day of procedure.

## 2018-03-08 NOTE — H&P
History & Physical - Short Stay  Gastroenterology      SUBJECTIVE:     Procedure: EGD    Chief Complaint/Indication for Procedure: Dysphagia    PTA Medications   Medication Sig    amLODIPine (NORVASC) 5 MG tablet TAKE 1 TABLET (5 MG TOTAL) ONE TIME DAILY.    carvedilol (COREG) 6.25 MG tablet TAKE 1 TABLET (6.25 MG TOTAL) 2 TIMES DAILY WITH MEALS.    diclofenac sodium (VOLTAREN) 1 % Gel Apply 2 g topically once daily. (Patient taking differently: Apply 2 g topically once daily. )    diphenhydrAMINE (BENADRYL) 25 mg capsule Take 25 mg by mouth every 6 (six) hours as needed for Itching.    donepezil (ARICEPT) 10 MG tablet Take 10 mg by mouth every morning.     duloxetine (CYMBALTA) 60 MG capsule Take 1 capsule (60 mg total) by mouth once daily.    gabapentin (NEURONTIN) 300 MG capsule TAKE 1 CAPSULE THREE TIMES DAILY    hydroCHLOROthiazide (HYDRODIURIL) 25 MG tablet TAKE 1/2 TABLET EVERY DAY    hydrocodone-acetaminophen 10-325mg (NORCO)  mg Tab Take 1 tablet by mouth 3 (three) times daily as needed.    hydrOXYzine pamoate (VISTARIL) 25 MG Cap Take 1 capsule (25 mg total) by mouth 2 (two) times daily as needed (for anxiety).    lamotrigine (LAMICTAL) 100 MG tablet Take 200 mg by mouth every morning.    meclizine (ANTIVERT) 25 mg tablet Take 1 tablet (25 mg total) by mouth 3 (three) times daily as needed. (Patient taking differently: Take 25 mg by mouth 3 (three) times daily as needed for Dizziness. )    neomycin-polymyxin-dexamethasone (DEXACINE) 3.5 mg/g-10,000 unit/g-0.1 % Oint Apply to affected area right upper lid 2 times daily    omeprazole (PRILOSEC) 40 MG capsule TAKE 1 CAPSULE (40 MG TOTAL) ONE TIME DAILY.    potassium chloride SA (K-DUR,KLOR-CON) 10 MEQ tablet Take 2 pills immediately.  Then take 1 pill every 8 hours for 3 days.  Then 1 pill twice a day for 3 days.    ranitidine (ZANTAC) 300 MG tablet Take 1 tablet (300 mg total) by mouth every evening.    sertraline (ZOLOFT) 100 MG  tablet Take 1 tablet (100 mg total) by mouth once daily.    alendronate (FOSAMAX) 70 MG tablet Take 1 tablet (70 mg total) by mouth every 7 days.    benzocaine-menthol 15-2.6 mg Lozg Take 1 lozenge by mouth 3 (three) times daily.     nitroGLYCERIN (NITROSTAT) 0.4 MG SL tablet Place 1 tablet (0.4 mg total) under the tongue every 5 (five) minutes as needed for Chest pain. For chest pain. Repeat in five minutes if pain persists. May repeat again, for total of 3 tablets.  Call 911 at the third tablet.    ondansetron (ZOFRAN) 4 MG tablet Take 1 tablet (4 mg total) by mouth every 6 (six) hours as needed.    polyethylene glycol (GLYCOLAX) 17 gram/dose powder Take 17 g by mouth once daily.    pravastatin (PRAVACHOL) 40 MG tablet Take 1 tablet (40 mg total) by mouth once daily.    sertraline (ZOLOFT) 100 MG tablet Take 1 tablet (100 mg total) by mouth once daily.       Review of patient's allergies indicates:   Allergen Reactions    Codeine Shortness Of Breath     Tongue swelled    Nifedipine Anaphylaxis    Ace inhibitors      Other reaction(s): Angioedema    Ketorolac      Other reaction(s): tongue swell    Milk      Other reaction(s): Stomach upset    Tramadol      Other reaction(s): seizure        Past Medical History:   Diagnosis Date    Anticoagulant long-term use     ASA 81mg    Arthritis     Asthma     Atrial premature beats     Benign neoplasm of adrenal gland     Colon polyp     COPD (chronic obstructive pulmonary disease)     Coronary artery disease     nonocclusive disease, no prior intervention    Depression     Dizziness     and balance issues    First degree AV block     Gastroesophageal reflux disease     General anesthetics causing adverse effect in therapeutic use     hard to wake up    Hepatomegaly     Hyperlipidemia     Hypertension     Impaired mobility     uses walker or cane    Neck pain     into chest and arms with numbness in arms    Schatzki's ring     Seasonal  allergies     Seizures     Last one recently    Stroke     tia    Trouble in sleeping     Wears dentures     upper    Wears glasses      Past Surgical History:   Procedure Laterality Date    APPENDECTOMY      CERVICAL FUSION  3/2016 Dr. Cuevas    COLONOSCOPY  2008 Dr. Nguyen    internal hemorrhoids otherwise normal findings; repeat in 4 years    ESOPHAGOGASTRODUODENOSCOPY      EYE SURGERY      phaco bilateral    HYSTERECTOMY      partial hysterectomy    mesh implant      TONSILLECTOMY      UPPER GASTROINTESTINAL ENDOSCOPY  2014 Dr. Nguyen    UPPER GASTROINTESTINAL ENDOSCOPY  02/01/2016    Dr. Nguyen     Family History   Problem Relation Age of Onset    Heart disease Mother     Cancer Mother      Colon    Colon cancer Mother 74    Hypertension Daughter     Heart disease Father     Hypertension Son     Diabetes Son     Arthritis Son     Rheum arthritis Daughter     Cancer Daughter      Thyroid and Breast    Glaucoma Neg Hx     Crohn's disease Neg Hx     Ulcerative colitis Neg Hx     Stomach cancer Neg Hx     Esophageal cancer Neg Hx      Social History   Substance Use Topics    Smoking status: Former Smoker     Packs/day: 0.50     Years: 19.00     Types: Cigarettes     Start date: 3/12/1984     Quit date: 10/8/2013    Smokeless tobacco: Never Used    Alcohol use No         OBJECTIVE:     Vital Signs (Most Recent)       Physical Exam:                                                       GENERAL:  Comfortable, in no acute distress.                                 HEENT EXAM:  Nonicteric.  No adenopathy.  Oropharynx is clear.               NECK:  Supple.                                                               LUNGS:  Clear.                                                               CARDIAC:  Regular rate and rhythm.  S1, S2.  No murmur.                      ABDOMEN:  Soft, positive bowel sounds, nontender.  No hepatosplenomegaly or masses.  No rebound or guarding.                                              EXTREMITIES:  No edema.     MENTAL STATUS:  Normal, alert and oriented.      ASSESSMENT/PLAN:     Assessment: Dysphagia    Plan: EGD    Anesthesia Plan: General    ASA Grade: ASA 2 - Patient with mild systemic disease with no functional limitations    MALLAMPATI SCORE:  I (soft palate, uvula, fauces, and tonsillar pillars visible)

## 2018-03-08 NOTE — ANESTHESIA POSTPROCEDURE EVALUATION
Anesthesia Post Evaluation    Patient: Summer Inman    Procedure(s) Performed: Procedure(s) (LRB):  ESOPHAGOGASTRODUODENOSCOPY (EGD) (N/A)    Final Anesthesia Type: general  Patient location during evaluation: PACU  Patient participation: Yes- Able to Participate  Level of consciousness: awake and alert  Post-procedure vital signs: reviewed and stable  Pain management: adequate  Airway patency: patent  PONV status at discharge: No PONV  Anesthetic complications: no      Cardiovascular status: blood pressure returned to baseline  Respiratory status: unassisted  Hydration status: euvolemic  Follow-up not needed.        Visit Vitals  /60 (BP Location: Left arm, Patient Position: Lying)   Pulse 78   Temp 36.2 °C (97.2 °F) (Skin)   Resp 18   Ht 5' (1.524 m)   Wt 69.9 kg (154 lb)   SpO2 98%   Breastfeeding? No   BMI 30.08 kg/m²       Pain/Zenaida Score: Pain Assessment Performed: Yes (3/8/2018 12:45 PM)  Presence of Pain: denies (3/8/2018 12:45 PM)  Zenaida Score: 10 (3/8/2018 12:45 PM)

## 2018-03-08 NOTE — DISCHARGE INSTRUCTIONS
Recovery After Procedural Sedation (Adult)  You have been given medicine by vein to make you sleep during your surgery. This may have included both a pain medicine and sleeping medicine. Most of the effects have worn off. But you may still have some drowsiness for the next 6 to 8 hours.  Home care  Follow these guidelines when you get home:  · For the next 8 hours, you should be watched by a responsible adult. This person should make sure your condition is not getting worse.  · Don't drink any alcohol for the next 24 hours.  · Don't drive, operate dangerous machinery, or make important business or personal decisions during the next 24 hours.  Note: Your healthcare provider may tell you not to take any medicine by mouth for pain or sleep in the next 4 hours. These medicines may react with the medicines you were given in the hospital. This could cause a much stronger response than usual.  Follow-up care  Follow up with your healthcare provider if you are not alert and back to your usual level of activity within 12 hours.  When to seek medical advice  Call your healthcare provider right away if any of these occur:  · Drowsiness gets worse  · Weakness or dizziness gets worse  · Repeated vomiting  · You can't be awakened   Date Last Reviewed: 10/18/2016  © 1085-4676 [employer]. [address] [place] [address], [place] [address]. All rights reserved. This information is not intended as a substitute for professional medical care. Always follow your healthcare professional's instructions.        Esophageal Dilation     A balloon dilator may be used to widen a stricture in the esophagus.   An esophageal dilation is a procedure used to widen a narrowed section of your esophagus. This is the tube that leads from your throat to your stomach. Narrowing (stricture) of the esophagus can cause problems. These include trouble swallowing. This sheet explains what to expect with esophageal dilation.  Why esophageal dilation is  needed  Several problems can be treated with esophageal dilation. They include:  · Peptic stricture. This is caused by reflux esophagitis. With this problem, the esophagus is irritated by acid reflux (heartburn). This occurs when acid from your stomach flows back up into the esophagus. Stomach acid damages the lining of the esophagus. This leads to a buildup of scar tissue. As a result, the esophagus is narrowed.  · Schatzkis ring. This is an abnormal ring of tissue. It forms where the esophagus meets the stomach. It can cause trouble swallowing. It can also cause food to get stuck in the esophagus. The cause of this condition is not known.  · Achalasia. This condition stops food and liquids from moving into your stomach from the esophagus. It affects the lower esophageal sphincter (LES). The LES is a muscular ring that opens (relaxes) when you swallow. With achalasia, the LES does not relax. This condition can also cause problems with peristalsis. This is the normal muscular action of the esophagus that moves food into the stomach.  · Eosinophilic esophagitis. This is a redness and swelling (inflammation) in the esophagus. It is caused by an environmental trigger such as a food allergy. It can lead to pain, trouble swallowing, and strictures.  · Less common causes of stricture. Other causes of stricture include radiation treatment and cancer.  Before you have esophageal dilation  · Tell your provider about any medicines you take. This includes prescription medicines, over-the-counter medicines, herbs, vitamins, and other supplements. Be sure to mention aspirin or any blood thinners youre taking.  · Let your provider know if you need to take antibiotics before dental procedures. You may need to take them before esophageal dilation as well.  · Tell your provider about any health conditions you have, such as heart or lung disease. Also mention any allergies to medicines.  · Youll need to have an empty stomach for  the procedure. Follow your providers instructions for not eating and drinking before the procedure.  · Arrange to have a family member or friend drive you home after the procedure.  During the procedure  · You may be given local anesthesia to numb your throat. Youll also likely be given medicine to relax you. The procedure takes about 15 minutes. It does not cause trouble breathing.  · A tube called an endoscope (scope) is used. This is a narrow tube with a tiny light and camera at the end. The scope is inserted through your mouth and into your esophagus. It lets your provider see inside your esophagus. To help guide your provider, an imaging method called fluoroscopy may also be used. This creates a moving X-ray image on a computer screen.   · Next, special tiny tools are carefully guided through your mouth and down into the esophagus. They widen the stricture and are then removed. Different types of instruments are used. The type used depends on the size and cause of the stricture. Types include:  ¨ Balloon dilator. A tiny empty balloon is put into the stricture using an endoscope. The balloon is slowly filled with air. The air is removed from the balloon when the stricture is widened to the right size. Balloon dilators are used to treat many types of strictures.  ¨ Guided wire dilator. A thin wire is eased down the esophagus. A small tube thats wider on one end is guided down the wire. It is put into the stricture to stretch it. These dilators are used to treat all kinds of strictures.  ¨ Bougies. These are weighted, cone-shaped tubes. Starting with smaller cones, your provider uses increasingly larger cones until the stricture is stretched the right amount. Bougies are often used to treat strictures that are simple (short, straight, and not very narrow).  After the procedure  · Youll be watched closely until your provider says youre ready to go home. Youll need to have a friend or family member drive you  home.  · You may have a sore throat for the rest of the day.  · You may have pain behind your breastbone for a short time afterwards.  · You can start drinking fluids again after the numbness in your throat goes away. You can resume eating the same day or the next day.  Risks and possible complications  Risks and possible complications for esophageal dilation include:  · Infection  · A tear or hole in the esophagus lining, causing bleeding and possibly needing surgery to fix  · Risks of anesthesia  Follow-up  You may need to have the procedure repeated one or more times. This depends on the cause and extent of the narrowing. Repeat procedures can allow the dilation to take place more slowly. This reduces the risks of the procedure.  If your stricture was caused by reflux esophagitis, youll likely need to take medicine to treat that condition. Your provider will tell you more.  When to call your provider  Call your healthcare provider right away if you have any of the following after the procedure:  · Fever of 100.4°F (38.0°C)  · Chest pain  · Trouble swallowing  · Vomiting blood or material that looks like coffee grounds  · Bleeding  · Black, tarry, or bloody stools   Date Last Reviewed: 7/1/2016  © 3266-4668 [employer]. [address] [place] [address], [place] [address]. All rights reserved. This information is not intended as a substitute for professional medical care. Always follow your healthcare professional's instructions.

## 2018-03-08 NOTE — PROGRESS NOTES
Subjective:     THIS DOCUMENT WAS MADE IN PART WITH [employer] DICTATION SOFTWARE. OCCASIONALLY THIS SOFTWARE MAY MISINTERPRET WORDS OR PHRASES.   Patient ID: Summer Inman is a 82 y.o. female.    Chief Complaint: Follow-up (3 month follow up)    HPI    Follow multiple problems.     Hypertension, this condition is satisfactory for her with a mild and systolic elevations but given other concerns this is a safer range.     We've also been following unexplained weight loss. This has appeared to stabilize, there has been some improvement in diet. I did review recent G.I. Consultation.     History of COPD, chronic shortness of breath and cough, no severe worsening but reports persistent cough and request for tessalon     history of a seizure disorder. Stable. But she request to change neurologist because of location and convenience. Previously followed by Dr. Souza     chronic pain syndrome, chronic back and neck pain, degenerative disc disease, chronic narcotic use to manage pain. This is relatively stable. She appears content with current medication and level of pain control. Does not wish to adjust medicines or follow back with pain management at this time.     Hypokalemia intermittent on labs, discuss current medications and supplements, due to repeat     she complains of intermittent left eye pain. Symptoms are vague. She denies any acute vision changes, she has chronic vision changes but not acute. No redness no drainage no severe headache. The discomfort seems to come and go. Some dryness at times.     She has a history of depressive disorder. This has improved. Still feels down at times but no severe depression. She denies any suicidal thoughts. She remains on sertraline.    Active Ambulatory Problems     Diagnosis Date Noted    Hyperlipidemia     Gastroesophageal reflux disease     Seizure disorder     Chronic cough 03/08/2016    Anemia 03/10/2016    DDD (degenerative disc disease), lumbar 06/15/2016     Lumbosacral radiculopathy 06/15/2016    Right hip pain 06/15/2016    Coronary artery disease involving native coronary artery of native heart without angina pectoris 10/17/2016    Essential hypertension 10/17/2016    Chronic obstructive pulmonary disease 10/17/2016    History of stroke 10/17/2016    Depression, major, recurrent, mild 10/17/2016    Insomnia 10/17/2016    Pulmonary hypertension 10/17/2016    Osteopenia 10/17/2016    Closed Colles' fracture of right radius 04/26/2017    Dysphagia 03/08/2018     Resolved Ambulatory Problems     Diagnosis Date Noted    Chronic kidney disease, stage II (mild) 12/26/2012    Dysphagia 10/08/2014    Cervical radiculopathy 02/02/2015    UTI (urinary tract infection) 01/31/2016    Elevated lactic acid level 01/31/2016    Abdominal pain 01/31/2016    Cervical spondylosis with myelopathy 03/07/2016    Shortness of breath 03/08/2016    Obesity, Class I, BMI 30-34.9 03/08/2016    Hypokalemia 03/09/2016     Past Medical History:   Diagnosis Date    Anticoagulant long-term use     Arthritis     Asthma     Atrial premature beats     Benign neoplasm of adrenal gland     Colon polyp     COPD (chronic obstructive pulmonary disease)     Coronary artery disease     Depression     Dizziness     First degree AV block     Gastroesophageal reflux disease     General anesthetics causing adverse effect in therapeutic use     Hepatomegaly     Hyperlipidemia     Hypertension     Impaired mobility     Neck pain     Schatzki's ring     Seasonal allergies     Seizures     Stroke     Trouble in sleeping     Wears dentures     Wears glasses      Current Outpatient Prescriptions on File Prior to Visit   Medication Sig Dispense Refill    alendronate (FOSAMAX) 70 MG tablet Take 1 tablet (70 mg total) by mouth every 7 days. 4 tablet 11    amLODIPine (NORVASC) 5 MG tablet TAKE 1 TABLET (5 MG TOTAL) ONE TIME DAILY. 90 tablet 3    benzocaine-menthol 15-2.6 mg  Lozg Take 1 lozenge by mouth 3 (three) times daily.       carvedilol (COREG) 6.25 MG tablet TAKE 1 TABLET (6.25 MG TOTAL) 2 TIMES DAILY WITH MEALS. 180 tablet 3    diclofenac sodium (VOLTAREN) 1 % Gel Apply 2 g topically once daily. (Patient taking differently: Apply 2 g topically once daily. ) 300 g 5    diphenhydrAMINE (BENADRYL) 25 mg capsule Take 25 mg by mouth every 6 (six) hours as needed for Itching.      donepezil (ARICEPT) 10 MG tablet Take 10 mg by mouth every morning.       duloxetine (CYMBALTA) 60 MG capsule Take 1 capsule (60 mg total) by mouth once daily. 90 capsule 1    gabapentin (NEURONTIN) 300 MG capsule TAKE 1 CAPSULE THREE TIMES DAILY 270 capsule 1    hydroCHLOROthiazide (HYDRODIURIL) 25 MG tablet TAKE 1/2 TABLET EVERY DAY 45 tablet 1    hydrocodone-acetaminophen 10-325mg (NORCO)  mg Tab Take 1 tablet by mouth 3 (three) times daily as needed. 90 tablet 0    hydrOXYzine pamoate (VISTARIL) 25 MG Cap Take 1 capsule (25 mg total) by mouth 2 (two) times daily as needed (for anxiety). 30 capsule 0    lamotrigine (LAMICTAL) 100 MG tablet Take 200 mg by mouth every morning.      meclizine (ANTIVERT) 25 mg tablet Take 1 tablet (25 mg total) by mouth 3 (three) times daily as needed. (Patient taking differently: Take 25 mg by mouth 3 (three) times daily as needed for Dizziness. ) 15 tablet 0    neomycin-polymyxin-dexamethasone (DEXACINE) 3.5 mg/g-10,000 unit/g-0.1 % Oint Apply to affected area right upper lid 2 times daily 3.5 g 0    nitroGLYCERIN (NITROSTAT) 0.4 MG SL tablet Place 1 tablet (0.4 mg total) under the tongue every 5 (five) minutes as needed for Chest pain. For chest pain. Repeat in five minutes if pain persists. May repeat again, for total of 3 tablets.  Call 911 at the third tablet. 20 tablet 3    omeprazole (PRILOSEC) 40 MG capsule TAKE 1 CAPSULE (40 MG TOTAL) ONE TIME DAILY. 90 capsule 0    ondansetron (ZOFRAN) 4 MG tablet Take 1 tablet (4 mg total) by mouth every 6  (six) hours as needed. 12 tablet 0    polyethylene glycol (GLYCOLAX) 17 gram/dose powder Take 17 g by mouth once daily. 510 g 0    potassium chloride SA (K-DUR,KLOR-CON) 10 MEQ tablet Take 2 pills immediately.  Then take 1 pill every 8 hours for 3 days.  Then 1 pill twice a day for 3 days. 20 tablet 1    pravastatin (PRAVACHOL) 40 MG tablet Take 1 tablet (40 mg total) by mouth once daily. 90 tablet 2    ranitidine (ZANTAC) 300 MG tablet Take 1 tablet (300 mg total) by mouth every evening. 90 tablet 2    sertraline (ZOLOFT) 100 MG tablet Take 1 tablet (100 mg total) by mouth once daily. 90 tablet 3    sertraline (ZOLOFT) 100 MG tablet Take 1 tablet (100 mg total) by mouth once daily. 30 tablet 1     No current facility-administered medications on file prior to visit.      Review of patient's allergies indicates:   Allergen Reactions    Codeine Shortness Of Breath     Tongue swelled    Nifedipine Anaphylaxis    Ace inhibitors      Other reaction(s): Angioedema    Ketorolac      Other reaction(s): tongue swell    Milk      Other reaction(s): Stomach upset    Tramadol      Other reaction(s): seizure     Social History   Substance Use Topics    Smoking status: Former Smoker     Packs/day: 0.50     Years: 19.00     Types: Cigarettes     Start date: 3/12/1984     Quit date: 10/8/2013    Smokeless tobacco: Never Used    Alcohol use No     Family History   Problem Relation Age of Onset    Heart disease Mother     Cancer Mother      Colon    Colon cancer Mother 74    Hypertension Daughter     Heart disease Father     Hypertension Son     Diabetes Son     Arthritis Son     Rheum arthritis Daughter     Cancer Daughter      Thyroid and Breast    Glaucoma Neg Hx     Crohn's disease Neg Hx     Ulcerative colitis Neg Hx     Stomach cancer Neg Hx     Esophageal cancer Neg Hx          Review of Systems   Constitutional: Positive for fatigue. Negative for unexpected weight change.   Eyes: Positive for  pain. Negative for photophobia.   Respiratory: Positive for cough and shortness of breath. Negative for choking.    Cardiovascular: Negative for chest pain.   Gastrointestinal: Negative for abdominal pain and blood in stool.   Musculoskeletal: Positive for arthralgias, back pain, gait problem, neck pain and neck stiffness.   Neurological: Positive for weakness. Negative for syncope.   Psychiatric/Behavioral: Negative for dysphoric mood and suicidal ideas.       Objective:      Physical Exam   Constitutional: She is oriented to person, place, and time.  Non-toxic appearance. No distress.   Elderly  female. She's in a wheelchair, she's awake and alert. She is not distressed   HENT:   Head: Normocephalic and atraumatic.   Right Ear: Tympanic membrane, external ear and ear canal normal.   Left Ear: Tympanic membrane, external ear and ear canal normal.   Nose: Nose normal.   Mouth/Throat: Oropharynx is clear and moist. No oropharyngeal exudate.   Eyes: Conjunctivae and EOM are normal. Pupils are equal, round, and reactive to light. Right eye exhibits no discharge. Left eye exhibits no discharge. No scleral icterus.   I exam reveals symmetrical pupils, responsive bilaterally. No deformity. Perhaps mild dryness, but no significant injection. No drainage. Attempt at funduscopic exam does not reveal any obvious papilledema but I am not able to visualize the retina satisfactory with my equipment without a dilated exam.   Neck: Normal range of motion. Neck supple. No JVD present. No thyromegaly present.   Cardiovascular: Normal rate, regular rhythm and intact distal pulses.  PMI is not displaced.  Exam reveals no gallop and no friction rub.    Murmur heard.  Pulmonary/Chest: Effort normal. No respiratory distress. She has no wheezes. She has no rales.   Mildly diminished breath sounds symmetrically   Abdominal: Soft. Bowel sounds are normal. She exhibits no distension and no mass. There is no tenderness. There is no  rebound and no guarding.   Lymphadenopathy:     She has no cervical adenopathy.   Neurological: She is alert and oriented to person, place, and time. She displays normal reflexes. No cranial nerve deficit. She exhibits normal muscle tone.   Skin: Skin is dry. No rash noted. She is not diaphoretic. No pallor.   Psychiatric: She has a normal mood and affect. Her behavior is normal.   Vitals reviewed.      Assessment:       1. Essential hypertension    2. Weight loss    3. Hypokalemia    4. DDD (degenerative disc disease), cervical    5. Seizure disorder    6. Chronic cough    7. Chronic obstructive pulmonary disease, unspecified COPD type    8. Eye pain, left    9. Depression, major, recurrent, mild        Plan:       Summer was seen today for follow-up.    Diagnoses and all orders for this visit:    Essential hypertension   chronic condition with mildly elevated systolic readings but this is satisfactory for her and stable  Weight loss   there appears to be stabilization. Weight is not returned back to normal but it has stabilized. Suspect this is related to G.I. Symptoms. Appetite is improving. I did review her EGD and findings. We discuss these results today see below for additional details  Hypokalemia   repeat potassium  DDD (degenerative disc disease), cervical   stable continue hydrocodone.  Seizure disorder  -     Ambulatory referral to Neurology    Chronic cough  -     benzonatate (TESSALON) 100 MG capsule; Take 2 capsules (200 mg total) by mouth 3 (three) times daily as needed for Cough.    Chronic obstructive pulmonary disease, unspecified COPD type   Narcotic precautions and routine follow-up suggested stable  Eye pain, left  -     Ambulatory referral to Ophthalmology   I don't see any acute or emergent Findings on exam. Will refer to ophthalmology but if there is worsening or vision changes in the meantime she should report to the emergency room.     Additional test reviewed include labs ordered by  gastroenterology including hepatic function and pancreatic enzymes. Abdominal ultrasound ordered but not completed. Will review once available

## 2018-03-08 NOTE — TRANSFER OF CARE
Anesthesia Transfer of Care Note    Patient: Summer Inman    Procedure(s) Performed: Procedure(s) (LRB):  ESOPHAGOGASTRODUODENOSCOPY (EGD) (N/A)    Patient location: PACU    Anesthesia Type: general    Transport from OR: Transported from OR on room air with adequate spontaneous ventilation    Post pain: adequate analgesia    Post assessment: no apparent anesthetic complications and tolerated procedure well    Post vital signs: stable    Level of consciousness: awake and alert    Nausea/Vomiting: no nausea/vomiting    Complications: none    Transfer of care protocol was followed      Last vitals:   Visit Vitals  /66 (BP Location: Right arm, Patient Position: Lying)   Pulse (!) 58   Temp 36.3 °C (97.3 °F) (Skin)   Resp 18   Ht 5' (1.524 m)   Wt 69.9 kg (154 lb)   SpO2 97%   Breastfeeding? No   BMI 30.08 kg/m²

## 2018-03-08 NOTE — ANESTHESIA PREPROCEDURE EVALUATION
03/08/2018  Summer Inman is a 82 y.o., female.    Pre-op Assessment    I have reviewed the Patient Summary Reports.     I have reviewed the Nursing Notes.   I have reviewed the Medications.     Review of Systems  Anesthesia Hx:  Hx of Anesthetic complications  Denies Family Hx of Anesthesia complications.    Cardiovascular:   Hypertension, poorly controlled CAD   Denies Dysrhythmias.     Pulmonary:   COPD, moderate Asthma asymptomatic    Renal/:   Denies Chronic Renal Disease.     Hepatic/GI:   GERD, well controlled    Musculoskeletal:   Arthritis     Neurological:   TIA, Denies CVA. Neuromuscular Disease,  Seizures, well controlled        Physical Exam  General:  Obesity    Airway/Jaw/Neck:  Airway Findings: Mouth Opening: Small, but > 3cm Tongue: Large  General Airway Assessment: Adult, Possible difficult intubation  Mallampati: IV  TM Distance: 4 - 6 cm       Chest/Lungs:  Chest/Lungs Findings: Clear to auscultation, Normal Respiratory Rate     Heart/Vascular:  Heart Findings: Rate: Normal  Rhythm: Regular Rhythm  Sounds: Normal        Mental Status:  Mental Status Findings:  Alert and Oriented         Anesthesia Plan  Type of Anesthesia, risks & benefits discussed:  Anesthesia Type:  general  Patient's Preference:   Intra-op Monitoring Plan: standard ASA monitors  Intra-op Monitoring Plan Comments:   Post Op Pain Control Plan:   Post Op Pain Control Plan Comments:   Induction:   IV  Beta Blocker:  Patient is on a Beta-Blocker and has received one dose within the past 24 hours (No further documentation required).       Informed Consent: Patient understands risks and agrees with Anesthesia plan.  Questions answered. Anesthesia consent signed with patient.  ASA Score: 3     Day of Surgery Review of History & Physical:    H&P update referred to the provider.         Ready For Surgery From Anesthesia  Perspective.

## 2018-03-12 ENCOUNTER — OUTPATIENT CASE MANAGEMENT (OUTPATIENT)
Dept: ADMINISTRATIVE | Facility: OTHER | Age: 83
End: 2018-03-12

## 2018-03-12 NOTE — PROGRESS NOTES
03/12/2018 (1st Attempt)  RN-IRVIN contacted patient to complete initial screen for OPCM. Spoke with patient and explained OPCM program. Patient states that she is interested, but is leaving to go have an abdominal US done at 4:00 today at the [facility]. She requested that I call her back Thursday to complete the assessment. She states that she can't do it tomorrow as her 40 year old grandson is having open heart surgery. She states that her daughter had a heart attack recently and then 2 days later, her grandson found out he needed heart surgery. She stated her daughter is doing better, but is still weak. Patient stated that she would prefer if I call in the afternoon on Thursday. Will attempt to contact patient later this week as requested. Mark Cox RN-OPCM

## 2018-03-14 ENCOUNTER — OFFICE VISIT (OUTPATIENT)
Dept: FAMILY MEDICINE | Facility: CLINIC | Age: 83
End: 2018-03-14
Payer: MEDICARE

## 2018-03-14 VITALS
BODY MASS INDEX: 30.63 KG/M2 | SYSTOLIC BLOOD PRESSURE: 122 MMHG | OXYGEN SATURATION: 96 % | WEIGHT: 156 LBS | DIASTOLIC BLOOD PRESSURE: 60 MMHG | HEART RATE: 63 BPM | TEMPERATURE: 98 F | HEIGHT: 60 IN

## 2018-03-14 DIAGNOSIS — R05.3 CHRONIC COUGH: ICD-10-CM

## 2018-03-14 DIAGNOSIS — B96.89 ACUTE BRONCHITIS, BACTERIAL: Primary | ICD-10-CM

## 2018-03-14 DIAGNOSIS — J32.9 SINUSITIS, UNSPECIFIED CHRONICITY, UNSPECIFIED LOCATION: ICD-10-CM

## 2018-03-14 DIAGNOSIS — J20.8 ACUTE BRONCHITIS, BACTERIAL: Primary | ICD-10-CM

## 2018-03-14 PROCEDURE — 99214 OFFICE O/P EST MOD 30 MIN: CPT | Mod: S$GLB,,, | Performed by: FAMILY MEDICINE

## 2018-03-14 PROCEDURE — 3074F SYST BP LT 130 MM HG: CPT | Mod: CPTII,S$GLB,, | Performed by: FAMILY MEDICINE

## 2018-03-14 PROCEDURE — 3078F DIAST BP <80 MM HG: CPT | Mod: CPTII,S$GLB,, | Performed by: FAMILY MEDICINE

## 2018-03-14 PROCEDURE — 99999 PR PBB SHADOW E&M-EST. PATIENT-LVL III: CPT | Mod: PBBFAC,,, | Performed by: FAMILY MEDICINE

## 2018-03-14 RX ORDER — AZITHROMYCIN 250 MG/1
TABLET, FILM COATED ORAL
Qty: 6 TABLET | Refills: 0 | Status: SHIPPED | OUTPATIENT
Start: 2018-03-14 | End: 2018-05-29 | Stop reason: ALTCHOICE

## 2018-03-14 RX ORDER — BENZONATATE 100 MG/1
200 CAPSULE ORAL 3 TIMES DAILY PRN
Qty: 60 CAPSULE | Refills: 2 | Status: SHIPPED | OUTPATIENT
Start: 2018-03-14 | End: 2018-06-05 | Stop reason: SDUPTHER

## 2018-03-14 NOTE — PROGRESS NOTES
Subjective:    THIS DOCUMENT WAS MADE IN PART WITH [employer] DICTATION SOFTWARE. OCCASIONALLY THIS SOFTWARE MAY MISINTERPRET WORDS OR PHRASES.     Patient ID: Summer Inman is a 82 y.o. female.    Chief Complaint: Shortness of Breath (pt's son states they are worried she is getting pneumonia. pt states she is having trouble swollowing )    HPI      82-year-old female returns today with worsening cough. She does have a history of COPD. She was seen last week when he of a dry cough and clear sputum. No fever or chills but symptoms have worsened she now reports green mucus. She also reports sticky green mucus from her eyes. No fever. She has some soreness in her chest which he takes a deep breath and coughs. She does not have any hemoptysis. She does have mild shortness of breath triggered by coughing spells. She is not used her bronchodilators. Said she forgot but has a really felt short of breath at rest, only with prolonged coughing spells. No fever or chills.  Over-the-counter cough medicines have not helped.  I sent in a prescription for tessalon perles,  But she states the pharmacist did not notify her this was sent in.    Active Ambulatory Problems     Diagnosis Date Noted    Hyperlipidemia     Gastroesophageal reflux disease     Seizure disorder     Chronic cough 03/08/2016    Anemia 03/10/2016    DDD (degenerative disc disease), lumbar 06/15/2016    Lumbosacral radiculopathy 06/15/2016    Right hip pain 06/15/2016    Coronary artery disease involving native coronary artery of native heart without angina pectoris 10/17/2016    Essential hypertension 10/17/2016    Chronic obstructive pulmonary disease 10/17/2016    History of stroke 10/17/2016    Depression, major, recurrent, mild 10/17/2016    Insomnia 10/17/2016    Pulmonary hypertension 10/17/2016    Osteopenia 10/17/2016    Closed Colles' fracture of right radius 04/26/2017    Dysphagia 03/08/2018     Resolved Ambulatory Problems     Diagnosis  Date Noted    Chronic kidney disease, stage II (mild) 12/26/2012    Dysphagia 10/08/2014    Cervical radiculopathy 02/02/2015    UTI (urinary tract infection) 01/31/2016    Elevated lactic acid level 01/31/2016    Abdominal pain 01/31/2016    Cervical spondylosis with myelopathy 03/07/2016    Shortness of breath 03/08/2016    Obesity, Class I, BMI 30-34.9 03/08/2016    Hypokalemia 03/09/2016     Past Medical History:   Diagnosis Date    Anticoagulant long-term use     Arthritis     Asthma     Atrial premature beats     Benign neoplasm of adrenal gland     Colon polyp     COPD (chronic obstructive pulmonary disease)     Coronary artery disease     Depression     Dizziness     First degree AV block     Gastroesophageal reflux disease     General anesthetics causing adverse effect in therapeutic use     Hepatomegaly     Hyperlipidemia     Hypertension     Impaired mobility     Neck pain     Schatzki's ring     Seasonal allergies     Seizures     Stroke     Trouble in sleeping     Wears dentures     Wears glasses            Review of Systems   Constitutional: Positive for fatigue. Negative for unexpected weight change.   Eyes: Positive for discharge and itching. Negative for photophobia and pain.   Respiratory: Positive for cough, chest tightness and shortness of breath. Negative for choking and wheezing.    Cardiovascular: Negative for chest pain.   Gastrointestinal: Negative for abdominal pain and blood in stool.   Musculoskeletal: Positive for arthralgias, back pain, gait problem, neck pain and neck stiffness.   Neurological: Positive for weakness. Negative for syncope.   Psychiatric/Behavioral: Negative for dysphoric mood and suicidal ideas.       Objective:      Physical Exam   Constitutional: She is oriented to person, place, and time. No distress.   HENT:   Head: Normocephalic and atraumatic.   Right Ear: Tympanic membrane, external ear and ear canal normal.   Left Ear: Tympanic  membrane, external ear and ear canal normal.   Nose: Mucosal edema present.   Mouth/Throat: Uvula is midline and oropharynx is clear and moist.   Eyes: No scleral icterus.   Cardiovascular: Normal rate, regular rhythm and normal heart sounds.    No murmur heard.  Pulmonary/Chest: Effort normal. No respiratory distress.   She's not distress. Her lungs are mostly clear with a few scattered course breast sounds that cleared up on coughing. No wheezing. No accessory muscle usage   Neurological: She is alert and oriented to person, place, and time.   Skin: Skin is dry. No rash noted. She is not diaphoretic.   Psychiatric: She has a normal mood and affect. Her behavior is normal.   Vitals reviewed.      Assessment:       1. Acute bronchitis, bacterial    2. Chronic cough    3. Sinusitis, unspecified chronicity, unspecified location        Plan:       Summer was seen today for shortness of breath.    Diagnoses and all orders for this visit:    Acute bronchitis, bacterial    Chronic cough  -     benzonatate (TESSALON) 100 MG capsule; Take 2 capsules (200 mg total) by mouth 3 (three) times daily as needed for Cough.    Sinusitis, unspecified chronicity, unspecified location  -     azithromycin (Z-ELISABETH) 250 MG tablet; Take this antibiotic for 5 days total according to the following instructions: Take 2 po on Day #1, then take one po daily on days 2-5     Warm compresses to the eyes. Contact me in a few days to make certain she's improving. Call sooner if any worsening

## 2018-03-16 ENCOUNTER — OUTPATIENT CASE MANAGEMENT (OUTPATIENT)
Dept: ADMINISTRATIVE | Facility: OTHER | Age: 83
End: 2018-03-16

## 2018-03-16 RX ORDER — PRAVASTATIN SODIUM 40 MG/1
TABLET ORAL
Qty: 90 TABLET | Refills: 3 | OUTPATIENT
Start: 2018-03-16

## 2018-03-16 NOTE — PROGRESS NOTES
03/16/18  (2nd Attempt)  RN-CM attempted to contact patient to complete initial screen for OPCM. Called mobile number [phone] which rang, then rolled to busy with no ability to leave message. Called home number [phone] and left message requesting return call. Will attempt to contact patient next week. Mark Cox RN-OPCM

## 2018-03-16 NOTE — PROGRESS NOTES
Refill Authorization Note     is requesting a refill authorization.    Brief assessment and rationale for refill: Quick DC: refill too soon; not until 10/18                       Medication-related problems identified: Therapeutic duplication              Comments:

## 2018-03-20 ENCOUNTER — OUTPATIENT CASE MANAGEMENT (OUTPATIENT)
Dept: ADMINISTRATIVE | Facility: OTHER | Age: 83
End: 2018-03-20

## 2018-03-20 NOTE — PROGRESS NOTES
03/20/18  (3rd Attempt)  RN-CM attempted to contact patient to complete initial screen for OPCM. Left message on mobile number [phone] and home number [phone] requesting return call. Will mail attempt letter to patient today. If no response from patient by 3/30/18, will close case. Mark Cox, RN-OPCM

## 2018-03-20 NOTE — LETTER
March 20, 2018    Summer LYN Inman  [address] [facility] [address]             [facility] [address] [place] [address] Dear Ms. Inman,    I work with [facility]'s Outpatient Care Management Department. We received a referral from your Primary Care Physician, Dr. Wilson to call you to discuss your medical history. These services are free of charge and are offered to [facility] patients who have recently been discharged from any of our facilities or who have complex medical conditions that may require the skill of a nurse to assist with management. Dr. Wilson felt that you could possibly benefit from our services.     I am a Registered Nurse who specializes in connecting patients with available medical and financial resources as well as addressing any educational needs that may be indicated.    I have attempted to reach you by telephone several times, but was unsuccessful. Please call our department so that we can go over some questions with you regarding your health.    I can be reached directly at [phone] from 8:00AM to 4:30PM, Monday through Friday. The general Outpatient Case Management Department can be reached at [phone] from 8:00AM to 4:30PM, Monday through Friday. [facility] also has a program where a nurse is available 24/7 to answer questions or provide medical advice. That number is [phone]. Please feel free to contact us for any questions or concerns.    Kind Regards,        Lien Cox, RN  Outpatient Complex Care Management  [phone]

## 2018-03-22 ENCOUNTER — TELEPHONE (OUTPATIENT)
Dept: FAMILY MEDICINE | Facility: CLINIC | Age: 83
End: 2018-03-22

## 2018-03-22 RX ORDER — PREDNISONE 20 MG/1
40 TABLET ORAL DAILY
Qty: 10 TABLET | Refills: 0 | Status: SHIPPED | OUTPATIENT
Start: 2018-03-22 | End: 2018-03-27

## 2018-03-22 NOTE — TELEPHONE ENCOUNTER
Spoke with patient. Said that she improved but on Monday she started getting worse again. C/O nausea, diarrhea, SOB, coughing up clear sputum. No fever.

## 2018-03-22 NOTE — TELEPHONE ENCOUNTER
I do not want to do any additional antibiotics if she is not having fever or colored sputum.  Not with her having diarrhea.  I'll send him a short course of prednisone though this will raise her glucose and she must account for this.  But if she's having severe diarrhea, blood in the stool, or significant shortness of breath then she should go to the emergency room instead.

## 2018-03-22 NOTE — TELEPHONE ENCOUNTER
"----- Message from Leatha Garcia sent at 3/22/2018  9:58 AM CDT -----  Contact: self  Patient [phone] would only say "just to have the nurse call me"/please call  "

## 2018-03-23 NOTE — TELEPHONE ENCOUNTER
Pt informed of dr recommendations. Pt states she is still having diarrhea, dark stools,and some shortness of breath. Instructed pt to go to ER with those symptoms. Pt states if it gets worse she will he to ER. As of now she will  rx from pharmacy.

## 2018-03-26 ENCOUNTER — TELEPHONE (OUTPATIENT)
Dept: FAMILY MEDICINE | Facility: CLINIC | Age: 83
End: 2018-03-26

## 2018-03-26 ENCOUNTER — OUTPATIENT CASE MANAGEMENT (OUTPATIENT)
Dept: ADMINISTRATIVE | Facility: OTHER | Age: 83
End: 2018-03-26

## 2018-03-26 DIAGNOSIS — R52 PAIN: ICD-10-CM

## 2018-03-26 DIAGNOSIS — F41.9 ANXIETY: ICD-10-CM

## 2018-03-26 DIAGNOSIS — H02.823: ICD-10-CM

## 2018-03-26 DIAGNOSIS — G40.909 SEIZURE DISORDER: Primary | ICD-10-CM

## 2018-03-26 RX ORDER — HYDROCODONE BITARTRATE AND ACETAMINOPHEN 10; 325 MG/1; MG/1
1 TABLET ORAL 3 TIMES DAILY PRN
Qty: 90 TABLET | Refills: 0 | Status: CANCELLED | OUTPATIENT
Start: 2018-03-26

## 2018-03-26 RX ORDER — HYDROXYZINE PAMOATE 25 MG/1
25 CAPSULE ORAL 2 TIMES DAILY PRN
Qty: 30 CAPSULE | Refills: 0 | Status: SHIPPED | OUTPATIENT
Start: 2018-03-26 | End: 2019-09-11

## 2018-03-26 RX ORDER — SERTRALINE HYDROCHLORIDE 100 MG/1
150 TABLET, FILM COATED ORAL DAILY
Qty: 135 TABLET | Refills: 3 | Status: SHIPPED | OUTPATIENT
Start: 2018-03-26 | End: 2018-06-05 | Stop reason: SDUPTHER

## 2018-03-26 RX ORDER — NEOMYCIN SULFATE, POLYMYXIN B SULFATE, AND DEXAMETHASONE 3.5; 10000; 1 MG/G; [USP'U]/G; MG/G
OINTMENT OPHTHALMIC
Qty: 3.5 G | Refills: 0 | Status: CANCELLED | OUTPATIENT
Start: 2018-03-26

## 2018-03-26 NOTE — PATIENT INSTRUCTIONS
Relieving Back Pain  Back pain is a common problem. You can strain back muscles by lifting too much weight or just by moving the wrong way. Back strain can be uncomfortable, even painful. And it can take weeks or months to improve. To help yourself feel better and prevent future back strains, try these tips.  Important Note: Do not give aspirin to children or teens without first discussing it with your healthcare provider.      ? Ice    Ice reduces muscle pain and swelling. It helps most during the first 24 to 48 hours after an injury.  · Wrap an ice pack or a bag of frozen peas in a thin towel. (Never place ice directly on your skin.)  · Place the ice where your back hurts the most.  · Dont ice for more than 20 minutes at a time.  · You can use ice several times a day.  ? Medicines  Over-the-counter pain relievers can include acetaminophen and anti-inflammatory medicines, which includes aspirin or ibuprofen. They can help ease discomfort. Some also reduce swelling.  · Tell your healthcare provider about any medicines you are already taking.  · Take medicines only as directed.  ? Heat  After the first 48 hours, heat can relax sore muscles and improve blood flow.  · Try a warm bath or shower. Or use a heating pad set on low. To prevent a burn, keep a cloth between you and the heating pad.  · Dont use a heating pad for more than 15 minutes at a time. Never sleep on a heating pad.  Date Last Reviewed: 9/1/2015  © 6692-3177 [employer]. [address] [place] [address]. All rights reserved. This information is not intended as a substitute for professional medical care. Always follow your healthcare professional's instructions.        After Back Surgery: Tips for Daily Living    These tips can help make some tasks easier and will help protect your back.  Getting dressed  Putting on and taking off socks, slacks, and underwear may be easier to do lying on your back. A tool called a dressing  reacher can be of help. To make dressing and undressing easier, wear loose clothes and slip-on shoes with closed backs. You may want to have someone help you dress and undress.  Getting ready to lie down  Before you lie down, make sure that you have the things you need within reach. Gather items such as medications, eyeglasses, reading material, and other things you may want. Be sure to place them so you wont have to twist your back to reach them. If you arent able to gather the items yourself, ask a family member or friend to help.  Washing at the sink  While standing at the sink, bend your knees and hips. Keep your back in a neutral position.  Showering  Use a hand-held shower to wash your hair. Or bend at the knees and hips under the shower head to avoid arching your back. To avoid bending, use a long-handled scrub brush. Use liquid soap so you dont need to  a dropped bar of soap.  Eating  Slide your chair as far under the table as possible. Dont lean forward or put your elbows on the table.  Using the toilet  Try using a toilet seat riser or portable commode. You can buy these at a drugstore or medical supply store.  Date Last Reviewed: 10/13/2015  © 6251-6165 [employer]. [address] [place] [address], [place] [id number]. All rights reserved. This information is not intended as a substitute for professional medical care. Always follow your healthcare professional's instructions.        What is High Blood Pressure?  High blood pressure (also called hypertension) is known as the silent killer. This is because most of the time it doesnt cause symptoms. In fact, many people dont know they have it until other problems develop. In most cases, high blood pressure cant be cured. Its a disease that often requires lifelong treatment. The good news is that it can be managed.  Understanding blood pressure  The circulatory system is made up of the heart and blood vessels that carry blood through the  body. Your heart is the pump for this system. With each heartbeat (contraction), the heart sends blood out through large blood vessels called arteries. Blood pressure is a measure of how hard the moving blood pushes against the walls of the arteries.  High blood pressure can harm your health  In a healthy blood vessel, the blood moves smoothly through the vessel and puts normal pressure on the vessel walls.       High blood pressure occurs when blood pushes too hard against artery walls. This causes damage to the artery walls and then the formation of scar tissue as it heals. This makes the arteries stiff and weak. Plaque sticks to the scarred tissue narrowing and hardening the arteries. High blood pressure also causes your heart to work harder to get blood out to the body. High blood pressure raises your risk of heart attack, also known as acute myocardial infarction, or AMI, and stroke. It can also lead to kidney disease, and blindness.  Measuring blood pressure  An example of a blood pressure measurement is 120/70 (120 over 70). The top number is the pressure of blood against the artery walls during a heartbeat (systolic). The bottom number is the pressure of blood against artery walls between heartbeats (diastolic). Talk with your healthcare provider to find out what your blood pressure goals should be.   Controlling blood pressure  If your blood pressure is too high, work with your doctor on a plan for lowering it. Below are steps you can take that will help lower your blood pressure.  · Choose heart-healthy foods. Eating healthier meals helps you control your blood pressure. Ask your doctor about the DASH eating plan. This plan helps reduce blood pressure by limiting the amount of sodium (salt) you have in your diet. DASH also encourages eating plenty of fruits and vegetables, low-fat or non-fat dairy, whole-grains, and foods high in fiber, and low in fat.  · Reduce sodium. Reducing sodium in your diet reduces  "fluid retention. Fluid retention caused by too much salt increases blood volume and blood pressure. The American Heart Associations (AHA) "ideal" sodium intake recommendation is 1,500 milligrams per day.  However, since American's eat so much salt, the AHA says a positive change can occur by cutting back to even 2,400 milligrams of sodium a day.  · Maintain a healthy weight. Being overweight makes you more likely to have high blood pressure. Losing excess weight helps lower blood pressure.  · Exercise regularly. Daily exercise helps your heart and blood vessels work better and stay healthier. It can help lower your blood pressure.  · Stop smoking. Smoking increases blood pressure and damages blood vessels.  · Limit alcohol. Drinking too much alcohol can raise blood pressure. Men should have no more than 2 drinks a day. Women should have no more than 1. (A drink is equal to 1 beer, or a small glass of wine, or a shot of liquor.)  · Control stress. Stress makes your heart work harder and beat faster. Controlling stress helps you control your blood pressure.  Facts about high blood pressure  · Feeling OK does not mean that blood pressure is under control. Likewise, feeling bad doesnt mean its out of control. The only way to know for sure is to check your pressure regularly.  · Medicine is only one part of controlling high blood pressure. You also need to manage your weight, get regular exercise, and adjust your eating habits.  · High blood pressure is usually a lifelong problem. But it can be controlled with healthy lifestyle changes and medicine.  · Hypertension is not the same as stress. Although stress may be a factor in high blood pressure, its only one part of the story.  · Blood pressure medicines need to be taken every day. Stopping suddenly may cause a dangerous increase in pressure.   Date Last Reviewed: 4/27/2016  © 8276-6013 [employer]. [address] [place] [address], [place] [address]. All rights " reserved. This information is not intended as a substitute for professional medical care. Always follow your healthcare professional's instructions.        Established High Blood Pressure    High blood pressure (hypertension) is a chronic disease. Often, healthcare providers dont know what causes it. But it can be caused by certain health conditions and medicines.  If you have high blood pressure, you may not have any symptoms. If you do have symptoms, they may include headache, dizziness, changes in your vision, chest pain, and shortness of breath. But even without symptoms, high blood pressure thats not treated raises your risk for heart attack and stroke. High blood pressure is a serious health risk and shouldnt be ignored.  A blood pressure reading is made up of two numbers: a higher number over a lower number. The top number is the systolic pressure. The bottom number is the diastolic pressure. A normal blood pressure is a systolic pressure of  less than 120 over a diastolic pressure of less than 80. You will see your blood pressure readings written together. For example, a person with a systolic pressure of 188 and a diastolic pressure of 78 will have 118/78 written in the medical record.  High blood pressure is when either the top number is 140 or higher, or the bottom number is 90 or higher. This must be the result when taking your blood pressure a number of times. The blood pressures between normal and high are called prehypertension.  Home care  If you have high blood pressure, you should do what is listed below to lower your blood pressure. If you are taking medicines for high blood pressure, these methods may reduce or end your need for medicines in the future.  · Begin a weight-loss program if you are overweight.  · Cut back on how much salt you get in your diet. Heres how to do this:  ¨ Dont eat foods that have a lot of salt. These include olives, pickles, smoked meats, and salted potato  chips.  ¨ Dont add salt to your food at the table.  ¨ Use only small amounts of salt when cooking.  · Start an exercise program. Talk with your healthcare provider about the type of exercise program that would be best for you. It doesn't have to be hard. Even brisk walking for 20 minutes 3 times a week is a good form of exercise.  · Dont take medicines that stimulate the heart. This includes many over-the-counter cold and sinus decongestant pills and sprays, as well as diet pills. Check the warnings about hypertension on the label. Before buying any over-the-counter medicines or supplements, always ask the pharmacist about the product's potential interaction with your high blood pressure and your high blood pressure medicines.  · Stimulants such as amphetamine or cocaine could be deadly for someone with high blood pressure. Never take these.  · Limit how much caffeine you get in your diet. Switch to caffeine-free products.  · Stop smoking. If you are a long-time smoker, this can be hard. Talk to your healthcare provider about medicines and nicotine replacement options to help you. Also, enroll in a stop-smoking program to make it more likely that you will quit for good.  · Learn how to handle stress. This is an important part of any program to lower blood pressure. Learn about relaxation methods like meditation, yoga, or biofeedback.  · If your provider prescribed medicines, take them exactly as directed. Missing doses may cause your blood pressure get out of control.  · If you miss a dose or doses, check with your healthcare provider or pharmacist about what to do.  · Consider buying an automatic blood pressure machine. Ask your provider for a recommendation. You can get one of these at most pharmacies.     The American Heart Association recommends the following guidelines for home blood pressure monitoring:  · Don't smoke or drink coffee for 30 minutes before taking your blood pressure.  · Go to the bathroom  before the test.  · Relax for 5 minutes before taking the measurement.  · Sit with your back supported (don't sit on a couch or soft chair); keep your feet on the floor uncrossed. Place your arm on a solid flat surface (like a table) with the upper part of the arm at heart level. Place the middle of the cuff directly above the eye of the elbow. Check the monitor's instruction manual for an illustration.  · Take multiple readings. When you measure, take 2 to 3 readings one minute apart and record all of the results.  · Take your blood pressure at the same time every day, or as your healthcare provider recommends.  · Record the date, time, and blood pressure reading.  · Take the record with you to your next medical appointment. If your blood pressure monitor has a built-in memory, simply take the monitor with you to your next appointment.  · Call your provider if you have several high readings. Don't be frightened by a single high blood pressure reading, but if you get several high readings, check in with your healthcare provider.  · Note: When blood pressure reaches a systolic (top number) of 180 or higher OR diastolic (bottom number) of 110 or higher, seek emergency medical treatment.  Follow-up care  You will need to see your healthcare provider regularly. This is to check your blood pressure and to make changes to your medicines. Make a follow-up appointment as directed. Bring the record of your home blood pressure readings to the appointment.  When to seek medical advice  Call your healthcare provider right away if any of these occur:  · Blood pressure reaches a systolic (upper number) of 180 or higher OR a diastolic (bottom number) of 110 or higher  · Chest pain or shortness of breath  · Severe headache  · Throbbing or rushing sound in the ears  · Nosebleed  · Sudden severe pain in your belly (abdomen)  · Extreme drowsiness, confusion, or fainting  · Dizziness or spinning sensation (vertigo)  · Weakness of an  arm or leg or one side of the face  · You have problems speaking or seeing   Date Last Reviewed: 12/1/2016  © 1699-4762 [employer]. [address] [place] [address], [place] [address]. All rights reserved. This information is not intended as a substitute for professional medical care. Always follow your healthcare professional's instructions.        Low-Salt Choices  Eating salt (sodium) can make your body retain too much water. Excess water makes your heart work harder. Canned, packaged, and frozen foods are easy to prepare, but they are often high in sodium. Here are some ideas for low-salt foods you can easily prepare yourself.    For breakfast  · Fruit or 100% fruit juice  · Whole-wheat bread or an English muffin. Compare sodium content on labels.  · Low-fat milk or yogurt  · Unsalted eggs  · Shredded wheat  · Corn tortillas  · Unsalted steamed rice  · Regular (not instant) hot cereal, made without salt  Stay away from:  · Sausage, stout, and ham  · Flour tortillas  · Packaged muffins, pancakes, and biscuits  · Instant hot cereals  · Cottage cheese  For lunch and dinner  · Fresh fish, chicken, turkey, or meat--baked, broiled, or roasted without salt  · Dry beans, cooked without salt  · Tofu, stir-fried without salt  · Unsalted fresh fruit and vegetables, or frozen or canned fruit and vegetables with no added salt  Stay away from:  · Lunch or deli meat that is cured or smoked  · Cheese  · Tomato juice and catsup  · Canned vegetables, soups, and fish not labeled as no-salt-added or reduced sodium  · Packaged gravies and sauces  · Olives, pickles, and relish  · Bottled salad dressings  For snacks and desserts  · Yogurt  · Unsalted, air popped popcorn  · Unsalted nuts or seeds  Stay away from:  · Pies and cakes  · Packaged dessert mixes  · Pizza  · Canned and packaged puddings  · Pretzels, chips, crackers, and nuts--unless the label says unsalted  Date Last Reviewed: 6/17/2015  © 2467-2996 [employer]. [address] [place] [address]. All rights reserved. This information is not intended as a substitute for professional medical care. Always follow your healthcare professional's instructions.        Potassium-Rich Foods  The normal adult diet usually contains 2,000 mg to 4,000 mg of potassium per day. More potassium is needed when you lose too much potassium from your body. This can happen if you have diarrhea or vomiting. It can also happen if you take a medicine to make you urinate more (diuretic). To increase the amount of potassium in your diet, include these high-potassium foods.     [The (*) indicates foods highest in potassium.]  Vegetables  Artichokes. Cooked 1/2 cup, 200 mg to 300 mg*  Asparagus. Cooked 1/2 cup, 200 mg to 300 mg  Beans. White, red, mays cooked 1/2 cup, 300 mg to 500 mg*  Beets. Cooked 1/2 cup, 200 mg to 300 mg  Broccoli. Cooked or raw 1 cup, 200 mg to 500 mg*  Farner sprouts. Cooked 1/2 cup, 200 mg to 300 mg  Cabbage. Raw 1 cup, 100 mg to 200 mg  Carrots. Raw or cooked 1/2 cup, 100 mg to 200 mg  Celery. Raw 1 cup, 200 mg to 300 mg  [place] beans. Fresh or frozen 1/2 cup, 300 mg to 500 mg*   Mushrooms. Raw or cooked 1/2 cup, 100 mg to 300 mg  Peas. Cooked 1/2 cup, 150 mg to 250 mg   Potatoes. Baked 1 medium, 500 mg to 900 mg*   Spinach. Cooked 1 cup, 800 mg to 900 mg*   Spinach. Raw 2 cups, 300 mg to 400 mg *  Squash, winter. Fresh, frozen, or cooked 1/2 cup, 200 mg to 400 mg   Tomato. Fresh 1 medium, 200 mg to 300 mg   Tomato juice. Canned 1/2 cup, 200 mg to 300 mg   Fruits  Apple juice. Unsweetened 1 cup, 200 mg to 300 mg   Apricots. Canned 1/2 cup, 200 mg to 300 mg   Apricots. Dried 4 pieces, 100 mg to 200 mg   Avocado. Raw 1/2 cup, 300 mg to 400 mg*  Banana. Fresh 1 small, 300 mg to 400 mg*   Cantaloupe. Fresh 1 cup diced, 300 mg to 400 mg*   Grape juice. Unsweetened 1 cup, 200 mg to 300 mg   Honeydew melon. Fresh 1 cup diced, 300 mg to 400 mg*   Orange. Fresh 1 medium,  "200 mg to 300 mg    Orange juice. Unsweetened, fresh or frozen 1/2 cup, 200 mg to 300 mg  Pineapple juice. Unsweetened 1 cup, 300 mg to 400 mg   Prune juice. Unsweetened 1/2 cup, 300 mg to 400 mg*   Prunes. Dried 5 pieces, 300 mg to 400 mg*   Strawberries. Fresh or frozen 1 cup, 200 mg to 300 mg  Meat  Red meat. Cooked 3 ounces, 100 mg to 300 mg   Seafood  Cod, flounder, halibut. Cooked 3 ounces, 100 mg to 300 mg*  Marquez. Cooked, 3 ounces 300 mg to 400 mg*   Scallops. Cooked 3 ounces, 200 mg to 300 mg*  Shrimp. Cooked 3/4 cup, 100 mg to 200 mg   Tuna. Fresh or canned 3/4 cup, 200 mg to 500 mg   Date Last Reviewed: 10/1/2016  © 4146-5264 [employer]. [address] [place] [address], [place] [address]. All rights reserved. This information is not intended as a substitute for professional medical care. Always follow your healthcare professional's instructions.        Know the Signs and Symptoms of Depression  Everyone feels down at times. The blues are a natural part of life. But an unhappy period thats intense or lasts for more than a couple of weeks can be a sign of depression.  Depression is a serious illness. It is not a sign of weakness or a "character flaw," and it is not something you can "snap out of." In fact, most people with depression need treatment to get better. Depression can disrupt the lives of family and friends. If you know someone you think may be depressed, find out what you can do to help.    Recognizing signs of depression  People who are depressed may:  · Feel unhappy, sad, blue, down, or miserable nearly every day  · Feel helpless, hopeless, or worthless  · Lose interest in hobbies, friends, and activities that used to give pleasure  · Not sleep well or sleep too much  · Gain or lose weight  · Feel low on energy or constantly tired  · Have a hard time concentrating or making decisions  · Lose interest in sex  · Have physical symptoms, such as stomachaches, headaches, or backaches  Know " the serious signals  Never ignore a person's comments about suicide or behaviors that can lead to self-harm. Warning signals for suicide include:  · Threats or talk of suicide  · Statements such as I wont be a problem much longer or Nothing matters  · Giving away possessions or making a will or  arrangements  · Buying a gun or other weapon  · Sudden, unexplained cheerfulness or calm after a period of depression  If you notice any of these signs, get help right away. Call a healthcare professional, mental health clinic, or suicide hotline and ask what action to take. In an emergency, dont hesitate to call the police.  Resources:  · National Institutes of Mental Pakvzs921-228-7138yse.[facility] Hospital[facility].nih.gov  · National [place] on Mental Jvdylii694-152-0057iat.tavon.org   · Mental Health Dibogpd943-806-2680izv.[facility].org  · National Suicide Yygokhc441-702-4947 (800-SUICIDE)  · National Suicide Prevention Rcxkvvah692-671-9150 (670-539-JRMN)www.suicidepreventionlifeline.org   Date Last Reviewed: 2017  © 1011-8062 [employer]. [address] [place] [address]. All rights reserved. This information is not intended as a substitute for professional medical care. Always follow your healthcare professional's instructions.        What Can Cause Depression?  Depression is a real illness and certain factors have been known to trigger it. Below are some common known causes. Any of these factors, or a combination of them, can make depression more likely. Sometimes, depression occurs for no one clear reason. But no matter what the cause, depression can be treated.    Loss or stress  Depression can occur in children and adults, but it often starts in adulthood. Normal grief over a death, breakup, or other loss may become depression. Life stresses such as physical abuse, job loss, or sudden change in finances can also trigger depression. In some cases, years go by before the depression sets in.  Family  history  The tendency to develop depression seems to run in families. If one or more of your close relatives (parents, grandparents, or siblings) have had an episode of depression, you may be more likely to develop the illness, too.  Drugs or alcohol  Drugs and alcohol can upset the chemical balance in the brain. This can lead to an episode of depression. Some depressed people turn to drugs or alcohol to numb the pain. But in the long run, doing so just makes depression worse.  Medicines  Depression can be a side effect of some medicines for high blood pressure, cancer, pain, and other health problems. So tell your doctor about all medicines you take. But never stop taking one without your doctors OK.  Physical illness  Being sick can make anyone feel frustrated and sad. But some health problems may cause actual changes in your brain that lead to depression. Other health problems, such as an underactive thyroid, may be mistaken for depression.  Hormones  Hormones carry messages in the bloodstream. They may affect brain chemicals, leading to depression. Women may get depressed when their hormone levels change quickly, such as just before their period, after giving birth, or during menopause.  Date Last Reviewed: 1/1/2017  © 3323-3157 [employer]. [address] [place] [address], [place] [address]. All rights reserved. This information is not intended as a substitute for professional medical care. Always follow your healthcare professional's instructions.        Partial Seizures: Know What to Do  Because seizures may happen at any time, it helps to be prepared. This is true even if medicine usually keeps your seizures under control. Start by telling those you live and work with about your health condition. Make sure they know what to do if a seizure happens.    Steps for your protection  Most partial seizures last from a few seconds to a few minutes. During that time, those around you should help keep you safe.  What those witnessing the seizure should do is listed below.  What to do  Seek medical attention right away if you:  · Are hurt during the seizure.  · Begin choking.  · Have a seizure that lasts more than 5 minutes.  · Have multiple seizures in a row.   · Do not fully regain consciousness after the seizure is over.   Otherwise, they should do the following:  · Move any hard or sharp objects away from you.  · Turn you on your side if you seem unconscious.  · Talk to you afterward to relieve your confusion.  · Note how long the seizure lasted. Note what you were doing before, during, and after the seizure.  What NOT to do  It is important that they do not do the following:  · Do not try to stop any jerking or twisting.  · Do not put anything in the mouth.  Prepare family, friends, and coworkers  It may seem awkward to talk to others about seizures. But telling family, friends, and coworkers about your seizures can help them react to a seizure in a way that will help--and not hurt--you. Describe to them what happens before, during, and after you have a seizure. Explain what they should and should not do.  Date Last Reviewed: 10/7/2015  © 1511-0238 [employer]. [address] [place] [address], [place] [address]. All rights reserved. This information is not intended as a substitute for professional medical care. Always follow your healthcare professional's instructions.        First Aid: Seizures  A seizure results from a sudden rush of abnormal electrical signals in the brain. Symptoms may range from a minor daze to uncontrollable muscle spasms (convulsions). In many cases, the victim will lose consciousness. A seizure can be caused by a high fever, head injury, drug reaction, or condition such as epilepsy.  1. Protect the head  · Help the victim to the floor if he or she begins losing muscle control. Turn the person on his or her side to prevent choking.  · Protect the victim's head from injury by placing  something soft, such as folded clothes, beneath it, and by moving objects away from the victim.  · Don't cause injury by restraining the person or by placing anything in his or her mouth.  · Remove eyeglasses.  2.  Preserve dignity  · Clear away bystanders.  · Reassure the victim, who may be confused, drowsy, or hostile when coming out of the seizure.  · Cover the person or provide dry clothes if muscle spasms have caused a loss of bladder control.  3. Check for injury  · Make sure the victim's mental state has returned to normal. One way to do this is to ask the person his or her name, the year, and your location.  · Injuries can occur to the head, mouth, tongue, or body.   4. Call 911  · If the seizure lasts longer than five minutes (Note: Timing the seizure and recovery time is helpful in many cases.)  · If a second seizure occurs  · If the victim doesnt regain consciousness  · If the victim is pregnant  · If the victim has no history of seizures  · If the person has sustained an injury during the seizure. (Note: If the injury is not severe or life threatening, it may be more appropriate to seek treatment with the primary care provider.)  Date Last Reviewed: 10/19/2015  © 1908-9022 [employer]. [address] [place] [address], [place] [address]. All rights reserved. This information is not intended as a substitute for professional medical care. Always follow your healthcare professional's instructions.        Exercises to Strengthen Your Lower Back  Strong lower back and abdominal muscles work together to support your spine. The exercises below will help strengthen the lower back. It is important that you begin exercising slowly and increase levels gradually.  Always begin any exercise program with stretching. If you feel pain while doing any of these exercises, stop and talk to your doctor about a more specific exercise program that better suits your condition.   Low back stretch  The point of stretching  is to make you more flexible and increase your range of motion. Stretch only as much as you are able. Stretch slowly. Do not push your stretch to the limit. If at any point you feel pain while stretching, this is your (temporary) limit.  · Lie on your back with your knees bent and both feet on the ground.  · Slowly raise your left knee to your chest as you flatten your lower back against the floor. Hold for 5 seconds.  · Relax and repeat the exercise with your right knee.  · Do 10 of these exercises for each leg.  · Repeat hugging both knees to your chest at the same time.  Building lower back strength  Start your exercise routine with 10 to 30 minutes a day, 1 to 3 times a day.  Initial exercises  Lying on your back:  1. Ankle pumps: Move your foot up and down, towards your head, and then away. Repeat 10 times with each foot.  2. Heel slides: Slowly bend your knee, drawing the heel of your foot towards you. Then slide your heel/foot from you, straightening your knee. Do not lift your foot off the floor (this is not a leg lift).  3. Abdominal contraction: Bend your knees and put your hands on your stomach. Tighten your stomach muscles. Hold for 5 seconds, then relax. Repeat 10 times.  4. Straight leg raise: Bend one leg at the knee and keep the other leg straight. Tighten your stomach muscles. Slowly lift your straight leg 6 to 12 inches off the floor and hold for up to 5 seconds. Repeat 10 times on each side.  Standin. Wall squats: Stand with your back against the wall. Move your feet about 12 inches away from the wall. Tighten your stomach muscles, and slowly bend your knees until they are at about a 45 degree angle. Do not go down too far. Hold about 5 seconds. Then slowly return to your starting position. Repeat 10 times.  2. Heel raises: Stand facing the wall. Slowly raise the heels of your feet up and down, while keeping your toes on the floor. If you have trouble balancing, you can touch the wall with  your hands. Repeat 10 times.  More advanced exercises  When you feel comfortable enough, try these exercises.  1. Kneeling lumbar extension: Begin on your hands and knees. At the same time, raise and straighten your right arm and left leg until they are parallel to the ground. Hold for 2 seconds and come back slowly to a starting position. Repeat with left arm and right leg, alternating 10 times.  2. Prone lumbar extension: Lie face down, arms extended overhead, palms on the floor. At the same time, raise your right arm and left leg as high as comfortably possible. Hold for 10 seconds and slowly return to start. Repeat with left arm and right leg, alternating 10 times. Gradually build up to 20 times. (Advanced: Repeat this exercise raising both arms and both legs a few inches off the floor at the same time. Hold for 5 seconds and release.)  3. Pelvic tilt: Lie on the floor on your back with your knees bent at 90 degrees. Your feet should be flat on the floor. Inhale, exhale, then slowly contract your abdominal muscles bringing your navel toward your spine. Let your pelvis rock back until your lower back is flat on the floor. Hold for 10 seconds while breathing smoothly.  4. Abdominal crunch: Perform a pelvic tilt (above) flattening your lower back against the floor. Holding the tension in your abdominal muscles, take another breath and raise your shoulder blades off the ground (this is not a full sit-up). Keep your head in line with your body (dont bend your neck forward). Hold for 2 seconds, then slowly lower.  Date Last Reviewed: 6/1/2016 © 2000-2017 [employer]. [address] [place] [address], [place] [address]. All rights reserved. This information is not intended as a substitute for professional medical care. Always follow your healthcare professional's instructions.        Back Pain (Acute or Chronic)    Back pain is one of the most common problems. The good news is that most people feel better in 1 to  2 weeks, and most of the rest in 1 to 2 months. Most people can remain active.  People experience and describe pain differently; not everyone is the same.  · The pain can be sharp, stabbing, shooting, aching, cramping or burning.  · Movement, standing, bending, lifting, sitting, or walking may worsen pain.  · It can be localized to one spot or area, or it can be more generalized.  · It can spread or radiate upwards, to the front, or go down your arms or legs (sciatica).  · It can cause muscle spasm.  Most of the time, mechanical problems with the muscles or spine cause the pain. Mechanical problems are usually caused by an injury to the muscles or ligaments. While illness can cause back pain, it is usually not caused by a serious illness. Mechanical problems include:   · Physical activity such as sports, exercise, work, or normal activity  · Overexertion, lifting, pushing, pulling incorrectly or too aggressively  · Sudden twisting, bending, or stretching from an accident, or accidental movement  · Poor posture  · Stretching or moving wrong, without noticing pain at the time  · Poor coordination, lack of regular exercise (check with your doctor about this)  · Spinal disc disease or arthritis  · Stress  Pain can also be related to pregnancy, or illness like appendicitis, bladder or kidney infections, pelvic infections, and many other things.  Acute back pain usually gets better in 1 to 2 weeks. Back pain related to disk disease, arthritis in the spinal joints or spinal stenosis (narrowing of the spinal canal) can become chronic and last for months or years.  Unless you had a physical injury (for example, a car accident or fall) X-rays are usually not needed for the initial evaluation of back pain. If pain continues and does not respond to medical treatment, X-rays and other tests may be needed.  Home care  Try these home care recommendations:  · When in bed, try to find a position of comfort. A firm mattress is best.  Try lying flat on your back with pillows under your knees. You can also try lying on your side with your knees bent up towards your chest and a pillow between your knees.  · At first, do not try to stretch out the sore spots. If there is a strain, it is not like the good soreness you get after exercising without an injury. In this case, stretching may make it worse.  · Avoid prolong sitting, long car rides, or travel. This puts more stress on the lower back than standing or walking.  · During the first 24 to 72 hours after an acute injury or flare up of chronic back pain, apply an ice pack to the painful area for 20 minutes and then remove it for 20 minutes. Do this over a period of 60 to 90 minutes or several times a day. This will reduce swelling and pain. Wrap the ice pack in a thin towel or plastic to protect your skin.  · You can start with ice, then switch to heat. Heat (hot shower, hot bath, or heating pad) reduces pain and works well for muscle spasms. Heat can be applied to the painful area for 20 minutes then remove it for 20 minutes. Do this over a period of 60 to 90 minutes or several times a day. Do not sleep on a heating pad. It can lead to skin burns or tissue damage.  · You can alternate ice and heat therapy. Talk with your doctor about the best treatment for your back pain.  · Therapeutic massage can help relax the back muscles without stretching them.  · Be aware of safe lifting methods and do not lift anything without stretching first.  Medicines  Talk to your doctor before using medicine, especially if you have other medical problems or are taking other medicines.  · You may use over-the-counter medicine as directed on the bottle to control pain, unless another pain medicine was prescribed. If you have chronic conditions like diabetes, liver or kidney disease, stomach ulcers, or gastrointestinal bleeding, or are taking blood thinners, talk to your doctor before taking any medicine.  · Be careful  if you are given a prescription medicines, narcotics, or medicine for muscle spasms. They can cause drowsiness, affect your coordination, reflexes, and judgement. Do not drive or operate heavy machinery.  Follow-up care  Follow up with your healthcare provider, or as advised.   A radiologist will review any X-rays that were taken. Your provide will notify you of any new findings that may affect your care.  Call 911  Call emergency services if any of the following occur:  · Trouble breathing  · Confusion  · Very drowsy or trouble awakening  · Fainting or loss of consciousness  · Rapid or very slow heart rate  · Loss of bowel or bladder control  When to seek medical advice  Call your healthcare provider right away if any of these occur:   · Pain becomes worse or spreads to your legs  · Weakness or numbness in one or both legs  · Numbness in the groin or genital area  Date Last Reviewed: 7/1/2016  © 3546-2010 [employer]. [address] [place] [address]. All rights reserved. This information is not intended as a substitute for professional medical care. Always follow your healthcare professional's instructions.        Causes of Lumbar (Low Back) Pain  Low back pain can be caused by problems with any part of the lumbar spine. A disk can herniate (push out) and press on a nerve. Vertebrae can rub against each other or slip out of place. This can irritate facet joints and nerves. It can also lead to stenosis, a narrowing of the spinal canal or foramen.  Pressure from a disk  Constant wear and tear on a disk can cause it to weaken and push outward. Part of the disk may then press on nearby nerves. There are two common types of herniated disks:  Contained means the soft nucleus is protruding outward.   Extruded means the firm annulus has torn, letting the soft center squeeze through.     Pressure from bone  An unstable spine   With age, a disk may thin and wear out. Vertebrae above and below the disk  may begin to touch. This can put pressure on nerves. It can also cause bone spurs (growths) to form where the bones rub together.    Stenosis results when bone spurs narrow the foramen or spinal canal. This also puts pressure on nerves. Slipping vertebrae can irritate nerves and joints. They can also worsen stenosis.    In some cases, vertebrae become unstable and slip forward. This is called spondylolisthesis.     Date Last Reviewed: 10/12/2015  © 8453-4705 [employer] [address] [place] [address], [place] [address]. All rights reserved. This information is not intended as a substitute for professional medical care. Always follow your healthcare professional's instructions.        Orthostatic Low Blood Pressure (Hypotension)  A blood pressure reading is made up of 2 numbers There is a top number over a bottom number. The top number is the systolic pressure. The bottom number is the diastolic pressure. A normal blood pressure is a systolic pressure less than 120 over a diastolic pressure less than 80. Low blood pressure (hypotension) is a blood pressure that is less than what is normal for you.  Orthostatic hypotension is a type of low blood pressure that occurs only when you change position from lying to standing. It can cause dizziness, lightheadedness, or fainting.  Some medicines can cause orthostatic hypotension. These include:  · High blood pressure medicines  · Water pills (diuretics)  · Some heart medicines  · Some antidepressants  · Pain, anxiety, sedative, and sleeping medicines  Other causes include:  · Dehydration from vomiting, diarrhea, or not getting enough fluids  · Severe infection  · High fever  · Blood loss, such as bleeding from the stomach or intestines  · Neurological diseases that impair the autonomic nervous system  Treatment will depend on what is causing your low blood pressure.  Home care  Follow these guidelines when caring for yourself at home:  · Rest until your symptoms get  better.  · Change positions slowly from lying to standing. When getting out of bed, sit on the side of the bed with your legs down for at least 30 seconds before standing. This gives your body time to adjust to the position change.  · Follow the treatment plan described by your healthcare provider.  Follow-up care  Follow up with your healthcare provider, or as advised.  When to seek medical advice  Call your healthcare provider right away if any of these occur:  · Dizziness, lightheadedness, or fainting  · Black or red color in your stools or vomit  · Diarrhea or vomiting that doesnt go away  · You arent able to eat or drink  · Fever of 100.4°F (38°C) or higher, or as directed by your healthcare provider  · Burning when you urinate  · Foul-smelling urine  Date Last Reviewed: 12/1/2016  © 7915-9929 [employer]. [address] [place] [address]. All rights reserved. This information is not intended as a substitute for professional medical care. Always follow your healthcare professional's instructions.

## 2018-03-26 NOTE — PROGRESS NOTES
Summary:  Patient referred to [facility] by PCP, Dr. Ludwig Wilson. Chart reviewed. Previously attempted to contact patient by telephone and letter. Daughter returned call today after receiving letter. Daughter on speaker phone with patient present, completed assessment. Patient is A&Ox4. Patient lives alone in a mobile home in [place]. Patient has 3 children, Stefani Inman, Levon Inman (both live locally) and another daughter Kindra Hurst, who lives in [place]. Stefani is a nurse who works nights 6A-6P and is patient's primary caregiver along with patient's son, Levon. Stefani states that if patient is not feeling well, Stefani's son will stay with her at night as needed. Stefani states that patient's mobile home has a few issues that she needs help with. Patient has a wooden ramp but cannot use it currently due to loose and rotten boards. Family is currently assisting her up and down the mobile home steps for appointments. Stefani states that it is difficult for patient to get out of the mobile home on her own. She states that the mobile home is very small and the hallways are very narrow. She states that patient has had as many as 6 falls and that she can't use the walker in the mobile home because she cannot navigate the narrow hallways. She is requesting a smaller walker, however she states that the walker patient currently has is a regular size walker and is unsure if they come in any smaller size. Stefani also states that patient could use a transport wheelchair. Stefani reports that patient is very compliant with her medications and states that the only time she does not take them is if it's a high co-pay drug and she tries to get a substitute that she can afford. Patient is fairly independent with ADL's, however she no longer is able to cook and family takes care of the cooked food for her. Patient also can complete most housework, but the cleaning that she cannot do is done by patient's children. Patient also  "suffers from chronic lumbosacral back pain and has had multiple injections that really have not helped her. Stefani states that patient used to see a Pain Management MD, but since the procedures don't really help her, her PCP is managing her pain with Norco. Stefani states that the Norco seems to be managing her pain well, with only an occasional break-through. Patient does not take NSAIDs or Tylenol for break-through pain due to "stomach issues" per daughter. Patient has GERD, however medication is controlling symptoms. We reviewed patient's problem list and Stefani states that she doesn't understand why patient has a diagnosis of COPD. She states that patient saw a Pulmonologist and was told that she had no issues and was fine. Patient is also not on any respiratory medications. Stefani states that patient checks her blood pressure 1-2 times per day, but has not been logging as she has a blood pressure machine that stores the BP for several days. Encouraged patient to log blood pressure at least once per day and bring log to PCP at next visit to determine if BP medications need to be adjusted; verbalized understanding and intent to comply. Stefani reports that patient gets light-headed/dizzy when getting up out of bed in the morning. Encouraged patient to take blood pressure when she feels light-headed/dizzy to determine if blood pressure is low at those times. We went over the need to sit up slowly and dangle legs a few minutes at side of bed before trying to stand. Patient states that she is doing this. Stefani states that patient was seeing a neurologist outside of the [facility] system, but would like to get a referral to one in the system to follow up with patient's seizure disorder. When completing the Medication Reconciliation, there were a few discrepancies noted: 1) patient not taking Cymbalta, only taking Zoloft, 2) Lamictal - patient reports takes 200 mg in AM and 300 mg in PM, 3) patient requesting refills on " Vistaril, Dexacine Ointment and Norco, also states that Zoloft may need to be increased as current dose does not seem to be helping as much. Will continue to follow. - JENNIFER Cox RN-[facility]    Interventions:   - Sent referral to [facility] SW for transportation options, financial assistance (medications and medical co-pays), ramp repair and grab bars in shower.   - Check with [facility] to see if there is a smaller rollator than the regular size. If there is, will facilitate order for small rollator and transport wheelchair if covered by insurance.   - Mailed patient printout of 3 wheeled lightweight walkers as these are not covered by insurance.   - Mailed educational materials on Fall Prevention, High Blood Pressure, Back Pain, Potassium Rich Foods, Low Salt Food Choices, Orthostatic Hypotension.   - Mailed Advance Directive/POA and [place]-Post paperwork as requested by patient and daughter.   - Sent message to PCP, Dr. Wilson regarding COPD diagnosis, requests for several medication refills and medication discrepancies in EPIC medication list.  - Sent message to PCP regarding order for transport wheelchair if covered by insurance and referral to Neurologist for Seizure Disorder.     Plan:  - Fall Prevention - Follow up on receipt of materials, continue education  - Managing Chronic Pain - Follow up on receipt of materials, continue education  - Hypertension - Follow up on receipt of materials, continue education  - Orthostatic Hypotension - Follow up on receipt of materials, continue education  - Assess for compliance with monitoring and logging blood pressure 1-2 times daily and if feeling lightheaded or dizzy.   - Assess for contact with [facility] for identified needs.   - Follow up with PCP regarding requested DME orders and medication issues as above.

## 2018-03-26 NOTE — TELEPHONE ENCOUNTER
" Regarding the multiple request by her daughter, who by the way has not been in with her on any appointments in quite some time:     1. Her hydrocodone was filled on March 2. She should not need this today. If she has been taking more than prescribed, she was not authorized to do so. But if her pain is worse and three a day is not adequately controlling her pain then we need to get her set up to see a pain specialist. I am not a physician who manages chronic pain and I do not feel comfortable escalating the narcotic usage, Nor will I escalate narcotic usage..     2. I will go up on the Zoloft Two 150 mg daily (1 1/2 tablets of the 100 mg), but if this is not helping her she may want to consider seeing a psychiatrist. Also I don't know why Cymbalta continues to show up in her medicine card after it has been discontinued multiple times. Maybe if the pharmacy still has this is active it's transferring to our system, they should make certain their pharmacy cancels Cymbalta if they have any recurring prescriptions     3. I entered a referral to neurology     4. Regarding a request for a steroid ointment for eyes,  I generally do not prescribe steroid ointment or drops for the eyes. If she's having a problem there she needs to see an eye specialist. If topical steroids are not supervised correctly it can lead to a rise in intraocular pressure. If I filled this for them in the past it was meant to be temporary, if they feel she needs this now should see an I care expert      5. I'm not certain who or when she was diagnosed with COPD. But anybody who has a history of smoking more than 10 years has some degree of COPD. If she has chronic cough wheezing or shortness of breath and she might benefit from inhalers. If she just has occasional exacerbations then it can be monitored.     6. Please write an order for a "transport wheelchair" on a perception pad and I will sign this. That's a reasonable request.  "

## 2018-03-26 NOTE — TELEPHONE ENCOUNTER
Please see previous message from  Rn.    Pt needs several refills and is inquiring about an increase in Zoloft- pt states current dose does not seem to be helping as much.       Pt and pt's daughter are requesting order for transport wheelchair    Also requesting referral to neurologist within [facility].      Pt and pt's daughter have questions as to why COPD is on problem list, stating that they were unaware of diagnosis and if pt should be on any medications for COPD.

## 2018-03-26 NOTE — LETTER
April 6, 2018    Summer Inman  [address] [address]             [facility] [address]  [address]  [place] [address]  Phone: [phone]  Fax: [phone] Dear Ms. Inman:    It looks like or office received a call from your daughter.  We have made several attempts to call her and have left messages.  Can you please have her to call our office if she still has any concerns.      If you have any questions or concerns, please don't hesitate to call.    Sincerely,        Morena Espinosa MA

## 2018-03-26 NOTE — TELEPHONE ENCOUNTER
----- Message from Eliot Sloan RN sent at 3/26/2018 12:31 PM CDT -----  Contact: Lien Cox RN-WILLIAM  Good afternoon. This is Lien Cox. I am a Registered Nurse with the Outpatient Care Management Team with [facility]. I received a referral on the above patient. I spoke with the patient and her daughter today on the telephone and they are requesting an order for a transport wheelchair. Also, when completing the Medication Reconciliation, there were a few discrepancies noted: 1) patient not taking Cymbalta, only taking Zoloft, 2) Lamictal - patient reports takes 200mg in AM and 300mg in PM (per neurologist orders), 3) patient requesting refills on Vistaril, Dexacine Ointment and Norco, also states that Zoloft may need to be increased as current dose does not seem to be helping as much. She is also requesting a referral to an [facility] Neurology Provider as she has been seeing a neurologist outside the [facility] system and would like to keep everything in the [facility] system. Patient and daughter are also asking why the diagnosis of COPD is in her problem list as they were not aware of that diagnosis and patient does not take any respiratory medications. Please advise.    Kindest Regards,  Lien Cox RN-[facility]  [phone]

## 2018-03-27 ENCOUNTER — OUTPATIENT CASE MANAGEMENT (OUTPATIENT)
Dept: ADMINISTRATIVE | Facility: OTHER | Age: 83
End: 2018-03-27

## 2018-03-27 NOTE — PROGRESS NOTES
3/27/2018:    SUMMARY:      spoke to patient via telephone in order to complete assessment.  This is a very pleasant 82 year old who lives alone in a mobile home in [place], [place].  She reported that her son and daughter prepare her meals so that she only has to microwave them.  She also has a grandson who checks in on her.  Pt reported that her family checks on her everyday.       She currently receives her medications through the [facility] mail order pharmacy in order to save money and uses the OTC benefit.  ($30 per quarter).  She is also receiving [facility] Patient Assistance for her medical bills.       Pt reported that she needs assistance with getting a ramp into her mobile home since the one she has is in disrepair.      INTERVENTION:  1. I checked the income guidelines for commodities and patient is over the income guidelines.  2. I called [facility] who reported that they do not repair ramps  3. I called [facility] and was instructed to have family call the [place] office [phone] and have patient added to the list for handy man services.  4. Mailed patient information re: [facility] on Aging (meals, home repairs, transportation, homemaker services, and utility assistance), Single Family Home Loans information with the [employer], [employer] information, and Advanced Directive forms.    PLAN:  Follow up call scheduled for 4/10/2018.      1. Did she call [facility] on Aging office to be placed on list for ramp?  2. Did she receive mailings?  3. Answer questions  4. Encourage her to call resources

## 2018-03-28 NOTE — TELEPHONE ENCOUNTER
Attempted to reach pt and pts daughter.  LM to return call, daughter had no voicemail set up    Faxed order for transport wheelchair to [facility]

## 2018-03-29 NOTE — TELEPHONE ENCOUNTER
Spoke with pt. She was confused about information. Attempted to call daughter with no answer and not vm set up. Call pt back and instructed her to have daughter to call office back

## 2018-04-02 ENCOUNTER — TELEPHONE (OUTPATIENT)
Dept: FAMILY MEDICINE | Facility: CLINIC | Age: 83
End: 2018-04-02

## 2018-04-02 DIAGNOSIS — R52 PAIN: ICD-10-CM

## 2018-04-02 RX ORDER — HYDROCODONE BITARTRATE AND ACETAMINOPHEN 10; 325 MG/1; MG/1
1 TABLET ORAL 3 TIMES DAILY PRN
Qty: 90 TABLET | Refills: 0 | Status: SHIPPED | OUTPATIENT
Start: 2018-04-02 | End: 2018-05-02 | Stop reason: SDUPTHER

## 2018-04-02 NOTE — TELEPHONE ENCOUNTER
----- Message from Hannah Bautista sent at 4/2/2018  9:25 AM CDT -----  Contact: patient   Patient requesting a new prescription for Norco. Please advise.   Call back    Thanks!    [facility] [place], [place] - [address]  [address]  [place] [address]  Phone: [phone] Fax: [phone]

## 2018-04-04 ENCOUNTER — HOSPITAL ENCOUNTER (OUTPATIENT)
Dept: RADIOLOGY | Facility: HOSPITAL | Age: 83
Discharge: HOME | End: 2018-04-04
Attending: NURSE PRACTITIONER
Payer: MEDICARE

## 2018-04-04 DIAGNOSIS — R10.9 RIGHT SIDED ABDOMINAL PAIN: ICD-10-CM

## 2018-04-04 DIAGNOSIS — R11.2 NON-INTRACTABLE VOMITING WITH NAUSEA, UNSPECIFIED VOMITING TYPE: ICD-10-CM

## 2018-04-04 DIAGNOSIS — R63.0 DECREASED APPETITE: ICD-10-CM

## 2018-04-04 PROCEDURE — 76700 US EXAM ABDOM COMPLETE: CPT | Mod: TC,PO

## 2018-04-04 PROCEDURE — 76700 US EXAM ABDOM COMPLETE: CPT | Mod: 26,,, | Performed by: RADIOLOGY

## 2018-04-05 ENCOUNTER — TELEPHONE (OUTPATIENT)
Dept: GASTROENTEROLOGY | Facility: CLINIC | Age: 83
End: 2018-04-05

## 2018-04-05 DIAGNOSIS — R10.9 RIGHT SIDED ABDOMINAL PAIN: ICD-10-CM

## 2018-04-05 DIAGNOSIS — K80.20 CALCULUS OF GALLBLADDER WITHOUT CHOLECYSTITIS WITHOUT OBSTRUCTION: Primary | ICD-10-CM

## 2018-04-05 DIAGNOSIS — R11.2 NON-INTRACTABLE VOMITING WITH NAUSEA, UNSPECIFIED VOMITING TYPE: ICD-10-CM

## 2018-04-05 NOTE — TELEPHONE ENCOUNTER
Please call to inform & review the results with the patient- radiology report of the ultrasound showed a small gallstone:   - recommend low fat diet  - it can cause symptoms in some patients, while other patients with it can be asymptomatic; recommend continue recommendations as discussed during our visit and recommend seeing general surgery for further evaluation and management of this finding  Other findings showed right kidney is enlarged due to a large cyst (seen on 10/2017 &1/2016 ct scans): Recommend follow-up with Primary Care Provider for continued evaluation and management.  Otherwise unremarkable findings.  Continue with previous recommendations. If no improvement in symptoms or symptoms worsen, call/follow-up at clinic or go to ER.  Please release results to patient's mychart once you have discussed results and recommendations with patient.  Thanks,  Ramonita SCHAEFER

## 2018-04-05 NOTE — TELEPHONE ENCOUNTER
Spoke with pt. Informed of results & recommendations per HELENA Koehler NP. Pt verbalized understanding & states she will discuss with her daughter.

## 2018-04-10 ENCOUNTER — OUTPATIENT CASE MANAGEMENT (OUTPATIENT)
Dept: ADMINISTRATIVE | Facility: OTHER | Age: 83
End: 2018-04-10

## 2018-04-10 NOTE — PROGRESS NOTES
4/10/2018:    SUMMARY:   spoke to patient via telephone for follow up who reported that she has received the information that I mailed to her.  She has not contacted any resource because she was expecting a walker to be delivered and since that did not happen, she thought she did not qualify for any other services.  She provided  with permission to contact [place] [facility] in order to start the referral process.  Referred walker issue to WILLIAM Emmanuel RN since she was working on this issue for patient.      INTERVENTION:  1. Contacted [facility] to have patient placed on waiting list for wheelchair ramps.    2. Attempted to contact daughter in order to clarify walker issues.  There was no answer and mailbox is full.   3. Camille at [facility] also put patient on list to be evaluated for homemaker, meals on wheels, and transportation services.  She stated that a supervisor will go to the home in order to complete evaluation for these programs.      PLAN:  Follow up call scheduled for 4/24/2018.      1. Did she call [facility] office to be placed on list for ramp?- completed by  on 4/10  2. Did she receive mailings?-YES 4/10/2018  3. Answer questions  4. Encourage her to call resources  5. Did [facility] come to her home in order to complete evaluation for additional services?

## 2018-04-11 ENCOUNTER — TELEPHONE (OUTPATIENT)
Dept: OPHTHALMOLOGY | Facility: CLINIC | Age: 83
End: 2018-04-11

## 2018-04-11 ENCOUNTER — TELEPHONE (OUTPATIENT)
Dept: NEUROLOGY | Facility: CLINIC | Age: 83
End: 2018-04-11

## 2018-04-11 ENCOUNTER — OUTPATIENT CASE MANAGEMENT (OUTPATIENT)
Dept: ADMINISTRATIVE | Facility: OTHER | Age: 83
End: 2018-04-11

## 2018-04-11 NOTE — TELEPHONE ENCOUNTER
Called Lien at Dr Rodriguez's office [phone] to schedule pt with Dr Sierra for redness and irritation in eyes.

## 2018-04-11 NOTE — TELEPHONE ENCOUNTER
----- Message from Eliot Sloan RN sent at 4/11/2018  4:02 PM CDT -----  Contact: Lien Cox RN-OPCM  Good afternoon. This is Lien Cox RN with the Outpatient Care Management Team with [facility]. I received a referral on the above patient and have obtained an authorized Ophthalmology referral from her PCP, Dr. Wilson. Patient has limited transportation and would like to schedule an appointment with you. She can only schedule appointments after 2:00pm due to transportation. Can you please check your next available appointment after 2:00pm and schedule patient? Please advise.    Kindest Regards,  Lien Cox RN-OPCM  [id number]

## 2018-04-11 NOTE — PROGRESS NOTES
04/11/2018  Summary: RN-CM contacted patient to follow up for OPCM. Spoke with patient who reports that she is feeling better. She appeared much more bright and animated today. She states that she received all the information that was mailed to her, but states that her daughter has been very busy at work the last couple of weeks and has not filled out any of the paperwork that was sent to her (Advance Directive/POA and [place]-Post). She states that she had not had a chance to review the educational materials yet. She reports that she has been in contact with the  and she is working on several resources to assist patient. Patient denies any recent falls and states that she is continuing to ambulate with her walker except when she goes to bathroom. She states that the bathroom is very close to her bedroom and she just doesn't have the room to navigate the walker. She states that she has a rail down the hallway that she hold onto and that it's working out well. We went over the Fall Prevention safety recommendations and patient states that she has such a small trailer that it is difficult to keep everything clear, but she does make sure that hallways and walkways are clear. She denies throw rugs and any electrical cords stretched across the floor and states that she is very careful when ambulating. She states that she has not yet received the transport wheelchair, but states she is not sure why she would need that anyway. I explained to patient that her daughter had requested the transport wheelchair for patient to make it easier to get her to and from her doctor appointments. Patient reports that her daughter received the information about the light-weight, 3 wheeled walker (not covered by insurance), but has not yet picked it up for her. She states that she will remind her daughter to do so. Patient confirms that she has continued to measure her blood pressure 1-2 times daily, but is still not logging it. We went over  the importance of using the logs that were mailed to her in order to let the doctor know what her blood pressures were running at home. Also encouraged patient to document any symptoms she is experiencing, if any, on the sheet where she logs the pressure. Patient verbalized understanding. We reviewed the literature on Orthostatic Hypotension and reviewed the signs and symptoms. Also reminded patient to recheck blood pressure when she felt light-headed or dizzy so she could also document what her blood pressure reading is when she feels these symptoms. We briefly talked about the Low Salt and Potassium Rich Foods and patient states that since her family does the cooking for her, that information needs to be reviewed with her daughter. I discussed with patient Dr. Wilson's responses regarding her medication questions: 1) if the current regimen of hydrocodone is not adequately controlling your pain, she will need to see a pain specialist to manage her pain, 2) he stated that he would be willing to increase her Zoloft to 150mg per day, but recommended if that increase does not help, she may want to see a psychiatrist to discuss other medication options, 3) he placed a referral to Neurology to establish care with a new physician to follow patient for seizure disorder, 4) if patient is continuing to have problems with her eyes requiring a steroid ointment, she should see an eye specialist as topical steroids, if not supervised correctly, can lead to a rise in intraocular pressure, 5) he stated that any patient who has a history of smoking >10 years has some degree of COPD. Stated that if she is experiencing chronic cough, wheezing or shortness of breath, she would likely benefit from inhalers, but if it is just occasional exacerbations, she can be monitored and 6) he wrote order for a transport wheelchair and faxed to [facility]. Patient states that she really doesn't want to go up on her pain medications as they  are adequately controlling her pain at this time. We reviewed a few non-invasive pain relieving techniques and patient states that she is willing to try. She also states that she does not want to go up on the Zoloft at this time as she is feeling better and states that she doesn't feel that she needs to see a psychiatrist. She has scheduled an appointment with Dr. Mark Anthony Servin regarding the issues she is having with her eyes (watery, redness, itchy). I have arranged an appointment for Neurology with Dr. Kourtney Vivar at the [facility] as was requested by patient. Appointment I scheduled for 5/16/18 at 2:00pm for initial consult. We reviewed the findings from her Abdominal Ultrasound on 4/4/18. Patient states that she got a call from GI MD office to report the findings to her and she wants to discuss with her daughter before she makes any decisions about having any surgery. EVA Dunlap, HARRY  recommend seeing general surgery for further evaluation and management of the gallstone that was seen on the US. She has not scheduled an appointment with a general surgeon to date. We reviewed the other recommendation of following a low fat diet due to the gallstone; patient verbalized understanding. Other findings from the US showed right kidney enlargement due to a large cyst, also seen on previous scans on 10/2017 & 1/2016, and it was recommended that she follow-up with PCP for continued evaluation and management; patient verbalized understanding. Encouraged patient to discuss the recommendations with her daughter sooner rather than later; verbalized understanding. Will continue to follow. - JENNIFER Cox RN-OPCM    Interventions:  - Confirmed that patient has been in contact with OPCM-SW. - SW assessment completed on 3/27/18.   - Reviewed Fall Prevention information and discussed appropriate home safety measures; encouraged patient to check each room for hazards.   - Reviewed Chronic Pain literature and reviewed  non-invasive pain relieving techniques.  - Reviewed Hypertension literature with patient. Reviewed signs and symptoms of hypertension and hypotension.  - Reviewed Orthostatic literature with patient. Encouraged patient to measure and record blood pressure whenever when she feels lightheaded or dizzy and to make appropriate comments on the log sheet.  - Reviewed diet information (Low Salt, Low Fat and Potassium Rich Foods).  - Assessed for compliance with completion of Advance Directive/POA and [place]-Post paperwork.   - Discussed Dr. Wilson's response regarding COPD diagnosis.  - Reviewed upcoming appointments and encouraged patient to attend all scheduled appointments. (Obtained appointment with [facility] Neurologist, Dr. Kourtney Inman on 5/16/18 at 2:00PM for seizure disorder.)    Plan:  - Assess for any decisions related to appointment with general surgeon for gallstone discovered on abdominal US.   - Fall Prevention - Assess for compliance with recommended home safety measures.  - Managing Chronic Pain - Continue education. Assess for any relief from the reviewed non-invasive pain relieving techniques.   - Hypertension - Assess with compliance with monitoring and logging blood pressure 1-2 times per day.   - Orthostatic Hypotension - Continue education. Assess with compliance of checking and logging blood pressure if feeling lightheaded or dizzy.  - Coordinate care with OPCM-SW as needed.   - Follow up with patient's daughter regarding purchase of 3 wheeled, smaller, lightweight walker for patient. (Not covered by insurance.)

## 2018-04-11 NOTE — TELEPHONE ENCOUNTER
----- Message from Kourtney Inman MD sent at 4/11/2018  4:27 PM CDT -----  Contact: Lien Cox RN-AYALAM  Could we check when my next available after 2pm is?    ----- Message -----  From: Eliot Sloan RN  Sent: 4/11/2018   4:07 PM  To: Kourtney Inman MD, [facility]    Good afternoon. This is Lien Cox RN with the Outpatient Care Management Team with [facility]. I received a referral on the above patient and have obtained an authorized Neurology referral from her PCP, Dr. Wilson. Patient is trying to establish with an [facility] Neurologist for her seizure disorder. Patient has limited transportation and would like to schedule an appointment with you or another neurologist at the [facility]. She can only schedule appointments after 2:00pm due to transportation. Can you please check your next available appointment after 2:00pm and schedule patient? Please advise.    Kindest Regards,  Lien Cox RN-OPCM  [id number]

## 2018-04-13 ENCOUNTER — TELEPHONE (OUTPATIENT)
Dept: OPHTHALMOLOGY | Facility: CLINIC | Age: 83
End: 2018-04-13

## 2018-04-16 ENCOUNTER — OFFICE VISIT (OUTPATIENT)
Dept: OPTOMETRY | Facility: CLINIC | Age: 83
End: 2018-04-16
Payer: MEDICARE

## 2018-04-16 DIAGNOSIS — H10.13 ALLERGIC CONJUNCTIVITIS OF BOTH EYES: ICD-10-CM

## 2018-04-16 DIAGNOSIS — Z13.5 GLAUCOMA SCREENING: ICD-10-CM

## 2018-04-16 DIAGNOSIS — Z96.1 BILATERAL PSEUDOPHAKIA: ICD-10-CM

## 2018-04-16 DIAGNOSIS — H57.13 PAIN AROUND EYE, BILATERAL: ICD-10-CM

## 2018-04-16 DIAGNOSIS — H43.813 POSTERIOR VITREOUS DETACHMENT, BILATERAL: Primary | ICD-10-CM

## 2018-04-16 PROCEDURE — 92004 COMPRE OPH EXAM NEW PT 1/>: CPT | Mod: S$GLB,,, | Performed by: OPTOMETRIST

## 2018-04-16 PROCEDURE — 99999 PR PBB SHADOW E&M-EST. PATIENT-LVL II: CPT | Mod: PBBFAC,,, | Performed by: OPTOMETRIST

## 2018-04-16 NOTE — PROGRESS NOTES
"HPI     OS>>OD with pain   HA like "someone is hitting me with an axe"  Feels VA is slightly reduced from what is normal    CE w/ PCIOL Ankush ~ 2005-6  Last visit in computer with Mariela 2009, complaint of diplopia, pain ,   HA    Last edited by FANNY Servin, OD on 4/16/2018  5:49 PM. (History)        ROS     Positive for: Eyes    Negative for: Constitutional, Gastrointestinal, Neurological, Skin,   Genitourinary, Musculoskeletal, HENT, Endocrine, Cardiovascular,   Respiratory, Psychiatric, Allergic/Imm, Heme/Lymph    Last edited by FANNY Servin, OD on 4/16/2018  2:40 PM. (History)        Assessment /Plan     For exam results, see Encounter Report.    Posterior vitreous detachment, bilateral    Pain around eye, bilateral    Glaucoma screening    Allergic conjunctivitis of both eyes    Bilateral pseudophakia      1. RD precautions given, reviewed  2. Does not appear as ocular origin  No K/conj stain, IOP normal, no uveitis noted OU  3. Not suspect  4. Minimal conj injection today, advised to continue with otc ATs and / or zaditor for seasonal s/s  5. Stable OU    Patient has visit scheduled for further HA/ pain work up with neurology  Can continue with otc readers for near    RTC for annual IOP / DFE prn if any issues                    "

## 2018-04-16 NOTE — LETTER
April 16, 2018      Ludwig Wilson MD  [address] [facility] [address] [place] [address]           [place] [facility]  [address] [facility] [address] [place] [address]  Phone: [phone]  Fax: [phone]          Patient: Summer Inman   MR Number: [id number]   YOB: 1935   Date of Visit: 4/16/2018       Dear Dr. Ludwig Wilson:    Thank you for referring Summer Inman to me for evaluation. Attached you will find relevant portions of my assessment and plan of care.    Referred pain not of ocular origin. Ocular health otherwise appears wnl, OU. Advised otc lubricant / allergy drops prn use for seasonal symptoms.    If you have questions, please do not hesitate to call me. I look forward to following Summer Inman along with you.    Sincerely,    FANNY Servin, OD    Enclosure  CC:  No Recipients    If you would like to receive this communication electronically, please contact [email or web address] or [phone] to request more information on [employer] access.    For providers and/or their staff who would like to refer a patient to [facility], please contact us through our one-stop-shop provider referral line, [facility], at [phone].    If you feel you have received this communication in error or would no longer like to receive these types of communications, please e-mail [email or web address]

## 2018-04-25 ENCOUNTER — OUTPATIENT CASE MANAGEMENT (OUTPATIENT)
Dept: ADMINISTRATIVE | Facility: OTHER | Age: 83
End: 2018-04-25

## 2018-04-25 DIAGNOSIS — K21.9 GASTROESOPHAGEAL REFLUX DISEASE WITHOUT ESOPHAGITIS: ICD-10-CM

## 2018-04-25 RX ORDER — OMEPRAZOLE 40 MG/1
CAPSULE, DELAYED RELEASE ORAL
Qty: 90 CAPSULE | Refills: 1 | Status: SHIPPED | OUTPATIENT
Start: 2018-04-25 | End: 2018-11-20 | Stop reason: SDUPTHER

## 2018-04-25 RX ORDER — ONDANSETRON 4 MG/1
TABLET, ORALLY DISINTEGRATING ORAL
Qty: 60 TABLET | Refills: 2 | Status: SHIPPED | OUTPATIENT
Start: 2018-04-25 | End: 2018-11-26 | Stop reason: SDUPTHER

## 2018-04-25 NOTE — PROGRESS NOTES
4/25/2018:    SUMMARY:   and OPCM RN, Lien, contacted patient via telephone for follow up.  Pt reported that she has not received a call from the [place] on Aging yet.     INTERVENTION:   1. Called [facility] on Aging- Camille reported that the supervisor has been out due to emergency surgery.  Camille will call patient to schedule a home visit.      PLAN:  Follow up call scheduled for 5/8/2018.      1. Did she call [facility] on Aging office to be placed on list for ramp?- completed by  on 4/10  2. Did she receive mailings?-YES 4/10/2018  3. Answer questions  4. Encourage her to call resources  5. Did [place] [facility] come to her home in order to complete evaluation for additional services?

## 2018-04-25 NOTE — PROGRESS NOTES
"04/25/2018   Summary:  RNYASMEEN contacted patient via 3-way call with OPCM-SW, Donald to follow up for OPCM. Spoke with patient who reports that she has been unable to speak with her daughter about the applications for Advance Directive/POA and [place]. She is not sure if daughter has completed the paperwork. She states that her daughter has been extremely busy at work and she hasn't been able to see her as much. I asked patient if she was okay with me calling her daughter to follow up on a few issues and she stated that was fine. Patient reports that she talked with her family about seeing the general surgeon and they are against it at this point. She states that the abdominal pain "is not that bad" and they are worried about her having to have sedation at her age and with her respiratory issues. Patient states that she has decided to just wait it out. Encouraged patient to discuss her concerns with Dr. Wilson at her next follow up appointment and to bring her daughter, if possible, so her concerns could also be addressed; patient verbalized understanding. Patient states that she is continuing to check her blood pressure 2 times per day and states that it has been running low, but was unable to provide me with any readings as they "are stored in the machine". She states that she has not been logging the pressures and has not been checking it when she feels light-headed or dizzy because she just forgot about the logs. Encouraged patient to start using the logs to document her blood pressures with date, time, blood pressure and any signs/symptoms of high or low blood pressure. Encouraged her to bring that log to Dr. Wilson at her follow up appointment on 6/8/18 in light of her occasional episodes of dizziness and saadia-headedness. Explained to patient that she may need to have her medication adjusted; verbalized understanding. Patient denies any recent falls and states that she is very careful now about " falling. We reviewed the fall prevention safety recommendations and patient reports compliance with keeping hallways and walkways clear, no throw rugs, no electrical cords across the floor, good lighting and non-skid bath mats. Encouraged patient to continue to utilize her walker whenever she ambulates and to obtain the smaller, 3-wheeled walker that would be easier to navigate in her mobile home's narrow hallways. Patient verbalized understanding. Will continue to follow. - JENNIFER Lawson RN-OPCM    Interventions:  - Placed call to patient's daughter, Stefani Inman, left message requesting return call.  - Reviewed Fall Prevention recommendations.  - Reviewed non-invasive pain relieving techniques. Patient states that the ice does help, but states she was told not to take Naprosyn any more.   - Sent in-basket message to GI doctor regarding N-Saids for chronic pain. It is listed on patient's medication list, but patient is not taking at this time.   - Reviewed signs/symptoms of Hypotension/Orthostatic Hypotension.     Plan:  - Managing Chronic Pain - Assess for any relief from the reviewed non-invasive pain relieving techniques.   - Hypertension - Assess with compliance with monitoring and logging blood pressure 1-2 times per day.   - Orthostatic Hypotension - Continue education. Assess with compliance of checking and logging blood pressure if feeling lightheaded or dizzy.  - Coordinate care with OPCM-SW as needed.   - Follow up with patient's daughter regarding completion on Advance Directive/POA and [place] paperwork.  - Follow up with patient's daughter regarding purchase of 3 wheeled, smaller, lightweight walker for patient. (Not covered by insurance.) Also, patient says the lightweight transport wheelchair is not needed; request clarification from daughter as she was the person who requested that we get an order for the wheelchair.

## 2018-04-25 NOTE — PROGRESS NOTES
Refill Authorization Note     is requesting a refill authorization.    Brief assessment and rationale for refill: DEFER: zofran / APPROVE: prr  Amount/Quantity of medication ordered: 90d         Refills Authorized: Yes  If authorized number of refills: 1           Medication Therapy Plan: Underwent EGD 3/18; no barretts but mild gastritis seen; vanessa 6 more  Name and strength of medication: OMEPRAZOLE 40 MG Capsule Delayed Release  How patient will take medication: t1t po daily   Medication reconciliation completed: No  Comments:   Lab Results   Component Value Date    WBC 13.80 (H) 10/17/2017    RBC 4.14 10/17/2017    HCT 34.7 (L) 10/17/2017    MCV 84 10/17/2017     10/17/2017     Lab Results   Component Value Date    MG 2.5 03/08/2016     Lab Results   Component Value Date    ESLIXURP04 320 09/14/2009

## 2018-04-26 NOTE — TELEPHONE ENCOUNTER
"Received refill request for zantac. I did not refill it because it was last filled with 90 day supply and with refills on 2/26/18:  "   Disp Refills Start End HODAN   ranitidine (ZANTAC) 300 MG tablet 90 tablet 2 2/26/2018  No   Sig - Route: Take 1 tablet (300 mg total) by mouth every evening. - Oral   Class: Normal      Order: [id number]   Date/Time Signed: 2/26/2018 16:10       E-Prescribing Status: Receipt confirmed by pharmacy (2/26/2018  4:10 PM CST)     "  KTP  "

## 2018-05-02 DIAGNOSIS — R52 PAIN: ICD-10-CM

## 2018-05-02 RX ORDER — HYDROCODONE BITARTRATE AND ACETAMINOPHEN 10; 325 MG/1; MG/1
1 TABLET ORAL 3 TIMES DAILY PRN
Qty: 90 TABLET | Refills: 0 | Status: SHIPPED | OUTPATIENT
Start: 2018-05-02 | End: 2018-05-29 | Stop reason: SDUPTHER

## 2018-05-02 NOTE — TELEPHONE ENCOUNTER
----- Message from RT Girish sent at 5/2/2018  3:45 PM CDT -----  Contact: pt   pt , requesting medication refill: [place], thanks.

## 2018-05-04 ENCOUNTER — PES CALL (OUTPATIENT)
Dept: ADMINISTRATIVE | Facility: CLINIC | Age: 83
End: 2018-05-04

## 2018-05-08 ENCOUNTER — OUTPATIENT CASE MANAGEMENT (OUTPATIENT)
Dept: ADMINISTRATIVE | Facility: OTHER | Age: 83
End: 2018-05-08

## 2018-05-08 NOTE — PROGRESS NOTES
5/8/2018:    SUMMARY:   spoke to patient via telephone for follow up.  She reported that she was stiff because she was dizzy yesterday and fell down.  Pt reported that she tripped on her new bathroom rugs.  She has since taken them up.  Pt has not received a call from [facility].     INTERVENTION:  1.  provided patient with the [facility]'s telephone number and encouraged her to call them for services.  2. Encouraged patient to keep a log of when she is dizzy/falls to bring with her to her next doctor's appointment    PLAN:  Follow up call scheduled for 5/22.      1. Did she call [facility] office to be placed on list for ramp?- completed by  on 4/10  2. Did she receive mailings?-YES 4/10/2018  3. Answer questions  4. Encourage her to call resources  5. Did [facility] come to her home in order to complete evaluation for additional services?-

## 2018-05-15 ENCOUNTER — TELEPHONE (OUTPATIENT)
Dept: FAMILY MEDICINE | Facility: CLINIC | Age: 83
End: 2018-05-15

## 2018-05-15 ENCOUNTER — OUTPATIENT CASE MANAGEMENT (OUTPATIENT)
Dept: ADMINISTRATIVE | Facility: OTHER | Age: 83
End: 2018-05-15

## 2018-05-15 DIAGNOSIS — D64.9 ANEMIA, UNSPECIFIED TYPE: ICD-10-CM

## 2018-05-15 DIAGNOSIS — R73.09 ELEVATED GLUCOSE: ICD-10-CM

## 2018-05-15 DIAGNOSIS — I10 ESSENTIAL HYPERTENSION: ICD-10-CM

## 2018-05-15 DIAGNOSIS — E78.5 HYPERLIPIDEMIA, UNSPECIFIED HYPERLIPIDEMIA TYPE: Primary | ICD-10-CM

## 2018-05-15 NOTE — TELEPHONE ENCOUNTER
----- Message from [place] Sloan RN sent at 5/15/2018  2:51 PM CDT -----  Contact: FRANKO Nails  Good afternoon. This is Lien Cox. I am a Registered Nurse with the Outpatient Care Management Team with [facility]. I have been working with Ms. Inman and I spoke to her today on the telephone. I know that she does not have an appointment scheduled with you until 6/8/18. She reported to me today that she has been suffering with severe calf cramping over the last 3 days as well as some weakness and feeling as if her legs are going to give out.  I know that she is on a diuretic and is not currently taking any potassium supplements. I checked her most recent labs and the last potassium level was 3.7 on 2/26/18. Just wanted to check to see what your recommendations are. Please advise.     Kindest Regards,  Lien Cox RN-WILLIAM  [phone]

## 2018-05-15 NOTE — PROGRESS NOTES
05/15/2018  Summary:  RNYASMEEN contacted patient to follow up for OPCM. Spoke with  via telephone. She reports that she has been having very bad calf cramping over the last 3 days. She states that they are so bad, she is afraid to try to walk around anywhere because she is afraid a cramp will hit her and she will fall. She is also complaining of weakness in her legs and feeling as if her legs are going to give out. Patient states she has not reported this to her PCP. As these are common symptoms of hypokalemia and patient's last Potassium level from 2/26/18 was 3.7, I will send a message to  regarding patient's symptoms for his recommendations. Ms. Inman states she has the appointment 5/16/18 with Dr. Kourtney Inman to establish with an [facility]rologist to follow her for her history of seizures. Patient states she has not had any recent seizure. Encouraged patient to also discuss the legs cramps with Dr. Inman at her appointment tomorrow; patient verbalized understanding. We reviewed her upcoming scheduled appointments and I will mail a list of future appointments to patient today. Patient states that she is continuing to check and log her blood pressure 2-3 times per day. She states that she is usually running in the 120's over 70's, but still is having an occasional low blood pressure with the lowest one she could recall at 96/58. We reviewed the signs/symptoms of low and high blood pressure and patient was able to correctly articulate 3 signs and symptoms. Encouraged patient to continue to log her pressures along with any symptoms of dizziness or light-headedness and bring that log with her when she sees Dr. Inman tomorrow and when she sees Dr. Wilson in June. Patient states that she just started using her last blood pressure log and requested that I mail her additional logs. Patient states that she is managing her back pain with her medication, but states that her left knee pain is getting  "worse. She states that when it gets to a point where she doesn't feel like she can tolerate it, she will have to do something about it. Patient states that she does not want to have any more surgeries or injections. I encouraged patient to discuss pain options with Dr. Wilson. We reviewed the non-invasive pain relieving techniques again and I encouraged patient to try the alternating ice and heat when her knee pain flares up, patient verbalized understanding. Patient states that her daughter Kindra Quinones" is in town for a week, visiting from [place]. She states that Kathleen and patient's daughter, Elizabeth, are going to complete the Advance Directive/POA and [place] paperwork together this week. Instructed patient to bring the completed paperwork to her next [facility] MD visit so it could be scanned into her chart; verbalized understanding.  stated that she thought the lady from [facility] was supposed to be coming to her home today, but she has not shown up. She states that she called the phone number provided to her by the [facility]- today and the person who answered the phone told her that they did not see her name on the schedule. I will attempt to contact [facility] [phone] and update patient today or tomorrow regarding appointment. Will send message to [facility] FREIRE, regarding [facility] appointment. Will continue to follow. - JENNIFER Cox RN-[facility]    Interventions:  - Mailed educational sheet on Hypotension to patient (patient lost sheet and requested another to be mailed).  - Mailed patient additional blood pressure logs (patient states she just used the last one and didn't have a copy machine).  - Sent message to PCP, Dr.James Wilson regarding patient's complaints of calf cramping and leg weakness over the last 3 days, for his recommendations.   - Placed call to [facility] in [place] [phone] to determine if appointment has been set up for home evaluation. (No " answer and mail box has not been set up.)  - Reviewed signs/symptoms of Hypertension and Hypotension with patient.   - Assessed for completion of Advance Directive/POA and [place] paperwork.   - Reviewed the non-invasive pain relieving options with patient. Encouraged patient to utilize methods for c/o knee pain.     Plan:  - Managing Chronic Pain - Assess for any relief from the reviewed non-invasive pain relieving techniques. - Done 5/15/18   - Hypertension - Assess with compliance with monitoring and logging blood pressure 1-2 times per day. - Done 5/15/18  - Orthostatic Hypotension - Continue education. Assess with compliance of checking and logging blood pressure if feeling lightheaded or dizzy. - Done 5/15/18  - Coordinate care with OPCM- regarding appointment with representative from the [facility].   - Follow up with patient's daughter regarding completion on Advance Directive/POA and [place] paperwork. - 5/15/18 Daughter are completing paperwork this week per patient.   - Follow up with patient's daughter regarding purchase of 3 wheeled, smaller, lightweight walker for patient. (Not covered by insurance.) Also, patient says the lightweight transport wheelchair is not needed; request clarification from daughter as she was the person who requested that we get an order for the wheelchair. - 5/15/18 Has not purchased  - Follow up on receipt of copy of educational sheet on Hypotension and additional blood pressure logs.   - Follow up with PCP regarding patient's complaints of leg cramping and leg weakness.

## 2018-05-15 NOTE — TELEPHONE ENCOUNTER
See my chart message.  I entered orders for labs if somebody can help her come to clinic to collect this sometime this week

## 2018-05-15 NOTE — PATIENT INSTRUCTIONS
Low Blood Pressure (All Causes)  A blood pressure reading is made up of 2 numbers There is a top number over a bottom number. The top number is the systolic pressure. The bottom number is the diastolic pressure. A normal blood pressure is a systolic pressure less than 120 over a diastolic pressure less than 80. Low blood pressure (hypotension) is a blood pressure that is less than what is normal for you. Low blood pressure can cause dizziness, lightheadedness, or fainting.  Some medicines can cause low blood pressure. They include:  · High blood pressure pills  · Water pills (diuretics)  · Some heart medicines  · Some antidepressants  · Pain, anxiety, sedative, and sleeping medicines  Other causes include:  · Dehydration, severe infection, or fever  · Blood loss, such as bleeding from the stomach or intestines.  · Heart failure  · Change in heart rate or rhythm (arrhythmia)  · A drop in blood pressure from a sudden change in body position, from lying down to standing (orthostatic hypotension)  · Alcohol or drug intoxication  · Neurological diseases that impair the autonomic nervous system  Treatment will depend on what is causing your low blood pressure.  Home care  Follow these guidelines when caring for yourself at home:  · Rest until your symptoms get better.  · Keep a record of your symptoms and what you were doing when they occurred. Bring the record with you to your next appointment.  · Be aware of how quickly your blood pressure drops when you become dehydrated, spend a lot of time in the sun, or have low blood sugar. Take measures to prevent blood pressure drops at these times.  · Follow the treatment plan described by your healthcare provider.  Follow-up care  Follow up with your healthcare provider, or as advised.  When to seek medical advice  Call your healthcare provider right away if any of these occur:  · Dizziness, lightheadedness, or fainting  · Black or red color in your stools or  vomit  · Shortness of breath or difficulty breathing  · Chest, shoulder, arm, neck, or upper back pain  · Abdominal pain  · Diarrhea or vomiting that doesnt go away  · You arent able to eat or drink  · Fever of 100.4°F (38°C) or higher, or as directed by your healthcare provider  · Burning when you urinate  · Foul-smelling urine  Date Last Reviewed: 12/1/2016  © 0402-0927 "[employer] [address] [place] [address], [place] [address]. All rights reserved. This information is not intended as a substitute for professional medical care. Always follow your healthcare professional's instructions.

## 2018-05-16 ENCOUNTER — LAB VISIT (OUTPATIENT)
Dept: LAB | Facility: HOSPITAL | Age: 83
End: 2018-05-16
Attending: FAMILY MEDICINE
Payer: MEDICARE

## 2018-05-16 ENCOUNTER — OFFICE VISIT (OUTPATIENT)
Dept: NEUROLOGY | Facility: CLINIC | Age: 83
End: 2018-05-16
Payer: MEDICARE

## 2018-05-16 VITALS
BODY MASS INDEX: 30.55 KG/M2 | SYSTOLIC BLOOD PRESSURE: 114 MMHG | RESPIRATION RATE: 20 BRPM | HEART RATE: 88 BPM | HEIGHT: 60 IN | DIASTOLIC BLOOD PRESSURE: 66 MMHG | WEIGHT: 155.63 LBS

## 2018-05-16 DIAGNOSIS — D64.9 ANEMIA, UNSPECIFIED TYPE: ICD-10-CM

## 2018-05-16 DIAGNOSIS — H57.12 LEFT EYE PAIN: ICD-10-CM

## 2018-05-16 DIAGNOSIS — R73.09 ELEVATED GLUCOSE: ICD-10-CM

## 2018-05-16 DIAGNOSIS — R56.9 SEIZURES: Primary | ICD-10-CM

## 2018-05-16 DIAGNOSIS — G44.89 CHRONIC MIXED HEADACHE SYNDROME: ICD-10-CM

## 2018-05-16 DIAGNOSIS — I10 ESSENTIAL HYPERTENSION: ICD-10-CM

## 2018-05-16 DIAGNOSIS — E78.5 HYPERLIPIDEMIA, UNSPECIFIED HYPERLIPIDEMIA TYPE: ICD-10-CM

## 2018-05-16 DIAGNOSIS — H15.89: ICD-10-CM

## 2018-05-16 DIAGNOSIS — M54.81 OCCIPITAL NEURALGIA OF LEFT SIDE: ICD-10-CM

## 2018-05-16 LAB
ALBUMIN SERPL BCP-MCNC: 3.8 G/DL
ALP SERPL-CCNC: 114 U/L
ALT SERPL W/O P-5'-P-CCNC: 8 U/L
ANION GAP SERPL CALC-SCNC: 8 MMOL/L
AST SERPL-CCNC: 16 U/L
BASOPHILS # BLD AUTO: 0.04 K/UL
BASOPHILS NFR BLD: 0.6 %
BILIRUB SERPL-MCNC: 0.4 MG/DL
BUN SERPL-MCNC: 13 MG/DL
CALCIUM SERPL-MCNC: 9.8 MG/DL
CHLORIDE SERPL-SCNC: 106 MMOL/L
CO2 SERPL-SCNC: 26 MMOL/L
CREAT SERPL-MCNC: 1 MG/DL
DIFFERENTIAL METHOD: ABNORMAL
EOSINOPHIL # BLD AUTO: 0.1 K/UL
EOSINOPHIL NFR BLD: 1.7 %
ERYTHROCYTE [DISTWIDTH] IN BLOOD BY AUTOMATED COUNT: 16.1 %
EST. GFR  (AFRICAN AMERICAN): >60 ML/MIN/1.73 M^2
EST. GFR  (NON AFRICAN AMERICAN): 53 ML/MIN/1.73 M^2
ESTIMATED AVG GLUCOSE: 120 MG/DL
GLUCOSE SERPL-MCNC: 110 MG/DL
HBA1C MFR BLD HPLC: 5.8 %
HCT VFR BLD AUTO: 41 %
HGB BLD-MCNC: 12.7 G/DL
IMM GRANULOCYTES # BLD AUTO: 0.01 K/UL
IMM GRANULOCYTES NFR BLD AUTO: 0.2 %
LYMPHOCYTES # BLD AUTO: 1.3 K/UL
LYMPHOCYTES NFR BLD: 20 %
MCH RBC QN AUTO: 26.5 PG
MCHC RBC AUTO-ENTMCNC: 31 G/DL
MCV RBC AUTO: 86 FL
MONOCYTES # BLD AUTO: 0.6 K/UL
MONOCYTES NFR BLD: 9.4 %
NEUTROPHILS # BLD AUTO: 4.3 K/UL
NEUTROPHILS NFR BLD: 68.1 %
NRBC BLD-RTO: 0 /100 WBC
PLATELET # BLD AUTO: 240 K/UL
PMV BLD AUTO: 11.2 FL
POTASSIUM SERPL-SCNC: 4 MMOL/L
PROT SERPL-MCNC: 7.2 G/DL
RBC # BLD AUTO: 4.79 M/UL
SODIUM SERPL-SCNC: 140 MMOL/L
TSH SERPL DL<=0.005 MIU/L-ACNC: 1.81 UIU/ML
WBC # BLD AUTO: 6.31 K/UL

## 2018-05-16 PROCEDURE — 83036 HEMOGLOBIN GLYCOSYLATED A1C: CPT

## 2018-05-16 PROCEDURE — 99205 OFFICE O/P NEW HI 60 MIN: CPT | Mod: S$GLB,,, | Performed by: PSYCHIATRY & NEUROLOGY

## 2018-05-16 PROCEDURE — 84443 ASSAY THYROID STIM HORMONE: CPT

## 2018-05-16 PROCEDURE — 99499 UNLISTED E&M SERVICE: CPT | Mod: S$GLB,,, | Performed by: PSYCHIATRY & NEUROLOGY

## 2018-05-16 PROCEDURE — 99999 PR PBB SHADOW E&M-EST. PATIENT-LVL IV: CPT | Mod: PBBFAC,,, | Performed by: PSYCHIATRY & NEUROLOGY

## 2018-05-16 PROCEDURE — 80053 COMPREHEN METABOLIC PANEL: CPT

## 2018-05-16 PROCEDURE — 85025 COMPLETE CBC W/AUTO DIFF WBC: CPT

## 2018-05-16 PROCEDURE — 3078F DIAST BP <80 MM HG: CPT | Mod: CPTII,S$GLB,, | Performed by: PSYCHIATRY & NEUROLOGY

## 2018-05-16 PROCEDURE — 36415 COLL VENOUS BLD VENIPUNCTURE: CPT | Mod: PO

## 2018-05-16 PROCEDURE — 3074F SYST BP LT 130 MM HG: CPT | Mod: CPTII,S$GLB,, | Performed by: PSYCHIATRY & NEUROLOGY
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
## 2018-05-16 NOTE — LETTER
May 16, 2018      Ludwig Wilson MD  [address] [facility] [address] [place] [address]           [facility]  [address] [facility] [address] [place] [address]  Phone: [phone]  Fax: [phone]          Patient: Summer Inman   MR Number: [id number]   YOB: 1935   Date of Visit: 5/16/2018       Dear Dr. Ludwig Wilson:    Thank you for referring Summer Inman to me for evaluation. Attached you will find relevant portions of my assessment and plan of care.    If you have questions, please do not hesitate to call me. I look forward to following Summer Inman along with you.    Sincerely,    Kourtney Inman MD    Enclosure  CC:  No Recipients    If you would like to receive this communication electronically, please contact [email or web address] or [phone] to request more information on [employer] Link access.    For providers and/or their staff who would like to refer a patient to [facility], please contact us through our one-stop-shop provider referral line, [facility], at [phone].    If you feel you have received this communication in error or would no longer like to receive these types of communications, please e-mail [email or web address]

## 2018-05-21 ENCOUNTER — OFFICE VISIT (OUTPATIENT)
Dept: FAMILY MEDICINE | Facility: CLINIC | Age: 83
End: 2018-05-21
Payer: MEDICARE

## 2018-05-21 VITALS
WEIGHT: 155.44 LBS | OXYGEN SATURATION: 96 % | HEART RATE: 75 BPM | BODY MASS INDEX: 30.52 KG/M2 | SYSTOLIC BLOOD PRESSURE: 128 MMHG | DIASTOLIC BLOOD PRESSURE: 66 MMHG | HEIGHT: 60 IN

## 2018-05-21 DIAGNOSIS — G47.00 INSOMNIA, UNSPECIFIED TYPE: ICD-10-CM

## 2018-05-21 DIAGNOSIS — R13.10 DYSPHAGIA, UNSPECIFIED TYPE: ICD-10-CM

## 2018-05-21 DIAGNOSIS — G40.909 SEIZURE DISORDER: ICD-10-CM

## 2018-05-21 DIAGNOSIS — E78.5 HYPERLIPIDEMIA, UNSPECIFIED HYPERLIPIDEMIA TYPE: ICD-10-CM

## 2018-05-21 DIAGNOSIS — K21.9 GASTROESOPHAGEAL REFLUX DISEASE, ESOPHAGITIS PRESENCE NOT SPECIFIED: ICD-10-CM

## 2018-05-21 DIAGNOSIS — M85.80 OSTEOPENIA, UNSPECIFIED LOCATION: ICD-10-CM

## 2018-05-21 DIAGNOSIS — Z86.73 HISTORY OF STROKE: ICD-10-CM

## 2018-05-21 DIAGNOSIS — I25.10 CORONARY ARTERY DISEASE INVOLVING NATIVE CORONARY ARTERY OF NATIVE HEART WITHOUT ANGINA PECTORIS: ICD-10-CM

## 2018-05-21 DIAGNOSIS — M25.551 RIGHT HIP PAIN: ICD-10-CM

## 2018-05-21 DIAGNOSIS — D64.9 ANEMIA, UNSPECIFIED TYPE: ICD-10-CM

## 2018-05-21 DIAGNOSIS — Z00.00 ENCOUNTER FOR PREVENTIVE HEALTH EXAMINATION: Primary | ICD-10-CM

## 2018-05-21 DIAGNOSIS — J44.9 CHRONIC OBSTRUCTIVE PULMONARY DISEASE, UNSPECIFIED COPD TYPE: ICD-10-CM

## 2018-05-21 DIAGNOSIS — I10 ESSENTIAL HYPERTENSION: ICD-10-CM

## 2018-05-21 DIAGNOSIS — M54.17 LUMBOSACRAL RADICULOPATHY: ICD-10-CM

## 2018-05-21 DIAGNOSIS — R05.3 CHRONIC COUGH: ICD-10-CM

## 2018-05-21 DIAGNOSIS — M51.36 DDD (DEGENERATIVE DISC DISEASE), LUMBAR: ICD-10-CM

## 2018-05-21 DIAGNOSIS — F33.0 DEPRESSION, MAJOR, RECURRENT, MILD: ICD-10-CM

## 2018-05-21 DIAGNOSIS — I27.20 PULMONARY HYPERTENSION: ICD-10-CM

## 2018-05-21 PROBLEM — S52.531A CLOSED COLLES' FRACTURE OF RIGHT RADIUS: Status: RESOLVED | Noted: 2017-04-26 | Resolved: 2018-05-21

## 2018-05-21 PROCEDURE — 3078F DIAST BP <80 MM HG: CPT | Mod: CPTII,S$GLB,, | Performed by: NURSE PRACTITIONER

## 2018-05-21 PROCEDURE — 99999 PR PBB SHADOW E&M-EST. PATIENT-LVL V: CPT | Mod: PBBFAC,,, | Performed by: NURSE PRACTITIONER

## 2018-05-21 PROCEDURE — G0439 PPPS, SUBSEQ VISIT: HCPCS | Mod: S$GLB,,, | Performed by: NURSE PRACTITIONER

## 2018-05-21 PROCEDURE — 3074F SYST BP LT 130 MM HG: CPT | Mod: CPTII,S$GLB,, | Performed by: NURSE PRACTITIONER

## 2018-05-21 PROCEDURE — 99499 UNLISTED E&M SERVICE: CPT | Mod: S$GLB,,, | Performed by: NURSE PRACTITIONER

## 2018-05-21 NOTE — PROGRESS NOTES
Summer Inman presented for a  Medicare AWV and comprehensive Health Risk Assessment today. The following components were reviewed and updated:    · Medical history  · Family History  · Social history  · Allergies and Current Medications  · Health Risk Assessment  · Health Maintenance  · Care Team     ** See Completed Assessments for Annual Wellness Visit within the encounter summary.**       The following assessments were completed:  · Living Situation  · CAGE  · Depression Screening  · Timed Get Up and Go  · Whisper Test  · Cognitive Function Screening          · Nutrition Screening  · ADL Screening  · PAQ Screening    Vitals:    05/21/18 1405   BP: 128/66   BP Location: Left arm   Patient Position: Sitting   BP Method: Medium (Manual)   Pulse: 75   SpO2: 96%   Weight: 70.5 kg (155 lb 6.8 oz)   Height: 5' (1.524 m)     Body mass index is 30.35 kg/m².  Physical Exam   Constitutional: She appears well-nourished. No distress.   Vocal tremor noted  Ambulates with cane     Eyes: Conjunctivae are normal.   Cardiovascular: Normal heart sounds.    No murmur heard.  Pulmonary/Chest: Effort normal and breath sounds normal. No respiratory distress. She has no wheezes. She has no rales.   Psychiatric: She has a normal mood and affect. Her behavior is normal.         Diagnoses and health risks identified today and associated recommendations/orders:    1. Encounter for preventive health examination  Reviewed health maintenance and provided recommendations    educated on shingrix vaccine    2. Seizure disorder  Continue to monitor   Followed by Storm.      3. DDD (degenerative disc disease), lumbar  Stable.     Followed by Ludwig Wilson MD .      4. Lumbosacral radiculopathy  Stable.     Followed by Ludwig Wilson MD .      5. History of stroke  Stable.     Followed by Ludwig Wilson MD .      6. Depression, major, recurrent, mild  Taking zoloft  No si/hi  Followed by Ludwig Wilson MD .      7. Chronic  cough  Stable.     Followed by Ludwig Wilson MD .      8. Chronic obstructive pulmonary disease, unspecified COPD type  Stable.     Followed by Ludwig Wilson MD .      9. Pulmonary hypertension  Continue to monitor   Followed by Ludwig Wilson MD .      10. Hyperlipidemia, unspecified hyperlipidemia type  Continue to monitor lipids  Followed by Ludwig Wilson MD .      11. Coronary artery disease involving native coronary artery of native heart without angina pectoris  Continue to monitor   Followed by Ludwig Wilson MD .      12. Essential hypertension  Stable.   Controlled on current medications.  Followed by Ludwig Wilson MD .      13. Anemia, unspecified type  Continue to monitor   Followed by Ludwig Wilson MD .      14. Gastroesophageal reflux disease, esophagitis presence not specified  Stable.   Controlled on current medications.  Followed by Ludwig Wilson MD .      15. Dysphagia, unspecified type  Unchanged  Followed by Wendy.      16. Right hip pain  Unchanged  Followed by Memo.      17. Osteopenia, unspecified location  Continue to monitor with dxa scan  Followed by Ludwig Wilson MD .      18. Insomnia, unspecified type  Continue to monitor   Followed by Ludwig Wilson MD .      19.  Aortic atherosclerosis  Continue to monitor   Followed by Ludwig Wilson MD   CT abd 10/17/17.      Provided Summer with a 5-10 year written screening schedule and personal prevention plan. Recommendations were developed using the USPSTF age appropriate recommendations. Education, counseling, and referrals were provided as needed. After Visit Summary printed and given to patient which includes a list of additional screenings\tests needed.    Follow-up in about 1 year (around 5/21/2019).    Gabriella Ramos NP

## 2018-05-21 NOTE — PATIENT INSTRUCTIONS
Counseling and Referral of Other Preventative  (Italic type indicates deductible and co-insurance are waived)    Patient Name: Summer Inman  Today's Date: 5/21/2018    Health Maintenance       Date Due Completion Date    Sign Pain Contract 11/30/1953 ---    Complete Opioid Risk Tool 11/30/1953 ---    Zoster Vaccine 11/30/1996 ---    Influenza Vaccine 08/01/2018 12/4/2017    Lipid Panel 10/11/2018 10/11/2017    DEXA SCAN 06/08/2020 6/8/2017    TETANUS VACCINE 08/25/2024 8/25/2014        No orders of the defined types were placed in this encounter.    The following information is provided to all patients.  This information is to help you find resources for any of the problems found today that may be affecting your health:                Living healthy guide: www.[facility].[place].gov      Understanding Diabetes: www.diabetes.org      Eating healthy: www.cdc.gov/healthyweight      CDC home safety checklist: www.cdc.gov/steadi/patient.html      Agency on Aging: www.goea.[place].[facility]      Alcoholics anonymous (AA): www.aa.org      Physical Activity: www.carlo.nih.gov/vs9ceyk      Tobacco use: www.quitwithusla.org

## 2018-05-22 ENCOUNTER — OUTPATIENT CASE MANAGEMENT (OUTPATIENT)
Dept: ADMINISTRATIVE | Facility: OTHER | Age: 83
End: 2018-05-22

## 2018-05-29 ENCOUNTER — OFFICE VISIT (OUTPATIENT)
Dept: FAMILY MEDICINE | Facility: CLINIC | Age: 83
End: 2018-05-29
Payer: MEDICARE

## 2018-05-29 VITALS
HEIGHT: 60 IN | TEMPERATURE: 98 F | WEIGHT: 159.81 LBS | DIASTOLIC BLOOD PRESSURE: 62 MMHG | BODY MASS INDEX: 31.38 KG/M2 | SYSTOLIC BLOOD PRESSURE: 110 MMHG | OXYGEN SATURATION: 95 % | HEART RATE: 69 BPM

## 2018-05-29 DIAGNOSIS — I10 ESSENTIAL HYPERTENSION: ICD-10-CM

## 2018-05-29 DIAGNOSIS — R52 PAIN: ICD-10-CM

## 2018-05-29 DIAGNOSIS — R82.90 FOUL SMELLING URINE: ICD-10-CM

## 2018-05-29 DIAGNOSIS — I27.20 PULMONARY HYPERTENSION: ICD-10-CM

## 2018-05-29 DIAGNOSIS — M51.36 DDD (DEGENERATIVE DISC DISEASE), LUMBAR: Primary | ICD-10-CM

## 2018-05-29 DIAGNOSIS — M54.17 LUMBOSACRAL RADICULOPATHY: ICD-10-CM

## 2018-05-29 DIAGNOSIS — J44.9 CHRONIC OBSTRUCTIVE PULMONARY DISEASE, UNSPECIFIED COPD TYPE: ICD-10-CM

## 2018-05-29 DIAGNOSIS — F33.0 DEPRESSION, MAJOR, RECURRENT, MILD: ICD-10-CM

## 2018-05-29 PROCEDURE — 3074F SYST BP LT 130 MM HG: CPT | Mod: CPTII,S$GLB,, | Performed by: FAMILY MEDICINE

## 2018-05-29 PROCEDURE — 99999 PR PBB SHADOW E&M-EST. PATIENT-LVL IV: CPT | Mod: PBBFAC,,, | Performed by: FAMILY MEDICINE

## 2018-05-29 PROCEDURE — 87086 URINE CULTURE/COLONY COUNT: CPT

## 2018-05-29 PROCEDURE — 81001 URINALYSIS AUTO W/SCOPE: CPT

## 2018-05-29 PROCEDURE — 3078F DIAST BP <80 MM HG: CPT | Mod: CPTII,S$GLB,, | Performed by: FAMILY MEDICINE

## 2018-05-29 PROCEDURE — 99499 UNLISTED E&M SERVICE: CPT | Mod: S$GLB,,, | Performed by: FAMILY MEDICINE

## 2018-05-29 PROCEDURE — 99214 OFFICE O/P EST MOD 30 MIN: CPT | Mod: S$GLB,,, | Performed by: FAMILY MEDICINE

## 2018-05-29 RX ORDER — HYDROCODONE BITARTRATE AND ACETAMINOPHEN 10; 325 MG/1; MG/1
1 TABLET ORAL 3 TIMES DAILY PRN
Qty: 90 TABLET | Refills: 0 | Status: SHIPPED | OUTPATIENT
Start: 2018-05-29 | End: 2018-07-02 | Stop reason: SDUPTHER

## 2018-05-29 NOTE — PROGRESS NOTES
Subjective:     THIS DOCUMENT WAS MADE IN PART WITH [employer] DICTATION SOFTWARE.  OCCASIONALLY THIS SOFTWARE WILL MISINTERPRET WORDS OR PHRASES.     Patient ID: Summer Inman is a 82 y.o. female.    Chief Complaint: Follow-up (3 month follow up )    HPI    follow-up multiple concerns. She appears stable. Note that she was anxious about providing me with her advanced directives that she has completed but she was unable to locate this paperwork within her material she brought with us today and was somewhat anxious about this.       DDD (degenerative disc disease), lumbar  Lumbosacral radiculopathy   chronic condition of relatively stable but still requiring hydrocodone. No recent falls since last scene she reports    Essential hypertension   chronic stable and very well-controlled    Chronic obstructive pulmonary disease, unspecified COPD type  Pulmonary hypertension   stable with no recent exacerbations.    Depression, major, recurrent, mild   Chronic condition, appears mildly improved. Still some depressed mood and anxiety. No SI. She does not wish for any additional treatment, adjustments, or referrals.    Foul smelling urine   No dysuria and no hematuria perhaps mild chronic frequency and urgency but no real changes here      Active Ambulatory Problems     Diagnosis Date Noted    Hyperlipidemia     Gastroesophageal reflux disease     Seizure disorder     Chronic cough 03/08/2016    Anemia 03/10/2016    DDD (degenerative disc disease), lumbar 06/15/2016    Lumbosacral radiculopathy 06/15/2016    Right hip pain 06/15/2016    Coronary artery disease involving native coronary artery of native heart without angina pectoris 10/17/2016    Essential hypertension 10/17/2016    Chronic obstructive pulmonary disease 10/17/2016    History of stroke 10/17/2016    Depression, major, recurrent, mild 10/17/2016    Insomnia 10/17/2016    Pulmonary hypertension 10/17/2016    Osteopenia 10/17/2016    Dysphagia  03/08/2018     Resolved Ambulatory Problems     Diagnosis Date Noted    Chronic kidney disease, stage II (mild) 12/26/2012    Dysphagia 10/08/2014    Cervical radiculopathy 02/02/2015    UTI (urinary tract infection) 01/31/2016    Elevated lactic acid level 01/31/2016    Abdominal pain 01/31/2016    Cervical spondylosis with myelopathy 03/07/2016    Shortness of breath 03/08/2016    Obesity, Class I, BMI 30-34.9 03/08/2016    Hypokalemia 03/09/2016    Closed Colles' fracture of right radius 04/26/2017     Past Medical History:   Diagnosis Date    Anticoagulant long-term use     Arthritis     Asthma     Atrial premature beats     Benign neoplasm of adrenal gland     Colon polyp     COPD (chronic obstructive pulmonary disease)     Coronary artery disease     Depression     Dizziness     First degree AV block     Gastroesophageal reflux disease     General anesthetics causing adverse effect in therapeutic use     Hepatomegaly     Hyperlipidemia     Hypertension     Impaired mobility     Neck pain     Schatzki's ring     Seasonal allergies     Seizures     Stroke     Trouble in sleeping     Wears dentures     Wears glasses          Review of Systems   Constitutional: Positive for fatigue. Negative for unexpected weight change.   HENT: Negative for trouble swallowing.    Eyes: Negative for photophobia, pain, discharge and itching.   Respiratory: Positive for cough and shortness of breath. Negative for choking, chest tightness and wheezing.         Respiratory symptoms better than last time, now stable and chronic   Cardiovascular: Negative for chest pain.   Gastrointestinal: Negative for abdominal pain and blood in stool.   Genitourinary: Negative for dysuria, flank pain and hematuria.   Musculoskeletal: Positive for arthralgias, back pain, gait problem and neck pain. Negative for neck stiffness.   Neurological: Positive for weakness. Negative for syncope.   Psychiatric/Behavioral:  Negative for sleep disturbance and suicidal ideas.       Objective:      Physical Exam   Constitutional: She is oriented to person, place, and time.  Non-toxic appearance. No distress.   Elderly  female. She's in a wheelchair, she's awake and alert. She is not distressed   HENT:   Head: Normocephalic and atraumatic.   Right Ear: Tympanic membrane, external ear and ear canal normal.   Left Ear: Tympanic membrane, external ear and ear canal normal.   Nose: Nose normal.   Mouth/Throat: Oropharynx is clear and moist. No oropharyngeal exudate.   Eyes: Conjunctivae and EOM are normal. Pupils are equal, round, and reactive to light. No scleral icterus.   Neck: Normal range of motion. Neck supple. No JVD present. No tracheal deviation present. No thyromegaly present.   Cardiovascular: Normal rate, regular rhythm and intact distal pulses.  PMI is not displaced.  Exam reveals no gallop and no friction rub.    Murmur heard.  Pulmonary/Chest: Effort normal and breath sounds normal. No respiratory distress. She has no wheezes. She has no rales.   Abdominal: Soft. Bowel sounds are normal. She exhibits no distension and no mass. There is no tenderness. There is no rebound and no guarding.   Lymphadenopathy:     She has no cervical adenopathy.   Neurological: She is alert and oriented to person, place, and time. She displays normal reflexes. No cranial nerve deficit. She exhibits normal muscle tone.   Skin: She is not diaphoretic.   Psychiatric: She has a normal mood and affect. Her behavior is normal.   Vitals reviewed.      Assessment:       1. DDD (degenerative disc disease), lumbar    2. Lumbosacral radiculopathy    3. Essential hypertension    4. Chronic obstructive pulmonary disease, unspecified COPD type    5. Pulmonary hypertension    6. Depression, major, recurrent, mild    7. Foul smelling urine    8. Pain        Plan:       Summer was seen today for follow-up.    Diagnoses and all orders for this visit:    DDD  (degenerative disc disease), lumbar  Lumbosacral radiculopathy   stable follow back in three months  Essential hypertension   stable  Chronic obstructive pulmonary disease, unspecified COPD type   stable  Pulmonary hypertension   stable  Depression, major, recurrent, mild   improved overall  Foul smelling urine  -     Urinalysis; Future  -     Urine culture; Future  -     Urinalysis Microscopic; Future  -     Urinalysis  -     Urine culture  -     Urinalysis Microscopic   she did not seem interested in leaving a urine specimen even after discussing her concerns. In fact she left without collecting this but stated she would drop it off later of course with the help of her son  Pain  -     HYDROcodone-acetaminophen (NORCO)  mg per tablet; Take 1 tablet by mouth 3 (three) times daily as needed.

## 2018-05-30 ENCOUNTER — OUTPATIENT CASE MANAGEMENT (OUTPATIENT)
Dept: ADMINISTRATIVE | Facility: OTHER | Age: 83
End: 2018-05-30

## 2018-05-30 NOTE — PROGRESS NOTES
5/30/2018:  SUMMARY:  Pt reported that she would like to obtain a transport wheelchair for home use.  She stated that one was ordered for her in the past and she declined it.  She has since decided that she could benefit from it.     Pt reported that she was to bring her completed Advanced Directive forms to her PCP appointment yesterday; however, she misunderstood where her daughter put them.  She has since found them and will bring them to the clinic when she brings her urine sample.      Pt reported that she is registered with [place] [facility] and will be able to utilize the transportation services.  She reported that they did not offer any additional services at this time.    Her children are still providing her with assistance/support when they can.    INTERVENTION:  1. Contacted Alma at [facility] who reported that they have the order for transport chair.  They will reach out to the patient for delivery.      PLAN:  Follow up call scheduled for 5/29     1. Did she call [facility] on Aging office to be placed on list for ramp?- completed by  on 4/10  2. Did she receive mailings?-YES 4/10/2018  3. Answer questions  4. Encourage her to call resources  5. Did [facility] come to her home in order to complete evaluation for additional services?-5/22  6. Did she receive wheelchair  7. Close case if no additional needs arise.

## 2018-06-04 ENCOUNTER — TELEPHONE (OUTPATIENT)
Dept: FAMILY MEDICINE | Facility: CLINIC | Age: 83
End: 2018-06-04

## 2018-06-04 ENCOUNTER — HOSPITAL ENCOUNTER (OUTPATIENT)
Dept: RADIOLOGY | Facility: HOSPITAL | Age: 83
Discharge: HOME | End: 2018-06-04
Attending: PSYCHIATRY & NEUROLOGY
Payer: MEDICARE

## 2018-06-04 ENCOUNTER — OUTPATIENT CASE MANAGEMENT (OUTPATIENT)
Dept: ADMINISTRATIVE | Facility: OTHER | Age: 83
End: 2018-06-04

## 2018-06-04 DIAGNOSIS — G44.89 CHRONIC MIXED HEADACHE SYNDROME: ICD-10-CM

## 2018-06-04 LAB

## 2018-06-04 PROCEDURE — A9585 GADOBUTROL INJECTION: HCPCS | Mod: PO | Performed by: PSYCHIATRY & NEUROLOGY

## 2018-06-04 PROCEDURE — 70553 MRI BRAIN STEM W/O & W/DYE: CPT | Mod: TC,PO

## 2018-06-04 PROCEDURE — 70553 MRI BRAIN STEM W/O & W/DYE: CPT | Mod: 26,,, | Performed by: RADIOLOGY

## 2018-06-04 PROCEDURE — 25500020 PHARM REV CODE 255: Mod: PO | Performed by: PSYCHIATRY & NEUROLOGY

## 2018-06-04 RX ORDER — GADOBUTROL 604.72 MG/ML
7 INJECTION INTRAVENOUS
Status: DISCONTINUED | OUTPATIENT
Start: 2018-06-04 | End: 2018-06-05 | Stop reason: HOSPADM

## 2018-06-04 RX ORDER — GADOBUTROL 604.72 MG/ML
7 INJECTION INTRAVENOUS
Status: COMPLETED | OUTPATIENT
Start: 2018-06-04 | End: 2018-06-04

## 2018-06-04 RX ADMIN — GADOBUTROL 7 ML: 604.72 INJECTION INTRAVENOUS at 03:06

## 2018-06-04 NOTE — PATIENT INSTRUCTIONS
Step-by-Step  Going Through a Door with a Walker    Date Last Reviewed: 7/9/2015  © 3871-4108 [employer]. [address] [place] [address]. All rights reserved. This information is not intended as a substitute for professional medical care. Always follow your healthcare professional's instructions.        Step-by-Step  Checking Your Blood Pressure    Date Last Reviewed: 4/27/2016  © 8925-5983 [employer]. [address] [place] [address]. All rights reserved. This information is not intended as a substitute for professional medical care. Always follow your healthcare professional's instructions.

## 2018-06-04 NOTE — TELEPHONE ENCOUNTER
The previous message was sent directly to me.     Please write in order for the appropriate Walker on a prescription pad and I will sign it.     Please see what glucometer Humanvickie prefers and send her in her own prescription, thanks

## 2018-06-04 NOTE — TELEPHONE ENCOUNTER
----- Message from Eliot Sloan RN sent at 6/4/2018 12:39 PM CDT -----  Contact: Lien Cox RN-WILLIAM  Good afternoon. This is Lien Cox, RN with [facility]. I spoke with the patient today on the telephone and she reported that her walker has broken. She states that it is pretty old so insurance should okay a new walker for patient. She states that she does not want a Rolator as she is used to the older aluminum walker with only 2 wheels on the 2 front legs. Patient is only 5' tall and her mobile home hallways are very narrow, so she needs the smallest walker that is available for her size. If you feel that this is an appropriate request, can you please send an order to [facility] for the walker?     Also, patient mentioned that you wanted her to start checking her blood glucose level. She states that she will be using her son's meter and supplies, so may need an order for a meter, testing strips and lancets soon. She does not remember how often you told her to check her blood glucose. Can you clarify that for me and I will pass on the frequency of testing to the patient.     Please advise,  Lien Cox RN-WILLIAM  [phone]    Please notify patient of your recommendations.    Kind Regards,  FRANKO Nails  [phone]

## 2018-06-04 NOTE — PROGRESS NOTES
05/29/2018  RNYASMEEN received voice mail message from patient requesting a transport wheelchair. OPCM-SW was calling patient for follow up, so gave patient message that we would obtain an order from PCP for the wheelchair. I had previously requested an order for same from PCP, but patient had declined at that time. SW contacted [facility] and they still had the order for the transport chair and stated that they would contact patient to arrange delivery. Will continue to follow patient. Mark Yepez RN-OPCM    06/04/2018  Summary: RNYASMEEN contacted patient to follow up for OPCM. Spoke with patient via telephone. She states that she is glad that I called because she was still asleep and needed to get up for a lab appointment at 2:30 today and an MRI at 3:30 in the [facility] today and has to get ready to go. Ms. Wheeler states that her leg weakness and leg pains have improved a bit. She stated that she saw PCP, Dr. Wilson, on 5/29/18 and he reviewed multiple labs with her. She states that he wanted her to start checking her blood glucose, but can't remember how many times per day to check it. Patient's last HgbA1c on 5/16/18 was 5.8&. She also states that he did not give her a prescription for a meter, lancets or testing strips. She states that she can use her son's supplies and meter for now. Will send message to PCP regarding frequency of testing and request order for Diabetic Testing Supplies. Patient confirmed receipt of the information I sent her on Hypotension and the blood pressure logs. Patient states she has been checking her blood pressure 1-2 times per day and states that it has been really good in the 110's to 120's range over 60's. We reviewed the signs/symptoms of low blood pressure. Patient states that she realizes that she does need a transport wheelchair. Informed patient that [facility] still has the order and will be contacting her to arrange delivery of the chair. Patient also states that her  walker broke yesterday. Patient has an old aluminum walker with 2 wheels on the front legs and would like to get the same kind again.Patient is also 5' tall and her mobile home has very narrow hallways. She needs the smallest walker available for her size. Patient states that her pain is pretty well controlled with the medication she is taking and denies any additional needs related to her pain. Patient states that her daughters completed the Advance Directive/POA paperwork and she could not produce them when she followed up with Dr. Wilson. She states that she has since found the papers and will bring to the [facility] today to have scanned into her [facility] account. We reviewed patient's upcoming scheduled appointments; encouraged patient to attend all appointments, verbalized understanding. Will continue to follow until patient has received DME as requested. - JENNIFER Cox RN-OPCM    Interventions:  - Sent message to PCP, Dr. Ludwig Wilson, requesting recommended frequency of blood glucose testing, and order for a walker, glucose meter, testing strips and lancets.  - Mailed [employer] information on Blood Glucose Testing and blood glucose logs.   - Reviewed patient's blood pressure logs. Encouraged her to bring them to her next appointment with PCP.  - Encouraged patient to bring completed Advance Directive/POA to appointment today to be scanned into her chart.   - Reviewed patient's upcoming schedules appointments.     Plan:  - Managing Chronic Pain - Assess for any relief from the reviewed non-invasive pain relieving techniques. - Done 5/15/18   - Hypertension - Assess with compliance with monitoring and logging blood pressure 1-2 times per day. - Done 5/15/18, 6/4/18  - Orthostatic Hypotension - Continue education. Assess with compliance of checking and logging blood pressure if feeling lightheaded or dizzy. - Done 5/15/18, 6/4/18  - Coordinate care with OPCM-SW regarding appointment with  representative from the [facility] on [facility]. - Assessment completed 5/22/18  - Follow up with patient's daughter regarding completion on Advance Directive/POA and [place] [employer] paperwork. - 5/15/18 Daughters are completing paperwork this week per patient. Will bring to lab/MRI appointment today (6/4/18) and have them scanned into the system here.   - Follow up with patient's daughter regarding purchase of 3 wheeled, smaller, lightweight walker for patient. (Not covered by insurance.) Also, patient says the lightweight transport wheelchair is not needed; request clarification from daughter as she was the person who requested that we get an order for the wheelchair. - 5/15/18 Has not purchased, 5/29/18 Patient has realized that she does need the transport wheelchair and requested to get one.   - Follow up on receipt of copy of educational sheet on Hypotension and additional blood pressure logs. - Done 6/4/18  - Follow up on receipt of Blood Glucose Testing and blood glucose logs.  - Follow up with PCP regarding patient's complaints of leg cramping and leg weakness. - Labs ordered 5/15/18, completed on 5/16/18  - Follow up with PCP regarding recommended frequency of blood glucose testing, and order for a walker, glucose meter, testing strips and lancets.  - Assess for contact with [facility] regarding wheelchair, table and chairs.   - Assess for any additional needs.

## 2018-06-05 DIAGNOSIS — R05.3 CHRONIC COUGH: ICD-10-CM

## 2018-06-05 LAB — BACTERIA UR CULT: NO GROWTH

## 2018-06-05 RX ORDER — SERTRALINE HYDROCHLORIDE 100 MG/1
150 TABLET, FILM COATED ORAL DAILY
Qty: 135 TABLET | Refills: 3 | Status: SHIPPED | OUTPATIENT
Start: 2018-06-05 | End: 2019-06-26 | Stop reason: SDUPTHER

## 2018-06-05 RX ORDER — BENZONATATE 100 MG/1
200 CAPSULE ORAL 3 TIMES DAILY PRN
Qty: 60 CAPSULE | Refills: 2 | OUTPATIENT
Start: 2018-06-05 | End: 2018-11-26 | Stop reason: SDUPTHER

## 2018-06-05 NOTE — PROGRESS NOTES
Refill Authorization Note     is requesting a refill authorization.    Brief assessment and rationale for refill: DEFER: tessalon pearls recently prescribed / APPROVE: going to [facility]  Amount/Quantity of medication ordered: 90d         Refills Authorized: Yes  If authorized number of refills: 0           Medication Therapy Plan: Depression commented as improved and seems stable; vanessa 12 more  Name and strength of medication: sertraline (ZOLOFT) 100 MG tablet / benzonatate  How patient will take medication: UTD   Medication reconciliation completed: No  Comments:

## 2018-06-12 ENCOUNTER — OUTPATIENT CASE MANAGEMENT (OUTPATIENT)
Dept: ADMINISTRATIVE | Facility: OTHER | Age: 83
End: 2018-06-12

## 2018-06-12 DIAGNOSIS — K21.9 GASTROESOPHAGEAL REFLUX DISEASE WITHOUT ESOPHAGITIS: ICD-10-CM

## 2018-06-12 RX ORDER — HYDROCHLOROTHIAZIDE 25 MG/1
TABLET ORAL
Qty: 45 TABLET | Refills: 1 | Status: SHIPPED | OUTPATIENT
Start: 2018-06-12 | End: 2018-11-20 | Stop reason: SDUPTHER

## 2018-06-12 NOTE — PROGRESS NOTES
6/12/2018:   SUMMARY:   spoke to patient via telephone who reported that she has not received her wheelchair yet.      INTERVENTION:  1. Spoke to Purnima at [facility] who reported that they have been leaving messages for patient.  Confirmed address with [facility].    2. Arranged for wheelchair delivery for tomorrow.  Pt in agreement.    PLAN:  Follow up call scheduled for 6/14    1. Did she call [facility] on Aging office to be placed on list for ramp?- completed by  on 4/10  2. Did she receive mailings?-YES 4/10/2018  3. Answer questions  4. Encourage her to call resources  5. Did [facility] come to her home in order to complete evaluation for additional services?-5/22  6. Did she receive wheelchair-  7. Close case if no additional needs arise.

## 2018-06-13 RX ORDER — GABAPENTIN 300 MG/1
CAPSULE ORAL
Qty: 270 CAPSULE | Refills: 2 | OUTPATIENT
Start: 2018-06-13

## 2018-06-14 ENCOUNTER — OUTPATIENT CASE MANAGEMENT (OUTPATIENT)
Dept: ADMINISTRATIVE | Facility: OTHER | Age: 83
End: 2018-06-14

## 2018-06-14 NOTE — PROGRESS NOTES
6/14/2018:   SUMMARY:   spoke to patient via telephone.  She reported that she has received the transport wheelchair and feels that it will not work.  After discussion, pt will contact her daughter and ask for her to look at the wheelchair to see what she thinks.  Pt plans to use the wheelchair for doctor appointments and not around the house.  She is concerned that she cannot push the wheelchair on her own.      INTERVENTIONS:  1. Discussed the differences between a transport chair and a standard wheelchair.  2. Encouraged patient to discuss with her daughter  3. [facility] patient with [facility]    PLAN:  Follow up call scheduled for 6/25  1. Did she call [facility] on Aging office to be placed on list for ramp?- completed by  on 4/10  2. Did she receive mailings?-YES 4/10/2018  3. Answer questions-6/14  4. Encourage her to call resources  5. Did [place] on Aging come to her home in order to complete evaluation for additional services?-5/22  6. Did she receive wheelchair-6/14  7. Close case if no additional needs arise.

## 2018-06-25 ENCOUNTER — OUTPATIENT CASE MANAGEMENT (OUTPATIENT)
Dept: ADMINISTRATIVE | Facility: OTHER | Age: 83
End: 2018-06-25

## 2018-06-25 NOTE — PROGRESS NOTES
6/25/2018:  SUMMARY:   spoke to patient via telephone for follow up.  She reported that her daughter has looked at the wheelchair and feels that patient needs a 3 wheel walker.   explained that this is not a covered item.      INTERVENTIONS:  1.  Pt do have her daughter contact [employer] to discuss wheelchair issues.  2. Spoke to OPCM RN who reported that daughter requested transport wheelchair and was provided with information re: 3 wheel walker.  3.  Encouraged patient to contact  if additional needs arise in the future.    PLAN:  Close case at this time.

## 2018-06-27 ENCOUNTER — OUTPATIENT CASE MANAGEMENT (OUTPATIENT)
Dept: ADMINISTRATIVE | Facility: OTHER | Age: 83
End: 2018-06-27

## 2018-06-27 NOTE — PROGRESS NOTES
06/27/2018  Summary:  RNYASMEEN contacted patient to follow up for OPCM. Spoke with Ms. Wheeler via telephone. She reports that she is doing well and has received the transport wheelchair from [facility]. Patient states that she is unable to reach the floor with her feet in the chair. We discussed that a transport chair is really meant to be pushed by the person transporting the patient. It is a smaller, lighter weight chair that is easier to take in and out of a car. Patient states that a regular wheelchair would not fit in her hallways in her trailer. Patient is only 5' tall and I stated that if she needs a wheelchair for inside the trailer, she may be able to get a pediatric chair, but that insurance may not pay for another wheelchair at this time. Patient stated that she would just keep the transport wheelchair and have her daughter  one of the small 3-wheeled walker to use inside the trailer. Patient states that she is continuing to check her blood pressure 2-3 times per day and is logging it. She reports that her blood pressure is very labile and will be low at times (93/52) and a few hours later, could be in the 140's over 70's. We reviewed the signs and symptoms of low blood pressure and the importance of checking blood pressure if she experienced any of the S/S. Stressed the importance of checking and logging blood pressure, and bringing the log to her PCP appointment so he can make a determination as to whether her blood pressure medication needs to be adjusted. Also reminded patient to write any S/S on the log if she experienced any. Patient states that she has been logging her blood pressure and will bring the log with her to Dr. Wilson's office in August, along with the completed Advance Directive/POA and [place] Post paperwork that she forgot to bring earlier this month. Patient states that she has received her diabetic supplies and is checking her blood glucose once per day as per MD instructions,  reporting this AM blood glucose of 113. Patient states that she continues to have back pain, but it is tolerable with the Norco pain medication that she takes. Reviewed information on non-invasive pain relieving techniques with patient; verbalized understanding. Patient denies any additional needs at this time. SW closed case on 6/25/18. Reviewed upcoming scheduled appointments with patient and encouraged her to maintain all appointments; verbalized understanding. Will close case at this time as all nursing identified patient centered goals have been met. Encouraged patient to contact OPCM if any needs arise in the future that we can assist her with. Patient confirmed that she has my contact information, as well as the O 24/7 Care Line. Patient expressed appreciation for all the assistance provided to her during her enrollment. - G.St.Cyr RN-OPCM    Interventions:    - Reviewed information on non-invasive pain relieving techniques with patient.  - Reviewed S/S of Low Blood Pressure with patient.   - Encouraged patient to turn in her completed Advance Directive/POA paperwork to PCP at next visit.   - Reviewed upcoming scheduled appointments.     Plan:  - Managing Chronic Pain - Assess for any relief from the reviewed non-invasive pain relieving techniques. - Done 5/15/18, 6/27/18  - Hypertension - Assess with compliance with monitoring and logging blood pressure 1-2 times per day. - Done 5/15/18, 6/4/18, 6/27/18  - Orthostatic Hypotension - Continue education. Assess with compliance of checking and logging blood pressure if feeling lightheaded or dizzy. - Done 5/15/18, 6/4/18, 6/27/18  - Coordinate care with OPCM-TANI regarding appointment with representative from the [facility]. - Assessment completed 5/22/18  - Follow up with patient's daughter regarding completion on Advance Directive/POA and [place] paperwork. - 5/15/18 Daughters are completing paperwork this week per patient. Will bring to  lab/MRI appointment today (6/4/18) and have them scanned into the system here. 6/27/18 Will bring to appointment with PCP on 8/28/18 to scan into system.   - Follow up with patient's daughter regarding purchase of 3 wheeled, smaller, lightweight walker for patient. (Not covered by insurance.) Also, patient says the lightweight transport wheelchair is not needed; request clarification from daughter as she was the person who requested that we get an order for the wheelchair. - 5/15/18 Has not purchased, 5/29/18 Patient has realized that she does need the transport wheelchair and requested to get one. 6/27/18 Patient received transport chair   - Follow up on receipt of copy of educational sheet on Hypotension and additional blood pressure logs. - Done 6/4/18  - Follow up on receipt of Blood Glucose Testing and blood glucose logs. - Done 6/27/18  - Follow up with PCP regarding patient's complaints of leg cramping and leg weakness. - Labs ordered 5/15/18, completed on 5/16/18  - Follow up with PCP regarding recommended frequency of blood glucose testing, and order for a walker, glucose meter, testing strips and lancets. 6/4/18 PCP wrote RX for diabetic supplies and patient is to check blood glucose once per day.   - Assess for contact with [facility] regarding wheelchair. - 6/27/18 Patient has received transport wheelchair   - Case closed as all nursing identified patient centered goals have been met.

## 2018-07-02 DIAGNOSIS — R52 PAIN: ICD-10-CM

## 2018-07-02 RX ORDER — HYDROCODONE BITARTRATE AND ACETAMINOPHEN 10; 325 MG/1; MG/1
1 TABLET ORAL 3 TIMES DAILY PRN
Qty: 90 TABLET | Refills: 0 | Status: SHIPPED | OUTPATIENT
Start: 2018-07-02 | End: 2018-08-02 | Stop reason: SDUPTHER

## 2018-07-02 NOTE — TELEPHONE ENCOUNTER
Pt requesting refill on NORCO  Last filled: 5/29/2018    LOV: 5/29/2018    [facility]

## 2018-07-02 NOTE — TELEPHONE ENCOUNTER
----- Message from Israel VALDES Frifernando sent at 7/2/2018  9:37 AM CDT -----  Contact: same  Type:  RX Refill Request    Who Called:  patient  Refill or New Rx:  refill  RX Name and Strength:  HYDROcodone-acetaminophen (NORCO)  mg per tablet  How is the patient currently taking it? (ex. 1XDay):  Take 1 tablet by mouth 3 (three) times daily as needed.  Is this a 30 day or 90 day RX:  90 tablets with 0 Refills last filled on 5/29/18  Preferred Pharmacy with phone number:    [place], [place] - [address] [address]  [address]  [facility] [address]  Phone: [phone] Fax: [phone]    Ordering Provider:  Steve  Best Call Back Number:  [phone]  Additional Information:  n/a

## 2018-07-19 ENCOUNTER — NURSE TRIAGE (OUTPATIENT)
Dept: ADMINISTRATIVE | Facility: CLINIC | Age: 83
End: 2018-07-19

## 2018-07-19 ENCOUNTER — TELEPHONE (OUTPATIENT)
Dept: NEUROLOGY | Facility: CLINIC | Age: 83
End: 2018-07-19

## 2018-07-19 ENCOUNTER — TELEPHONE (OUTPATIENT)
Dept: FAMILY MEDICINE | Facility: CLINIC | Age: 83
End: 2018-07-19

## 2018-07-19 DIAGNOSIS — E11.8 TYPE 2 DIABETES MELLITUS WITH COMPLICATION, WITHOUT LONG-TERM CURRENT USE OF INSULIN: Primary | ICD-10-CM

## 2018-07-19 NOTE — TELEPHONE ENCOUNTER
----- Message from Mar Reyes sent at 7/19/2018 10:20 AM CDT -----  Contact: Patient  Type: Needs Medical Advice    Who Called:  Patient  Symptoms (please be specific):  2 weeks ago she blacked out for about 9 hrs and now she is having all kinds of problems- at times she can't breathe  How long has patient had these symptoms: 2 weeks  Pharmacy name and phone #: NA  Best Call Back Number:   Additional Information: Calling to pod. No answer. Called and warm transferred to On Call Nurse. Please advise.

## 2018-07-19 NOTE — TELEPHONE ENCOUNTER
----- Message from Quyen Luis sent at 7/19/2018  9:06 AM CDT -----  Contact: Patient  Type:  Sooner Apoointment Request    Caller is requesting a sooner appointment.  Caller declined first available appointment listed below.  Caller will not accept being placed on the waitlist and is requesting a message be sent to doctor.    Name of Caller:  Patient  When is the first available appointment?  9/9  Symptoms:  Cold; patient said that she just is not getting any better, it seems to be getting worse  Best Call Back Number:    Additional Information:

## 2018-07-19 NOTE — TELEPHONE ENCOUNTER
"Spoke with pt  She had already spoken to Triage nurse  Pt was advised to go to ER  Advised pt again that she needed to go to ER  Pt could hardly breath while talking  Pt states she has had chest pain and "blacked out" a couple of days ago  Instructed pt to be seen at ER to be evaluated   "

## 2018-07-19 NOTE — TELEPHONE ENCOUNTER
"  Reason for Disposition   Chest pain present when not coughing    Protocols used: ST COUGH-A-OH    Transferred from scheduling due to reports of "not being able to breath"  Summer reports that she is not short of breath at this time but when she is coughing she becomes short of breath.  Summer also reports that she blacked out 9 days ago for 3 days.  Summer also reports chest pain when not coughing.  Due to all of these complaints advised ER.  "

## 2018-07-19 NOTE — TELEPHONE ENCOUNTER
Left voicemail to return call. Per the chart, the patient was told to go to the ED for evaluation.

## 2018-07-20 RX ORDER — LANCETS 33 GAUGE
EACH MISCELLANEOUS
Qty: 200 EACH | Refills: 3 | Status: SHIPPED | OUTPATIENT
Start: 2018-07-20 | End: 2022-03-25

## 2018-07-20 RX ORDER — GABAPENTIN 300 MG/1
CAPSULE ORAL
Qty: 270 CAPSULE | Refills: 1 | Status: SHIPPED | OUTPATIENT
Start: 2018-07-20 | End: 2019-06-07 | Stop reason: SDUPTHER

## 2018-07-20 RX ORDER — BLOOD-GLUCOSE METER
EACH MISCELLANEOUS
Qty: 1 EACH | Refills: 0 | Status: SHIPPED | OUTPATIENT
Start: 2018-07-20 | End: 2019-06-26 | Stop reason: SDUPTHER

## 2018-07-20 RX ORDER — CALCIUM CITRATE/VITAMIN D3 200MG-6.25
TABLET ORAL
Qty: 200 STRIP | Refills: 3 | Status: SHIPPED | OUTPATIENT
Start: 2018-07-20 | End: 2022-03-25

## 2018-07-20 NOTE — PROGRESS NOTES
Refill Authorization Note     is requesting a refill authorization.    Brief assessment and rationale for refill: APPROVE; prr  Amount/Quantity of medication ordered: 90d        Refills Authorized: Yes  If authorized number of refills: 3           Medication Therapy Plan: A1C WNL; approve 12 more  Name and strength of medication: TRUE METRIX AIR W/BLUETOOTH SMART w/Device/TRUEPLUS LANCETS 33G/True Metrix Test strips  How patient will take medication: test bid   Medication reconciliation completed: No  Comments:   Lab Results   Component Value Date    HGBA1C 5.8 (H) 05/16/2018

## 2018-07-26 ENCOUNTER — OFFICE VISIT (OUTPATIENT)
Dept: NEUROLOGY | Facility: CLINIC | Age: 83
End: 2018-07-26
Payer: MEDICARE

## 2018-07-26 VITALS
RESPIRATION RATE: 20 BRPM | BODY MASS INDEX: 31.05 KG/M2 | HEART RATE: 71 BPM | SYSTOLIC BLOOD PRESSURE: 125 MMHG | HEIGHT: 60 IN | DIASTOLIC BLOOD PRESSURE: 74 MMHG | WEIGHT: 158.13 LBS

## 2018-07-26 DIAGNOSIS — E78.5 HYPERLIPIDEMIA, UNSPECIFIED HYPERLIPIDEMIA TYPE: ICD-10-CM

## 2018-07-26 DIAGNOSIS — I10 ESSENTIAL HYPERTENSION: ICD-10-CM

## 2018-07-26 DIAGNOSIS — Z86.73 HISTORY OF STROKE: ICD-10-CM

## 2018-07-26 DIAGNOSIS — R56.9 SEIZURES: ICD-10-CM

## 2018-07-26 DIAGNOSIS — G40.909 SEIZURE DISORDER: ICD-10-CM

## 2018-07-26 DIAGNOSIS — R41.3 MEMORY LOSS: Primary | ICD-10-CM

## 2018-07-26 PROCEDURE — 3078F DIAST BP <80 MM HG: CPT | Mod: CPTII,S$GLB,, | Performed by: PSYCHIATRY & NEUROLOGY

## 2018-07-26 PROCEDURE — 99215 OFFICE O/P EST HI 40 MIN: CPT | Mod: S$GLB,,, | Performed by: PSYCHIATRY & NEUROLOGY

## 2018-07-26 PROCEDURE — 3074F SYST BP LT 130 MM HG: CPT | Mod: CPTII,S$GLB,, | Performed by: PSYCHIATRY & NEUROLOGY

## 2018-07-26 PROCEDURE — 99999 PR PBB SHADOW E&M-EST. PATIENT-LVL III: CPT | Mod: PBBFAC,,, | Performed by: PSYCHIATRY & NEUROLOGY

## 2018-07-26 RX ORDER — NAPROXEN SODIUM 220 MG/1
81 TABLET, FILM COATED ORAL DAILY
Refills: 0 | COMMUNITY
Start: 2018-07-26 | End: 2019-03-08

## 2018-07-26 RX ORDER — LAMOTRIGINE 100 MG/1
TABLET ORAL
Qty: 450 TABLET | Refills: 3 | Status: SHIPPED | OUTPATIENT
Start: 2018-07-26 | End: 2019-05-05 | Stop reason: SDUPTHER

## 2018-07-26 RX ORDER — DONEPEZIL HYDROCHLORIDE 10 MG/1
10 TABLET, FILM COATED ORAL EVERY MORNING
Qty: 90 TABLET | Refills: 3 | Status: SHIPPED | OUTPATIENT
Start: 2018-07-26 | End: 2019-05-05 | Stop reason: SDUPTHER

## 2018-07-26 NOTE — PROGRESS NOTES
"    Date: 7/26/2018    Patient ID: Summer Inman is a 82 y.o. female.    Chief Complaint: Seizures      History of Present Illness:  Ms. Inman is a 82 y.o. female who presents for follow up of seizures. The patient was accompanied by her son who also contributed to the following history. The history is difficult to obtain.     Since our last visit, we obtained MRI brain that did not show a definite cause for the seizures. 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 I recommended aspirin 81 mg daily be started and she has been taking that daily. The lamotrigine level was normal.     About 3 weeks ago, she had an unusual episode. She had a 9 hour long episode where she was very confused/delirious. She was yelling at people. She recalls that the TV was "rolling". No dizziness.  Her son says that she was confused and thought she was at [employer]. She was unable to stand as well. She kept sliding out of the bed and her grandson kept trying to pick her up. She is unclear if she had any seizure like activity. When she came out of it, she was totally fine. She denies that she took medication incorrectly.     No other episodes since that time. She has passed out in the past before but this was not a passing out episode. No seizures that her and her son.     She states she is taking lamictal 200 mg in the morning and 300 mg in the evening (previously reported that she was on 200 mg once daily). It is unclear what she is actually taking. Per my review of Dr. Souza's records he prescribed 200mg/300mg. She has had a normal EEG with him.     Review of Systems:   Comprehensive review of systems is negative except as mentioned in the HPI    Allergies:  Review of patient's allergies indicates:   Allergen Reactions    Codeine Shortness Of Breath     Tongue swelled    Nifedipine Anaphylaxis    Ace inhibitors      Other reaction(s): Angioedema    Benadryl [diphenhydramine hcl] Other (See Comments)     " Lowers seizure threshold.    Ketorolac      Other reaction(s): tongue swell    Milk      Other reaction(s): Stomach upset    Quinolones Other (See Comments)     Lowers seizure threshold.    Tramadol      Other reaction(s): seizure       Current Medications:  Current Outpatient Prescriptions   Medication Sig Dispense Refill    alendronate (FOSAMAX) 70 MG tablet Take 1 tablet (70 mg total) by mouth every 7 days. 4 tablet 11    amLODIPine (NORVASC) 5 MG tablet TAKE 1 TABLET (5 MG TOTAL) ONE TIME DAILY. 90 tablet 3    benzocaine-menthol 15-2.6 mg Lozg Take 1 lozenge by mouth 3 (three) times daily.       benzonatate (TESSALON) 100 MG capsule Take 2 capsules (200 mg total) by mouth 3 (three) times daily as needed for Cough. 60 capsule 2    carvedilol (COREG) 6.25 MG tablet TAKE 1 TABLET (6.25 MG TOTAL) 2 TIMES DAILY WITH MEALS. 180 tablet 3    diclofenac sodium (VOLTAREN) 1 % Gel Apply 2 g topically once daily. (Patient taking differently: Apply 2 g topically once daily. ) 300 g 5    donepezil (ARICEPT) 10 MG tablet Take 1 tablet (10 mg total) by mouth every morning. 90 tablet 3    gabapentin (NEURONTIN) 300 MG capsule TAKE 1 CAPSULE THREE TIMES DAILY 270 capsule 1    hydroCHLOROthiazide (HYDRODIURIL) 25 MG tablet TAKE 1/2 TABLET EVERY DAY 45 tablet 1    HYDROcodone-acetaminophen (NORCO)  mg per tablet Take 1 tablet by mouth 3 (three) times daily as needed. 90 tablet 0    hydrOXYzine pamoate (VISTARIL) 25 MG Cap Take 1 capsule (25 mg total) by mouth 2 (two) times daily as needed (for anxiety). 30 capsule 0    lamotrigine (LAMICTAL) 100 MG tablet Take 200 mg by mouth every morning.      neomycin-polymyxin-dexamethasone (DEXACINE) 3.5 mg/g-10,000 unit/g-0.1 % Oint Apply to affected area right upper lid 2 times daily 3.5 g 0    nitroGLYCERIN (NITROSTAT) 0.4 MG SL tablet Place 1 tablet (0.4 mg total) under the tongue every 5 (five) minutes as needed for Chest pain. For chest pain. Repeat in five  minutes if pain persists. May repeat again, for total of 3 tablets.  Call 911 at the third tablet. 20 tablet 3    omeprazole (PRILOSEC) 40 MG capsule TAKE 1 CAPSULE EVERY DAY 90 capsule 1    ondansetron (ZOFRAN-ODT) 4 MG TbDL DISSOLVE 1 TABLET ON TONGUE EVERY 8 HOURS AS NEEDED 60 tablet 2    polyethylene glycol (GLYCOLAX) 17 gram/dose powder Take 17 g by mouth once daily. 510 g 0    potassium chloride SA (K-DUR,KLOR-CON) 10 MEQ tablet Take 2 pills immediately.  Then take 1 pill every 8 hours for 3 days.  Then 1 pill twice a day for 3 days. 20 tablet 1    pravastatin (PRAVACHOL) 40 MG tablet Take 1 tablet (40 mg total) by mouth once daily. 90 tablet 2    ranitidine (ZANTAC) 300 MG tablet TAKE 1 TABLET EVERY EVENING (NEED MD APPOINTMENT) 30 tablet 2    sertraline (ZOLOFT) 100 MG tablet Take 1.5 tablets (150 mg total) by mouth once daily. 135 tablet 3    TRUE METRIX AIR GLUCOSE METER kit USE AS DIRECTED 1 each 0    TRUE METRIX GLUCOSE TEST STRIP Strp TEST TWO TIMES DAILY 200 strip 3    TRUEPLUS LANCETS 33 gauge Misc TEST TWO TIMES DAILY 200 each 3    aspirin 81 MG Chew Take 1 tablet (81 mg total) by mouth once daily.  0     No current facility-administered medications for this visit.        Past Medical History:  Past Medical History:   Diagnosis Date    Anticoagulant long-term use     ASA 81mg    Arthritis     Asthma     Atrial premature beats     Benign neoplasm of adrenal gland     Colon polyp     COPD (chronic obstructive pulmonary disease)     Coronary artery disease     nonocclusive disease, no prior intervention    Depression     Dizziness     and balance issues    First degree AV block     Gastroesophageal reflux disease     General anesthetics causing adverse effect in therapeutic use     hard to wake up    Hepatomegaly     Hyperlipidemia     Hypertension     Impaired mobility     uses walker or cane    Neck pain     into chest and arms with numbness in arms    Schatzki's ring      Seasonal allergies     Seizures     Last one recently    Stroke     tia    Trouble in sleeping     Wears dentures     upper    Wears glasses        Past Surgical History:  Past Surgical History:   Procedure Laterality Date    APPENDECTOMY      CERVICAL FUSION  3/2016 Dr. Cuevas    COLONOSCOPY  2008 Dr. Nguyen    internal hemorrhoids otherwise normal findings; repeat in 4 years    ESOPHAGOGASTRODUODENOSCOPY      EYE SURGERY      phaco bilateral    HYSTERECTOMY      partial hysterectomy    mesh implant      TONSILLECTOMY      UPPER GASTROINTESTINAL ENDOSCOPY  2014 Dr. Nguyen    UPPER GASTROINTESTINAL ENDOSCOPY  02/01/2016    Dr. Nguyen       Family History:  family history includes Arthritis in her son; Cancer in her daughter and mother; Colon cancer (age of onset: 74) in her mother; Diabetes in her son; Heart disease in her father and mother; Hypertension in her daughter and son; Rheum arthritis in her daughter.    Social History:   reports that she quit smoking about 4 years ago. Her smoking use included Cigarettes. She started smoking about 34 years ago. She has a 9.50 pack-year smoking history. She has never used smokeless tobacco. She reports that she does not drink alcohol or use drugs.    Physical Exam:  Vitals:    07/26/18 1038   BP: 125/74   Pulse: 71   Resp: 20   Weight: 71.7 kg (158 lb 1.6 oz)   Height: 5' (1.524 m)   PainSc:   5   PainLoc: Hand     Body mass index is 30.88 kg/m².  General: Well developed, well nourished.  No acute distress.  HEENT: Atraumatic, normocephalic.  Mental status: Awake and alert  Speech language: No dysarthria or aphasia on conversation  Cranial nerves: Face symmetric  Motor: Moves all extremities well  Coordination: No ataxia. No tremor.       Data:  I have personally reviewed the referring provider's notes, labs, & imaging made available to me today.       Labs:  CBC:   Lab Results   Component Value Date    WBC 6.31 05/16/2018    HGB 12.7 05/16/2018     HCT 41.0 05/16/2018     05/16/2018    MCV 86 05/16/2018    RDW 16.1 (H) 05/16/2018     BMP:   Lab Results   Component Value Date     05/16/2018    K 4.0 05/16/2018     05/16/2018    CO2 26 05/16/2018    BUN 13 05/16/2018    CREATININE 1.0 05/16/2018     05/16/2018    CALCIUM 9.8 05/16/2018    MG 2.5 03/08/2016     LFTS;   Lab Results   Component Value Date    PROT 7.2 05/16/2018    ALBUMIN 3.8 05/16/2018    BILITOT 0.4 05/16/2018    AST 16 05/16/2018    ALKPHOS 114 05/16/2018    ALT 8 (L) 05/16/2018     COAGS: No results found for: INR, PROTIME, PTT  FLP:   Lab Results   Component Value Date    CHOL 168 10/11/2017    HDL 27 (L) 10/11/2017    LDLCALC 104.4 10/11/2017    TRIG 183 (H) 10/11/2017    CHOLHDL 16.1 (L) 10/11/2017         Imaging:  I have personally reviewed the imaging, MRI brain shows a chronic stroke in the right basal ganglia.     Assessment and Plan:  Ms. Inman is a 82 y.o. female who memory loss, seizure disorder, and silent chronic lacunar infarct.     For the memory loss, I will refill her aricept.     For the seizures, it is unclear what dose she is taking. Per my review of Dr. Souza's records, he did indeed prescribe 200mg/300mg. We will call [employer] and see what she has been taking. Her lamictal level is normal.    For the lacunar infarct, she is on aspirin 81 mg daily now. She takes BP medication and statin to control HTN and HLD. She is followed by Dr. Wilson for this.     For this unusual episodes, I'm not sure what happened. She could have been delirious and this could be related to her memory loss. It could have been stroke (less likely since he has no recollection of the event but she also has memory loss) or post-ictal state. I told her to go to the ER if this happens again. We will obtain EEG for further evaluation.     Memory loss    Seizures  -     EEG,w/awake & asleep record; Future    Essential hypertension    Hyperlipidemia, unspecified hyperlipidemia  type    Seizure disorder    History of stroke    Other orders  -     aspirin 81 MG Chew; Take 1 tablet (81 mg total) by mouth once daily.; Refill: 0  -     donepezil (ARICEPT) 10 MG tablet; Take 1 tablet (10 mg total) by mouth every morning.  Dispense: 90 tablet; Refill: 3

## 2018-07-30 ENCOUNTER — TELEPHONE (OUTPATIENT)
Dept: NEUROLOGY | Facility: CLINIC | Age: 83
End: 2018-07-30

## 2018-07-30 NOTE — TELEPHONE ENCOUNTER
Spoke with patient and explained EEG was in [place]. She stated she would call back if she wants to schedule it to get the scheduling number.

## 2018-07-30 NOTE — TELEPHONE ENCOUNTER
----- Message from Aidee Dowd sent at 7/30/2018  9:41 AM CDT -----  Contact: pt  Pt calling would like to schedule her EEG that the Dr had ordered for her,please....[phone](pt says if no answer call her back)

## 2018-08-02 DIAGNOSIS — R52 PAIN: ICD-10-CM

## 2018-08-02 RX ORDER — HYDROCODONE BITARTRATE AND ACETAMINOPHEN 10; 325 MG/1; MG/1
1 TABLET ORAL 3 TIMES DAILY PRN
Qty: 90 TABLET | Refills: 0 | Status: SHIPPED | OUTPATIENT
Start: 2018-08-02 | End: 2018-08-28

## 2018-08-02 NOTE — TELEPHONE ENCOUNTER
----- Message from Kacy Bueno sent at 8/2/2018 10:02 AM CDT -----  Refill on Rx Narco.  Please send into [facility] pharmacy, please call patient when called in at [phone].

## 2018-08-15 ENCOUNTER — TELEPHONE (OUTPATIENT)
Dept: NEUROLOGY | Facility: CLINIC | Age: 83
End: 2018-08-15

## 2018-08-15 NOTE — TELEPHONE ENCOUNTER
----- Message from Delores Mayo sent at 8/15/2018 10:20 AM CDT -----  Contact: self [phone]  She is requesting that you schedule an EEG.  Please call her.  Thank you!

## 2018-08-23 ENCOUNTER — HOSPITAL ENCOUNTER (OUTPATIENT)
Dept: NEUROLOGY | Facility: HOSPITAL | Age: 83
Discharge: HOME | End: 2018-08-23
Attending: PSYCHIATRY & NEUROLOGY
Payer: MEDICARE

## 2018-08-23 DIAGNOSIS — R56.9 SEIZURES: ICD-10-CM

## 2018-08-23 PROCEDURE — 95819 EEG AWAKE AND ASLEEP: CPT | Mod: 26,,, | Performed by: PSYCHIATRY & NEUROLOGY

## 2018-08-23 PROCEDURE — 95819 EEG AWAKE AND ASLEEP: CPT

## 2018-08-24 NOTE — PROCEDURES
DATE OF PROCEDURE:  08/23/2018.    EEG#:  ON-.    REFERRING PHYSICIAN:  Dr. Inman.    This EEG was performed to assess for evidence for underlying epilepsy.    ELECTROENCEPHALOGRAM REPORT    METHODOLOGY:  Electroencephalographic (EEG) recording is recorded with   electrodes placed according to the International 10-20 placement system.  Thirty   two (32) channels of digital signal (sampling rate of 512/sec), including T1   and T2, were simultaneously recorded from the scalp and may include EKG, EMG,   and/or eye monitors.  Recording band pass was 0.1 to 512 Hz.  Digital video   recording of the patient is simultaneously recorded with the EEG.  The patient   is instructed to report clinical symptoms which may occur during the recording   session.  EEG and video recording are stored and archived in digital format.    Activation procedures, which include photic stimulation, hyperventilation and   instructing patients to perform simple tasks, are done in selected patients.    The EEG is displayed on a monitor screen and can be reviewed using different   montages.  Computer assisted-analysis is employed to detect spike and   electrographic seizure activity.   The entire record is submitted for computer   analysis.  The entire recording is visually reviewed, and the times identified   by computer analysis as being spikes or seizures are reviewed again.    Compressed spectral analysis (CSA) is also performed on the activity recorded   from each individual channel.  This is displayed as a power display of   frequencies from 0 to 30 Hz over time.   The CSA is reviewed looking for   asymmetries in power between homologous areas of the scalp, then compared with   the original EEG recording.    Archetypes software was also utilized in the review of this study.  This software   suite analyzes the EEG recording in multiple domains.  Coherence and rhythmicity   are computed to identify EEG sections which may contain organized  seizures.    Each channel undergoes analysis to detect the presence of spike and sharp waves   which have special and morphological characteristics of epileptic activity.  The   routine EEG recording is converted from special into frequency domain.  This is   then displayed comparing homologous areas to identify areas of significant   asymmetry.  Algorithm to identify non-cortically generated artifact is used to   separate artifact from the EEG.    EEG FINDINGS:  The recording was obtained with a number of standard bipolar and   referential montages during wakefulness, drowsiness and sleep.  In the alert   state, the posterior background rhythm was a symmetric, well-modulated 6 to 8 Hz   activity, which reacted symmetrically to eye opening.  Intermittent photic   stimulation failed to evoke driving responses.  Hyperventilation was not   performed.  No abnormalities were activated by photic stimulation.  During   drowsiness, the background rhythm waxed and waned and there were periods of   slowing.  During stage II sleep, symmetric V waves and sleep spindles were   noted.  There were no interictal epileptiform abnormalities and no clinical or   electrographic seizures were recorded.    The EKG channel revealed sinus rhythm.    IMPRESSION:  This is an abnormal EEG during wakefulness, drowsiness and sleep.    Diffuse disorganized slowing of the background was noted.    CLINICAL CORRELATION:  The patient is an 82-year-old female with a history of   memory loss who is currently maintained on lamotrigine.  This is an abnormal EEG   during wakefulness, drowsiness and sleep with overall degree of slowing.  For   her given age, it is suggestive of a mild encephalopathy, nonspecific to the   cause.  There is no evidence of an epileptic process on this recording.  No   seizures were recorded during this study.      FAK/IN  dd: 08/23/2018 17:22:58 (CDT)  td: 08/23/2018 17:45:48 (CDT)  Doc ID   #[id number]  Job ID #[id number]    CC:

## 2018-08-28 ENCOUNTER — OFFICE VISIT (OUTPATIENT)
Dept: FAMILY MEDICINE | Facility: CLINIC | Age: 83
End: 2018-08-28
Payer: MEDICARE

## 2018-08-28 VITALS
HEART RATE: 67 BPM | RESPIRATION RATE: 15 BRPM | OXYGEN SATURATION: 98 % | BODY MASS INDEX: 30.97 KG/M2 | DIASTOLIC BLOOD PRESSURE: 64 MMHG | SYSTOLIC BLOOD PRESSURE: 128 MMHG | HEIGHT: 60 IN | WEIGHT: 157.75 LBS | TEMPERATURE: 99 F

## 2018-08-28 DIAGNOSIS — H01.009 BLEPHARITIS, UNSPECIFIED LATERALITY, UNSPECIFIED TYPE: ICD-10-CM

## 2018-08-28 DIAGNOSIS — R52 PAIN: ICD-10-CM

## 2018-08-28 DIAGNOSIS — G89.29 OTHER CHRONIC PAIN: ICD-10-CM

## 2018-08-28 DIAGNOSIS — I10 ESSENTIAL HYPERTENSION: Primary | ICD-10-CM

## 2018-08-28 DIAGNOSIS — F11.90 CHRONIC NARCOTIC USE: ICD-10-CM

## 2018-08-28 DIAGNOSIS — M51.36 DDD (DEGENERATIVE DISC DISEASE), LUMBAR: ICD-10-CM

## 2018-08-28 DIAGNOSIS — I27.20 PULMONARY HYPERTENSION: ICD-10-CM

## 2018-08-28 DIAGNOSIS — M54.17 LUMBOSACRAL RADICULOPATHY: ICD-10-CM

## 2018-08-28 DIAGNOSIS — J44.9 CHRONIC OBSTRUCTIVE PULMONARY DISEASE, UNSPECIFIED COPD TYPE: ICD-10-CM

## 2018-08-28 PROCEDURE — 99214 OFFICE O/P EST MOD 30 MIN: CPT | Mod: S$GLB,,, | Performed by: FAMILY MEDICINE

## 2018-08-28 PROCEDURE — 3078F DIAST BP <80 MM HG: CPT | Mod: CPTII,S$GLB,, | Performed by: FAMILY MEDICINE

## 2018-08-28 PROCEDURE — 3074F SYST BP LT 130 MM HG: CPT | Mod: CPTII,S$GLB,, | Performed by: FAMILY MEDICINE

## 2018-08-28 PROCEDURE — 99999 PR PBB SHADOW E&M-EST. PATIENT-LVL IV: CPT | Mod: PBBFAC,,, | Performed by: FAMILY MEDICINE

## 2018-08-28 RX ORDER — ERYTHROMYCIN 5 MG/G
OINTMENT OPHTHALMIC NIGHTLY
Qty: 3.5 G | Refills: 1 | Status: SHIPPED | OUTPATIENT
Start: 2018-08-28 | End: 2018-10-10 | Stop reason: SDUPTHER

## 2018-08-28 RX ORDER — HYDROCODONE BITARTRATE AND ACETAMINOPHEN 10; 325 MG/1; MG/1
1 TABLET ORAL 3 TIMES DAILY PRN
Qty: 90 TABLET | Refills: 0 | Status: SHIPPED | OUTPATIENT
Start: 2018-08-28 | End: 2018-10-01 | Stop reason: SDUPTHER

## 2018-08-28 NOTE — PROGRESS NOTES
] THIS DOCUMENT WAS MADE IN PART WITH VOICE RECOGNITION SOFTWARE.  OCCASIONALLY THIS SOFTWARE WILL MISINTERPRET WORDS OR PHRASES.      Summer Inman  1935    Summer was seen today for results.    Diagnoses and all orders for this visit:    Essential hypertension   this chronic condition is stable and well-controlled  Chronic obstructive pulmonary disease, unspecified COPD type   stable, chronic cough and occasional shortness of breath but stable  Pulmonary hypertension   stable  Lumbosacral radiculopathy  DDD (degenerative disc disease), lumbar  Other chronic pain  Chronic narcotic use  Pain  -     HYDROcodone-acetaminophen (NORCO)  mg per tablet; Take 1 tablet by mouth 3 (three) times daily as needed.   she has chronic severe pain that has been reasonably well-managed on hydrocodone TID.   She is on antiseizure medication for seizures so really no room to adjust these for pain control  Blepharitis, unspecified laterality, unspecified type  -     erythromycin (ROMYCIN) ophthalmic ointment; Place into the right eye every evening.   presumed of blepharitis. Planes of intermittent itching crusting around her right eye. Requested a refill of steroid eyedrop that an eye doctor gave her more than a year ago I declined this. She can try  erythromycin ointment but if symptoms don't improve she should follow back with ophthalmology. Warm compresses may help      Subjective     Chief Complaint   Patient presents with    Results       HPI   as above chronicity and stability discussed    Active Ambulatory Problems     Diagnosis Date Noted    Hyperlipidemia     Gastroesophageal reflux disease     Seizure disorder     Chronic cough 03/08/2016    Anemia 03/10/2016    DDD (degenerative disc disease), lumbar 06/15/2016    Lumbosacral radiculopathy 06/15/2016    Right hip pain 06/15/2016    Coronary artery disease involving native coronary artery of native heart without angina pectoris 10/17/2016    Essential  hypertension 10/17/2016    Chronic obstructive pulmonary disease 10/17/2016    History of stroke 10/17/2016    Depression, major, recurrent, mild 10/17/2016    Insomnia 10/17/2016    Pulmonary hypertension 10/17/2016    Osteopenia 10/17/2016    Dysphagia 03/08/2018    Memory loss 07/26/2018    Seizures      Resolved Ambulatory Problems     Diagnosis Date Noted    Chronic kidney disease, stage II (mild) 12/26/2012    Dysphagia 10/08/2014    Cervical radiculopathy 02/02/2015    UTI (urinary tract infection) 01/31/2016    Elevated lactic acid level 01/31/2016    Abdominal pain 01/31/2016    Cervical spondylosis with myelopathy 03/07/2016    Shortness of breath 03/08/2016    Obesity, Class I, BMI 30-34.9 03/08/2016    Hypokalemia 03/09/2016    Closed Colles' fracture of right radius 04/26/2017     Past Medical History:   Diagnosis Date    Anticoagulant long-term use     Arthritis     Asthma     Atrial premature beats     Benign neoplasm of adrenal gland     Colon polyp     COPD (chronic obstructive pulmonary disease)     Coronary artery disease     Depression     Dizziness     First degree AV block     Gastroesophageal reflux disease     General anesthetics causing adverse effect in therapeutic use     Hepatomegaly     Hyperlipidemia     Hypertension     Impaired mobility     Neck pain     Schatzki's ring     Seasonal allergies     Seizures     Stroke     Trouble in sleeping     Wears dentures     Wears glasses          Review of Systems   Constitutional: Positive for fatigue. Negative for unexpected weight change.   HENT: Negative for trouble swallowing.    Eyes: Negative for photophobia, pain, discharge and itching.   Respiratory: Positive for cough and shortness of breath. Negative for choking, chest tightness and wheezing.         Current symptoms are chronic and stable   Cardiovascular: Negative for chest pain.   Gastrointestinal: Negative for abdominal pain and blood in  stool.   Genitourinary: Negative for dysuria, flank pain and hematuria.   Musculoskeletal: Positive for arthralgias, back pain, gait problem and neck pain. Negative for neck stiffness.   Neurological: Positive for weakness. Negative for syncope.   Psychiatric/Behavioral: Negative for sleep disturbance and suicidal ideas.       Objective     Physical Exam  Vitals:    08/28/18 1119   BP: 128/64   Pulse: 67   Resp: 15   Temp: 98.6 °F (37 °C)   SpO2: 98%   Weight: 71.6 kg (157 lb 11.8 oz)   Height: 5' (1.524 m)       MOST RECENT LABS IN OUR ELECTRONIC MEDICAL RECORD:     Results for orders placed or performed in visit on 06/04/18   Lamotrigine level   Result Value Ref Range    Lamotrigine Lvl 11.4 2.0 - 15.0 ug/mL

## 2018-10-01 DIAGNOSIS — R52 PAIN: ICD-10-CM

## 2018-10-01 RX ORDER — HYDROCODONE BITARTRATE AND ACETAMINOPHEN 10; 325 MG/1; MG/1
1 TABLET ORAL 3 TIMES DAILY PRN
Qty: 90 TABLET | Refills: 0 | Status: SHIPPED | OUTPATIENT
Start: 2018-10-01 | End: 2018-11-02

## 2018-10-01 NOTE — TELEPHONE ENCOUNTER
----- Message from Iván Alonso sent at 10/1/2018  8:24 AM CDT -----  Type:  RX Refill Request    Who Called:  Patient  Refill or New Rx:  Refill  RX Name and Strength:  HYDROcodone-acetaminophen (NORCO)  mg per tablet  How is the patient currently taking it? (ex. 1XDay):  3XDay  Is this a 30 day or 90 day RX:  90  Preferred Pharmacy with phone number:    [facility] [place] - [address]  [place] [address]  Phone: [phone] Fax: [phone]    Local or Mail Order:  Local  Ordering Provider:  Steve  Best Call Back Number:  [phone]

## 2018-10-10 DIAGNOSIS — I10 ESSENTIAL HYPERTENSION: ICD-10-CM

## 2018-10-10 DIAGNOSIS — H01.009 BLEPHARITIS, UNSPECIFIED LATERALITY, UNSPECIFIED TYPE: ICD-10-CM

## 2018-10-10 RX ORDER — ERYTHROMYCIN 5 MG/G
OINTMENT OPHTHALMIC NIGHTLY
Qty: 3.5 G | Refills: 1 | Status: SHIPPED | OUTPATIENT
Start: 2018-10-10 | End: 2018-11-26 | Stop reason: SDUPTHER

## 2018-10-11 RX ORDER — CARVEDILOL 6.25 MG/1
TABLET ORAL
Qty: 180 TABLET | Refills: 3 | Status: SHIPPED | OUTPATIENT
Start: 2018-10-11 | End: 2020-02-27 | Stop reason: SDUPTHER

## 2018-10-11 RX ORDER — AMLODIPINE BESYLATE 5 MG/1
TABLET ORAL
Qty: 90 TABLET | Refills: 3 | Status: SHIPPED | OUTPATIENT
Start: 2018-10-11 | End: 2020-02-27

## 2018-11-02 DIAGNOSIS — R52 PAIN: ICD-10-CM

## 2018-11-02 RX ORDER — HYDROCODONE BITARTRATE AND ACETAMINOPHEN 10; 325 MG/1; MG/1
1 TABLET ORAL 3 TIMES DAILY PRN
Qty: 90 TABLET | Refills: 0 | Status: SHIPPED | OUTPATIENT
Start: 2018-11-02 | End: 2018-12-03 | Stop reason: SDUPTHER

## 2018-11-02 RX ORDER — HYDROCODONE BITARTRATE AND ACETAMINOPHEN 10; 325 MG/1; MG/1
1 TABLET ORAL 3 TIMES DAILY PRN
Qty: 90 TABLET | Refills: 0 | Status: CANCELLED | OUTPATIENT
Start: 2018-11-02

## 2018-11-02 NOTE — TELEPHONE ENCOUNTER
----- Message from Rebekah Andrade sent at 11/2/2018  9:28 AM CDT -----  Type:  RX Refill Request    Who Called:  Patient  Refill or New Rx:  refill  RX Name and Strength:  HYDROcodone-acetaminophen (NORCO)  mg per tablet  How is the patient currently taking it? (ex. 1XDay):  Na  Is this a 30 day or 90 day RX:  30  Preferred Pharmacy with phone number:    [facility] [place] [facility] [address]  [address]  [facility] [address]  Phone: [phone] Fax: [phone]  Local or Mail Order:  local  Ordering Provider:  Steve  Best Call Back Number:  [phone] (home)     Additional Information:  Emy

## 2018-11-13 ENCOUNTER — OFFICE VISIT (OUTPATIENT)
Dept: FAMILY MEDICINE | Facility: CLINIC | Age: 83
End: 2018-11-13
Payer: MEDICARE

## 2018-11-13 VITALS
SYSTOLIC BLOOD PRESSURE: 130 MMHG | RESPIRATION RATE: 16 BRPM | WEIGHT: 157.5 LBS | HEIGHT: 60 IN | HEART RATE: 65 BPM | BODY MASS INDEX: 30.92 KG/M2 | OXYGEN SATURATION: 97 % | DIASTOLIC BLOOD PRESSURE: 70 MMHG

## 2018-11-13 DIAGNOSIS — J44.9 CHRONIC OBSTRUCTIVE PULMONARY DISEASE, UNSPECIFIED COPD TYPE: ICD-10-CM

## 2018-11-13 DIAGNOSIS — F41.9 ANXIETY: ICD-10-CM

## 2018-11-13 DIAGNOSIS — I10 ESSENTIAL HYPERTENSION: Primary | ICD-10-CM

## 2018-11-13 DIAGNOSIS — S63.92XS HAND SPRAIN, LEFT, SEQUELA: Primary | ICD-10-CM

## 2018-11-13 DIAGNOSIS — R05.3 CHRONIC COUGH: ICD-10-CM

## 2018-11-13 DIAGNOSIS — M51.36 DDD (DEGENERATIVE DISC DISEASE), LUMBAR: ICD-10-CM

## 2018-11-13 DIAGNOSIS — S63.92XS HAND SPRAIN, LEFT, SEQUELA: ICD-10-CM

## 2018-11-13 DIAGNOSIS — F11.90 CHRONIC NARCOTIC USE: ICD-10-CM

## 2018-11-13 DIAGNOSIS — F33.0 DEPRESSION, MAJOR, RECURRENT, MILD: ICD-10-CM

## 2018-11-13 PROCEDURE — 3075F SYST BP GE 130 - 139MM HG: CPT | Mod: CPTII,HCWC,S$GLB, | Performed by: FAMILY MEDICINE

## 2018-11-13 PROCEDURE — 1101F PT FALLS ASSESS-DOCD LE1/YR: CPT | Mod: CPTII,HCWC,S$GLB, | Performed by: FAMILY MEDICINE

## 2018-11-13 PROCEDURE — 99215 OFFICE O/P EST HI 40 MIN: CPT | Mod: HCWC,S$GLB,, | Performed by: FAMILY MEDICINE

## 2018-11-13 PROCEDURE — 99999 PR PBB SHADOW E&M-EST. PATIENT-LVL IV: CPT | Mod: PBBFAC,HCWC,, | Performed by: FAMILY MEDICINE

## 2018-11-13 PROCEDURE — 3078F DIAST BP <80 MM HG: CPT | Mod: CPTII,HCWC,S$GLB, | Performed by: FAMILY MEDICINE

## 2018-11-13 RX ORDER — ERYTHROMYCIN 5 MG/G
OINTMENT OPHTHALMIC NIGHTLY
Qty: 3.5 G | Refills: 1 | Status: CANCELLED | OUTPATIENT
Start: 2018-11-13

## 2018-11-13 RX ORDER — DICLOFENAC SODIUM 10 MG/G
2 GEL TOPICAL 3 TIMES DAILY
Qty: 300 G | Refills: 5 | Status: CANCELLED | OUTPATIENT
Start: 2018-11-13

## 2018-11-13 RX ORDER — ONDANSETRON 4 MG/1
TABLET, ORALLY DISINTEGRATING ORAL
Qty: 60 TABLET | Refills: 2 | Status: CANCELLED | OUTPATIENT
Start: 2018-11-13

## 2018-11-13 RX ORDER — HYDROXYZINE PAMOATE 25 MG/1
25 CAPSULE ORAL 2 TIMES DAILY PRN
Qty: 30 CAPSULE | Refills: 0 | Status: CANCELLED | OUTPATIENT
Start: 2018-11-13

## 2018-11-13 RX ORDER — BENZONATATE 100 MG/1
200 CAPSULE ORAL 3 TIMES DAILY PRN
Qty: 60 CAPSULE | Refills: 2 | Status: CANCELLED | OUTPATIENT
Start: 2018-11-13

## 2018-11-13 NOTE — PROGRESS NOTES
THIS DOCUMENT WAS MADE IN PART WITH VOICE RECOGNITION SOFTWARE.  OCCASIONALLY THIS SOFTWARE WILL MISINTERPRET WORDS OR PHRASES.      Summer Inman  1935    Summer was seen today for follow-up, hypertension and fall.    Diagnoses and all orders for this visit:    Essential hypertension  This is a chronic condition that is currently stable and satisfactory. Reviewed readings from in clinic and at home.    Chronic obstructive pulmonary disease, unspecified COPD type  Chronic cough  Chronic condition relatively stable with minor fluctuations related to allergies. No severe shortness of breath or wheezing.    Anxiety  This chronic condition is currently stable.    Hand sprain, left, sequela  She did have trauma and the fall a few weeks ago. Emergency room visit reviewed. There was some trauma to hand it still swollen and tender, x-rays not previously checked. Will check today    DDD (degenerative disc disease), lumbar  Chronic narcotic use  Chronic moderate to severe pain. She is stable on current medication and will follow every three months. Patient should follow back with pain management is symptoms change or worsen or are not adequately controlled with current medications.    Depression, major, recurrent, mild  This chronic condition is currently stable.    Other orders  benzonatate (TESSALON) 100 MG capsule; Take 2 capsules (200 mg total) by mouth 3 (three) times daily as needed for Cough.  erythromycin (ROMYCIN) ophthalmic ointment; Place into the right eye every evening.  hydrOXYzine pamoate (VISTARIL) 25 MG Cap; Take 1 capsule (25 mg total) by mouth 2 (two) times daily as needed (for anxiety).   ondansetron (ZOFRAN-ODT) 4 MG TbDL; DISSOLVE 1 TABLET ON TONGUE EVERY 8 HOURS AS NEEDED  diclofenac sodium (VOLTAREN) 1 % Gel; Apply 2 g topically 3 (three) times daily.  X-Ray Hand Complete Left; Future  -     alendronate (FOSAMAX) 70 MG tablet; Take 1 tablet (70 mg total) by mouth every 7 days.  -      hydroCHLOROthiazide (HYDRODIURIL) 25 MG tablet; Take 0.5 tablets (12.5 mg total) by mouth once daily.  -     omeprazole (PRILOSEC) 40 MG capsule; Take 1 capsule (40 mg total) by mouth once daily.  -     pravastatin (PRAVACHOL) 40 MG tablet; Take 1 tablet (40 mg total) by mouth once daily.        Subjective     Chief Complaint   Patient presents with    Follow-up    Hypertension    Fall       HPI  Routine follow-up multiple chronic conditions and a few new concerns, all addressed above    HPI elements addressed above in the assessment and plan including problems, diagnosis, stability/instability,  improving/worsening, and chronicity will not be duplicated in this section. Any important additional HPI topics will be discussed here if needed.    Active Ambulatory Problems     Diagnosis Date Noted    Hyperlipidemia     Gastroesophageal reflux disease     Seizure disorder     Chronic cough 03/08/2016    Anemia 03/10/2016    DDD (degenerative disc disease), lumbar 06/15/2016    Lumbosacral radiculopathy 06/15/2016    Right hip pain 06/15/2016    Coronary artery disease involving native coronary artery of native heart without angina pectoris 10/17/2016    Essential hypertension 10/17/2016    Chronic obstructive pulmonary disease 10/17/2016    History of stroke 10/17/2016    Depression, major, recurrent, mild 10/17/2016    Insomnia 10/17/2016    Pulmonary hypertension 10/17/2016    Osteopenia 10/17/2016    Dysphagia 03/08/2018    Memory loss 07/26/2018    Seizures      Resolved Ambulatory Problems     Diagnosis Date Noted    Chronic kidney disease, stage II (mild) 12/26/2012    Dysphagia 10/08/2014    Cervical radiculopathy 02/02/2015    UTI (urinary tract infection) 01/31/2016    Elevated lactic acid level 01/31/2016    Abdominal pain 01/31/2016    Cervical spondylosis with myelopathy 03/07/2016    Shortness of breath 03/08/2016    Obesity, Class I, BMI 30-34.9 03/08/2016    Hypokalemia  03/09/2016    Closed Colles' fracture of right radius 04/26/2017     Past Medical History:   Diagnosis Date    Anticoagulant long-term use     Arthritis     Asthma     Atrial premature beats     Benign neoplasm of adrenal gland     Colon polyp     COPD (chronic obstructive pulmonary disease)     Coronary artery disease     Depression     Dizziness     First degree AV block     Gastroesophageal reflux disease     General anesthetics causing adverse effect in therapeutic use     Hepatomegaly     Hyperlipidemia     Hypertension     Impaired mobility     Neck pain     Schatzki's ring     Seasonal allergies     Seizures     Stroke     Trouble in sleeping     Wears dentures     Wears glasses      Current Outpatient Medications on File Prior to Visit   Medication Sig Dispense Refill    amLODIPine (NORVASC) 5 MG tablet TAKE 1 TABLET (5 MG TOTAL) ONE TIME DAILY. 90 tablet 3    aspirin 81 MG Chew Take 1 tablet (81 mg total) by mouth once daily.  0    benzonatate (TESSALON) 100 MG capsule Take 2 capsules (200 mg total) by mouth 3 (three) times daily as needed for Cough. 60 capsule 2    carvedilol (COREG) 6.25 MG tablet TAKE 1 TABLET (6.25 MG TOTAL) 2 TIMES DAILY WITH MEALS. 180 tablet 3    diclofenac sodium (VOLTAREN) 1 % Gel Apply 2 g topically once daily. (Patient taking differently: Apply 2 g topically once daily. ) 300 g 5    donepezil (ARICEPT) 10 MG tablet Take 1 tablet (10 mg total) by mouth every morning. 90 tablet 3    erythromycin (ROMYCIN) ophthalmic ointment place into the right eye EVERY EVENING 3.5 g 1    gabapentin (NEURONTIN) 300 MG capsule TAKE 1 CAPSULE THREE TIMES DAILY 270 capsule 1    HYDROcodone-acetaminophen (NORCO)  mg per tablet Take 1 tablet by mouth 3 (three) times daily as needed. 90 tablet 0    hydrOXYzine pamoate (VISTARIL) 25 MG Cap Take 1 capsule (25 mg total) by mouth 2 (two) times daily as needed (for anxiety). 30 capsule 0    lamoTRIgine (LAMICTAL)  100 MG tablet 200 mg in the morning and 300 mg at night 450 tablet 3    ondansetron (ZOFRAN-ODT) 4 MG TbDL DISSOLVE 1 TABLET ON TONGUE EVERY 8 HOURS AS NEEDED 60 tablet 2    polyethylene glycol (GLYCOLAX) 17 gram/dose powder Take 17 g by mouth once daily. 510 g 0    ranitidine (ZANTAC) 300 MG tablet TAKE 1 TABLET EVERY EVENING (NEED MD APPOINTMENT) 30 tablet 2    sertraline (ZOLOFT) 100 MG tablet Take 1.5 tablets (150 mg total) by mouth once daily. 135 tablet 3    TRUE METRIX AIR GLUCOSE METER kit USE AS DIRECTED 1 each 0    TRUE METRIX GLUCOSE TEST STRIP Strp TEST TWO TIMES DAILY 200 strip 3    TRUEPLUS LANCETS 33 gauge Misc TEST TWO TIMES DAILY 200 each 3    [DISCONTINUED] alendronate (FOSAMAX) 70 MG tablet Take 1 tablet (70 mg total) by mouth every 7 days. 4 tablet 11    [DISCONTINUED] hydroCHLOROthiazide (HYDRODIURIL) 25 MG tablet TAKE 1/2 TABLET EVERY DAY 45 tablet 1    [DISCONTINUED] omeprazole (PRILOSEC) 40 MG capsule TAKE 1 CAPSULE EVERY DAY 90 capsule 1    [DISCONTINUED] pravastatin (PRAVACHOL) 40 MG tablet Take 1 tablet (40 mg total) by mouth once daily. 90 tablet 2    nitroGLYCERIN (NITROSTAT) 0.4 MG SL tablet Place 1 tablet (0.4 mg total) under the tongue every 5 (five) minutes as needed for Chest pain. For chest pain. Repeat in five minutes if pain persists. May repeat again, for total of 3 tablets.  Call 911 at the third tablet. 20 tablet 3     No current facility-administered medications on file prior to visit.      Review of patient's allergies indicates:   Allergen Reactions    Bleach (sodium hypochlorite) Nausea And Vomiting, Palpitations and Shortness Of Breath    Codeine Shortness Of Breath     Tongue swelled    Nifedipine Anaphylaxis    Ace inhibitors      Other reaction(s): Angioedema    Ketorolac      Other reaction(s): tongue swell    Milk      Other reaction(s): Stomach upset    Quinolones Other (See Comments)     Lowers seizure threshold.    Tramadol      Other  reaction(s): seizure     Social History     Tobacco Use    Smoking status: Former Smoker     Packs/day: 0.50     Years: 19.00     Pack years: 9.50     Types: Cigarettes     Start date: 3/12/1984     Last attempt to quit: 10/8/2013     Years since quittin.1    Smokeless tobacco: Never Used   Substance Use Topics    Alcohol use: No    Drug use: No   '  Family History   Problem Relation Age of Onset    Heart disease Mother     Cancer Mother         Colon    Colon cancer Mother 74    Hypertension Daughter     Heart disease Father     Hypertension Son     Diabetes Son     Arthritis Son     Rheum arthritis Daughter     Cancer Daughter         Thyroid and Breast    Glaucoma Neg Hx     Crohn's disease Neg Hx     Ulcerative colitis Neg Hx     Stomach cancer Neg Hx     Esophageal cancer Neg Hx          Review of Systems   Constitutional: Positive for fatigue. Negative for fever and unexpected weight change.   HENT: Negative for trouble swallowing.    Eyes: Negative for photophobia, pain, discharge and itching.   Respiratory: Positive for cough, shortness of breath and wheezing (mild). Negative for choking and chest tightness.         Current symptoms are chronic and stable   Cardiovascular: Negative for chest pain and leg swelling.   Gastrointestinal: Negative for abdominal pain and blood in stool.   Endocrine: Negative for polydipsia and polyuria.   Genitourinary: Negative for dysuria, flank pain and hematuria.   Musculoskeletal: Positive for arthralgias, back pain, gait problem and neck pain. Negative for neck stiffness.   Neurological: Positive for weakness. Negative for syncope.   Psychiatric/Behavioral: Negative for sleep disturbance and suicidal ideas.       Objective     Physical Exam   Constitutional: She is oriented to person, place, and time.  Non-toxic appearance. No distress.   Elderly  female. She's in a wheelchair, she's awake and alert. She is not distressed   HENT:   Head:  Normocephalic and atraumatic.   Right Ear: Tympanic membrane, external ear and ear canal normal.   Left Ear: Tympanic membrane, external ear and ear canal normal.   Nose: Nose normal.   Mouth/Throat: Oropharynx is clear and moist. No oropharyngeal exudate.   Eyes: Conjunctivae and EOM are normal. Pupils are equal, round, and reactive to light. No scleral icterus.   Cardiovascular: Normal rate, regular rhythm and intact distal pulses. PMI is not displaced. Exam reveals no gallop and no friction rub.   Murmur heard.  Pulmonary/Chest: Effort normal and breath sounds normal. No respiratory distress. She has no wheezes. She has no rales.   Musculoskeletal:   Left hand swelling posteriorly over the second and third metacarpal bones. There is stiffness and pain with movement of the first three MP joints, no significant deformity other than arthritis changes   Neurological: She is alert and oriented to person, place, and time. No cranial nerve deficit.   Skin: She is not diaphoretic.   Psychiatric: She has a normal mood and affect. Her behavior is normal.   Vitals reviewed.    Vitals:    11/13/18 1622   BP: 130/70   BP Location: Left arm   Patient Position: Sitting   BP Method: Large (Manual)   Pulse: 65   Resp: 16   SpO2: 97%   Weight: 71.5 kg (157 lb 8.3 oz)   Height: 5' (1.524 m)       MOST RECENT LABS IN OUR ELECTRONIC MEDICAL RECORD:     Results for orders placed or performed during the hospital encounter of 10/29/18   Comprehensive metabolic panel   Result Value Ref Range    Sodium 131 (L) 136 - 145 mmol/L    Potassium 3.2 (L) 3.5 - 5.1 mmol/L    Chloride 91 (L) 95 - 110 mmol/L    CO2 32 (H) 22 - 31 mmol/L    Glucose 118 (H) 70 - 110 mg/dL    BUN, Bld 27 (H) 7 - 18 mg/dL    Creatinine 1.11 0.50 - 1.40 mg/dL    Calcium 8.9 8.4 - 10.2 mg/dL    Total Protein 7.2 6.0 - 8.4 g/dL    Albumin 4.0 3.5 - 5.2 g/dL    Total Bilirubin 0.5 0.2 - 1.3 mg/dL    Alkaline Phosphatase 116 38 - 145 U/L    AST 21 14 - 36 U/L    ALT 16 10 -  44 U/L    Anion Gap 8 8 - 16 mmol/L    eGFR if African American 54 (A) >60 mL/min/1.73 m^2    eGFR if non African American 47 (A) >60 mL/min/1.73 m^2   CBC auto differential   Result Value Ref Range    WBC 9.01 3.90 - 12.70 K/uL    RBC 4.04 4.00 - 5.40 M/uL    Hemoglobin 11.0 (L) 12.0 - 16.0 g/dL    Hematocrit 33.0 (L) 37.0 - 48.5 %    MCV 82 82 - 98 fL    MCH 27.2 27.0 - 31.0 pg    MCHC 33.3 32.0 - 36.0 g/dL    RDW 15.1 (H) 11.5 - 14.5 %    Platelets 233 150 - 350 K/uL    MPV 10.8 9.2 - 12.9 fL    Gran # (ANC) 6.9 1.8 - 7.7 K/uL    Lymph # 1.1 1.0 - 4.8 K/uL    Mono # 0.8 0.3 - 1.0 K/uL    Eos # 0.1 0.0 - 0.5 K/uL    Baso # 0.04 0.00 - 0.20 K/uL    nRBC 0 0 /100 WBC    Gran% 77.1 (H) 38.0 - 73.0 %    Lymph% 12.5 (L) 18.0 - 48.0 %    Mono% 8.7 4.0 - 15.0 %    Eosinophil% 1.3 0.0 - 8.0 %    Basophil% 0.4 0.0 - 1.9 %    Differential Method Automated

## 2018-11-16 ENCOUNTER — HOSPITAL ENCOUNTER (OUTPATIENT)
Dept: RADIOLOGY | Facility: HOSPITAL | Age: 83
Discharge: HOME | End: 2018-11-16
Attending: FAMILY MEDICINE
Payer: MEDICARE

## 2018-11-16 DIAGNOSIS — S63.92XS HAND SPRAIN, LEFT, SEQUELA: ICD-10-CM

## 2018-11-16 PROCEDURE — 73130 X-RAY EXAM OF HAND: CPT | Mod: 26,HCWC,LT, | Performed by: RADIOLOGY

## 2018-11-16 PROCEDURE — 73130 X-RAY EXAM OF HAND: CPT | Mod: TC,FY,HCWC,PO,LT

## 2018-11-19 ENCOUNTER — TELEPHONE (OUTPATIENT)
Dept: FAMILY MEDICINE | Facility: CLINIC | Age: 83
End: 2018-11-19

## 2018-11-19 NOTE — TELEPHONE ENCOUNTER
----- Message from Kourtney Reid sent at 11/19/2018  4:00 PM CST -----  Contact: self [phone]  States that she is calling for her xray results. Please call back at [phone]//thank you acc

## 2018-11-20 RX ORDER — HYDROCHLOROTHIAZIDE 25 MG/1
12.5 TABLET ORAL DAILY
Qty: 45 TABLET | Refills: 1 | Status: SHIPPED | OUTPATIENT
Start: 2018-11-20 | End: 2019-05-05 | Stop reason: SDUPTHER

## 2018-11-20 RX ORDER — PRAVASTATIN SODIUM 40 MG/1
40 TABLET ORAL DAILY
Qty: 90 TABLET | Refills: 3 | Status: SHIPPED | OUTPATIENT
Start: 2018-11-20 | End: 2019-03-15 | Stop reason: SDUPTHER

## 2018-11-20 RX ORDER — OMEPRAZOLE 40 MG/1
40 CAPSULE, DELAYED RELEASE ORAL DAILY
Qty: 90 CAPSULE | Refills: 1 | Status: SHIPPED | OUTPATIENT
Start: 2018-11-20 | End: 2019-05-05 | Stop reason: SDUPTHER

## 2018-11-20 RX ORDER — ALENDRONATE SODIUM 70 MG/1
70 TABLET ORAL
Qty: 12 TABLET | Refills: 1 | Status: SHIPPED | OUTPATIENT
Start: 2018-11-20 | End: 2019-11-11 | Stop reason: SDUPTHER

## 2018-11-20 NOTE — PROGRESS NOTES
05/08/2018  Summary:  (1st Attempt)  RN-CM attempted to contact patient to follow up for OPCM. Left message requesting return call. Will attempt to follow up at a later date. Mark Yepez, RN-OPCM   Reason For Visit  EFFIE BRYANT is here today for a nurse visit for TB reading.      Current Meds   1. Ferrous Sulfate 325 (65 Fe) MG Oral Tablet; Take 1 tablet daily with food for iron   deficiency;   Therapy: 64Zef3534 to (Evaluate:04Jun2018)  Requested for: 99Mvw0586; Last   Rx:[id number] Ordered   2. Fluticasone Propionate 50 MCG/ACT Nasal Suspension; USE 2 SPRAYS IN EACH   NOSTRIL EVERY DAY AS NEEDED;   Therapy: 64Pqi4348 to (Evaluate:66Zak9224)  Requested for: 33Xai8792; Last   Rx:[id number] Ordered   3. Norgestim-Eth Estrad Triphasic 0.18/0.215/0.25 MG-25 MCG Oral Tablet (Ortho   Tri-Cyclen Lo); TAKE 1 TABLET DAILY;   Therapy: 14Jun2017 to (Last Rx:66Lbh9733)  Requested for: 19Zjo2188 Ordered   4. Prenatal Vitamins 0.8 MG TABS; TAKE 1 TABLET DAILY;   Therapy: 84Bwu3413 to (Evaluate:04Jun2018)  Requested for: 52Qhz2029; Last   Rx:[id number] Ordered   5. ProAir  (90 Base) MCG/ACT Inhalation Aerosol Solution; INHALE 2 PUFFS   EVERY 4 HOURS AS NEEDED;   Therapy: 19Apr2017 to (Evaluate:49Hyd4801)  Requested for: 14Eis0192; Last   Rx:[id number] Ordered   6. Vitamin D3 3000 UNIT Oral Tablet; TAKE 1 TABLET DAILY;   Therapy: 76Aka6018 to (Evaluate:04Jun2018)  Requested for: 92Ngd0320; Last   Rx:[id number] Ordered    Allergies  No Known Drug Allergies    Immunizations  Up to date . the patient has not had any significant adverse reactions to immunizations.      Nurse Documentation    Presented for PPD Reading 0mm= Negative, Patient discharged to home.           Assessment   1. Encounter for PPD test (Z11.1)    Plan   1. PPD, tuberculin skin test; purified protein derivative solution, intradermal    Patient Instructions Presented for PPD Reading 0mm= Negative , Updated shot record Patient to keep follow up to  form as directed. Verbalized understanding.   Return to Clinic as needed.      Signatures   Electronically signed by : Connie Wolfe R.N.; Mar 14 2018  7:04PM CST

## 2018-11-20 NOTE — TELEPHONE ENCOUNTER
----- Message from Alan Durand sent at 11/19/2018  4:32 PM CST -----  Type:  Patient Returning Call    Who Called:  patient  Who Left Message for Patient: nurse  Does the patient know what this is regarding?: results   Best Call Back Number: [phone]

## 2018-11-26 DIAGNOSIS — H01.009 BLEPHARITIS, UNSPECIFIED LATERALITY, UNSPECIFIED TYPE: ICD-10-CM

## 2018-11-26 DIAGNOSIS — R05.3 CHRONIC COUGH: ICD-10-CM

## 2018-11-26 RX ORDER — ONDANSETRON 4 MG/1
TABLET, ORALLY DISINTEGRATING ORAL
Qty: 60 TABLET | Refills: 2 | Status: SHIPPED | OUTPATIENT
Start: 2018-11-26 | End: 2020-03-26 | Stop reason: SDUPTHER

## 2018-11-26 RX ORDER — BENZONATATE 100 MG/1
200 CAPSULE ORAL 3 TIMES DAILY PRN
Qty: 60 CAPSULE | Refills: 2 | Status: SHIPPED | OUTPATIENT
Start: 2018-11-26 | End: 2020-05-06 | Stop reason: SDUPTHER

## 2018-11-26 RX ORDER — DICLOFENAC SODIUM 10 MG/G
2 GEL TOPICAL DAILY
Qty: 300 G | Refills: 5 | Status: SHIPPED | OUTPATIENT
Start: 2018-11-26 | End: 2019-11-11 | Stop reason: SDUPTHER

## 2018-11-26 RX ORDER — ERYTHROMYCIN 5 MG/G
OINTMENT OPHTHALMIC NIGHTLY
Qty: 3.5 G | Refills: 1 | Status: SHIPPED | OUTPATIENT
Start: 2018-11-26 | End: 2019-03-08 | Stop reason: SDUPTHER

## 2018-11-26 NOTE — TELEPHONE ENCOUNTER
----- Message from Freda Bryant sent at 11/26/2018  3:08 PM CST -----  Type:  RX Refill Request    Who Called:  Patient  Refill or New Rx:  refill  RX Name and Strength:  erythromycin (ROMYCIN) ophthalmic ointment, diclofenac sodium (VOLTAREN) 1 % Gel, the pearls for cough and the nausea pill. She did not know the names of the last two medications.   Preferred Pharmacy with phone number:    [facility] - [facility] [address]  [address] [facility] [address]  Phone: [phone] Fax: [phone]  Local or Mail Order:  Mail Order  Ordering Provider:  Same  Best Call Back Number:   [phone]    Additional Information: She is out of her medications and is asking patient to please refill these for her. Please call to advise when completed.

## 2018-12-03 DIAGNOSIS — R52 PAIN: ICD-10-CM

## 2018-12-03 RX ORDER — HYDROCODONE BITARTRATE AND ACETAMINOPHEN 10; 325 MG/1; MG/1
1 TABLET ORAL 3 TIMES DAILY PRN
Qty: 90 TABLET | Refills: 0 | Status: SHIPPED | OUTPATIENT
Start: 2018-12-03 | End: 2018-12-31 | Stop reason: SDUPTHER

## 2018-12-03 RX ORDER — HYDROCODONE BITARTRATE AND ACETAMINOPHEN 10; 325 MG/1; MG/1
TABLET ORAL
Qty: 90 TABLET | OUTPATIENT
Start: 2018-12-03

## 2018-12-03 NOTE — TELEPHONE ENCOUNTER
----- Message from Vasquez Min sent at 12/3/2018  1:52 PM CST -----  Contact: pt  Type:  RX Refill Request    Who Called:  pt  Refill or New Rx:  refill  RX Name and Strength:  HYDROcodone-acetaminophen (NORCO)  mg per tablet  How is the patient currently taking it? (ex. 1XDay):  4 x day  Is this a 30 day or 90 day RX:  30  Preferred Pharmacy with phone number:    [facility] [place] [facility] [address]  [address]  [facility] [address]  Phone: [phone] Fax: [phone]    Local or Mail Order:    Ordering Provider:    Best Call Back Number:  [phone] or  [phone]  Additional Information:

## 2018-12-06 ENCOUNTER — TELEPHONE (OUTPATIENT)
Dept: FAMILY MEDICINE | Facility: CLINIC | Age: 83
End: 2018-12-06

## 2018-12-06 NOTE — TELEPHONE ENCOUNTER
----- Message from Freda Bryant sent at 12/6/2018 10:53 AM CST -----  Type: Needs Medical Advice    Who Called:  Patient  Pharmacy name and phone #:    [facility] - [place] - [address]  [address]  [place] [address]  Phone: [phone] Fax: [phone]    Best Call Back Number:   Additional Information: Patient states that she never did get her medications. She says she does not know the names of the medications but one if for nausea, a monserrat for her cough,  a cream for her hands and Benadryl. Patient states that this is the second time that she has called. Please call her back to advise in the morning. She will be gone for today. She has been totally out of her medications.

## 2018-12-31 ENCOUNTER — TELEPHONE (OUTPATIENT)
Dept: FAMILY MEDICINE | Facility: CLINIC | Age: 83
End: 2018-12-31

## 2018-12-31 DIAGNOSIS — R52 PAIN: ICD-10-CM

## 2018-12-31 RX ORDER — HYDROCODONE BITARTRATE AND ACETAMINOPHEN 10; 325 MG/1; MG/1
1 TABLET ORAL 3 TIMES DAILY PRN
Qty: 90 TABLET | Refills: 0 | Status: SHIPPED | OUTPATIENT
Start: 2018-12-31 | End: 2019-02-01 | Stop reason: SDUPTHER

## 2018-12-31 NOTE — TELEPHONE ENCOUNTER
----- Message from Ana Inman sent at 12/31/2018  7:08 AM CST -----  Please call  [phone] / states she is out and would like a refill today / before the pharmacy closes for HYDROcodone-acetaminophen (NORCO)  mg per tablet   [facility] - [place], [place] - [address]  [address]  [place] [address]  Phone: [phone] Fax: [phone]

## 2019-01-04 RX ORDER — GABAPENTIN 300 MG/1
CAPSULE ORAL
Qty: 270 CAPSULE | Refills: 1 | OUTPATIENT
Start: 2019-01-04

## 2019-02-01 DIAGNOSIS — H01.009 BLEPHARITIS, UNSPECIFIED LATERALITY, UNSPECIFIED TYPE: ICD-10-CM

## 2019-02-01 DIAGNOSIS — R52 PAIN: ICD-10-CM

## 2019-02-01 RX ORDER — ERYTHROMYCIN 5 MG/G
OINTMENT OPHTHALMIC
Qty: 3.5 G | Refills: 1 | Status: SHIPPED | OUTPATIENT
Start: 2019-02-01 | End: 2019-08-20 | Stop reason: SDUPTHER

## 2019-02-01 RX ORDER — HYDROCODONE BITARTRATE AND ACETAMINOPHEN 10; 325 MG/1; MG/1
1 TABLET ORAL 3 TIMES DAILY PRN
Qty: 90 TABLET | Refills: 0 | Status: SHIPPED | OUTPATIENT
Start: 2019-02-01 | End: 2019-03-04 | Stop reason: SDUPTHER

## 2019-02-01 NOTE — TELEPHONE ENCOUNTER
----- Message from Adrianne Hills sent at 2/1/2019  9:19 AM CST -----  Type:  RX Refill Request    Who Called:  Patient  RX Name and Strength: HYDROcodone-acetaminophen (NORCO)  mg per tablet  Preferred Pharmacy with phone number: [facility] at [phone] (Phone)  Best Call Back Number:  [phone]  Additional Information:

## 2019-02-15 ENCOUNTER — TELEPHONE (OUTPATIENT)
Dept: FAMILY MEDICINE | Facility: CLINIC | Age: 84
End: 2019-02-15

## 2019-02-15 NOTE — TELEPHONE ENCOUNTER
----- Message from Hank Clarke sent at 2/15/2019  2:20 PM CST -----  Type:  Sooner Apoointment Request    Caller is requesting a sooner appointment.  Caller declined first available appointment listed below.  Caller will not accept being placed on the waitlist and is requesting a message be sent to doctor.    Name of Caller: self   When is the first available appointment?  05/01/2019  Symptoms:    Best Call Back Number:  [phone]  Additional Information:  Patient need an earlier appointment for follow up. The next [facility] appointment is in May.

## 2019-02-20 ENCOUNTER — TELEPHONE (OUTPATIENT)
Dept: FAMILY MEDICINE | Facility: CLINIC | Age: 84
End: 2019-02-20

## 2019-02-20 NOTE — TELEPHONE ENCOUNTER
----- Message from Quyen Luis sent at 2/20/2019  9:43 AM CST -----  Contact: Patient  Patient needs to reschedule her appointment from 2/15 that she was unable to make because of transportation problems.  She needs to reschedule for an extended appointment and I am unable to scheduled anything until May.  Please call to reschedule.  Call Back#[phone]  Thanks

## 2019-02-21 ENCOUNTER — TELEPHONE (OUTPATIENT)
Dept: FAMILY MEDICINE | Facility: CLINIC | Age: 84
End: 2019-02-21

## 2019-02-21 NOTE — TELEPHONE ENCOUNTER
Offered AWV appt~ pt could not schedule at this time due to transportation problems. Pt requested a call back in a few months.

## 2019-03-04 DIAGNOSIS — R52 PAIN: ICD-10-CM

## 2019-03-04 RX ORDER — HYDROCODONE BITARTRATE AND ACETAMINOPHEN 10; 325 MG/1; MG/1
1 TABLET ORAL 3 TIMES DAILY PRN
Qty: 90 TABLET | Refills: 0 | Status: SHIPPED | OUTPATIENT
Start: 2019-03-04 | End: 2019-04-03 | Stop reason: SDUPTHER

## 2019-03-04 NOTE — TELEPHONE ENCOUNTER
----- Message from Israel VALDES Frifernando sent at 3/4/2019  2:20 PM CST -----  Contact: same  Type:  RX Refill Request    Who Called:  patient  Refill or New Rx:  Refill  RX Name and Strength:  HYDROcodone-acetaminophen (NORCO)  mg per tablet  How is the patient currently taking it? (ex. 1XDay):  Take 1 tablet by mouth 3 (three) times daily as needed. - Oral  Is this a 30 day or 90 day RX:  90 tablets last filled on 2/1/19 with 0 refills  Preferred Pharmacy with phone number:    [place], [place] - [address] [address] [facility] [address]  [address] [facility] [address]  [facility] [address]  Phone: [phone] Fax: [phone]    Ordering Provider:  Steve  Best Call Back Number:  [phone]  Additional Information:  n/a

## 2019-03-08 ENCOUNTER — LAB VISIT (OUTPATIENT)
Dept: LAB | Facility: HOSPITAL | Age: 84
End: 2019-03-08
Attending: FAMILY MEDICINE
Payer: MEDICARE

## 2019-03-08 ENCOUNTER — TELEPHONE (OUTPATIENT)
Dept: FAMILY MEDICINE | Facility: CLINIC | Age: 84
End: 2019-03-08

## 2019-03-08 ENCOUNTER — OFFICE VISIT (OUTPATIENT)
Dept: FAMILY MEDICINE | Facility: CLINIC | Age: 84
End: 2019-03-08
Payer: MEDICARE

## 2019-03-08 VITALS
WEIGHT: 149.56 LBS | SYSTOLIC BLOOD PRESSURE: 116 MMHG | DIASTOLIC BLOOD PRESSURE: 70 MMHG | HEIGHT: 60 IN | TEMPERATURE: 98 F | BODY MASS INDEX: 29.36 KG/M2 | RESPIRATION RATE: 18 BRPM | HEART RATE: 68 BPM

## 2019-03-08 DIAGNOSIS — J44.9 CHRONIC OBSTRUCTIVE PULMONARY DISEASE, UNSPECIFIED COPD TYPE: ICD-10-CM

## 2019-03-08 DIAGNOSIS — R73.09 ELEVATED GLUCOSE: ICD-10-CM

## 2019-03-08 DIAGNOSIS — E78.5 DYSLIPIDEMIA: ICD-10-CM

## 2019-03-08 DIAGNOSIS — F41.9 ANXIETY: ICD-10-CM

## 2019-03-08 DIAGNOSIS — I10 ESSENTIAL HYPERTENSION: Primary | ICD-10-CM

## 2019-03-08 DIAGNOSIS — M51.36 DDD (DEGENERATIVE DISC DISEASE), LUMBAR: ICD-10-CM

## 2019-03-08 DIAGNOSIS — I27.20 PULMONARY HYPERTENSION: ICD-10-CM

## 2019-03-08 DIAGNOSIS — H04.123 DRY EYES: ICD-10-CM

## 2019-03-08 DIAGNOSIS — F11.90 CHRONIC NARCOTIC USE: ICD-10-CM

## 2019-03-08 DIAGNOSIS — I10 ESSENTIAL HYPERTENSION: ICD-10-CM

## 2019-03-08 DIAGNOSIS — F33.0 DEPRESSION, MAJOR, RECURRENT, MILD: ICD-10-CM

## 2019-03-08 LAB
ALBUMIN SERPL BCP-MCNC: 3.7 G/DL
ALP SERPL-CCNC: 130 U/L
ALT SERPL W/O P-5'-P-CCNC: 13 U/L
ANION GAP SERPL CALC-SCNC: 9 MMOL/L
AST SERPL-CCNC: 16 U/L
BASOPHILS # BLD AUTO: 0.05 K/UL
BASOPHILS NFR BLD: 0.6 %
BILIRUB SERPL-MCNC: 0.3 MG/DL
BUN SERPL-MCNC: 15 MG/DL
CALCIUM SERPL-MCNC: 10.2 MG/DL
CHLORIDE SERPL-SCNC: 98 MMOL/L
CHOLEST SERPL-MCNC: 264 MG/DL
CHOLEST/HDLC SERPL: 4.9 {RATIO}
CO2 SERPL-SCNC: 30 MMOL/L
CREAT SERPL-MCNC: 1 MG/DL
DIFFERENTIAL METHOD: ABNORMAL
EOSINOPHIL # BLD AUTO: 0.2 K/UL
EOSINOPHIL NFR BLD: 2 %
ERYTHROCYTE [DISTWIDTH] IN BLOOD BY AUTOMATED COUNT: 14.6 %
EST. GFR  (AFRICAN AMERICAN): >60 ML/MIN/1.73 M^2
EST. GFR  (NON AFRICAN AMERICAN): 52.6 ML/MIN/1.73 M^2
ESTIMATED AVG GLUCOSE: 128 MG/DL
GLUCOSE SERPL-MCNC: 113 MG/DL
HBA1C MFR BLD HPLC: 6.1 %
HCT VFR BLD AUTO: 40.6 %
HDLC SERPL-MCNC: 54 MG/DL
HDLC SERPL: 20.5 %
HGB BLD-MCNC: 12.8 G/DL
IMM GRANULOCYTES # BLD AUTO: 0.02 K/UL
IMM GRANULOCYTES NFR BLD AUTO: 0.2 %
LDLC SERPL CALC-MCNC: 170.6 MG/DL
LYMPHOCYTES # BLD AUTO: 1.8 K/UL
LYMPHOCYTES NFR BLD: 20.8 %
MCH RBC QN AUTO: 26.4 PG
MCHC RBC AUTO-ENTMCNC: 31.5 G/DL
MCV RBC AUTO: 84 FL
MONOCYTES # BLD AUTO: 0.9 K/UL
MONOCYTES NFR BLD: 10.2 %
NEUTROPHILS # BLD AUTO: 5.8 K/UL
NEUTROPHILS NFR BLD: 66.2 %
NONHDLC SERPL-MCNC: 210 MG/DL
NRBC BLD-RTO: 0 /100 WBC
PLATELET # BLD AUTO: 235 K/UL
PMV BLD AUTO: 11.6 FL
POTASSIUM SERPL-SCNC: 3.4 MMOL/L
PROT SERPL-MCNC: 7.3 G/DL
RBC # BLD AUTO: 4.84 M/UL
SODIUM SERPL-SCNC: 137 MMOL/L
TRIGL SERPL-MCNC: 197 MG/DL
TSH SERPL DL<=0.005 MIU/L-ACNC: 1.31 UIU/ML
WBC # BLD AUTO: 8.81 K/UL

## 2019-03-08 PROCEDURE — 3078F PR MOST RECENT DIASTOLIC BLOOD PRESSURE < 80 MM HG: ICD-10-PCS | Mod: HCNC,CPTII,S$GLB, | Performed by: FAMILY MEDICINE

## 2019-03-08 PROCEDURE — 84443 ASSAY THYROID STIM HORMONE: CPT | Mod: HCNC

## 2019-03-08 PROCEDURE — 1101F PT FALLS ASSESS-DOCD LE1/YR: CPT | Mod: HCNC,CPTII,S$GLB, | Performed by: FAMILY MEDICINE

## 2019-03-08 PROCEDURE — 83036 HEMOGLOBIN GLYCOSYLATED A1C: CPT | Mod: HCNC

## 2019-03-08 PROCEDURE — 99214 OFFICE O/P EST MOD 30 MIN: CPT | Mod: HCNC,S$GLB,, | Performed by: FAMILY MEDICINE

## 2019-03-08 PROCEDURE — 99214 PR OFFICE/OUTPT VISIT, EST, LEVL IV, 30-39 MIN: ICD-10-PCS | Mod: HCNC,S$GLB,, | Performed by: FAMILY MEDICINE

## 2019-03-08 PROCEDURE — 36415 COLL VENOUS BLD VENIPUNCTURE: CPT | Mod: HCNC,PN

## 2019-03-08 PROCEDURE — 85025 COMPLETE CBC W/AUTO DIFF WBC: CPT | Mod: HCNC

## 2019-03-08 PROCEDURE — 80053 COMPREHEN METABOLIC PANEL: CPT | Mod: HCNC

## 2019-03-08 PROCEDURE — 80061 LIPID PANEL: CPT | Mod: HCNC

## 2019-03-08 PROCEDURE — 99999 PR PBB SHADOW E&M-EST. PATIENT-LVL III: ICD-10-PCS | Mod: PBBFAC,HCNC,, | Performed by: FAMILY MEDICINE

## 2019-03-08 PROCEDURE — 99999 PR PBB SHADOW E&M-EST. PATIENT-LVL III: CPT | Mod: PBBFAC,HCNC,, | Performed by: FAMILY MEDICINE

## 2019-03-08 PROCEDURE — 3074F PR MOST RECENT SYSTOLIC BLOOD PRESSURE < 130 MM HG: ICD-10-PCS | Mod: HCNC,CPTII,S$GLB, | Performed by: FAMILY MEDICINE

## 2019-03-08 PROCEDURE — 3078F DIAST BP <80 MM HG: CPT | Mod: HCNC,CPTII,S$GLB, | Performed by: FAMILY MEDICINE

## 2019-03-08 PROCEDURE — 1101F PR PT FALLS ASSESS DOC 0-1 FALLS W/OUT INJ PAST YR: ICD-10-PCS | Mod: HCNC,CPTII,S$GLB, | Performed by: FAMILY MEDICINE

## 2019-03-08 PROCEDURE — 3074F SYST BP LT 130 MM HG: CPT | Mod: HCNC,CPTII,S$GLB, | Performed by: FAMILY MEDICINE

## 2019-03-08 NOTE — PROGRESS NOTES
THIS DOCUMENT WAS MADE IN PART WITH VOICE RECOGNITION SOFTWARE.  OCCASIONALLY THIS SOFTWARE WILL MISINTERPRET WORDS OR PHRASES.      Summer Inman  1935    Summer was seen today for hypertension.    Diagnoses and all orders for this visit:    Essential hypertension  -     Comprehensive metabolic panel; Future  -     Lipid panel; Future  -     CBC auto differential; Future  Chronic stable and well controlled    Chronic obstructive pulmonary disease, unspecified COPD type  Chronic no recent exacerbations    Anxiety  She feels that it is stable.    DDD (degenerative disc disease), lumbar  Chronic narcotic use  Chronic back and neck pain. She remains on hydrocodone.  Usage has been stable.  Pain is not ideally controlled but she does not wish for any intervention or referrals.  So will continue the same treatment and follow every 3 months    Depression, major, recurrent, mild  Overall this appears improved.  No worrisome or warning signs. No SI.    Pulmonary hypertension  Stable    Dyslipidemia  -     TSH; Future    Elevated glucose  -     Hemoglobin A1c; Future    Dry eyes  May try Lacri-Lube in the evening time.  I would encourage her to see Ophthalmology if not improving but she declined today I do not see any sign of active infection today for.  May use wetting drops during the daytime      Subjective     Chief Complaint   Patient presents with    Hypertension     follow up        HPI     Eyes better but still dry, cracked in middle  'allergy drops' help some  Advised lacrilube, she declined optometry    Everything else is stable as discussed above    HPI elements addressed above in the assessment and plan including problems, diagnosis, stability/instability,  improving/worsening, and chronicity will not be duplicated in this section. Any important additional HPI topics will be discussed here if needed.    Active Ambulatory Problems     Diagnosis Date Noted    Hyperlipidemia     Gastroesophageal reflux  disease     Seizure disorder     Chronic cough 03/08/2016    Anemia 03/10/2016    DDD (degenerative disc disease), lumbar 06/15/2016    Lumbosacral radiculopathy 06/15/2016    Right hip pain 06/15/2016    Coronary artery disease involving native coronary artery of native heart without angina pectoris 10/17/2016    Essential hypertension 10/17/2016    Chronic obstructive pulmonary disease 10/17/2016    History of stroke 10/17/2016    Depression, major, recurrent, mild 10/17/2016    Insomnia 10/17/2016    Pulmonary hypertension 10/17/2016    Osteopenia 10/17/2016    Dysphagia 03/08/2018    Memory loss 07/26/2018    Seizures      Resolved Ambulatory Problems     Diagnosis Date Noted    Chronic kidney disease, stage II (mild) 12/26/2012    Dysphagia 10/08/2014    Cervical radiculopathy 02/02/2015    UTI (urinary tract infection) 01/31/2016    Elevated lactic acid level 01/31/2016    Abdominal pain 01/31/2016    Cervical spondylosis with myelopathy 03/07/2016    Shortness of breath 03/08/2016    Obesity, Class I, BMI 30-34.9 03/08/2016    Hypokalemia 03/09/2016    Closed Colles' fracture of right radius 04/26/2017     Past Medical History:   Diagnosis Date    Anticoagulant long-term use     Arthritis     Asthma     Atrial premature beats     Benign neoplasm of adrenal gland     Colon polyp     COPD (chronic obstructive pulmonary disease)     Coronary artery disease     Depression     Dizziness     First degree AV block     Gastroesophageal reflux disease     General anesthetics causing adverse effect in therapeutic use     Hepatomegaly     Hyperlipidemia     Hypertension     Impaired mobility     Neck pain     Schatzki's ring     Seasonal allergies     Seizures     Stroke     Trouble in sleeping     Wears dentures     Wears glasses          Review of Systems   Constitutional: Positive for fatigue. Negative for fever and unexpected weight change.   HENT: Negative for  trouble swallowing.    Eyes: Positive for redness and itching. Negative for photophobia, pain and discharge.   Respiratory: Positive for cough, shortness of breath and wheezing (mild). Negative for choking and chest tightness.         Current symptoms are chronic and stable   Cardiovascular: Negative for chest pain and leg swelling.   Gastrointestinal: Negative for abdominal pain and blood in stool.   Endocrine: Negative for polydipsia and polyuria.   Genitourinary: Negative for dysuria and hematuria.   Musculoskeletal: Positive for arthralgias, back pain, gait problem and neck pain. Negative for neck stiffness.   Neurological: Positive for weakness. Negative for syncope.   Psychiatric/Behavioral: Negative for sleep disturbance and suicidal ideas.       Objective     Physical Exam   Constitutional: She is oriented to person, place, and time.  Non-toxic appearance. No distress.   No distress.   HENT:   Head: Normocephalic and atraumatic.   Right Ear: Tympanic membrane, external ear and ear canal normal.   Left Ear: Tympanic membrane, external ear and ear canal normal.   Nose: Nose normal.   Mouth/Throat: Oropharynx is clear and moist. No oropharyngeal exudate.   Eyes: EOM are normal. Pupils are equal, round, and reactive to light.   Mild dryness.  I do not see any purulent drainage. No conjunctival injection.     Neck: Normal range of motion. Neck supple. No thyromegaly present.   Cardiovascular: Normal rate, regular rhythm and intact distal pulses. PMI is not displaced. Exam reveals no gallop and no friction rub.   Murmur heard.  Pulmonary/Chest: Effort normal and breath sounds normal. No respiratory distress. She has no wheezes. She has no rales.   Abdominal: Soft. Bowel sounds are normal. She exhibits no distension and no mass. There is no tenderness. There is no rebound and no guarding.   Lymphadenopathy:     She has no cervical adenopathy.   Neurological: She is alert and oriented to person, place, and time. She  displays normal reflexes. No cranial nerve deficit. She exhibits normal muscle tone.   Brought back in a wheelchair but she can ambulate though gets tired and weak very easily   Skin: She is not diaphoretic.   Psychiatric: She has a normal mood and affect. Her behavior is normal.   Vitals reviewed.    Vitals:    03/08/19 1004   BP: 116/70   BP Location: Left arm   Patient Position: Sitting   BP Method: Large (Manual)   Pulse: 68   Resp: 18   Temp: 97.6 °F (36.4 °C)   TempSrc: Oral   Weight: 67.8 kg (149 lb 9.3 oz)   Height: 5' (1.524 m)       MOST RECENT LABS IN OUR ELECTRONIC MEDICAL RECORD:     Results for orders placed or performed during the hospital encounter of 10/29/18   Comprehensive metabolic panel   Result Value Ref Range    Sodium 131 (L) 136 - 145 mmol/L    Potassium 3.2 (L) 3.5 - 5.1 mmol/L    Chloride 91 (L) 95 - 110 mmol/L    CO2 32 (H) 22 - 31 mmol/L    Glucose 118 (H) 70 - 110 mg/dL    BUN, Bld 27 (H) 7 - 18 mg/dL    Creatinine 1.11 0.50 - 1.40 mg/dL    Calcium 8.9 8.4 - 10.2 mg/dL    Total Protein 7.2 6.0 - 8.4 g/dL    Albumin 4.0 3.5 - 5.2 g/dL    Total Bilirubin 0.5 0.2 - 1.3 mg/dL    Alkaline Phosphatase 116 38 - 145 U/L    AST 21 14 - 36 U/L    ALT 16 10 - 44 U/L    Anion Gap 8 8 - 16 mmol/L    eGFR if African American 54 (A) >60 mL/min/1.73 m^2    eGFR if non African American 47 (A) >60 mL/min/1.73 m^2   CBC auto differential   Result Value Ref Range    WBC 9.01 3.90 - 12.70 K/uL    RBC 4.04 4.00 - 5.40 M/uL    Hemoglobin 11.0 (L) 12.0 - 16.0 g/dL    Hematocrit 33.0 (L) 37.0 - 48.5 %    MCV 82 82 - 98 fL    MCH 27.2 27.0 - 31.0 pg    MCHC 33.3 32.0 - 36.0 g/dL    RDW 15.1 (H) 11.5 - 14.5 %    Platelets 233 150 - 350 K/uL    MPV 10.8 9.2 - 12.9 fL    Gran # (ANC) 6.9 1.8 - 7.7 K/uL    Lymph # 1.1 1.0 - 4.8 K/uL    Mono # 0.8 0.3 - 1.0 K/uL    Eos # 0.1 0.0 - 0.5 K/uL    Baso # 0.04 0.00 - 0.20 K/uL    nRBC 0 0 /100 WBC    Gran% 77.1 (H) 38.0 - 73.0 %    Lymph% 12.5 (L) 18.0 - 48.0 %    Mono%  8.7 4.0 - 15.0 %    Eosinophil% 1.3 0.0 - 8.0 %    Basophil% 0.4 0.0 - 1.9 %    Differential Method Automated

## 2019-03-09 NOTE — TELEPHONE ENCOUNTER
Called patient she is going to restart her pravastatin. She stated that she has some and will start taking the medication. Also advised her to add some extra potassium to her diet and she stated that she would add some extra bananas and she has a table full of them. She has appointment to see the doctor in 3 months.

## 2019-03-15 ENCOUNTER — TELEPHONE (OUTPATIENT)
Dept: FAMILY MEDICINE | Facility: CLINIC | Age: 84
End: 2019-03-15

## 2019-03-15 RX ORDER — POTASSIUM CHLORIDE 750 MG/1
10 TABLET, EXTENDED RELEASE ORAL DAILY
Qty: 90 TABLET | Refills: 1 | Status: SHIPPED | OUTPATIENT
Start: 2019-03-15 | End: 2019-09-18 | Stop reason: SDUPTHER

## 2019-03-15 RX ORDER — POTASSIUM CHLORIDE 750 MG/1
10 TABLET, EXTENDED RELEASE ORAL ONCE
COMMUNITY
End: 2019-03-15 | Stop reason: SDUPTHER

## 2019-03-15 RX ORDER — PRAVASTATIN SODIUM 40 MG/1
40 TABLET ORAL DAILY
Qty: 90 TABLET | Refills: 3 | Status: SHIPPED | OUTPATIENT
Start: 2019-03-15 | End: 2019-11-11 | Stop reason: SDUPTHER

## 2019-03-15 NOTE — TELEPHONE ENCOUNTER
Pt of Ludwig Wilson MD,    Pt states she needs a refill of her cholestoral medication and her K+. Medication for K+ was d/c on 11-, please advise if you wanted to continue medication. See below for original script.    potassium chloride SA (K-DUR,KLOR-CON) 10 MEQ tablet (Discontinued)

## 2019-03-15 NOTE — TELEPHONE ENCOUNTER
----- Message from Quyen Luis sent at 3/15/2019 11:30 AM CDT -----  Contact: Patient  Type:  RX Refill Request    Who Called:  Patient   Refill or New Rx:  Refills  RX Name and Strength:    Potassium  Cholesterol   How is the patient currently taking it? (ex. 1XDay):    Is this a 30 day or 90 day RX:  90  Preferred Pharmacy with phone number:    [facility] - [place], [place] - [address]  [address] [facility] [address]  Phone: [phone] Fax: [phone]  Local or Mail Order:  Mail  Ordering Provider:  Steve Flores Call Back Number:   [phone]  Additional Information:

## 2019-03-15 NOTE — TELEPHONE ENCOUNTER
Medication for K+ was d/c on 11-, please advise if you wanted to continue medication. See below for original script.     potassium chloride SA (K-DUR,KLOR-CON) 10 MEQ tablet (Discontinued)

## 2019-04-03 ENCOUNTER — TELEPHONE (OUTPATIENT)
Dept: FAMILY MEDICINE | Facility: CLINIC | Age: 84
End: 2019-04-03

## 2019-04-03 DIAGNOSIS — R52 PAIN: ICD-10-CM

## 2019-04-03 RX ORDER — HYDROCODONE BITARTRATE AND ACETAMINOPHEN 10; 325 MG/1; MG/1
TABLET ORAL
Qty: 90 TABLET | Refills: 0 | Status: SHIPPED | OUTPATIENT
Start: 2019-04-03 | End: 2019-05-02 | Stop reason: SDUPTHER

## 2019-04-03 NOTE — TELEPHONE ENCOUNTER
----- Message from Alan Durand sent at 4/3/2019 10:58 AM CDT -----  Type:  Patient Call Back    Who Called:pt     Does the patient know what this is regarding?: refill HYDROcodone-acetaminophen (NORCO)  mg per tablet     [facility] [address] [address] [facility] [facility] [address] [address] [address] [facility] [address]  [facility] [address]  Phone: [phone] Fax: [phone]    Best Call Back Number: [phone]  Additional Information:  Please call pt and leave a detailed message if there is no answer.

## 2019-05-02 DIAGNOSIS — R52 PAIN: ICD-10-CM

## 2019-05-02 RX ORDER — HYDROCODONE BITARTRATE AND ACETAMINOPHEN 10; 325 MG/1; MG/1
1 TABLET ORAL 3 TIMES DAILY PRN
Qty: 90 TABLET | Refills: 0 | Status: SHIPPED | OUTPATIENT
Start: 2019-05-02 | End: 2019-06-04 | Stop reason: SDUPTHER

## 2019-05-02 NOTE — TELEPHONE ENCOUNTER
----- Message from Alan Durand sent at 5/2/2019  3:53 PM CDT -----  Type:  Patient Call Back    Who Called:  pt  Does the patient know what this is regarding?:refill of HYDROcodone-acetaminophen (NORCO)  mg per tablet   [facility] [address] [facility] [address]  [address] [facility] [address]  [place] [address]  Phone: [phone] Fax: [phone]  Best Call Back Number:  [phone]  Additional Information:  Please call pt and leave a detailed message if there is no answer.

## 2019-05-02 NOTE — TELEPHONE ENCOUNTER
Pt Of Ludwig Wilson MD    Last seen on: 3/8/19    Next appt: 6/7/19    Last refill: 4/3/19    Allergies:   Review of patient's allergies indicates:   Allergen Reactions    Bleach (sodium hypochlorite) Nausea And Vomiting, Palpitations and Shortness Of Breath    Codeine Shortness Of Breath     Tongue swelled    Nifedipine Anaphylaxis    Ace inhibitors      Other reaction(s): Angioedema    Ketorolac      Other reaction(s): tongue swell    Milk      Other reaction(s): Stomach upset    Quinolones Other (See Comments)     Lowers seizure threshold.    Tramadol      Other reaction(s): seizure       Pharmacy:   [facility] - [place] - [address] [address]  [address]  [place] [address]  Phone: [phone] Fax: [phone]    Please review! Thank you!

## 2019-05-05 ENCOUNTER — TELEPHONE (OUTPATIENT)
Dept: GASTROENTEROLOGY | Facility: CLINIC | Age: 84
End: 2019-05-05

## 2019-05-05 DIAGNOSIS — K21.9 GASTROESOPHAGEAL REFLUX DISEASE WITHOUT ESOPHAGITIS: ICD-10-CM

## 2019-05-06 RX ORDER — LAMOTRIGINE 100 MG/1
TABLET ORAL
Qty: 450 TABLET | Refills: 3 | Status: SHIPPED | OUTPATIENT
Start: 2019-05-06 | End: 2021-01-25 | Stop reason: SDUPTHER

## 2019-05-06 RX ORDER — HYDROCHLOROTHIAZIDE 25 MG/1
TABLET ORAL
Qty: 45 TABLET | Refills: 1 | Status: SHIPPED | OUTPATIENT
Start: 2019-05-06 | End: 2019-11-11 | Stop reason: SDUPTHER

## 2019-05-06 RX ORDER — OMEPRAZOLE 40 MG/1
40 CAPSULE, DELAYED RELEASE ORAL DAILY
Qty: 90 CAPSULE | Refills: 1 | Status: SHIPPED | OUTPATIENT
Start: 2019-05-06 | End: 2019-07-18 | Stop reason: SDUPTHER

## 2019-05-06 RX ORDER — DONEPEZIL HYDROCHLORIDE 10 MG/1
10 TABLET, FILM COATED ORAL EVERY MORNING
Qty: 90 TABLET | Refills: 3 | Status: SHIPPED | OUTPATIENT
Start: 2019-05-06 | End: 2021-01-25 | Stop reason: SDUPTHER

## 2019-05-06 RX ORDER — GABAPENTIN 300 MG/1
CAPSULE ORAL
Qty: 270 CAPSULE | Refills: 1 | OUTPATIENT
Start: 2019-05-06

## 2019-05-07 NOTE — TELEPHONE ENCOUNTER
Please inform the patient that I refilled the prescription for zantac and she is due for a follow-up visit (last seen a year ago) for continued evaluation and management.  Thanks  ADAM

## 2019-05-21 ENCOUNTER — TELEPHONE (OUTPATIENT)
Dept: GASTROENTEROLOGY | Facility: CLINIC | Age: 84
End: 2019-05-21

## 2019-05-21 ENCOUNTER — TELEPHONE (OUTPATIENT)
Dept: PAIN MEDICINE | Facility: CLINIC | Age: 84
End: 2019-05-21

## 2019-05-21 NOTE — TELEPHONE ENCOUNTER
Left voice message that we received a refill request on Gabapentin. Last office visit was in 2016. Need to verify provider filling medication. She has a follow up with Dr. Wilson in June.

## 2019-05-21 NOTE — TELEPHONE ENCOUNTER
Patient is overdue for follow-up appointment, last seen in 2/2018. Patient is scheduled for follow-up on 5/27/19. Medication refill sent on 5/5/19 for 90 day supply. I will refill at time of follow-up appointment.  Thanks  ADAM

## 2019-05-23 DIAGNOSIS — K21.9 GASTROESOPHAGEAL REFLUX DISEASE WITHOUT ESOPHAGITIS: ICD-10-CM

## 2019-05-31 ENCOUNTER — TELEPHONE (OUTPATIENT)
Dept: FAMILY MEDICINE | Facility: CLINIC | Age: 84
End: 2019-05-31

## 2019-05-31 NOTE — TELEPHONE ENCOUNTER
----- Message from Afua Laughlin sent at 5/31/2019  8:15 AM CDT -----  Contact: self   Patient is requesting her annual wellness visit appointment be rescheduled to 6/7. Please call back at [phone].

## 2019-06-04 DIAGNOSIS — R52 PAIN: ICD-10-CM

## 2019-06-04 RX ORDER — HYDROCODONE BITARTRATE AND ACETAMINOPHEN 10; 325 MG/1; MG/1
1 TABLET ORAL 3 TIMES DAILY PRN
Qty: 90 TABLET | Refills: 0 | Status: SHIPPED | OUTPATIENT
Start: 2019-06-04 | End: 2019-07-01 | Stop reason: SDUPTHER

## 2019-06-04 NOTE — TELEPHONE ENCOUNTER
----- Message from Mandi Lott sent at 6/4/2019  8:10 AM CDT -----  Contact: Summer klein  Type:  RX Refill Request    Who Called:  Summer  Refill or New Rx:  refill  RX Name and Strength:  HYDROcodone-acetaminophen (NORCO)  mg per tablet  How is the patient currently taking it? (ex. 1XDay):  As needed  Is this a 30 day or 90 day RX:  30  Preferred Pharmacy with phone number:    [place], [place] - [address] [facility] [address]  [address] [facility] [address]  [facility] [address]  Phone: [phone] Fax: [phone]    Local or Mail Order:  local  Ordering Provider:  Steve  Best Call Back Number:  [phone]  Additional Information:

## 2019-06-07 ENCOUNTER — OFFICE VISIT (OUTPATIENT)
Dept: FAMILY MEDICINE | Facility: CLINIC | Age: 84
End: 2019-06-07
Payer: MEDICARE

## 2019-06-07 ENCOUNTER — HOSPITAL ENCOUNTER (OUTPATIENT)
Dept: RADIOLOGY | Facility: HOSPITAL | Age: 84
Discharge: HOME | End: 2019-06-07
Attending: FAMILY MEDICINE
Payer: MEDICARE

## 2019-06-07 VITALS
SYSTOLIC BLOOD PRESSURE: 102 MMHG | DIASTOLIC BLOOD PRESSURE: 74 MMHG | OXYGEN SATURATION: 97 % | HEIGHT: 60 IN | BODY MASS INDEX: 29.36 KG/M2 | HEART RATE: 82 BPM | WEIGHT: 149.56 LBS

## 2019-06-07 DIAGNOSIS — F41.9 ANXIETY: ICD-10-CM

## 2019-06-07 DIAGNOSIS — I10 ESSENTIAL HYPERTENSION: ICD-10-CM

## 2019-06-07 DIAGNOSIS — Z12.39 BREAST CANCER SCREENING: ICD-10-CM

## 2019-06-07 DIAGNOSIS — G40.909 SEIZURE DISORDER: ICD-10-CM

## 2019-06-07 DIAGNOSIS — M54.17 LUMBOSACRAL RADICULOPATHY: ICD-10-CM

## 2019-06-07 DIAGNOSIS — I70.0 ATHEROSCLEROSIS OF AORTA: ICD-10-CM

## 2019-06-07 DIAGNOSIS — M51.36 DDD (DEGENERATIVE DISC DISEASE), LUMBAR: Primary | ICD-10-CM

## 2019-06-07 DIAGNOSIS — R73.09 ELEVATED GLUCOSE: ICD-10-CM

## 2019-06-07 DIAGNOSIS — F11.20 CHRONIC NARCOTIC DEPENDENCE: ICD-10-CM

## 2019-06-07 DIAGNOSIS — M51.36 DDD (DEGENERATIVE DISC DISEASE), LUMBAR: ICD-10-CM

## 2019-06-07 DIAGNOSIS — J44.9 CHRONIC OBSTRUCTIVE PULMONARY DISEASE, UNSPECIFIED COPD TYPE: ICD-10-CM

## 2019-06-07 PROCEDURE — 72100 XR LUMBAR SPINE AP AND LATERAL: ICD-10-PCS | Mod: 26,HCNC,, | Performed by: RADIOLOGY

## 2019-06-07 PROCEDURE — 1101F PR PT FALLS ASSESS DOC 0-1 FALLS W/OUT INJ PAST YR: ICD-10-PCS | Mod: HCNC,CPTII,S$GLB, | Performed by: FAMILY MEDICINE

## 2019-06-07 PROCEDURE — 99215 OFFICE O/P EST HI 40 MIN: CPT | Mod: HCNC,S$GLB,, | Performed by: FAMILY MEDICINE

## 2019-06-07 PROCEDURE — 99215 PR OFFICE/OUTPT VISIT, EST, LEVL V, 40-54 MIN: ICD-10-PCS | Mod: HCNC,S$GLB,, | Performed by: FAMILY MEDICINE

## 2019-06-07 PROCEDURE — 72100 X-RAY EXAM L-S SPINE 2/3 VWS: CPT | Mod: 26,HCNC,, | Performed by: RADIOLOGY

## 2019-06-07 PROCEDURE — 3074F SYST BP LT 130 MM HG: CPT | Mod: HCNC,CPTII,S$GLB, | Performed by: FAMILY MEDICINE

## 2019-06-07 PROCEDURE — 99999 PR PBB SHADOW E&M-EST. PATIENT-LVL IV: ICD-10-PCS | Mod: PBBFAC,HCNC,, | Performed by: FAMILY MEDICINE

## 2019-06-07 PROCEDURE — 3078F DIAST BP <80 MM HG: CPT | Mod: HCNC,CPTII,S$GLB, | Performed by: FAMILY MEDICINE

## 2019-06-07 PROCEDURE — 72100 X-RAY EXAM L-S SPINE 2/3 VWS: CPT | Mod: TC,HCNC,PN

## 2019-06-07 PROCEDURE — 3074F PR MOST RECENT SYSTOLIC BLOOD PRESSURE < 130 MM HG: ICD-10-PCS | Mod: HCNC,CPTII,S$GLB, | Performed by: FAMILY MEDICINE

## 2019-06-07 PROCEDURE — 1101F PT FALLS ASSESS-DOCD LE1/YR: CPT | Mod: HCNC,CPTII,S$GLB, | Performed by: FAMILY MEDICINE

## 2019-06-07 PROCEDURE — 3078F PR MOST RECENT DIASTOLIC BLOOD PRESSURE < 80 MM HG: ICD-10-PCS | Mod: HCNC,CPTII,S$GLB, | Performed by: FAMILY MEDICINE

## 2019-06-07 PROCEDURE — 99999 PR PBB SHADOW E&M-EST. PATIENT-LVL IV: CPT | Mod: PBBFAC,HCNC,, | Performed by: FAMILY MEDICINE

## 2019-06-07 RX ORDER — HYDROCHLOROTHIAZIDE 25 MG/1
12.5 TABLET ORAL DAILY
Qty: 45 TABLET | Refills: 1 | Status: CANCELLED | OUTPATIENT
Start: 2019-06-07

## 2019-06-07 RX ORDER — HYDROXYZINE HYDROCHLORIDE 25 MG/1
25 TABLET, FILM COATED ORAL 2 TIMES DAILY PRN
COMMUNITY
End: 2019-09-11

## 2019-06-07 RX ORDER — HYDROCHLOROTHIAZIDE 25 MG/1
12.5 TABLET ORAL DAILY
COMMUNITY
End: 2019-06-07 | Stop reason: SDUPTHER

## 2019-06-07 RX ORDER — HYDROXYZINE PAMOATE 25 MG/1
25 CAPSULE ORAL 2 TIMES DAILY PRN
Qty: 30 CAPSULE | Refills: 0 | Status: CANCELLED | OUTPATIENT
Start: 2019-06-07

## 2019-06-07 RX ORDER — GABAPENTIN 300 MG/1
300 CAPSULE ORAL 3 TIMES DAILY
Qty: 270 CAPSULE | Refills: 1 | Status: SHIPPED | OUTPATIENT
Start: 2019-06-07 | End: 2019-11-11 | Stop reason: SDUPTHER

## 2019-06-07 RX ORDER — HYDROXYZINE HYDROCHLORIDE 25 MG/1
25 TABLET, FILM COATED ORAL 2 TIMES DAILY PRN
Qty: 60 TABLET | Refills: 0 | Status: CANCELLED | OUTPATIENT
Start: 2019-06-07

## 2019-06-07 RX ORDER — TIZANIDINE 2 MG/1
TABLET ORAL
Qty: 30 TABLET | Refills: 1 | Status: SHIPPED | OUTPATIENT
Start: 2019-06-07 | End: 2020-05-06 | Stop reason: SDUPTHER

## 2019-06-07 RX ORDER — HYDROCHLOROTHIAZIDE 25 MG/1
12.5 TABLET ORAL DAILY
Qty: 15 TABLET | Refills: 0 | Status: CANCELLED | OUTPATIENT
Start: 2019-06-07

## 2019-06-07 NOTE — PROGRESS NOTES
THIS DOCUMENT WAS MADE IN PART WITH VOICE RECOGNITION SOFTWARE.  OCCASIONALLY THIS SOFTWARE WILL MISINTERPRET WORDS OR PHRASES.      Summer Inman  1935    Summer was seen today for follow-up.    Diagnoses and all orders for this visit:    DDD (degenerative disc disease), lumbar  -     gabapentin (NEURONTIN) 300 MG capsule; Take 1 capsule (300 mg total) by mouth 3 (three) times daily.  -     X-Ray Lumbar Spine AP And Lateral; Future  -     tiZANidine (ZANAFLEX) 2 MG tablet; Take 1-2 po TID prn muscle spasm  Worsening will restart a muscle relaxant. An x-ray to rule out a compression fracture.   I will not be increasing the short acting narcotic and would encourage her to follow back with pain management of symptoms are not well-controlled.    Chronic narcotic dependence   Stable but no increase. Discuss that this is not considered the best treatment long-term but she is not in the mindset to discuss other changes now    Essential hypertension  This chronic condition, condition has been reviewed and discussed, it is currently stable.    Anxiety   mil worsening related to pain.    Lumbosacral radiculopathy  -     gabapentin (NEURONTIN) 300 MG capsule; Take 1 capsule (300 mg total) by mouth 3 (three) times daily.  -     X-Ray Lumbar Spine AP And Lateral; Future    Atherosclerosis of aorta   Continue pravastin    Seizure disorder  Stable    Breast cancer screening  -     Mammo Digital Screening Bilateral With CAD; Future    Elevated glucose   reviewed labs and symptoms. She denies polydipsia or polyuria. No symptoms to suggest hypoglycemia    Chronic obstructive pulmonary disease, unspecified COPD type   stable    Other orders  -     Cancel: hydroCHLOROthiazide (HYDRODIURIL) 25 MG tablet; Take 0.5 tablets (12.5 mg total) by mouth once daily.  -     Cancel: hydroCHLOROthiazide (HYDRODIURIL) 25 MG tablet; Take 0.5 tablets (12.5 mg total) by mouth once daily.  -     Cancel: hydrOXYzine pamoate (VISTARIL) 25 MG Cap;  Take 1 capsule (25 mg total) by mouth 2 (two) times daily as needed (for anxiety).  -     Cancel: hydrOXYzine HCl (ATARAX) 25 MG tablet; Take 1 tablet (25 mg total) by mouth 2 (two) times daily as needed.    Total time greater than 45 min, including chart review, lab reviewed, med reconcilliation., more than 50% face-to-face counseling on a variety of topics      Subjective     Chief Complaint   Patient presents with    Follow-up       HPI   she's here followed with multiple chronic conditions that have been reviewed and assessed as outlined above, but her primary concern was worsening back pain no longer adequately control the pain medication    Back pain, worsening, falls but states pain not related to a specific fall.      Muscles tight, spasm, also mid back and cervical    'always off balance',     HPI elements addressed above in the assessment and plan including problems, diagnosis, stability/instability,  improving/worsening, and chronicity will not be duplicated in this section. Any important additional HPI topics will be discussed here if needed.    Active Ambulatory Problems     Diagnosis Date Noted    Hyperlipidemia     Gastroesophageal reflux disease     Seizure disorder     Chronic cough 03/08/2016    Anemia 03/10/2016    DDD (degenerative disc disease), lumbar 06/15/2016    Lumbosacral radiculopathy 06/15/2016    Right hip pain 06/15/2016    Coronary artery disease involving native coronary artery of native heart without angina pectoris 10/17/2016    Essential hypertension 10/17/2016    Chronic obstructive pulmonary disease 10/17/2016    History of stroke 10/17/2016    Depression, major, recurrent, mild 10/17/2016    Insomnia 10/17/2016    Pulmonary hypertension 10/17/2016    Osteopenia 10/17/2016    Dysphagia 03/08/2018    Memory loss 07/26/2018    Seizures     Chronic narcotic dependence 06/07/2019    Atherosclerosis of aorta 06/07/2019     Resolved Ambulatory Problems      Diagnosis Date Noted    Chronic kidney disease, stage II (mild) 12/26/2012    Dysphagia 10/08/2014    Cervical radiculopathy 02/02/2015    UTI (urinary tract infection) 01/31/2016    Elevated lactic acid level 01/31/2016    Abdominal pain 01/31/2016    Cervical spondylosis with myelopathy 03/07/2016    Shortness of breath 03/08/2016    Obesity, Class I, BMI 30-34.9 03/08/2016    Hypokalemia 03/09/2016    Closed Colles' fracture of right radius 04/26/2017     Past Medical History:   Diagnosis Date    Anticoagulant long-term use     Arthritis     Asthma     Atrial premature beats     Benign neoplasm of adrenal gland     Colon polyp     COPD (chronic obstructive pulmonary disease)     Coronary artery disease     Depression     Dizziness     First degree AV block     Gastroesophageal reflux disease     General anesthetics causing adverse effect in therapeutic use     Hepatomegaly     Hyperlipidemia     Hypertension     Impaired mobility     Neck pain     Schatzki's ring     Seasonal allergies     Seizures     Stroke     Trouble in sleeping     Wears dentures     Wears glasses          Review of Systems   Constitutional: Positive for activity change and fatigue. Negative for unexpected weight change.   HENT: Negative for trouble swallowing.    Respiratory: Positive for shortness of breath. Negative for wheezing.    Cardiovascular: Negative for chest pain and leg swelling.   Gastrointestinal: Positive for constipation.   Endocrine: Negative for polydipsia and polyuria.   Genitourinary: Negative for difficulty urinating, dysuria and hematuria.   Musculoskeletal: Positive for arthralgias, back pain and gait problem.       Objective     Physical Exam   Constitutional: She is oriented to person, place, and time. She appears well-developed and well-nourished. No distress.   Elderly  female well-groomed.   HENT:   Head: Normocephalic and atraumatic.   Eyes: No scleral icterus.    Cardiovascular: Normal rate and regular rhythm.   Murmur heard.  Pulmonary/Chest: Effort normal. No respiratory distress. She has no wheezes. She has no rales.   Musculoskeletal:   Examination of the lower back reveals that she complains of tenderness with light palpation diffusely but there does not appear to be any severe tenderness with palpation over the processes.   Neurological: She is alert and oriented to person, place, and time.   She's the wheelchair, she can transfer and walk with assistance   Skin: She is not diaphoretic.   Psychiatric: She has a normal mood and affect. Her behavior is normal.   Vitals reviewed.    Vitals:    06/07/19 1407   BP: 102/74   Pulse: 82   SpO2: 97%   Weight: 67.8 kg (149 lb 9.3 oz)   Height: 5' (1.524 m)       MOST RECENT LABS IN OUR ELECTRONIC MEDICAL RECORD:     Results for orders placed or performed in visit on 03/08/19   Comprehensive metabolic panel   Result Value Ref Range    Sodium 137 136 - 145 mmol/L    Potassium 3.4 (L) 3.5 - 5.1 mmol/L    Chloride 98 95 - 110 mmol/L    CO2 30 (H) 23 - 29 mmol/L    Glucose 113 (H) 70 - 110 mg/dL    BUN, Bld 15 8 - 23 mg/dL    Creatinine 1.0 0.5 - 1.4 mg/dL    Calcium 10.2 8.7 - 10.5 mg/dL    Total Protein 7.3 6.0 - 8.4 g/dL    Albumin 3.7 3.5 - 5.2 g/dL    Total Bilirubin 0.3 0.1 - 1.0 mg/dL    Alkaline Phosphatase 130 55 - 135 U/L    AST 16 10 - 40 U/L    ALT 13 10 - 44 U/L    Anion Gap 9 8 - 16 mmol/L    eGFR if African American >60.0 >60 mL/min/1.73 m^2    eGFR if non  52.6 (A) >60 mL/min/1.73 m^2   TSH   Result Value Ref Range    TSH 1.315 0.400 - 4.000 uIU/mL   Lipid panel   Result Value Ref Range    Cholesterol 264 (H) 120 - 199 mg/dL    Triglycerides 197 (H) 30 - 150 mg/dL    HDL 54 40 - 75 mg/dL    LDL Cholesterol 170.6 (H) 63.0 - 159.0 mg/dL    Hdl/Cholesterol Ratio 20.5 20.0 - 50.0 %    Total Cholesterol/HDL Ratio 4.9 2.0 - 5.0    Non-HDL Cholesterol 210 mg/dL   Hemoglobin A1c   Result Value Ref Range     Hemoglobin A1C 6.1 (H) 4.0 - 5.6 %    Estimated Avg Glucose 128 68 - 131 mg/dL   CBC auto differential   Result Value Ref Range    WBC 8.81 3.90 - 12.70 K/uL    RBC 4.84 4.00 - 5.40 M/uL    Hemoglobin 12.8 12.0 - 16.0 g/dL    Hematocrit 40.6 37.0 - 48.5 %    Mean Corpuscular Volume 84 82 - 98 fL    Mean Corpuscular Hemoglobin 26.4 (L) 27.0 - 31.0 pg    Mean Corpuscular Hemoglobin Conc 31.5 (L) 32.0 - 36.0 g/dL    RDW 14.6 (H) 11.5 - 14.5 %    Platelets 235 150 - 350 K/uL    MPV 11.6 9.2 - 12.9 fL    Immature Granulocytes 0.2 0.0 - 0.5 %    Gran # (ANC) 5.8 1.8 - 7.7 K/uL    Immature Grans (Abs) 0.02 0.00 - 0.04 K/uL    Lymph # 1.8 1.0 - 4.8 K/uL    Mono # 0.9 0.3 - 1.0 K/uL    Eos # 0.2 0.0 - 0.5 K/uL    Baso # 0.05 0.00 - 0.20 K/uL    nRBC 0 0 /100 WBC    Gran% 66.2 38.0 - 73.0 %    Lymph% 20.8 18.0 - 48.0 %    Mono% 10.2 4.0 - 15.0 %    Eosinophil% 2.0 0.0 - 8.0 %    Basophil% 0.6 0.0 - 1.9 %    Differential Method Automated

## 2019-06-17 ENCOUNTER — OFFICE VISIT (OUTPATIENT)
Dept: FAMILY MEDICINE | Facility: CLINIC | Age: 84
End: 2019-06-17
Payer: MEDICARE

## 2019-06-17 VITALS
DIASTOLIC BLOOD PRESSURE: 60 MMHG | WEIGHT: 151 LBS | SYSTOLIC BLOOD PRESSURE: 116 MMHG | BODY MASS INDEX: 29.64 KG/M2 | OXYGEN SATURATION: 97 % | HEIGHT: 60 IN | HEART RATE: 81 BPM

## 2019-06-17 DIAGNOSIS — I25.10 CORONARY ARTERY DISEASE INVOLVING NATIVE CORONARY ARTERY OF NATIVE HEART WITHOUT ANGINA PECTORIS: ICD-10-CM

## 2019-06-17 DIAGNOSIS — Z00.00 ENCOUNTER FOR PREVENTIVE HEALTH EXAMINATION: Primary | ICD-10-CM

## 2019-06-17 DIAGNOSIS — F11.20 CHRONIC NARCOTIC DEPENDENCE: ICD-10-CM

## 2019-06-17 DIAGNOSIS — R56.9 SEIZURES: ICD-10-CM

## 2019-06-17 DIAGNOSIS — I10 ESSENTIAL HYPERTENSION: ICD-10-CM

## 2019-06-17 DIAGNOSIS — J44.9 CHRONIC OBSTRUCTIVE PULMONARY DISEASE, UNSPECIFIED COPD TYPE: ICD-10-CM

## 2019-06-17 DIAGNOSIS — I27.20 PULMONARY HYPERTENSION: ICD-10-CM

## 2019-06-17 DIAGNOSIS — N18.30 CHRONIC KIDNEY DISEASE, STAGE III (MODERATE): ICD-10-CM

## 2019-06-17 DIAGNOSIS — E78.5 HYPERLIPIDEMIA, UNSPECIFIED HYPERLIPIDEMIA TYPE: ICD-10-CM

## 2019-06-17 DIAGNOSIS — M51.36 DDD (DEGENERATIVE DISC DISEASE), LUMBAR: ICD-10-CM

## 2019-06-17 DIAGNOSIS — G40.909 SEIZURE DISORDER: ICD-10-CM

## 2019-06-17 DIAGNOSIS — F33.0 DEPRESSION, MAJOR, RECURRENT, MILD: ICD-10-CM

## 2019-06-17 DIAGNOSIS — R41.3 MEMORY LOSS: ICD-10-CM

## 2019-06-17 DIAGNOSIS — I70.0 ATHEROSCLEROSIS OF AORTA: ICD-10-CM

## 2019-06-17 PROCEDURE — G0439 PR MEDICARE ANNUAL WELLNESS SUBSEQUENT VISIT: ICD-10-PCS | Mod: HCNC,S$GLB,, | Performed by: NURSE PRACTITIONER

## 2019-06-17 PROCEDURE — 99499 RISK ADDL DX/OHS AUDIT: ICD-10-PCS | Mod: HCNC,S$GLB,, | Performed by: NURSE PRACTITIONER

## 2019-06-17 PROCEDURE — 3074F PR MOST RECENT SYSTOLIC BLOOD PRESSURE < 130 MM HG: ICD-10-PCS | Mod: HCNC,CPTII,S$GLB, | Performed by: NURSE PRACTITIONER

## 2019-06-17 PROCEDURE — 3078F PR MOST RECENT DIASTOLIC BLOOD PRESSURE < 80 MM HG: ICD-10-PCS | Mod: HCNC,CPTII,S$GLB, | Performed by: NURSE PRACTITIONER

## 2019-06-17 PROCEDURE — G0439 PPPS, SUBSEQ VISIT: HCPCS | Mod: HCNC,S$GLB,, | Performed by: NURSE PRACTITIONER

## 2019-06-17 PROCEDURE — 99499 UNLISTED E&M SERVICE: CPT | Mod: HCNC,S$GLB,, | Performed by: NURSE PRACTITIONER

## 2019-06-17 PROCEDURE — 99999 PR PBB SHADOW E&M-EST. PATIENT-LVL V: ICD-10-PCS | Mod: PBBFAC,HCNC,, | Performed by: NURSE PRACTITIONER

## 2019-06-17 PROCEDURE — 99999 PR PBB SHADOW E&M-EST. PATIENT-LVL V: CPT | Mod: PBBFAC,HCNC,, | Performed by: NURSE PRACTITIONER

## 2019-06-17 PROCEDURE — 3078F DIAST BP <80 MM HG: CPT | Mod: HCNC,CPTII,S$GLB, | Performed by: NURSE PRACTITIONER

## 2019-06-17 PROCEDURE — 3074F SYST BP LT 130 MM HG: CPT | Mod: HCNC,CPTII,S$GLB, | Performed by: NURSE PRACTITIONER

## 2019-06-17 NOTE — PATIENT INSTRUCTIONS
Counseling and Referral of Other Preventative  (Italic type indicates deductible and co-insurance are waived)    Patient Name: Summer Inman  Today's Date: 6/17/2019    Health Maintenance       Date Due Completion Date    Sign Pain Contract 11/30/1953 ---    Complete Opioid Risk Tool 11/30/1954 ---    Aspirin/Antiplatelet Therapy 11/30/1954 ---    Shingles Vaccine (1 of 2) 11/30/1986 ---    Influenza Vaccine 08/01/2019 12/4/2017    Lipid Panel 03/08/2020 3/8/2019    DEXA SCAN 06/08/2020 6/8/2017    TETANUS VACCINE 08/25/2024 8/25/2014        No orders of the defined types were placed in this encounter.    The following information is provided to all patients.  This information is to help you find resources for any of the problems found today that may be affecting your health:                Living healthy guide: www.[facility].gov      Understanding Diabetes: www.diabetes.org      Eating healthy: www.cdc.gov/healthyweight      [facility] home safety checklist: www.cdc.gov/steadi/patient.html      Agency on Aging: www.goea.[place].[facility]      Alcoholics anonymous (AA): www.aa.org      Physical Activity: www.carlo.nih.gov/je6nzhk      Tobacco use: www.quitwithusla.org

## 2019-06-17 NOTE — PROGRESS NOTES
Summer Inman presented for a  Medicare AWV and comprehensive Health Risk Assessment today. The following components were reviewed and updated:    · Medical history  · Family History  · Social history  · Allergies and Current Medications  · Health Risk Assessment  · Health Maintenance  · Care Team     ** See Completed Assessments for Annual Wellness Visit within the encounter summary.**     The following assessments were completed:  · Living Situation  · CAGE  · Depression Screening  · Timed Get Up and Go  · Whisper Test  · Cognitive Function Screening  Followed by neurology (Korutney Inman MD) for memory loss  · Nutrition Screening  · ADL Screening  · PAQ Screening    Vitals:    06/17/19 1359   BP: 116/60   BP Location: Left arm   Patient Position: Sitting   BP Method: Medium (Manual)   Pulse: 81   SpO2: 97%   Weight: 68.5 kg (151 lb 0.2 oz)   Height: 5' (1.524 m)     Body mass index is 29.49 kg/m².  Physical Exam   Constitutional: She is oriented to person, place, and time. She appears well-nourished.   Cardiovascular: Normal rate, regular rhythm, normal heart sounds and intact distal pulses.   Pulmonary/Chest: Effort normal and breath sounds normal.   Neurological: She is alert and oriented to person, place, and time.   Skin: Skin is warm.   Vitals reviewed.      Diagnoses and health risks identified today and associated recommendations/orders:    1. Encounter for preventive health examination  Reviewed and discussed health maintenance.      2. Atherosclerosis of aorta  Stable- continue current treatment and follow up routinely with PCP     3. Essential hypertension  Stable- continue current treatment and follow up routinely with PCP     4. Coronary artery disease involving native coronary artery of native heart without angina pectoris  Stable- continue current treatment and follow up routinely with PCP     5. Hyperlipidemia, unspecified hyperlipidemia type  Stable- continue current treatment and follow up routinely  with PCP     6. Pulmonary hypertension  Stable- continue current treatment and follow up routinely with PCP     7. Chronic obstructive pulmonary disease, unspecified COPD type  Stable- continue current treatment and follow up routinely with PCP     8. Chronic narcotic dependence  Stable- continue current treatment and follow up routinely with PCP     9. Depression, major, recurrent, mild  Stable- continue current treatment and follow up routinely with PCP     10. Seizures  Stable- continue current treatment and follow up routinely with PCP     11. Memory loss  Stable- continue current treatment and follow up routinely with PCP     12. Seizure disorder  Stable- continue current treatment and follow up routinely with PCP     13. DDD (degenerative disc disease), lumbar  Stable- continue current treatment and follow up routinely with PCP     14. Chronic kidney disease, stage III (moderate)  Stable- continue current treatment and follow up routinely with PCP   Labs reviewed. Avoid NSAIDs and increase fluids    Provided Summer with a 5-10 year written screening schedule and personal prevention plan. Recommendations were developed using the USPSTF age appropriate recommendations. Education, counseling, and referrals were provided as needed. After Visit Summary printed and given to patient which includes a list of additional screenings\tests needed.    Stephanie Amos NP

## 2019-06-26 DIAGNOSIS — E11.8 TYPE 2 DIABETES MELLITUS WITH COMPLICATION, WITHOUT LONG-TERM CURRENT USE OF INSULIN: ICD-10-CM

## 2019-06-26 DIAGNOSIS — K21.9 GASTROESOPHAGEAL REFLUX DISEASE WITHOUT ESOPHAGITIS: ICD-10-CM

## 2019-06-26 RX ORDER — SERTRALINE HYDROCHLORIDE 100 MG/1
TABLET, FILM COATED ORAL
Qty: 135 TABLET | Refills: 3 | Status: SHIPPED | OUTPATIENT
Start: 2019-06-26 | End: 2019-11-11 | Stop reason: SDUPTHER

## 2019-06-26 RX ORDER — BLOOD-GLUCOSE METER
EACH MISCELLANEOUS
Qty: 1 EACH | Refills: 0 | Status: SHIPPED | OUTPATIENT
Start: 2019-06-26 | End: 2022-03-25

## 2019-06-26 NOTE — TELEPHONE ENCOUNTER
Patient is scheduled for follow-up appointment on 7/18/19. Refill received for zantac; last prescribed on 5/7/19 with 90 day supply. I will refill at follow-up appointment

## 2019-07-01 DIAGNOSIS — R52 PAIN: ICD-10-CM

## 2019-07-01 RX ORDER — HYDROCODONE BITARTRATE AND ACETAMINOPHEN 10; 325 MG/1; MG/1
1 TABLET ORAL 3 TIMES DAILY PRN
Qty: 90 TABLET | Refills: 0 | Status: SHIPPED | OUTPATIENT
Start: 2019-07-01 | End: 2019-07-31 | Stop reason: SDUPTHER

## 2019-07-01 NOTE — TELEPHONE ENCOUNTER
----- Message from Adrianne Burr sent at 7/1/2019 10:36 AM CDT -----  Type:  RX Refill Request    Who Called:  Patient   Refill or New Rx:  refill  RX Name and Strength: HYDROcodone-acetaminophen (NORCO)  mg per tablet  How is the patient currently taking it? (ex. 1XDay):  3 x day  Is this a 30 day or 90 day RX:  30  Preferred Pharmacy with phone number:    [place], [place] - [address] [address]  [address] [address]  [facility] [address]  Phone: [phone] Fax: [phone]  Local or Mail Order:  local  Ordering Provider:  Ludwig Wilson  Best Call Back Number: [phone]  Additional Information:      Thank you

## 2019-07-18 ENCOUNTER — OFFICE VISIT (OUTPATIENT)
Dept: GASTROENTEROLOGY | Facility: CLINIC | Age: 84
End: 2019-07-18
Payer: MEDICARE

## 2019-07-18 VITALS
RESPIRATION RATE: 19 BRPM | SYSTOLIC BLOOD PRESSURE: 131 MMHG | DIASTOLIC BLOOD PRESSURE: 82 MMHG | HEART RATE: 75 BPM | BODY MASS INDEX: 29.22 KG/M2 | WEIGHT: 148.81 LBS | HEIGHT: 60 IN

## 2019-07-18 DIAGNOSIS — K92.0 COFFEE GROUND EMESIS: ICD-10-CM

## 2019-07-18 DIAGNOSIS — K21.9 GASTROESOPHAGEAL REFLUX DISEASE WITHOUT ESOPHAGITIS: ICD-10-CM

## 2019-07-18 DIAGNOSIS — Z87.19 HISTORY OF GASTRITIS: ICD-10-CM

## 2019-07-18 DIAGNOSIS — R63.4 WEIGHT LOSS: ICD-10-CM

## 2019-07-18 DIAGNOSIS — K44.9 HIATAL HERNIA: ICD-10-CM

## 2019-07-18 DIAGNOSIS — Z80.0 FAMILY HISTORY OF COLON CANCER IN MOTHER: ICD-10-CM

## 2019-07-18 DIAGNOSIS — R13.14 PHARYNGOESOPHAGEAL DYSPHAGIA: ICD-10-CM

## 2019-07-18 DIAGNOSIS — R10.13 EPIGASTRIC PAIN: ICD-10-CM

## 2019-07-18 DIAGNOSIS — R49.0 HOARSENESS OF VOICE: ICD-10-CM

## 2019-07-18 DIAGNOSIS — K92.1 BLACK STOOL: ICD-10-CM

## 2019-07-18 DIAGNOSIS — Z51.81 ENCOUNTER FOR MONITORING LONG-TERM PROTON PUMP INHIBITOR THERAPY: Primary | ICD-10-CM

## 2019-07-18 DIAGNOSIS — K80.20 CALCULUS OF GALLBLADDER WITHOUT CHOLECYSTITIS WITHOUT OBSTRUCTION: ICD-10-CM

## 2019-07-18 DIAGNOSIS — Z79.899 ENCOUNTER FOR MONITORING LONG-TERM PROTON PUMP INHIBITOR THERAPY: Primary | ICD-10-CM

## 2019-07-18 DIAGNOSIS — R11.2 NON-INTRACTABLE VOMITING WITH NAUSEA, UNSPECIFIED VOMITING TYPE: ICD-10-CM

## 2019-07-18 PROCEDURE — 1101F PT FALLS ASSESS-DOCD LE1/YR: CPT | Mod: HCNC,CPTII,S$GLB, | Performed by: NURSE PRACTITIONER

## 2019-07-18 PROCEDURE — 3079F DIAST BP 80-89 MM HG: CPT | Mod: HCNC,CPTII,S$GLB, | Performed by: NURSE PRACTITIONER

## 2019-07-18 PROCEDURE — 99999 PR PBB SHADOW E&M-EST. PATIENT-LVL V: ICD-10-PCS | Mod: PBBFAC,HCNC,, | Performed by: NURSE PRACTITIONER

## 2019-07-18 PROCEDURE — 99214 OFFICE O/P EST MOD 30 MIN: CPT | Mod: HCNC,S$GLB,, | Performed by: NURSE PRACTITIONER

## 2019-07-18 PROCEDURE — 3075F PR MOST RECENT SYSTOLIC BLOOD PRESS GE 130-139MM HG: ICD-10-PCS | Mod: HCNC,CPTII,S$GLB, | Performed by: NURSE PRACTITIONER

## 2019-07-18 PROCEDURE — 99214 PR OFFICE/OUTPT VISIT, EST, LEVL IV, 30-39 MIN: ICD-10-PCS | Mod: HCNC,S$GLB,, | Performed by: NURSE PRACTITIONER

## 2019-07-18 PROCEDURE — 3075F SYST BP GE 130 - 139MM HG: CPT | Mod: HCNC,CPTII,S$GLB, | Performed by: NURSE PRACTITIONER

## 2019-07-18 PROCEDURE — 1101F PR PT FALLS ASSESS DOC 0-1 FALLS W/OUT INJ PAST YR: ICD-10-PCS | Mod: HCNC,CPTII,S$GLB, | Performed by: NURSE PRACTITIONER

## 2019-07-18 PROCEDURE — 99499 UNLISTED E&M SERVICE: CPT | Mod: HCNC,S$GLB,, | Performed by: NURSE PRACTITIONER

## 2019-07-18 PROCEDURE — 99999 PR PBB SHADOW E&M-EST. PATIENT-LVL V: CPT | Mod: PBBFAC,HCNC,, | Performed by: NURSE PRACTITIONER

## 2019-07-18 PROCEDURE — 99499 RISK ADDL DX/OHS AUDIT: ICD-10-PCS | Mod: HCNC,S$GLB,, | Performed by: NURSE PRACTITIONER

## 2019-07-18 PROCEDURE — 3079F PR MOST RECENT DIASTOLIC BLOOD PRESSURE 80-89 MM HG: ICD-10-PCS | Mod: HCNC,CPTII,S$GLB, | Performed by: NURSE PRACTITIONER

## 2019-07-18 RX ORDER — DIPHENHYDRAMINE HCL 25 MG
25 CAPSULE ORAL EVERY 6 HOURS PRN
COMMUNITY
End: 2019-09-11

## 2019-07-18 RX ORDER — OMEPRAZOLE 40 MG/1
40 CAPSULE, DELAYED RELEASE ORAL DAILY
Qty: 90 CAPSULE | Refills: 1 | Status: SHIPPED | OUTPATIENT
Start: 2019-07-18 | End: 2019-09-18 | Stop reason: SDUPTHER

## 2019-07-18 NOTE — PROGRESS NOTES
"Subjective:       Patient ID: Summer Inman is a 83 y.o. female Body mass index is 29.06 kg/m².    Chief Complaint: Annual Exam (for medication refill; also having abdominal Pain, N&V, black stool, dysphagia)    Established patient of Dr. Nguyen & myself.    Patient was requested to follow-up for reflux medication refill. Patient is also complaining of GI symptoms. Patient is here with a family member, whom assisted with history.    Gastroesophageal Reflux   She complains of abdominal pain (epigastric pain; unsure of when it started; occurs anytime she takes medications (any medications); described as choking sensation and has to vomit), choking (sometimes; denies ever needing heimlich manuever or having to seek medical assistant to relieve symptom), dysphagia (CHIEF COMPLAINT: with food, pills & liquids; reports had some improvement after egd with dilation but since neck surgery has had trouble swallowing; had gradual worsening since our last visit), globus sensation (occasional), heartburn, a hoarse voice (occasional), nausea (zofran prn helps at times) and water brash. She reports no belching, no chest pain, no coughing, no early satiety or no sore throat. This is a chronic problem. Episode onset: intermittent for the past few years; recurred ~5/2019. The problem occurs frequently (daily). The heartburn wakes (wakes up short of breath) her from sleep. The symptoms are aggravated by certain foods (tomatoes). Associated symptoms include melena (black stool; denies pepto or iron use) and weight loss (lost about 5 lbs since our last visit in 2/2018; patient relates to recurrence of nausea and vomiting). Pertinent negatives include no fatigue. Risk factors include obesity. She has tried a PPI, a histamine-2 antagonist and head elevation (NOT TAKING ANY OF HER MEDICATIONS EXCEPT FOR BENADRYL AND NORCO DUE TO "makes me ill"; prilosec 40 mg once daily & zantac 300 mg once daily at night- helps when she takes it) for " the symptoms. Past procedures include an EGD.     Review of Systems   Constitutional: Positive for weight loss (lost about 5 lbs since our last visit in 2/2018; patient relates to recurrence of nausea and vomiting). Negative for appetite change, chills, fatigue and fever.   HENT: Positive for hoarse voice (occasional), trouble swallowing (patient reports currently has soft diet & food pureed and seeing speech therapist) and voice change. Negative for mouth sores and sore throat.    Respiratory: Positive for choking (sometimes; denies ever needing heimlich manuever or having to seek medical assistant to relieve symptom). Negative for cough, chest tightness and shortness of breath.    Cardiovascular: Negative for chest pain and palpitations.   Gastrointestinal: Positive for abdominal pain (epigastric pain; unsure of when it started; occurs anytime she takes medications (any medications); described as choking sensation and has to vomit), dysphagia (CHIEF COMPLAINT: with food, pills & liquids; reports had some improvement after egd with dilation but since neck surgery has had trouble swallowing; had gradual worsening since our last visit), heartburn, melena (black stool; denies pepto or iron use), nausea (zofran prn helps at times) and vomiting (emesis of black stuff or food; occurs daily). Negative for abdominal distention, anal bleeding, blood in stool, constipation, diarrhea and rectal pain.        Bowel movements daily- takes glycolax PRN; takes 1/2 a tablet of norco 6 times a day   Genitourinary: Negative for difficulty urinating, dysuria, flank pain, frequency, hematuria, pelvic pain and urgency.   Musculoskeletal: Negative for back pain.   Neurological: Negative for weakness.       No LMP recorded. Patient has had a hysterectomy.    Past Medical History:   Diagnosis Date    Anticoagulant long-term use     ASA 81mg    Arthritis     Asthma     Atrial premature beats     Benign neoplasm of adrenal gland      CKD (chronic kidney disease)     Colon polyp     COPD (chronic obstructive pulmonary disease)     Coronary artery disease     nonocclusive disease, no prior intervention    Depression     Dizziness     and balance issues    First degree AV block     Gastroesophageal reflux disease     General anesthetics causing adverse effect in therapeutic use     hard to wake up    Hepatomegaly     Hyperlipidemia     Hypertension     Impaired mobility     uses walker or cane    Neck pain     into chest and arms with numbness in arms    Schatzki's ring     Seasonal allergies     Seizures     Last one recently    Stroke     tia    Trouble in sleeping     Wears dentures     upper    Wears glasses      Past Surgical History:   Procedure Laterality Date    APPENDECTOMY      CERVICAL FUSION  3/2016 Dr. Cuevas    COLONOSCOPY  2008 Dr. Nguyen    internal hemorrhoids otherwise normal findings; repeat in 4 years    DISKECTOMY AND FUSION-ANTERIOR CERVICAL (ACDF): Microscopic Anterior C3/4 Diskectomies, Allograft fusion and plating with IOM; [id number], +[id number], +[id number], +[id number], +[id number] N/A 3/7/2016    Performed by Cristóbal Cuevas Jr., [facility] at [facility] OR    ESOPHAGOGASTRODUODENOSCOPY      ESOPHAGOGASTRODUODENOSCOPY (EGD) N/A 3/8/2018    [facility] Lucas [facility] ENDO    ESOPHAGOGASTRODUODENOSCOPY (EGD) N/A 2/1/2016    [facility]    ESOPHAGOGASTRODUODENOSCOPY (EGD) N/A 10/8/2014    Performed [facility] Lucas [facility] ENDO    EYE SURGERY      phaco bilateral    HYSTERECTOMY      partial hysterectomy    INJECTION-STEROID-EPIDURAL-CERVICAL N/A 3/18/2015    Per[facility] Lacho Orona [facility] at [facility] OR    mesh implant      TONSILLECTOMY      UPPER GASTROINTESTINAL ENDOSCOPY  2014 Dr. Nguyen    UPPER GASTROINTESTINAL ENDOSCOPY  02/01/2016    Dr. Nguyen    UPPER GASTROINTESTINAL ENDOSCOPY  03/08/2018    Dr. Nguyen: small hiatal hernia, gastritis,  esophageal dilation performed; biopsy: stomach and esophagus- unremarkable, negative for nicholson's, h pylori negative     Family History   Problem Relation Age of Onset    Heart disease Mother     Cancer Mother         Colon    Colon cancer Mother 74    Hypertension Daughter     Heart disease Father     Hypertension Son     Diabetes Son     Arthritis Son     Rheum arthritis Daughter     Cancer Daughter         Thyroid and Breast    Glaucoma Neg Hx     Crohn's disease Neg Hx     Ulcerative colitis Neg Hx     Stomach cancer Neg Hx     Esophageal cancer Neg Hx      Wt Readings from Last 20 Encounters:   07/18/19 67.5 kg (148 lb 13 oz)   06/17/19 68.5 kg (151 lb 0.2 oz)   06/07/19 67.8 kg (149 lb 9.3 oz)   03/08/19 67.8 kg (149 lb 9.3 oz)   11/13/18 71.5 kg (157 lb 8.3 oz)   10/29/18 72.8 kg (160 lb 7.9 oz)   08/28/18 71.6 kg (157 lb 11.8 oz)   07/26/18 71.7 kg (158 lb 1.6 oz)   05/29/18 72.5 kg (159 lb 13.3 oz)   05/21/18 70.5 kg (155 lb 6.8 oz)   05/16/18 70.6 kg (155 lb 9.6 oz)   03/14/18 70.8 kg (155 lb 15.6 oz)   03/08/18 71.3 kg (157 lb 3 oz)   03/08/18 69.9 kg (154 lb)   02/26/18 70.3 kg (154 lb 15.7 oz)   12/04/17 70.4 kg (155 lb 3.3 oz)   10/17/17 73.8 kg (162 lb 11.2 oz)   10/17/17 73.4 kg (161 lb 11.3 oz)   10/11/17 69.8 kg (153 lb 15.9 oz)   07/07/17 75.3 kg (166 lb 0.1 oz)     Lab Results   Component Value Date    WBC 8.81 03/08/2019    HGB 12.8 03/08/2019    HCT 40.6 03/08/2019    MCV 84 03/08/2019     03/08/2019     CMP  Sodium   Date Value Ref Range Status   03/08/2019 137 136 - 145 mmol/L Final     Potassium   Date Value Ref Range Status   03/08/2019 3.4 (L) 3.5 - 5.1 mmol/L Final     Chloride   Date Value Ref Range Status   03/08/2019 98 95 - 110 mmol/L Final     CO2   Date Value Ref Range Status   03/08/2019 30 (H) 23 - 29 mmol/L Final     Glucose   Date Value Ref Range Status   03/08/2019 113 (H) 70 - 110 mg/dL Final     BUN, Bld   Date Value Ref Range Status   03/08/2019 15 8 -  23 mg/dL Final     Creatinine   Date Value Ref Range Status   03/08/2019 1.0 0.5 - 1.4 mg/dL Final     Calcium   Date Value Ref Range Status   03/08/2019 10.2 8.7 - 10.5 mg/dL Final     Total Protein   Date Value Ref Range Status   03/08/2019 7.3 6.0 - 8.4 g/dL Final     Albumin   Date Value Ref Range Status   03/08/2019 3.7 3.5 - 5.2 g/dL Final     Total Bilirubin   Date Value Ref Range Status   03/08/2019 0.3 0.1 - 1.0 mg/dL Final     Comment:     For infants and newborns, interpretation of results should be based  on gestational age, weight and in agreement with clinical  observations.  Premature Infant recommended reference ranges:  Up to 24 hours.............<8.0 mg/dL  Up to 48 hours............<12.0 mg/dL  3-5 days..................<15.0 mg/dL  6-29 days.................<15.0 mg/dL       Alkaline Phosphatase   Date Value Ref Range Status   03/08/2019 130 55 - 135 U/L Final     AST ([place])   Date Value Ref Range Status   01/31/2016 24 14 - 36 U/L Final     AST   Date Value Ref Range Status   03/08/2019 16 10 - 40 U/L Final     ALT   Date Value Ref Range Status   03/08/2019 13 10 - 44 U/L Final     Anion Gap   Date Value Ref Range Status   03/08/2019 9 8 - 16 mmol/L Final     eGFR if    Date Value Ref Range Status   03/08/2019 >60.0 >60 mL/min/1.73 m^2 Final     eGFR if non    Date Value Ref Range Status   03/08/2019 52.6 (A) >60 mL/min/1.73 m^2 Final     Comment:     Calculation used to obtain the estimated glomerular filtration  rate (eGFR) is the CKD-EPI equation.        Lab Results   Component Value Date    LIPASE 20 02/26/2018     Lab Results   Component Value Date    TSH 1.315 03/08/2019     Reviewed prior medical records including radiology report of 10/29/18 ct chest abdomen pelvis; 4/4/18 abdominal ultrasound; & endoscopy history (see surgical history).    Objective:      Physical Exam   Constitutional: She is oriented to person, place, and time. She appears  well-developed and well-nourished. No distress.   Patient uses walker for assistance with ambulation   HENT:   Mouth/Throat: Oropharynx is clear and moist and mucous membranes are normal. No oral lesions. No oropharyngeal exudate.   Eyes: Conjunctivae are normal. No scleral icterus.   Pulmonary/Chest: Effort normal and breath sounds normal. No respiratory distress. She has no wheezes. She has no rhonchi. She has no rales.   Abdominal: Normal appearance and bowel sounds are normal. She exhibits no distension, no abdominal bruit and no mass. There is generalized tenderness (mild). There is no rigidity, no rebound and no guarding.   Neurological: She is alert and oriented to person, place, and time.   Skin: Skin is warm and dry. No rash noted. She is not diaphoretic. No erythema. No pallor.   Non-jaundiced   Psychiatric: She has a normal mood and affect. Her behavior is normal.   Nursing note and vitals reviewed.      Assessment:       1. Encounter for monitoring long-term proton pump inhibitor therapy    2. Gastroesophageal reflux disease without esophagitis    3. History of gastritis    4. Epigastric pain    5. Hiatal hernia    6. Pharyngoesophageal dysphagia    7. Non-intractable vomiting with nausea, unspecified vomiting type    8. Calculus of gallbladder without cholecystitis without obstruction    9. Family history of colon cancer in mother    10. Black stool    11. Coffee ground emesis    12. Weight loss    13. Hoarseness of voice        Plan:       Encounter for monitoring long-term proton pump inhibitor therapy  -     CBC Without Differential; Future; Expected date: 07/18/2019  -     Vitamin B12; Future; Expected date: 07/18/2019  -     Magnesium; Future; Expected date: 07/18/2019  - discussed with patient about long term use of reflux medications (preference to use lowest effective dose or discontinuing if possible), the risk and benefits of using these medications long term, the risk of untreated GERD such  as nicholson's esophagus, and recommend a diet high in calcium and/or taking OTC calcium and vitamin d supplements as directed (such as Citracal +D), pt verbalized understanding.  - recommend annual monitoring with blood work to include CMP, CBC, vitamin B12, and magnesium.    Gastroesophageal reflux disease without esophagitis & History of gastritis  -  RESTART   omeprazole (PRILOSEC) 40 MG capsule; Take 1 capsule (40 mg total) by mouth once daily.  Dispense: 90 capsule; Refill: 1, take in the morning 30-60 minutes before breakfast, discussed about possible long term use of medication (prefer to use lowest effective dose or discontinuing if possible) and discussed the risks & benefits with taking a reflux medication long term, and to take OTC calcium and vitamin d supplements as directed (such as Citracal +D), pt verbalized understanding  -discussed about the different types of medications used to treat reflux and how to use them, antacids can be used PRN for breakthrough heartburn symptoms by reducing stomach acid that is already produced, H2 blockers (zantac) work by limiting the amount acid production, & PPI's work to block acid production and are taken daily, patient verbalized understanding.  -Educated patient on lifestyle modifications to help control/reduce reflux/abdominal pain including: avoid large meals, avoid eating within 2-3 hours of bedtime (avoid late night eating & lying down soon after eating), elevate head of bed if nocturnal symptoms are present, smoking cessation (if current smoker), & weight loss (if overweight).   -Educated to avoid known foods which trigger reflux symptoms & to minimize/avoid high-fat foods, chocolate, caffeine, citrus, alcohol, & tomato products.  -Advised to avoid/limit use of NSAID's, since they can cause GI upset, bleeding, and/or ulcers. If needed, take with food.   -  RESTART   ranitidine (ZANTAC) 300 MG tablet; Take 1 tablet (300 mg total) by mouth every evening.   Dispense: 90 tablet; Refill: 1  - schedule EGD, discussed procedure with patient, including risks and benefits, patient verbalized understanding    Epigastric pain  -     US Abdomen Complete; Future; Expected date: 07/18/2019  -     Lipase; Future; Expected date: 07/18/2019  - RESTART    omeprazole (PRILOSEC) 40 MG capsule; Take 1 capsule (40 mg total) by mouth once daily.  Dispense: 90 capsule; Refill: 1  - schedule EGD, discussed procedure with patient, including risks and benefits, patient verbalized understanding  - Possible CT scan pending results of testing and if symptoms persist    Hiatal hernia  -  RESTART   omeprazole (PRILOSEC) 40 MG capsule; Take 1 capsule (40 mg total) by mouth once daily.  Dispense: 90 capsule; Refill: 1  -discussed diagnosis with patient & that it is usually managed by controlling reflux symptoms, surgery is an option, but usually performed if reflux is uncontrolled by medication management and lifestyle/dietary modifications; if symptoms persist despite medication management and lifestyle/dietary modifications, we can refer to general surgery to consult about surgical options, patient verbalized understanding    Pharyngoesophageal dysphagia  - schedule EGD, discussed procedure with patient and possible esophageal dilation may be performed during procedure if indicated, patient verbalized understanding  - educated patient to eat smaller more frequent meals and to eat slowly and advised to eat a soft diet.  - possible UGI/esophagram/esophageal manometry if symptoms persist    Non-intractable vomiting with nausea, unspecified vomiting type  -     US Abdomen Complete; Future; Expected date: 07/18/2019  - discussed with patient that a side effect of narcotic pain medications is NAUSEA, advised patient to talk to provider who manages pain medication, patient verbalized understanding  - schedule EGD, discussed procedure with patient, including risks and benefits, patient verbalized  understanding  - Possible gastric emptying study pending results of testing and if symptoms persist    Calculus of gallbladder without cholecystitis without obstruction  -     US Abdomen Complete; Future; Expected date: 07/18/2019    Family history of colon cancer in mother  - discussed about Colonoscopy, including the risks and benefits of colonoscopy, patient verbalized understanding but patient refused colonoscopy.    Black stool & Coffee ground emesis  - schedule EGD, discussed procedure with patient, including risks and benefits, patient verbalized understanding  - avoid/minimize use of NSAIDs- since they can cause GI upset, bleeding and/or ulcers. If NSAID must be taken, recommend take with food.  -     CBC Without Differential; Future; Expected date: 07/18/2019  If no improvement in symptoms or symptoms worsen, call/follow-up at clinic or go to ER    Weight loss  - schedule EGD, discussed procedure with patient, including risks and benefits, patient verbalized understanding  - discussed about Colonoscopy, including the risks and benefits of colonoscopy, patient verbalized understanding but patient refused colonoscopy.  - encouraged PO intake and daily calorie counts to ensure adequate nutrition is taken in, recommend at least 2,000 calories a day  - recommend nutritional drinks, such as Boost, Ensure or Glucerna, to supplement nutrition needs    Hoarseness of voice  Recommend follow-up with Primary Care Provider/ENT for continued evaluation and management.    Follow up in about 1 month (around 8/18/2019), or if symptoms worsen or fail to improve.    If no improvement in symptoms or symptoms worsen, call/follow-up at clinic or go to ER.

## 2019-07-18 NOTE — PATIENT INSTRUCTIONS
Abdominal Pain    Abdominal pain is pain in the stomach or belly area. Everyone has this pain from time to time. In many cases it goes away on its own. But abdominal pain can sometimes be due to a serious problem, such as appendicitis. So its important to know when to seek help.  Causes of abdominal pain  There are many possible causes of abdominal pain. Common causes in adults include:  · Constipation, diarrhea, or gas  · Stomach acid flowing back up into the esophagus (acid reflux or heartburn)  · Severe acid reflux, called GERD (gastroesophageal reflux disease)  · A sore in the lining of the stomach or small intestine (peptic ulcer)  · Inflammation of the gallbladder, liver, or pancreas  · Gallstones or kidney stones  · Appendicitis   · Intestinal blockage   · An internal organ pushing through a muscle or other tissue (hernia)  · Urinary tract infections  · In women, menstrual cramps, fibroids, or endometriosis  · Inflammation or infection of the intestines  Diagnosing the cause of abdominal pain  Your healthcare provider will do a physical exam help find the cause of your pain. If needed, tests will be ordered. Belly pain has many possible causes. So it can be hard to find the reason for your pain. Giving details about your pain can help. Tell your provider where and when you feel the pain, and what makes it better or worse. Also let your provider know if you have other symptoms such as:  · Fever  · Tiredness  · Upset stomach (nausea)  · Vomiting  · Changes in bathroom habits  Treating abdominal pain  Some causes of pain need emergency medical treatment right away. These include appendicitis or a bowel blockage. Other problems can be treated with rest, fluids, or medicines. Your healthcare provider can give you specific instructions for treatment or self-care based on what is causing your pain.  If you have vomiting or diarrhea, sip water or other clear fluids. When you are ready to eat solid foods again,  start with small amounts of easy-to-digest, low-fat foods. These include apple sauce, toast, or crackers.   When to seek medical care  Call 911 or go to the hospital right away if you:  · Cant pass stool and are vomiting  · Are vomiting blood or have bloody diarrhea or black, tarry diarrhea  · Have chest, neck, or shoulder pain  · Feel like you might pass out  · Have pain in your shoulder blades with nausea  · Have sudden, severe belly pain  · Have new, severe pain unlike any you have felt before  · Have a belly that is rigid, hard, and tender to touch  Call your healthcare provider if you have:  · Pain for more than 5 days  · Bloating for more than 2 days  · Diarrhea for more than 5 days  · A fever of 100.4°F (38.0°C) or higher, or as directed by your provider  · Pain that gets worse  · Weight loss for no reason  · Continued lack of appetite  · Blood in your stool  How to prevent abdominal pain  Here are some tips to help prevent abdominal pain:  · Eat smaller amounts of food at one time.  · Avoid greasy, fried, or other high-fat foods.  · Avoid foods that give you gas.  · Exercise regularly.  · Drink plenty of fluids.  To help prevent GERD symptoms:  · Quit smoking.  · Reduce alcohol and certain foods that increase stomach acid.  · Avoid aspirin and over-the-counter pain and fever medicines (NSAIDS or nonsteroidal anti-inflammatory drugs), if possible  · Lose extra weight.  · Finish eating at least 2 hours before you go to bed or lie down.  · Raise the head of your bed.  Date Last Reviewed: 7/1/2016  © 4648-5682 [employer]. [address] [place] [address], [place] [address]. All rights reserved. This information is not intended as a substitute for professional medical care. Always follow your healthcare professional's instructions.        Lower GI Bleeding (Stable)  You have signs of blood in your stool. This is called rectal bleeding. The bleeding may have begun in another part of your gastrointestinal  (GI) tract. If the blood is bright red, it is likely coming from the lower part of the GI tract. If the blood is black or dark, it might be coming from higher up in the GI tract. Very small amounts of GI bleeding may not be visible and can only be discovered during a test on your stool. Possible causes of lower GI bleeding include:  · Hemorrhoids  · Anal fissures  · Diverticulitis  · Inflammatory bowel disease (Crohn's disease or ulcerative colitis)  · Polyps (growths) in the intestine  Note: Iron supplements and medicines for diarrhea or upset stomach can cause black stools. Foods such as licorice and red beets can also discolor the stool and be mistaken for bleeding. These are not bleeding and are not a cause for alarm.  Home care  You have not lost a large amount of blood and your condition appears stable at this time. You may resume normal activity as long as you feel well.  Avoid NSAIDs, such as aspirin, ibuprofen, or naproxen. They can irritate the stomach and cause further bleeding. If you are taking these medicines for other medical reasons, talk to your healthcare provider before you stop them.   Follow-up care  Follow up with your healthcare provider as advised. Further tests may be needed to find the cause of your bleeding.  When to seek medical advice  Call your healthcare provider for any of the following:  · Large amount of rectal bleeding   · Increasing abdominal pain  · Weakness, dizziness  Call 911  Get emergency medical care if any of the following occur:  · Loss of consciousness  · Vomiting blood  Date Last Reviewed: 6/24/2015  © 8143-9539 [employer]. [address] [place] [address], [place] [address]. All rights reserved. This information is not intended as a substitute for professional medical care. Always follow your healthcare professional's instructions.        Discharge Instructions: Eating a Soft Diet  You have been prescribed a soft diet (also called gastrointestinal soft diet or  bland diet). This reduces the amount of work your digestive tract has to do. It also reduces the chance that your digestive tract will be irritated by the food you eat. A soft diet is prescribed for people with digestive problems. The diet consists of foods that are tender, mildly seasoned, and easy to digest. While on this diet, you should not eat fried or spicy foods, or raw fruits and vegetables. Also avoid alcoholic beverages.  General guidelines  · Eat in a calm, relaxed atmosphere. How you eat may be as important as what you eat. Dont rush while eating. Chew your food slowly and thoroughly, and swallow slowly.  · Eat small frequent meals throughout the day, but dont eat within 2 hours of bedtime.  · Avoid any foods that cause discomfort.  · Dont use NSAIDs (nonsteroidal anti-inflammatory drugs), such as aspirin, and ibuprofen. Also avoid medicine that contain aspirin. NSAIDs can cause ulcers and delay or prevent ulcer healing.  · Use antacids as needed, but keep in mind that magnesium-containing antacids may cause diarrhea.  Foods to eat  · Cream of wheat and cream of rice  · Cooked white rice  · Mashed potatoes, and boiled potatoes without skin  · Plain pasta and noodles  · Plain white crackers (such as no-salt soda crackers)  · White bread  · Applesauce  · Cooked fruits without skins or seeds  · Mild juices, such as apple and grape  · Bananas  · Cooked or mashed vegetables without stems and seeds  ¨ Carrots  ¨ Summer squash (zucchini, yellow squash)  ¨ Winter squash (acorn, butternut, spaghetti squash)  · Cottage cheese  · Mild hard or soft cheeses  · Custard  · Yogurt without seeds or nuts  · Milk (you may need lactose-free milk)  · Ice cream without seeds or nuts  · Smooth peanut butter  · Eggs  · Fish, turkey, chicken, or other meat that is not tough or stringy  · Tofu  Foods to avoid  · Nuts and seeds  · Snack foods, such as the following:  ¨ Chocolate-containing snacks, candy, pastries, or  cakes.  ¨ Potato chips (plain, barbecued, or other flavors)  ¨ Taco chips or nachos  ¨ Corn chips  ¨ Popcorn, popcorn cakes, or rice cakes  ¨ Crackers with nuts, seeds, or spicy seasonings  ¨ French fries  · Fried or greasy foods  · Whole-grain breads, rolls, and crackers  · Breads and rolls with nuts, seeds, or bran  · Bran and granola cereals  · Berries with seeds, such as strawberries, raspberries, and blackberries  · Acidic fruits, such as oranges, grapefruits, lily, limes, and pineapples  · Raw vegetables  · Mild or hot peppers  · Sauerkraut and pickled vegetables  · Tomatoes or tomato products, such as tomato paste, tomato sauce, and tomato juice  · Barbecue sauce  · Spicy or flavored cheeses, such as jalapeño and black pepper cheese  · Crunchy peanut butter  · Dried cooked beans, such as mays, kidney, or navy beans  · The following meats:  ¨ Fried or greasy meats  ¨ Processed, spicy meats, such as sausage, stout, ham, and lunch meats  ¨ Ribs and other meats with barbecue sauce  ¨ Tough or stringy meats, such as corned beef or beef jerky  Fluids to avoid  · Alcoholic beverages  · Coffee and regular teas  · Pankaj and other drinks with caffeine  · Cranberry, orange, pineapple, and grapefruit juice  · Lemonade  · Vegetable juice  · Whole milk, if you are lactose intolerant  Follow-up  Make a follow-up appointment with a dietitian as directed by our staff.  Date Last Reviewed: 6/21/2015  © 3719-8515 [employer]. [address] [place] [address], [place] [address]. All rights reserved. This information is not intended as a substitute for professional medical care. Always follow your healthcare professional's instructions.

## 2019-07-30 ENCOUNTER — HOSPITAL ENCOUNTER (OUTPATIENT)
Dept: RADIOLOGY | Facility: HOSPITAL | Age: 84
Discharge: HOME | End: 2019-07-30
Attending: NURSE PRACTITIONER
Payer: MEDICARE

## 2019-07-30 DIAGNOSIS — K80.20 CALCULUS OF GALLBLADDER WITHOUT CHOLECYSTITIS WITHOUT OBSTRUCTION: ICD-10-CM

## 2019-07-30 DIAGNOSIS — R10.13 EPIGASTRIC PAIN: ICD-10-CM

## 2019-07-30 DIAGNOSIS — R11.2 NON-INTRACTABLE VOMITING WITH NAUSEA, UNSPECIFIED VOMITING TYPE: ICD-10-CM

## 2019-07-30 PROCEDURE — 76700 US ABDOMEN COMPLETE: ICD-10-PCS | Mod: 26,HCNC,, | Performed by: RADIOLOGY

## 2019-07-30 PROCEDURE — 76700 US EXAM ABDOM COMPLETE: CPT | Mod: 26,HCNC,, | Performed by: RADIOLOGY

## 2019-07-30 PROCEDURE — 76700 US EXAM ABDOM COMPLETE: CPT | Mod: TC,HCNC,PO

## 2019-07-31 ENCOUNTER — TELEPHONE (OUTPATIENT)
Dept: GASTROENTEROLOGY | Facility: CLINIC | Age: 84
End: 2019-07-31

## 2019-07-31 DIAGNOSIS — R11.2 NON-INTRACTABLE VOMITING WITH NAUSEA, UNSPECIFIED VOMITING TYPE: ICD-10-CM

## 2019-07-31 DIAGNOSIS — K80.20 CALCULUS OF GALLBLADDER WITHOUT CHOLECYSTITIS WITHOUT OBSTRUCTION: ICD-10-CM

## 2019-07-31 DIAGNOSIS — R52 PAIN: ICD-10-CM

## 2019-07-31 DIAGNOSIS — R10.13 EPIGASTRIC PAIN: Primary | ICD-10-CM

## 2019-07-31 RX ORDER — HYDROCODONE BITARTRATE AND ACETAMINOPHEN 10; 325 MG/1; MG/1
1 TABLET ORAL 3 TIMES DAILY PRN
Qty: 90 TABLET | Refills: 0 | Status: SHIPPED | OUTPATIENT
Start: 2019-07-31 | End: 2019-08-30 | Stop reason: SDUPTHER

## 2019-07-31 NOTE — TELEPHONE ENCOUNTER
----- Message from Ana Inman sent at 7/31/2019  3:21 PM CDT -----  Type:  RX Refill Request    Who Called:  Daughter / Elizabeth   Refill or New Rx:  Refill   RX Name and Strength:  HYDROcodone-acetaminophen (NORCO)  mg per tablet   How is the patient currently taking it? (ex. 1XDay):     Is this a 30 day or 90 day RX:  30 day  Preferred Pharmacy with phone number:    [facility] [place] - [address] [facility] [address]  [address] [facility] [address]  [facility] [address]  Phone: [phone] Fax: [phone]         Local or Mail Order:  local  Ordering Provider:  Dr Wilson  Best Call Back Number:  [phone]   Additional Information:

## 2019-07-31 NOTE — TELEPHONE ENCOUNTER
"Please call to inform & review the results with the patient- radiology report of the abdominal ultrasound showed "1. Cholelithiasis  2. Right renal cysts". Otherwise, unremarkable findings.  For the gallstones:  - recommend low fat diet  - recommend consulting general surgery for further evaluation and management    Kidney cysts are typically benign but I recommend they be follow with Primary Care Provider for continued evaluation and management.    Continue with previous recommendations, including EGD as scheduled.    If no improvement in symptoms or symptoms worsen, call/follow-up at clinic or go to ER.  Please release results to patient's mychart once you have discussed results and recommendations with patient.  Thanks,        "

## 2019-08-06 ENCOUNTER — TELEPHONE (OUTPATIENT)
Dept: GASTROENTEROLOGY | Facility: CLINIC | Age: 84
End: 2019-08-06

## 2019-08-06 NOTE — TELEPHONE ENCOUNTER
Ultrasound results were sent to staff to contact patient with results on 7/31/19. Please see telephone encounter from 7/31/19. It does not appear that the patient reviewed e-mail that was sent.  Thanks  ADAM

## 2019-08-06 NOTE — TELEPHONE ENCOUNTER
----- Message from Leah Tracy sent at 8/6/2019  8:27 AM CDT -----  Contact: Self  Type:  Test Results    Who Called:  patient  Name of Test (Lab/Mammo/Etc):  ultrasound  Date of Test:  7//30  Ordering Provider:  Ramonita Koehler  Where the test was performed:  [facility]  Best Call Back Number:  [phone] (home)   Additional Information:  na

## 2019-08-07 NOTE — TELEPHONE ENCOUNTER
"Left message requesting a call back    Radiology report of the abdominal ultrasound showed   1. Cholelithiasis   2. Right renal cysts". Otherwise, unremarkable findings.   For the gallstones:   - recommend low fat diet   - recommend consulting general surgery for further evaluation and management"       "Kidney cysts are typically benign but I recommend they be follow with Primary Care Provider for continued evaluation and management."       "Continue with previous recommendations, including EGD as scheduled."       "If no improvement in symptoms or symptoms worsen, call/follow-up at clinic or go to ER.   Richard Hines NP"      "

## 2019-08-08 NOTE — TELEPHONE ENCOUNTER
Spoke with pt and informed of results and recommendations per Natalya Koehler NP. Pt verbalized understanding.

## 2019-08-20 DIAGNOSIS — H01.009 BLEPHARITIS, UNSPECIFIED LATERALITY, UNSPECIFIED TYPE: ICD-10-CM

## 2019-08-21 RX ORDER — ERYTHROMYCIN 5 MG/G
OINTMENT OPHTHALMIC
Qty: 1 G | Refills: 1 | Status: SHIPPED | OUTPATIENT
Start: 2019-08-21 | End: 2020-11-04 | Stop reason: CLARIF

## 2019-08-22 ENCOUNTER — LAB VISIT (OUTPATIENT)
Dept: LAB | Facility: HOSPITAL | Age: 84
End: 2019-08-22
Attending: SURGERY
Payer: MEDICARE

## 2019-08-22 ENCOUNTER — OFFICE VISIT (OUTPATIENT)
Dept: SURGERY | Facility: CLINIC | Age: 84
End: 2019-08-22
Payer: MEDICARE

## 2019-08-22 VITALS
DIASTOLIC BLOOD PRESSURE: 71 MMHG | HEART RATE: 64 BPM | WEIGHT: 151.69 LBS | HEIGHT: 60 IN | TEMPERATURE: 97 F | BODY MASS INDEX: 29.78 KG/M2 | SYSTOLIC BLOOD PRESSURE: 137 MMHG

## 2019-08-22 DIAGNOSIS — K80.50 BILIARY COLIC: ICD-10-CM

## 2019-08-22 DIAGNOSIS — K80.50 BILIARY COLIC: Primary | ICD-10-CM

## 2019-08-22 LAB
ALBUMIN SERPL BCP-MCNC: 3.8 G/DL (ref 3.5–5.2)
ALP SERPL-CCNC: 133 U/L (ref 55–135)
ALT SERPL W/O P-5'-P-CCNC: 13 U/L (ref 10–44)
ANION GAP SERPL CALC-SCNC: 10 MMOL/L (ref 8–16)
AST SERPL-CCNC: 16 U/L (ref 10–40)
BASOPHILS # BLD AUTO: 0.07 K/UL (ref 0–0.2)
BASOPHILS NFR BLD: 0.8 % (ref 0–1.9)
BILIRUB SERPL-MCNC: 0.2 MG/DL (ref 0.1–1)
BUN SERPL-MCNC: 22 MG/DL (ref 8–23)
CALCIUM SERPL-MCNC: 10 MG/DL (ref 8.7–10.5)
CHLORIDE SERPL-SCNC: 95 MMOL/L (ref 95–110)
CO2 SERPL-SCNC: 29 MMOL/L (ref 23–29)
CREAT SERPL-MCNC: 1 MG/DL (ref 0.5–1.4)
DIFFERENTIAL METHOD: ABNORMAL
EOSINOPHIL # BLD AUTO: 0.2 K/UL (ref 0–0.5)
EOSINOPHIL NFR BLD: 2.2 % (ref 0–8)
ERYTHROCYTE [DISTWIDTH] IN BLOOD BY AUTOMATED COUNT: 15.1 % (ref 11.5–14.5)
EST. GFR  (AFRICAN AMERICAN): >60 ML/MIN/1.73 M^2
EST. GFR  (NON AFRICAN AMERICAN): 52.6 ML/MIN/1.73 M^2
GLUCOSE SERPL-MCNC: 107 MG/DL (ref 70–110)
HCT VFR BLD AUTO: 40.4 % (ref 37–48.5)
HGB BLD-MCNC: 12.7 G/DL (ref 12–16)
IMM GRANULOCYTES # BLD AUTO: 0.03 K/UL (ref 0–0.04)
IMM GRANULOCYTES NFR BLD AUTO: 0.3 % (ref 0–0.5)
LYMPHOCYTES # BLD AUTO: 2.2 K/UL (ref 1–4.8)
LYMPHOCYTES NFR BLD: 24.3 % (ref 18–48)
MCH RBC QN AUTO: 26.4 PG (ref 27–31)
MCHC RBC AUTO-ENTMCNC: 31.4 G/DL (ref 32–36)
MCV RBC AUTO: 84 FL (ref 82–98)
MONOCYTES # BLD AUTO: 1 K/UL (ref 0.3–1)
MONOCYTES NFR BLD: 11.2 % (ref 4–15)
NEUTROPHILS # BLD AUTO: 5.4 K/UL (ref 1.8–7.7)
NEUTROPHILS NFR BLD: 61.2 % (ref 38–73)
NRBC BLD-RTO: 0 /100 WBC
PLATELET # BLD AUTO: 334 K/UL (ref 150–350)
PMV BLD AUTO: 11.3 FL (ref 9.2–12.9)
POTASSIUM SERPL-SCNC: 3.3 MMOL/L (ref 3.5–5.1)
PROT SERPL-MCNC: 7.4 G/DL (ref 6–8.4)
RBC # BLD AUTO: 4.81 M/UL (ref 4–5.4)
SODIUM SERPL-SCNC: 134 MMOL/L (ref 136–145)
WBC # BLD AUTO: 8.9 K/UL (ref 3.9–12.7)

## 2019-08-22 PROCEDURE — 80053 COMPREHEN METABOLIC PANEL: CPT | Mod: HCNC

## 2019-08-22 PROCEDURE — 99204 OFFICE O/P NEW MOD 45 MIN: CPT | Mod: HCNC,S$GLB,, | Performed by: SURGERY

## 2019-08-22 PROCEDURE — 1101F PT FALLS ASSESS-DOCD LE1/YR: CPT | Mod: HCNC,CPTII,S$GLB, | Performed by: SURGERY

## 2019-08-22 PROCEDURE — 3075F SYST BP GE 130 - 139MM HG: CPT | Mod: HCNC,CPTII,S$GLB, | Performed by: SURGERY

## 2019-08-22 PROCEDURE — 3078F PR MOST RECENT DIASTOLIC BLOOD PRESSURE < 80 MM HG: ICD-10-PCS | Mod: HCNC,CPTII,S$GLB, | Performed by: SURGERY

## 2019-08-22 PROCEDURE — 3075F PR MOST RECENT SYSTOLIC BLOOD PRESS GE 130-139MM HG: ICD-10-PCS | Mod: HCNC,CPTII,S$GLB, | Performed by: SURGERY

## 2019-08-22 PROCEDURE — 3078F DIAST BP <80 MM HG: CPT | Mod: HCNC,CPTII,S$GLB, | Performed by: SURGERY

## 2019-08-22 PROCEDURE — 99204 PR OFFICE/OUTPT VISIT, NEW, LEVL IV, 45-59 MIN: ICD-10-PCS | Mod: HCNC,S$GLB,, | Performed by: SURGERY

## 2019-08-22 PROCEDURE — 99999 PR PBB SHADOW E&M-EST. PATIENT-LVL III: ICD-10-PCS | Mod: PBBFAC,HCNC,, | Performed by: SURGERY

## 2019-08-22 PROCEDURE — 36415 COLL VENOUS BLD VENIPUNCTURE: CPT | Mod: HCNC,PO

## 2019-08-22 PROCEDURE — 99999 PR PBB SHADOW E&M-EST. PATIENT-LVL III: CPT | Mod: PBBFAC,HCNC,, | Performed by: SURGERY

## 2019-08-22 PROCEDURE — 85025 COMPLETE CBC W/AUTO DIFF WBC: CPT | Mod: HCNC

## 2019-08-22 PROCEDURE — 1101F PR PT FALLS ASSESS DOC 0-1 FALLS W/OUT INJ PAST YR: ICD-10-PCS | Mod: HCNC,CPTII,S$GLB, | Performed by: SURGERY

## 2019-08-22 NOTE — PATIENT INSTRUCTIONS
Surgery is scheduled for 9/18/19  arrival time will be given by the the preop nurse.  The preop nurse will call you from [phone]  Nothing to eat or drink after midnight.  Adult to drive you home.      THE PREOP NURSE WILL CALL, SOMETIMES AS LATE AS 4 or 5 PM IN THE AFTERNOON THE DAY BEFORE SURGERY.    Bathe the night before and the morning of your procedure with a Chlorhexidine wash such as Hibiclens, can be purchased at most Pharmacy's no prescription needed.    Special Instruction: none

## 2019-08-22 NOTE — LETTER
August 23, 2019      Ramonita Koehler, HARRY  [address] [facility] [address] [place] [address]           [facility]  [address] [facility] [address] [place] [address]  Phone: [phone]          Patient: Summer Inman   MR Number: [id number]   YOB: 1935   Date of Visit: 8/22/2019       Dear Ramonita Koehler:    Thank you for referring Summer Inman to me for evaluation. Attached you will find relevant portions of my assessment and plan of care.    If you have questions, please do not hesitate to call me. I look forward to following Summer Inman along with you.    Sincerely,    Eric Mathias MD    Enclosure  CC:  No Recipients    If you would like to receive this communication electronically, please contact [email or web address] or [phone] to request more information on [employer] Link access.    For providers and/or their staff who would like to refer a patient to [facility], please contact us through our one-stop-shop provider referral line, Rupali Bright, at [phone].    If you feel you have received this communication in error or would no longer like to receive these types of communications, please e-mail [email or web address]

## 2019-08-23 NOTE — H&P (VIEW-ONLY)
Subjective:       Patient ID: Summer Inman is a 83 y.o. female.    Chief Complaint: Consult ([facility] [place])    HPI  Pleasant 84 yo F referred to me for evaluation of biliary colic. Pt notes that she has been having pain and tenderness in the epigastric region and into her RUQ.  NOtes that the pain is associated with dietary intake.  Has been having this for several months but notes that it has been getting worse.  Has also had nausea.  No fever/chills.  She does have history of GERD but notes that this seems different from her GERD.  Pt has HTN, CAD, pulm htn.  She has no significant PShx.     Review of Systems   Constitutional: Negative for activity change, appetite change, fever and unexpected weight change.   Respiratory: Negative for chest tightness, shortness of breath and wheezing.    Cardiovascular: Negative for chest pain.   Gastrointestinal: Positive for abdominal pain and nausea. Negative for abdominal distention, anal bleeding, blood in stool, constipation, diarrhea, rectal pain and vomiting.   Genitourinary: Negative for difficulty urinating, dysuria and frequency.   Skin: Negative for color change and wound.   Neurological: Negative for dizziness.   Hematological: Negative for adenopathy.   Psychiatric/Behavioral: Negative for agitation.       Objective:      Physical Exam   Constitutional: She is oriented to person, place, and time. She appears well-developed and well-nourished.   HENT:   Head: Normocephalic and atraumatic.   Eyes: Pupils are equal, round, and reactive to light.   Neck: Normal range of motion. Neck supple. No tracheal deviation present. No thyromegaly present.   Cardiovascular: Normal rate and regular rhythm.   Murmur heard.  Pulmonary/Chest: Effort normal and breath sounds normal. She exhibits no tenderness.   Abdominal: Soft. Bowel sounds are normal. She exhibits no distension, no abdominal bruit, no pulsatile midline mass and no mass. There is no hepatosplenomegaly. There is no  tenderness. There is no rigidity, no rebound, no guarding, no tenderness at McBurney's point and negative Marroquin's sign. No hernia. Hernia confirmed negative in the ventral area.   Genitourinary: Rectum normal.   Musculoskeletal: Normal range of motion.   Neurological: She is alert and oriented to person, place, and time.   Skin: Skin is warm. No rash noted. No erythema.   Psychiatric: She has a normal mood and affect. Her behavior is normal.   Vitals reviewed.        US: 7/18  The gallbladder demonstrates a reportedly mobile echogenicity of 9 mm suggestive a stone with a 2nd similar stones suspected of 8 mm also reportedly mobile.  No evidence of gallbladder wall thickening, pericholecystic fluid, or reported sonographic Marroquin sign is noted.  A common bile duct of 4 mm is noted which is within normal limits.    Lab Results   Component Value Date    WBC 8.90 08/22/2019    HGB 12.7 08/22/2019    HCT 40.4 08/22/2019    MCV 84 08/22/2019     08/22/2019     CMP  Sodium   Date Value Ref Range Status   08/22/2019 134 (L) 136 - 145 mmol/L Final     Potassium   Date Value Ref Range Status   08/22/2019 3.3 (L) 3.5 - 5.1 mmol/L Final     Chloride   Date Value Ref Range Status   08/22/2019 95 95 - 110 mmol/L Final     CO2   Date Value Ref Range Status   08/22/2019 29 23 - 29 mmol/L Final     Glucose   Date Value Ref Range Status   08/22/2019 107 70 - 110 mg/dL Final     BUN, Bld   Date Value Ref Range Status   08/22/2019 22 8 - 23 mg/dL Final     Creatinine   Date Value Ref Range Status   08/22/2019 1.0 0.5 - 1.4 mg/dL Final     Calcium   Date Value Ref Range Status   08/22/2019 10.0 8.7 - 10.5 mg/dL Final     Total Protein   Date Value Ref Range Status   08/22/2019 7.4 6.0 - 8.4 g/dL Final     Albumin   Date Value Ref Range Status   08/22/2019 3.8 3.5 - 5.2 g/dL Final     Total Bilirubin   Date Value Ref Range Status   08/22/2019 0.2 0.1 - 1.0 mg/dL Final     Comment:     For infants and newborns, interpretation of  results should be based  on gestational age, weight and in agreement with clinical  observations.  Premature Infant recommended reference ranges:  Up to 24 hours.............<8.0 mg/dL  Up to 48 hours............<12.0 mg/dL  3-5 days..................<15.0 mg/dL  6-29 days.................<15.0 mg/dL       Alkaline Phosphatase   Date Value Ref Range Status   08/22/2019 133 55 - 135 U/L Final     AST ([place])   Date Value Ref Range Status   01/31/2016 24 14 - 36 U/L Final     AST   Date Value Ref Range Status   08/22/2019 16 10 - 40 U/L Final     ALT   Date Value Ref Range Status   08/22/2019 13 10 - 44 U/L Final     Anion Gap   Date Value Ref Range Status   08/22/2019 10 8 - 16 mmol/L Final     eGFR if    Date Value Ref Range Status   08/22/2019 >60.0 >60 mL/min/1.73 m^2 Final     eGFR if non    Date Value Ref Range Status   08/22/2019 52.6 (A) >60 mL/min/1.73 m^2 Final     Comment:     Calculation used to obtain the estimated glomerular filtration  rate (eGFR) is the CKD-EPI equation.          Assessment:       1. Biliary colic        Plan:       D/w pt. I have recommended and offerd to perform lap tyesha at a time that is convenient to pt.  She prefers to wait until mid sept.  Risks and benefits were d/w pt and informed consent obtained.

## 2019-08-26 ENCOUNTER — TELEPHONE (OUTPATIENT)
Dept: GASTROENTEROLOGY | Facility: CLINIC | Age: 84
End: 2019-08-26

## 2019-08-26 NOTE — TELEPHONE ENCOUNTER
Spoke with pt and confirmed pt cancelling procedure scheduled for 8/27/19. Pt agreed and procedure was cancelled per pt request. Pt stated she would call back to reschedule.

## 2019-08-26 NOTE — TELEPHONE ENCOUNTER
----- Message from Aura Aviles sent at 8/26/2019  9:39 AM CDT -----  Contact: pt [phone]  Patient called to cancel her procedure. She will call back to reschedule.

## 2019-08-28 ENCOUNTER — PATIENT OUTREACH (OUTPATIENT)
Dept: ADMINISTRATIVE | Facility: HOSPITAL | Age: 84
End: 2019-08-28

## 2019-08-28 RX ORDER — LIDOCAINE HYDROCHLORIDE 10 MG/ML
1 INJECTION, SOLUTION EPIDURAL; INFILTRATION; INTRACAUDAL; PERINEURAL ONCE
Status: CANCELLED | OUTPATIENT
Start: 2019-08-28 | End: 2019-08-28

## 2019-08-28 RX ORDER — SODIUM CHLORIDE 9 MG/ML
INJECTION, SOLUTION INTRAVENOUS CONTINUOUS
Status: CANCELLED | OUTPATIENT
Start: 2019-08-28

## 2019-08-28 RX ORDER — METRONIDAZOLE 500 MG/100ML
500 INJECTION, SOLUTION INTRAVENOUS
Status: CANCELLED | OUTPATIENT
Start: 2019-08-28

## 2019-08-30 ENCOUNTER — TELEPHONE (OUTPATIENT)
Dept: FAMILY MEDICINE | Facility: CLINIC | Age: 84
End: 2019-08-30

## 2019-08-30 DIAGNOSIS — R52 PAIN: ICD-10-CM

## 2019-08-30 RX ORDER — HYDROCODONE BITARTRATE AND ACETAMINOPHEN 10; 325 MG/1; MG/1
1 TABLET ORAL 3 TIMES DAILY PRN
Qty: 90 TABLET | Refills: 0 | Status: ON HOLD | OUTPATIENT
Start: 2019-08-30 | End: 2019-09-18 | Stop reason: HOSPADM

## 2019-08-30 NOTE — TELEPHONE ENCOUNTER
----- Message from Adrianne Hills sent at 8/30/2019  1:09 PM CDT -----  Type:  RX Refill Request    Who Called:  Patient  RX Name and Strength: HYDROcodone-acetaminophen (NORCO)  mg per tablet  Preferred Pharmacy with phone number:  [facility]  Best Call Back Number:  [phone]  Additional Information:

## 2019-09-11 ENCOUNTER — OFFICE VISIT (OUTPATIENT)
Dept: FAMILY MEDICINE | Facility: CLINIC | Age: 84
End: 2019-09-11
Payer: MEDICARE

## 2019-09-11 VITALS
HEART RATE: 76 BPM | WEIGHT: 149.69 LBS | HEIGHT: 60 IN | BODY MASS INDEX: 29.39 KG/M2 | SYSTOLIC BLOOD PRESSURE: 92 MMHG | RESPIRATION RATE: 16 BRPM | DIASTOLIC BLOOD PRESSURE: 68 MMHG | TEMPERATURE: 99 F

## 2019-09-11 DIAGNOSIS — G89.29 CHRONIC PAIN OF MULTIPLE SITES: ICD-10-CM

## 2019-09-11 DIAGNOSIS — E78.5 HYPERLIPIDEMIA, UNSPECIFIED HYPERLIPIDEMIA TYPE: ICD-10-CM

## 2019-09-11 DIAGNOSIS — F11.20 CHRONIC NARCOTIC DEPENDENCE: ICD-10-CM

## 2019-09-11 DIAGNOSIS — J44.9 CHRONIC OBSTRUCTIVE PULMONARY DISEASE, UNSPECIFIED COPD TYPE: ICD-10-CM

## 2019-09-11 DIAGNOSIS — I10 ESSENTIAL HYPERTENSION: Primary | ICD-10-CM

## 2019-09-11 DIAGNOSIS — R52 CHRONIC PAIN OF MULTIPLE SITES: ICD-10-CM

## 2019-09-11 DIAGNOSIS — M51.36 DDD (DEGENERATIVE DISC DISEASE), LUMBAR: ICD-10-CM

## 2019-09-11 DIAGNOSIS — J30.1 ALLERGIC RHINITIS DUE TO POLLEN, UNSPECIFIED SEASONALITY: ICD-10-CM

## 2019-09-11 PROCEDURE — 99214 OFFICE O/P EST MOD 30 MIN: CPT | Mod: HCNC,S$GLB,, | Performed by: FAMILY MEDICINE

## 2019-09-11 PROCEDURE — 3078F DIAST BP <80 MM HG: CPT | Mod: HCNC,CPTII,S$GLB, | Performed by: FAMILY MEDICINE

## 2019-09-11 PROCEDURE — 3078F PR MOST RECENT DIASTOLIC BLOOD PRESSURE < 80 MM HG: ICD-10-PCS | Mod: HCNC,CPTII,S$GLB, | Performed by: FAMILY MEDICINE

## 2019-09-11 PROCEDURE — 3074F PR MOST RECENT SYSTOLIC BLOOD PRESSURE < 130 MM HG: ICD-10-PCS | Mod: HCNC,CPTII,S$GLB, | Performed by: FAMILY MEDICINE

## 2019-09-11 PROCEDURE — 3074F SYST BP LT 130 MM HG: CPT | Mod: HCNC,CPTII,S$GLB, | Performed by: FAMILY MEDICINE

## 2019-09-11 PROCEDURE — 1101F PR PT FALLS ASSESS DOC 0-1 FALLS W/OUT INJ PAST YR: ICD-10-PCS | Mod: HCNC,CPTII,S$GLB, | Performed by: FAMILY MEDICINE

## 2019-09-11 PROCEDURE — 1101F PT FALLS ASSESS-DOCD LE1/YR: CPT | Mod: HCNC,CPTII,S$GLB, | Performed by: FAMILY MEDICINE

## 2019-09-11 PROCEDURE — 99214 PR OFFICE/OUTPT VISIT, EST, LEVL IV, 30-39 MIN: ICD-10-PCS | Mod: HCNC,S$GLB,, | Performed by: FAMILY MEDICINE

## 2019-09-11 PROCEDURE — 99999 PR PBB SHADOW E&M-EST. PATIENT-LVL III: CPT | Mod: PBBFAC,HCNC,, | Performed by: FAMILY MEDICINE

## 2019-09-11 PROCEDURE — 99999 PR PBB SHADOW E&M-EST. PATIENT-LVL III: ICD-10-PCS | Mod: PBBFAC,HCNC,, | Performed by: FAMILY MEDICINE

## 2019-09-11 NOTE — PROGRESS NOTES
THIS DOCUMENT WAS MADE IN PART WITH VOICE RECOGNITION SOFTWARE.  OCCASIONALLY THIS SOFTWARE WILL MISINTERPRET WORDS OR PHRASES.      Summer Inman  1935    Summer was seen today for hypertension.    Diagnoses and all orders for this visit:    Essential hypertension  Blood pressure appears low today.  She does have chronic fatigue weakness and lightheadedness but this may be multifactorial.  I recommend drinking more water.  Monitor more closely at home.  May need to discuss medication reduction or even follow back with Cardiology if not improving    Chronic obstructive pulmonary disease, unspecified COPD type  Stable no recent exacerbations    Hyperlipidemia, unspecified hyperlipidemia type  Stable    Allergic rhinitis due to pollen, unspecified seasonality  I recommend that she restart Flonase and use regularly.    DDD (degenerative disc disease), lumbar  Chronic pain of multiple sites  Chronic narcotic dependence  Relatively stable.  Still requiring hydrocodone.  She does not wish to follow back with pain management for alternative options right now.  It does appear to improve quality of life.  Continue to follow every 3 months.      Subjective     Chief Complaint   Patient presents with    Hypertension     follow up       HPI  Routine follow-up multiple conditions.  She denies any acute changes or exacerbation.  Details discussed above  HPI elements addressed above in the assessment and plan including problems, diagnosis, stability/instability,  improving/worsening, and chronicity will not be duplicated in this section. Any important additional HPI topics will be discussed here if needed.    Active Ambulatory Problems     Diagnosis Date Noted    Hyperlipidemia     Gastroesophageal reflux disease     Seizure disorder     Chronic cough 03/08/2016    Anemia 03/10/2016    DDD (degenerative disc disease), lumbar 06/15/2016    Lumbosacral radiculopathy 06/15/2016    Right hip pain 06/15/2016    Coronary  artery disease involving native coronary artery of native heart without angina pectoris 10/17/2016    Essential hypertension 10/17/2016    Chronic obstructive pulmonary disease 10/17/2016    History of stroke 10/17/2016    Depression, major, recurrent, mild 10/17/2016    Insomnia 10/17/2016    Pulmonary hypertension 10/17/2016    Osteopenia 10/17/2016    Dysphagia 03/08/2018    Memory loss 07/26/2018    Seizures     Chronic narcotic dependence 06/07/2019    Atherosclerosis of aorta 06/07/2019    Chronic pain of multiple sites 01/30/2000    Chronic kidney disease, stage III (moderate) 06/17/2019     Resolved Ambulatory Problems     Diagnosis Date Noted    Chronic kidney disease, stage II (mild) 12/26/2012    Dysphagia 10/08/2014    Cervical radiculopathy 02/02/2015    UTI (urinary tract infection) 01/31/2016    Elevated lactic acid level 01/31/2016    Abdominal pain 01/31/2016    Cervical spondylosis with myelopathy 03/07/2016    Shortness of breath 03/08/2016    Obesity, Class I, BMI 30-34.9 03/08/2016    Hypokalemia 03/09/2016    Closed Colles' fracture of right radius 04/26/2017     Past Medical History:   Diagnosis Date    Anticoagulant long-term use     Arthritis     Asthma     Atrial premature beats     Benign neoplasm of adrenal gland     CKD (chronic kidney disease)     Colon polyp     COPD (chronic obstructive pulmonary disease)     Coronary artery disease     Depression     Dizziness     First degree AV block     Gastroesophageal reflux disease     General anesthetics causing adverse effect in therapeutic use     Hepatomegaly     Hyperlipidemia     Hypertension     Impaired mobility     Neck pain     Schatzki's ring     Seasonal allergies     Seizures     Stroke     Trouble in sleeping     Wears dentures     Wears glasses        Review of Systems   Constitutional: Positive for activity change and fatigue. Negative for unexpected weight change.   HENT:  Negative for trouble swallowing.    Eyes: Negative for visual disturbance.   Respiratory: Positive for shortness of breath. Negative for wheezing.    Cardiovascular: Negative for chest pain, palpitations and leg swelling.   Gastrointestinal: Positive for constipation.   Endocrine: Negative for polydipsia and polyuria.   Genitourinary: Negative for difficulty urinating, dysuria and hematuria.   Musculoskeletal: Positive for arthralgias, back pain, gait problem and neck stiffness.   Neurological: Positive for weakness and light-headedness.       Objective     Physical Exam   Constitutional: She is oriented to person, place, and time.  Non-toxic appearance. No distress.   Elderly  female.    HENT:   Head: Normocephalic and atraumatic.   Right Ear: Tympanic membrane, external ear and ear canal normal.   Left Ear: Tympanic membrane, external ear and ear canal normal.   Nose: Nose normal.   Mouth/Throat: Oropharynx is clear and moist. No oropharyngeal exudate.   Eyes: Pupils are equal, round, and reactive to light. Conjunctivae and EOM are normal. No scleral icterus.   Neck: Normal range of motion. Neck supple. No thyromegaly present.   Cardiovascular: Normal rate, regular rhythm and intact distal pulses. PMI is not displaced. Exam reveals no gallop and no friction rub.   Murmur heard.  Pulmonary/Chest: Effort normal and breath sounds normal. No respiratory distress. She has no wheezes. She has no rales.   Lymphadenopathy:     She has no cervical adenopathy.   Neurological: She is alert and oriented to person, place, and time. She displays normal reflexes. No cranial nerve deficit. She exhibits normal muscle tone.   In wheelchair, unable to walk long distances because of pain and fatigue.   Skin: She is not diaphoretic.   Psychiatric: She has a normal mood and affect. Her behavior is normal.   Vitals reviewed.    Vitals:    09/11/19 1410   BP: 92/68   BP Location: Left arm   Patient Position: Sitting   BP Method:  Medium (Manual)   Pulse: 76   Resp: 16   Temp: 98.7 °F (37.1 °C)   TempSrc: Oral   Weight: 67.9 kg (149 lb 11.1 oz)   Height: 5' (1.524 m)       MOST RECENT LABS IN OUR ELECTRONIC MEDICAL RECORD:     Results for orders placed or performed in visit on 08/22/19   Comprehensive metabolic panel   Result Value Ref Range    Sodium 134 (L) 136 - 145 mmol/L    Potassium 3.3 (L) 3.5 - 5.1 mmol/L    Chloride 95 95 - 110 mmol/L    CO2 29 23 - 29 mmol/L    Glucose 107 70 - 110 mg/dL    BUN, Bld 22 8 - 23 mg/dL    Creatinine 1.0 0.5 - 1.4 mg/dL    Calcium 10.0 8.7 - 10.5 mg/dL    Total Protein 7.4 6.0 - 8.4 g/dL    Albumin 3.8 3.5 - 5.2 g/dL    Total Bilirubin 0.2 0.1 - 1.0 mg/dL    Alkaline Phosphatase 133 55 - 135 U/L    AST 16 10 - 40 U/L    ALT 13 10 - 44 U/L    Anion Gap 10 8 - 16 mmol/L    eGFR if African American >60.0 >60 mL/min/1.73 m^2    eGFR if non  52.6 (A) >60 mL/min/1.73 m^2   CBC auto differential   Result Value Ref Range    WBC 8.90 3.90 - 12.70 K/uL    RBC 4.81 4.00 - 5.40 M/uL    Hemoglobin 12.7 12.0 - 16.0 g/dL    Hematocrit 40.4 37.0 - 48.5 %    Mean Corpuscular Volume 84 82 - 98 fL    Mean Corpuscular Hemoglobin 26.4 (L) 27.0 - 31.0 pg    Mean Corpuscular Hemoglobin Conc 31.4 (L) 32.0 - 36.0 g/dL    RDW 15.1 (H) 11.5 - 14.5 %    Platelets 334 150 - 350 K/uL    MPV 11.3 9.2 - 12.9 fL    Immature Granulocytes 0.3 0.0 - 0.5 %    Gran # (ANC) 5.4 1.8 - 7.7 K/uL    Immature Grans (Abs) 0.03 0.00 - 0.04 K/uL    Lymph # 2.2 1.0 - 4.8 K/uL    Mono # 1.0 0.3 - 1.0 K/uL    Eos # 0.2 0.0 - 0.5 K/uL    Baso # 0.07 0.00 - 0.20 K/uL    nRBC 0 0 /100 WBC    Gran% 61.2 38.0 - 73.0 %    Lymph% 24.3 18.0 - 48.0 %    Mono% 11.2 4.0 - 15.0 %    Eosinophil% 2.2 0.0 - 8.0 %    Basophil% 0.8 0.0 - 1.9 %    Differential Method Automated

## 2019-09-17 ENCOUNTER — ANESTHESIA EVENT (OUTPATIENT)
Dept: SURGERY | Facility: HOSPITAL | Age: 84
End: 2019-09-17
Payer: MEDICARE

## 2019-09-18 ENCOUNTER — ANESTHESIA (OUTPATIENT)
Dept: SURGERY | Facility: HOSPITAL | Age: 84
End: 2019-09-18
Payer: MEDICARE

## 2019-09-18 ENCOUNTER — HOSPITAL ENCOUNTER (OUTPATIENT)
Facility: HOSPITAL | Age: 84
Discharge: HOME | End: 2019-09-18
Attending: SURGERY | Admitting: SURGERY
Payer: MEDICARE

## 2019-09-18 DIAGNOSIS — K44.9 HIATAL HERNIA: ICD-10-CM

## 2019-09-18 DIAGNOSIS — K80.50 BILIARY COLIC: ICD-10-CM

## 2019-09-18 DIAGNOSIS — K21.9 GASTROESOPHAGEAL REFLUX DISEASE WITHOUT ESOPHAGITIS: ICD-10-CM

## 2019-09-18 DIAGNOSIS — Z87.19 HISTORY OF GASTRITIS: ICD-10-CM

## 2019-09-18 DIAGNOSIS — R10.13 EPIGASTRIC PAIN: ICD-10-CM

## 2019-09-18 PROCEDURE — S0030 INJECTION, METRONIDAZOLE: HCPCS | Mod: HCNC,PO | Performed by: SURGERY

## 2019-09-18 PROCEDURE — 47562 LAPAROSCOPIC CHOLECYSTECTOMY: CPT | Mod: HCNC,,, | Performed by: SURGERY

## 2019-09-18 PROCEDURE — 37000008 HC ANESTHESIA 1ST 15 MINUTES: Mod: HCNC,PO | Performed by: SURGERY

## 2019-09-18 PROCEDURE — 37000009 HC ANESTHESIA EA ADD 15 MINS: Mod: HCNC,PO | Performed by: SURGERY

## 2019-09-18 PROCEDURE — 36000709 HC OR TIME LEV III EA ADD 15 MIN: Mod: HCNC,PO | Performed by: SURGERY

## 2019-09-18 PROCEDURE — 63600175 PHARM REV CODE 636 W HCPCS: Mod: HCNC,PO | Performed by: NURSE ANESTHETIST, CERTIFIED REGISTERED

## 2019-09-18 PROCEDURE — 25000003 PHARM REV CODE 250: Mod: HCNC,PO | Performed by: ANESTHESIOLOGY

## 2019-09-18 PROCEDURE — 88304 TISSUE SPECIMEN TO PATHOLOGY - SURGERY: ICD-10-PCS | Mod: 26,HCNC,, | Performed by: PATHOLOGY

## 2019-09-18 PROCEDURE — 27201423 OPTIME MED/SURG SUP & DEVICES STERILE SUPPLY: Mod: HCNC,PO | Performed by: SURGERY

## 2019-09-18 PROCEDURE — 63600175 PHARM REV CODE 636 W HCPCS: Mod: HCNC,PO | Performed by: SURGERY

## 2019-09-18 PROCEDURE — D9220A PRA ANESTHESIA: Mod: HCNC,ANES,, | Performed by: ANESTHESIOLOGY

## 2019-09-18 PROCEDURE — 71000015 HC POSTOP RECOV 1ST HR: Mod: HCNC,PO | Performed by: SURGERY

## 2019-09-18 PROCEDURE — 47562 PR LAP,CHOLECYSTECTOMY: ICD-10-PCS | Mod: HCNC,,, | Performed by: SURGERY

## 2019-09-18 PROCEDURE — 88304 TISSUE EXAM BY PATHOLOGIST: CPT | Mod: HCNC | Performed by: PATHOLOGY

## 2019-09-18 PROCEDURE — D9220A PRA ANESTHESIA: ICD-10-PCS | Mod: HCNC,ANES,, | Performed by: ANESTHESIOLOGY

## 2019-09-18 PROCEDURE — 63600175 PHARM REV CODE 636 W HCPCS: Mod: HCNC,PO | Performed by: ANESTHESIOLOGY

## 2019-09-18 PROCEDURE — 88304 TISSUE EXAM BY PATHOLOGIST: CPT | Mod: 26,HCNC,, | Performed by: PATHOLOGY

## 2019-09-18 PROCEDURE — 25000003 PHARM REV CODE 250: Mod: HCNC,PO | Performed by: NURSE ANESTHETIST, CERTIFIED REGISTERED

## 2019-09-18 PROCEDURE — 36000708 HC OR TIME LEV III 1ST 15 MIN: Mod: HCNC,PO | Performed by: SURGERY

## 2019-09-18 PROCEDURE — 25000003 PHARM REV CODE 250: Mod: HCNC,PO | Performed by: SURGERY

## 2019-09-18 PROCEDURE — 71000033 HC RECOVERY, INTIAL HOUR: Mod: HCNC,PO | Performed by: SURGERY

## 2019-09-18 RX ORDER — METRONIDAZOLE 500 MG/100ML
500 INJECTION, SOLUTION INTRAVENOUS
Status: COMPLETED | OUTPATIENT
Start: 2019-09-18 | End: 2019-09-18

## 2019-09-18 RX ORDER — POTASSIUM CHLORIDE 750 MG/1
TABLET, EXTENDED RELEASE ORAL
Qty: 90 TABLET | Refills: 1 | Status: SHIPPED | OUTPATIENT
Start: 2019-09-18 | End: 2019-11-11 | Stop reason: SDUPTHER

## 2019-09-18 RX ORDER — PROPOFOL 10 MG/ML
VIAL (ML) INTRAVENOUS
Status: DISCONTINUED | OUTPATIENT
Start: 2019-09-18 | End: 2019-09-18

## 2019-09-18 RX ORDER — LIDOCAINE HCL/PF 100 MG/5ML
SYRINGE (ML) INTRAVENOUS
Status: DISCONTINUED | OUTPATIENT
Start: 2019-09-18 | End: 2019-09-18

## 2019-09-18 RX ORDER — DIPHENHYDRAMINE HYDROCHLORIDE 50 MG/ML
25 INJECTION INTRAMUSCULAR; INTRAVENOUS EVERY 6 HOURS PRN
Status: DISCONTINUED | OUTPATIENT
Start: 2019-09-18 | End: 2019-09-18 | Stop reason: HOSPADM

## 2019-09-18 RX ORDER — ONDANSETRON 2 MG/ML
4 INJECTION INTRAMUSCULAR; INTRAVENOUS EVERY 12 HOURS PRN
Status: DISCONTINUED | OUTPATIENT
Start: 2019-09-18 | End: 2019-09-18 | Stop reason: HOSPADM

## 2019-09-18 RX ORDER — LIDOCAINE HYDROCHLORIDE 10 MG/ML
1 INJECTION, SOLUTION EPIDURAL; INFILTRATION; INTRACAUDAL; PERINEURAL ONCE
Status: DISCONTINUED | OUTPATIENT
Start: 2019-09-18 | End: 2019-09-18 | Stop reason: HOSPADM

## 2019-09-18 RX ORDER — OXYCODONE HYDROCHLORIDE 5 MG/1
5 TABLET ORAL
Status: DISCONTINUED | OUTPATIENT
Start: 2019-09-18 | End: 2019-09-18 | Stop reason: HOSPADM

## 2019-09-18 RX ORDER — BUPIVACAINE HYDROCHLORIDE AND EPINEPHRINE 2.5; 5 MG/ML; UG/ML
INJECTION, SOLUTION EPIDURAL; INFILTRATION; INTRACAUDAL; PERINEURAL
Status: DISCONTINUED | OUTPATIENT
Start: 2019-09-18 | End: 2019-09-18 | Stop reason: HOSPADM

## 2019-09-18 RX ORDER — HYDROMORPHONE HYDROCHLORIDE 2 MG/ML
0.2 INJECTION, SOLUTION INTRAMUSCULAR; INTRAVENOUS; SUBCUTANEOUS EVERY 5 MIN PRN
Status: DISCONTINUED | OUTPATIENT
Start: 2019-09-18 | End: 2019-09-18 | Stop reason: HOSPADM

## 2019-09-18 RX ORDER — NEOSTIGMINE METHYLSULFATE 1 MG/ML
INJECTION, SOLUTION INTRAVENOUS
Status: DISCONTINUED | OUTPATIENT
Start: 2019-09-18 | End: 2019-09-18

## 2019-09-18 RX ORDER — HYDROCODONE BITARTRATE AND ACETAMINOPHEN 5; 325 MG/1; MG/1
1 TABLET ORAL EVERY 6 HOURS PRN
Qty: 20 TABLET | Refills: 0 | Status: SHIPPED | OUTPATIENT
Start: 2019-09-18 | End: 2019-11-11 | Stop reason: SDUPTHER

## 2019-09-18 RX ORDER — ONDANSETRON 2 MG/ML
INJECTION INTRAMUSCULAR; INTRAVENOUS
Status: DISCONTINUED | OUTPATIENT
Start: 2019-09-18 | End: 2019-09-18

## 2019-09-18 RX ORDER — SODIUM CHLORIDE 9 MG/ML
INJECTION, SOLUTION INTRAVENOUS CONTINUOUS
Status: DISCONTINUED | OUTPATIENT
Start: 2019-09-18 | End: 2019-09-18 | Stop reason: HOSPADM

## 2019-09-18 RX ORDER — MEPERIDINE HYDROCHLORIDE 50 MG/ML
12.5 INJECTION INTRAMUSCULAR; INTRAVENOUS; SUBCUTANEOUS ONCE AS NEEDED
Status: DISCONTINUED | OUTPATIENT
Start: 2019-09-18 | End: 2019-09-18 | Stop reason: HOSPADM

## 2019-09-18 RX ORDER — FENTANYL CITRATE 50 UG/ML
INJECTION, SOLUTION INTRAMUSCULAR; INTRAVENOUS
Status: DISCONTINUED | OUTPATIENT
Start: 2019-09-18 | End: 2019-09-18

## 2019-09-18 RX ORDER — DEXAMETHASONE SODIUM PHOSPHATE 4 MG/ML
8 INJECTION, SOLUTION INTRA-ARTICULAR; INTRALESIONAL; INTRAMUSCULAR; INTRAVENOUS; SOFT TISSUE
Status: DISCONTINUED | OUTPATIENT
Start: 2019-09-18 | End: 2019-09-18 | Stop reason: HOSPADM

## 2019-09-18 RX ORDER — FENTANYL CITRATE 50 UG/ML
25 INJECTION, SOLUTION INTRAMUSCULAR; INTRAVENOUS EVERY 5 MIN PRN
Status: DISCONTINUED | OUTPATIENT
Start: 2019-09-18 | End: 2019-09-18 | Stop reason: HOSPADM

## 2019-09-18 RX ORDER — GLYCOPYRROLATE 0.2 MG/ML
INJECTION INTRAMUSCULAR; INTRAVENOUS
Status: DISCONTINUED | OUTPATIENT
Start: 2019-09-18 | End: 2019-09-18

## 2019-09-18 RX ORDER — ROCURONIUM BROMIDE 10 MG/ML
INJECTION, SOLUTION INTRAVENOUS
Status: DISCONTINUED | OUTPATIENT
Start: 2019-09-18 | End: 2019-09-18

## 2019-09-18 RX ORDER — SODIUM CHLORIDE, SODIUM LACTATE, POTASSIUM CHLORIDE, CALCIUM CHLORIDE 600; 310; 30; 20 MG/100ML; MG/100ML; MG/100ML; MG/100ML
INJECTION, SOLUTION INTRAVENOUS CONTINUOUS
Status: DISCONTINUED | OUTPATIENT
Start: 2019-09-18 | End: 2019-09-18 | Stop reason: HOSPADM

## 2019-09-18 RX ORDER — OMEPRAZOLE 40 MG/1
CAPSULE, DELAYED RELEASE ORAL
Qty: 90 CAPSULE | Refills: 1 | Status: SHIPPED | OUTPATIENT
Start: 2019-09-18 | End: 2020-02-27 | Stop reason: SDUPTHER

## 2019-09-18 RX ORDER — SUCCINYLCHOLINE CHLORIDE 20 MG/ML
INJECTION INTRAMUSCULAR; INTRAVENOUS
Status: DISCONTINUED | OUTPATIENT
Start: 2019-09-18 | End: 2019-09-18

## 2019-09-18 RX ORDER — HYDROCODONE BITARTRATE AND ACETAMINOPHEN 5; 325 MG/1; MG/1
1 TABLET ORAL EVERY 4 HOURS PRN
Status: DISCONTINUED | OUTPATIENT
Start: 2019-09-18 | End: 2019-09-18 | Stop reason: HOSPADM

## 2019-09-18 RX ORDER — ACETAMINOPHEN 10 MG/ML
INJECTION, SOLUTION INTRAVENOUS
Status: DISCONTINUED | OUTPATIENT
Start: 2019-09-18 | End: 2019-09-18

## 2019-09-18 RX ORDER — CEFTRIAXONE 2 G/50ML
2 INJECTION, SOLUTION INTRAVENOUS
Status: COMPLETED | OUTPATIENT
Start: 2019-09-18 | End: 2019-09-18

## 2019-09-18 RX ADMIN — GLYCOPYRROLATE 0.4 MG: 0.2 INJECTION, SOLUTION INTRAMUSCULAR; INTRAVENOUS at 10:09

## 2019-09-18 RX ADMIN — ROCURONIUM BROMIDE 10 MG: 10 INJECTION, SOLUTION INTRAVENOUS at 09:09

## 2019-09-18 RX ADMIN — SUCCINYLCHOLINE CHLORIDE 20 MG: 20 INJECTION, SOLUTION INTRAMUSCULAR; INTRAVENOUS at 09:09

## 2019-09-18 RX ADMIN — FENTANYL CITRATE 25 MCG: 50 INJECTION, SOLUTION INTRAMUSCULAR; INTRAVENOUS at 10:09

## 2019-09-18 RX ADMIN — FENTANYL CITRATE 50 MCG: 50 INJECTION, SOLUTION INTRAMUSCULAR; INTRAVENOUS at 10:09

## 2019-09-18 RX ADMIN — PROPOFOL 50 MG: 10 INJECTION, EMULSION INTRAVENOUS at 09:09

## 2019-09-18 RX ADMIN — METRONIDAZOLE 500 MG: 500 INJECTION, SOLUTION INTRAVENOUS at 09:09

## 2019-09-18 RX ADMIN — SODIUM CHLORIDE, SODIUM LACTATE, POTASSIUM CHLORIDE, AND CALCIUM CHLORIDE: .6; .31; .03; .02 INJECTION, SOLUTION INTRAVENOUS at 09:09

## 2019-09-18 RX ADMIN — FENTANYL CITRATE 25 MCG: 50 INJECTION, SOLUTION INTRAMUSCULAR; INTRAVENOUS at 09:09

## 2019-09-18 RX ADMIN — ACETAMINOPHEN 1000 MG: 10 INJECTION, SOLUTION INTRAVENOUS at 09:09

## 2019-09-18 RX ADMIN — ONDANSETRON 4 MG: 2 INJECTION, SOLUTION INTRAMUSCULAR; INTRAVENOUS at 09:09

## 2019-09-18 RX ADMIN — CEFTRIAXONE SODIUM 2 G: 2 INJECTION, SOLUTION INTRAVENOUS at 09:09

## 2019-09-18 RX ADMIN — FENTANYL CITRATE 25 MCG: 50 INJECTION INTRAMUSCULAR; INTRAVENOUS at 10:09

## 2019-09-18 RX ADMIN — OXYCODONE HYDROCHLORIDE 5 MG: 5 TABLET ORAL at 10:09

## 2019-09-18 RX ADMIN — NEOSTIGMINE METHYLSULFATE 4 MG: 1 INJECTION INTRAVENOUS at 10:09

## 2019-09-18 RX ADMIN — LIDOCAINE HYDROCHLORIDE 50 MG: 20 INJECTION PARENTERAL at 09:09

## 2019-09-18 NOTE — ANESTHESIA PREPROCEDURE EVALUATION
09/18/2019  Summer Inman is a 83 y.o., female.    Anesthesia Evaluation    I have reviewed the Patient Summary Reports.    I have reviewed the Nursing Notes.   I have reviewed the Medications.     Review of Systems  Anesthesia Hx:  Hx of Anesthetic complications    Social:  Former Smoker Smoking Status: Former Smoker - 9.5 pack years  Quit Smoking: 10/08/13  Smokeless Tobacco Status: Never Used  Alcohol use: No  Drug use: No       Cardiovascular:   Hypertension, poorly controlled CAD  Dysrhythmias     Pulmonary:   COPD Asthma    Renal/:   Chronic Renal Disease    Hepatic/GI:   GERD Liver Disease,    Musculoskeletal:   Arthritis     Neurological:   CVA Neuromuscular Disease, Seizures   Peripheral Neuropathy    Psych:   Psychiatric History          Physical Exam  General:  Well nourished    Airway/Jaw/Neck:  Airway Findings: Mouth Opening: Normal Tongue: Normal  General Airway Assessment: Adult, Good  Mallampati: II  Improves to II with phonation.  TM Distance: 4-6 cm      Dental:  Dental Findings: In tact   Chest/Lungs:  Chest/Lungs Findings: Clear to auscultation, Normal Respiratory Rate     Heart/Vascular:  Heart Findings: Rate: Normal  Rhythm: Regular Rhythm  Sounds: Normal  Heart murmur: negative       Mental Status:  Mental Status Findings:  Cooperative, Alert and Oriented         Anesthesia Plan  Type of Anesthesia, risks & benefits discussed:  Anesthesia Type:  general  Patient's Preference:   Intra-op Monitoring Plan: standard ASA monitors  Intra-op Monitoring Plan Comments:   Post Op Pain Control Plan:   Post Op Pain Control Plan Comments:   Induction:   IV  Beta Blocker:  Patient is on a Beta-Blocker and has received one dose within the past 24 hours (No further documentation required).       Informed Consent: Patient understands risks and agrees with Anesthesia plan.  Questions answered.  Anesthesia consent signed with patient.  ASA Score: 4     Day of Surgery Review of History & Physical: I have interviewed and examined the patient. I have reviewed the patient's H&P dated:  There are no significant changes.  H&P update referred to the surgeon.         Ready For Surgery From Anesthesia Perspective.

## 2019-09-18 NOTE — PLAN OF CARE
Patient signed conscents and was aware of sugery being performed. Spoke with Dr. Mathisa at bedside. Daughters at bedside to confirm medications. IV started to left hand by aarti meraz CRNA x 1 attempt.

## 2019-09-18 NOTE — DISCHARGE INSTRUCTIONS
Department of General Surgery  [facility]  LAPAROSCOPIC CHOLECYSTECTOMY    DOS:   Minimal activity for 48 hours   Regular diet as tolerated   May shower in 48 hours   May remove outer dressing in 48 hours. Leave ster-strips in place. If steri-strips get wet, pat dry. If they fall off, do not worry.   Resume home medications as prescribed.    DONT:   Do not lift anything over 15 pounds until you have been released by your physician.   Do not soak in tub   No driving for 24 hours or while taking narcotic pain medication.   DO NOT TAKE ADDITIONAL TYLENOL/ACETAMINOPHEN WHILE TAKING NARCOTIC PAIN MEDICINE THAT CONTAINS TYLENOL/ACETAMINOPHEN.    CALL PHYSICIAN FOR:   Redness, swelling, or bleeding from surgical site   Fever greater then 101.   Drainage from the incision site that appears infected.   Persistent pain not relieved by pain medication.    RETURN APPOINTMENT: Call to schedule appointment in 10-14 days    FOR EMERGENCIES: Contact  Your doctor at [phone]        Discharge Instructions: After Your Surgery  Youve just had surgery. During surgery, you were given medicine called anesthesia to keep you relaxed and free of pain. After surgery, you may have some pain or nausea. This is common. Here are some tips for feeling better and getting well after surgery.     Stay on schedule with your medicine.   Going home  Your healthcare provider will show you how to take care of yourself when you go home. He or she will also answer your questions. Have an adult family member or friend drive you home. For the first 24 hours after your surgery:  · Do not drive or use heavy equipment.  · Do not make important decisions or sign legal papers.  · Do not drink alcohol.  · Have someone stay with you, if needed. He or she can watch for problems and help keep you safe.  Be sure to go to all follow-up visits with your healthcare provider. And rest after your surgery for as long as your healthcare  provider tells you to.  Coping with pain  If you have pain after surgery, pain medicine will help you feel better. Take it as told, before pain becomes severe. Also, ask your healthcare provider or pharmacist about other ways to control pain. This might be with heat, ice, or relaxation. And follow any other instructions your surgeon or nurse gives you.  Tips for taking pain medicine  To get the best relief possible, remember these points:  · Pain medicines can upset your stomach. Taking them with a little food may help.  · Most pain relievers taken by mouth need at least 20 to 30 minutes to start to work.  · Taking medicine on a schedule can help you remember to take it. Try to time your medicine so that you can take it before starting an activity. This might be before you get dressed, go for a walk, or sit down for dinner.  · Constipation is a common side effect of pain medicines. Call your healthcare provider before taking any medicines such as laxatives or stool softeners to help ease constipation. Also ask if you should skip any foods. Drinking lots of fluids and eating foods such as fruits and vegetables that are high in fiber can also help. Remember, do not take laxatives unless your surgeon has prescribed them.  · Drinking alcohol and taking pain medicine can cause dizziness and slow your breathing. It can even be deadly. Do not drink alcohol while taking pain medicine.  · Pain medicine can make you react more slowly to things. Do not drive or run machinery while taking pain medicine.  Your healthcare provider may tell you to take acetaminophen to help ease your pain. Ask him or her how much you are supposed to take each day. Acetaminophen or other pain relievers may interact with your prescription medicines or other over-the-counter (OTC) medicines. Some prescription medicines have acetaminophen and other ingredients. Using both prescription and OTC acetaminophen for pain can cause you to overdose. Read the  labels on your OTC medicines with care. This will help you to clearly know the list of ingredients, how much to take, and any warnings. It may also help you not take too much acetaminophen. If you have questions or do not understand the information, ask your pharmacist or healthcare provider to explain it to you before you take the OTC medicine.  Managing nausea  Some people have an upset stomach after surgery. This is often because of anesthesia, pain, or pain medicine, or the stress of surgery. These tips will help you handle nausea and eat healthy foods as you get better. If you were on a special food plan before surgery, ask your healthcare provider if you should follow it while you get better. These tips may help:  · Do not push yourself to eat. Your body will tell you when to eat and how much.  · Start off with clear liquids and soup. They are easier to digest.  · Next try semi-solid foods, such as mashed potatoes, applesauce, and gelatin, as you feel ready.  · Slowly move to solid foods. Dont eat fatty, rich, or spicy foods at first.  · Do not force yourself to have 3 large meals a day. Instead eat smaller amounts more often.  · Take pain medicines with a small amount of solid food, such as crackers or toast, to avoid nausea.     Call your surgeon if  · You still have pain an hour after taking medicine. The medicine may not be strong enough.  · You feel too sleepy, dizzy, or groggy. The medicine may be too strong.  · You have side effects like nausea, vomiting, or skin changes, such as rash, itching, or hives.       If you have obstructive sleep apnea  You were given anesthesia medicine during surgery to keep you comfortable and free of pain. After surgery, you may have more apnea spells because of this medicine and other medicines you were given. The spells may last longer than usual.   At home:  · Keep using the continuous positive airway pressure (CPAP) device when you sleep. Unless your healthcare  provider tells you not to, use it when you sleep, day or night. CPAP is a common device used to treat obstructive sleep apnea.  · Talk with your provider before taking any pain medicine, muscle relaxants, or sedatives. Your provider will tell you about the possible dangers of taking these medicines.  Date Last Reviewed: 12/1/2016  © 2685-8416 [employer]. [address] [place] [address], [place] [address]. All rights reserved. This information is not intended as a substitute for professional medical care. Always follow your healthcare professional's instructions.

## 2019-09-18 NOTE — OP NOTE
DATE OF PROCEDURE:  09/18/2019.    STAFF SURGEON:  Eric Mathias M.D.    PREOPERATIVE DIAGNOSIS:  Biliary colic.    POSTOPERATIVE DIAGNOSIS:  Biliary colic.    PROCEDURE:  Laparoscopic cholecystectomy.    ANESTHESIA:  General endotracheal anesthesia.    INDICATION FOR PROCEDURE:  This is a pleasant 83-year-old female who has signs   and symptoms suggestive of biliary colic were scheduled for laparoscopic   cholecystectomy on the above-mentioned date.    DESCRIPTION OF PROCEDURE:  Following signing of informed consent, she received   preoperative antibiotics, taken to the OR and placed in supine position.  SCDs   were placed.  General anesthesia was administered without event.  Abdomen was   prepped and draped in standard fashion and appropriate timeout procedure was   then performed.  Having performed timeout procedure, I made a small transverse   incision above the umbilicus and carried down through deep dermal tissue.  I   then placed a Veress needle establishing pneumoperitoneum to 15 mmHg. I then   placed an Optiview trocar under direct visualization.  Having placed this, I   evaluated for injury from the Veress needle.  No such injury was appreciated.  I   then placed a 5 mm epigastric trocar into the right subcostal margin, all after   injection with 0.25% Marcaine with epinephrine.  The patient has dilated   gallbladder and has some omental adhesions to the gallbladder.  I took down   these omental adhesions.  I grabbed the fundus and hold it up over the liver.  I   then grasped the infundibulum and held it towards the patient's feet.  I then   dissected the triangle of Calot, identifying the cystic duct and cystic artery   in the usual anatomic locations.  I placed 2 clips distally on the cystic duct,   1 clip proximally, and 2 clips were placed on the cystic artery, I cut between   clips and used hook cautery to remove the gallbladder from the hepatic fossa.    Having removed the gallbladder from the  hepatic fossa, I evaluated ensuring   adequate hemostasis.  Having ensured adequate hemostasis of the fossa, I removed   the gallbladder with the assistance of an EndoCatch bag.  I then having removed   the gallbladder sent it to Pathology for evaluation.  I evaluated the hepatic   fossa once again ensuring adequate hemostasis.  I removed all trocars under   direct visualization.  I then made sure all air had been escaped the abdominal   cavity and closed the umbilical fascia with 0 Vicryl suture.  Skin incision was   closed with 4-0 Monocryl.  The patient was then extubated, taken to Recovery   Room in stable condition.  There were no immediate complications.      STELLA  dd: 09/18/2019 10:11:01 (CDT)  td: 09/18/2019 10:40:54 (CDT)  Doc ID   #[id number]  Job ID #[id number]    CC:

## 2019-09-18 NOTE — BRIEF OP NOTE
[facility]  Brief Operative Note     SUMMARY     Surgery Date: 9/18/2019     Surgeon(s) and Role:     * Eric Mathias MD - Primary    Assisting Surgeon: None    Pre-op Diagnosis:  Biliary colic [K80.50]    Post-op Diagnosis:  Post-Op Diagnosis Codes:     * Biliary colic [K80.50]    Procedure(s) (LRB):  CHOLECYSTECTOMY, LAPAROSCOPIC (N/A)    Anesthesia: General    Description of the findings of the procedure: Normal biliary anatomy.  Lap tyesha performed without event.     Findings/Key Components: see above    Estimated Blood Loss: 10 cc's       Specimens:   Specimen (12h ago, onward)    Start     Ordered    09/18/19 0949  Specimen to Pathology - Surgery  Once     Comments:  Pre-op Diagnosis: Biliary colic [K80.50]Post-op Diagnosis: Procedure(s):CHOLECYSTECTOMY, LAPAROSCOPIC Number of specimens: 1Name of specimens: Gallbladder     Start Status     09/18/19 0949 Collected (09/18/19 0955) Order ID: [id number]       09/18/19 0950          Discharge Note    SUMMARY     Admit Date: 9/18/2019    Discharge Date and Time:  09/18/2019 10:08 AM    Hospital Course (synopsis of major diagnoses, care, treatment, and services provided during the course of the hospital stay): Pt presented for lap tyesha.  Procedure and post op course were without event and pt was discharged to home.         Final Diagnosis: Post-Op Diagnosis Codes:     * Biliary colic [K80.50]    Disposition: Home or Self Care    Follow Up/Patient Instructions:     Medications:  Reconciled Home Medications:      Medication List      START taking these medications    HYDROcodone-acetaminophen 5-325 mg per tablet  Commonly known as:  NORCO  Take 1 tablet by mouth every 6 (six) hours as needed for Pain.  Replaces:  HYDROcodone-acetaminophen  mg per tablet        STOP taking these medications    HYDROcodone-acetaminophen  mg per tablet  Commonly known as:  NORCO  Replaced by:  HYDROcodone-acetaminophen 5-325 mg per tablet        ASK your  doctor about these medications    alendronate 70 MG tablet  Commonly known as:  FOSAMAX  Take 1 tablet (70 mg total) by mouth every 7 days.     amLODIPine 5 MG tablet  Commonly known as:  NORVASC  TAKE 1 TABLET (5 MG TOTAL) ONE TIME DAILY.     benzonatate 100 MG capsule  Commonly known as:  TESSALON  Take 2 capsules (200 mg total) by mouth 3 (three) times daily as needed for Cough.     carvedilol 6.25 MG tablet  Commonly known as:  COREG  TAKE 1 TABLET (6.25 MG TOTAL) 2 TIMES DAILY WITH MEALS.     diclofenac sodium 1 % Gel  Commonly known as:  VOLTAREN  Apply 2 g topically once daily.     donepezil 10 MG tablet  Commonly known as:  ARICEPT  TAKE 1 TABLET (10 MG TOTAL) BY MOUTH EVERY MORNING.     erythromycin ophthalmic ointment  Commonly known as:  ROMYCIN  PLACE INTO THE RIGHT EYE EVERY EVENING.     gabapentin 300 MG capsule  Commonly known as:  NEURONTIN  Take 1 capsule (300 mg total) by mouth 3 (three) times daily.     hydroCHLOROthiazide 25 MG tablet  Commonly known as:  HYDRODIURIL  TAKE 1/2 TABLET EVERY DAY     lamoTRIgine 100 MG tablet  Commonly known as:  LAMICTAL  TAKE 2 TABLETS IN THE MORNING  AND TAKE 3 TABLETS AT NIGHT     nitroGLYCERIN 0.4 MG SL tablet  Commonly known as:  NITROSTAT  Place 1 tablet (0.4 mg total) under the tongue every 5 (five) minutes as needed for Chest pain. For chest pain. Repeat in five minutes if pain persists. May repeat again, for total of 3 tablets.  Call 911 at the third tablet.     omeprazole 40 MG capsule  Commonly known as:  PRILOSEC  Take 1 capsule (40 mg total) by mouth once daily.     ondansetron 4 MG Tbdl  Commonly known as:  ZOFRAN-ODT  DISSOLVE 1 TABLET ON TONGUE EVERY 8 HOURS AS NEEDED     polyethylene glycol 17 gram/dose powder  Commonly known as:  GLYCOLAX  Take 17 g by mouth once daily.     potassium chloride 10 MEQ Tbsr  Commonly known as:  KLOR-CON  Take 1 tablet (10 mEq total) by mouth once daily.     pravastatin 40 MG tablet  Commonly known as:   PRAVACHOL  Take 1 tablet (40 mg total) by mouth once daily.     ranitidine 300 MG tablet  Commonly known as:  ZANTAC  Take 1 tablet (300 mg total) by mouth every evening.     sertraline 100 MG tablet  Commonly known as:  ZOLOFT  TAKE 1 AND 1/2 TABLETS ONE TIME DAILY     tiZANidine 2 MG tablet  Commonly known as:  ZANAFLEX  Take 1-2 po TID prn muscle spasm     TRUE METRIX AIR GLUCOSE METER kit  Generic drug:  blood-glucose meter  USE AS DIRECTED     TRUE METRIX GLUCOSE TEST STRIP Strp  Generic drug:  blood sugar diagnostic  TEST TWO TIMES DAILY     TRUEPLUS LANCETS 33 gauge Misc  Generic drug:  lancets  TEST TWO TIMES DAILY          Discharge Procedure Orders   Diet general     Call MD for:  extreme fatigue     Call MD for:  persistent dizziness or light-headedness     Call MD for:  hives     Call MD for:  redness, tenderness, or signs of infection (pain, swelling, redness, odor or green/yellow discharge around incision site)     Call MD for:  difficulty breathing, headache or visual disturbances     Call MD for:  severe uncontrolled pain     Call MD for:  persistent nausea and vomiting     Call MD for:  temperature >100.4     Remove dressing in 48 hours     Activity as tolerated     Follow-up Information     Eric Mathias MD.    Specialties:  General Surgery, Colon and Rectal Surgery, Surgery  Contact information:  [address] [place] [address]  [phone]

## 2019-09-18 NOTE — ANESTHESIA POSTPROCEDURE EVALUATION
Anesthesia Post Evaluation    Patient: Summer Inman    Procedure(s) Performed: Procedure(s) (LRB):  CHOLECYSTECTOMY, LAPAROSCOPIC (N/A)    Final Anesthesia Type: general  Patient location during evaluation: PACU  Patient participation: Yes- Able to Participate  Level of consciousness: awake and alert and oriented  Post-procedure vital signs: reviewed and stable  Pain management: adequate  Airway patency: patent  PONV status at discharge: No PONV  Anesthetic complications: no      Cardiovascular status: blood pressure returned to baseline  Respiratory status: unassisted, spontaneous ventilation and room air  Hydration status: euvolemic  Follow-up not needed.          Vitals Value Taken Time   /79 9/18/2019 10:13 AM   Temp 36.2 °C (97.2 °F) 9/18/2019 10:13 AM   Pulse 67 9/18/2019 10:13 AM   Resp 18 9/18/2019 10:13 AM   SpO2 90 % 9/18/2019 10:13 AM         No case tracking events are documented in the log.      Pain/Zenaida Score: Zenaida Score: 8 (9/18/2019 10:13 AM)

## 2019-09-19 VITALS
TEMPERATURE: 97 F | BODY MASS INDEX: 29.25 KG/M2 | HEART RATE: 65 BPM | SYSTOLIC BLOOD PRESSURE: 149 MMHG | OXYGEN SATURATION: 94 % | RESPIRATION RATE: 17 BRPM | HEIGHT: 60 IN | WEIGHT: 149 LBS | DIASTOLIC BLOOD PRESSURE: 65 MMHG

## 2019-09-20 ENCOUNTER — TELEPHONE (OUTPATIENT)
Dept: FAMILY MEDICINE | Facility: CLINIC | Age: 84
End: 2019-09-20

## 2019-09-20 NOTE — TELEPHONE ENCOUNTER
----- Message from Kim Marrero sent at 9/20/2019 10:22 AM CDT -----  Contact: Kourtney Oconnell calling in regards to patients   Norco prescription . Please contact Kourtney at  [phone]     Thanks

## 2019-09-20 NOTE — TELEPHONE ENCOUNTER
Spoke with Kourtney, states pt normally gets Norco 10 as a monthly rx, states pt had surgery and was given a short supply noroc 5, advised per PCP, to discard the 5mg dose and dispense 10mg dose, voiced understanding

## 2019-09-27 RX ORDER — HYDROCODONE BITARTRATE AND ACETAMINOPHEN 5; 325 MG/1; MG/1
1 TABLET ORAL EVERY 6 HOURS PRN
Qty: 90 TABLET | Refills: 0 | Status: CANCELLED | OUTPATIENT
Start: 2019-09-27

## 2019-09-27 RX ORDER — HYDROCODONE BITARTRATE AND ACETAMINOPHEN 10; 325 MG/1; MG/1
1 TABLET ORAL EVERY 6 HOURS PRN
Qty: 90 TABLET | Refills: 0 | Status: SHIPPED | OUTPATIENT
Start: 2019-09-27 | End: 2019-10-24 | Stop reason: SDUPTHER

## 2019-09-27 NOTE — TELEPHONE ENCOUNTER
----- Message from Israel Morgan sent at 9/27/2019  8:50 AM CDT -----  Contact: same  Patient called in and stated Dr. Wilson filled 90 tablets of Norco for her on the beginning of the September & stated she is in need of a refill.  Patient wanted Dr. Wilson to know that she never got the Rx of Norco filled that was from Dr. Mathias on 9/18/19??      [place], [place] - [address] [facility] [address] [address] [facility] [address]  [facility] [address]  Phone: [phone] Fax: [phone]    Patient stated she would like a call back at [phone]

## 2019-10-02 ENCOUNTER — OFFICE VISIT (OUTPATIENT)
Dept: SURGERY | Facility: CLINIC | Age: 84
End: 2019-10-02
Payer: MEDICARE

## 2019-10-02 VITALS — BODY MASS INDEX: 29.78 KG/M2 | WEIGHT: 151.69 LBS | TEMPERATURE: 98 F | HEIGHT: 60 IN

## 2019-10-02 DIAGNOSIS — Z09 POSTOP CHECK: Primary | ICD-10-CM

## 2019-10-02 PROCEDURE — 99024 PR POST-OP FOLLOW-UP VISIT: ICD-10-PCS | Mod: HCNC,S$GLB,, | Performed by: SURGERY

## 2019-10-02 PROCEDURE — 99999 PR PBB SHADOW E&M-EST. PATIENT-LVL III: ICD-10-PCS | Mod: PBBFAC,HCNC,, | Performed by: SURGERY

## 2019-10-02 PROCEDURE — 99024 POSTOP FOLLOW-UP VISIT: CPT | Mod: HCNC,S$GLB,, | Performed by: SURGERY

## 2019-10-02 PROCEDURE — 99999 PR PBB SHADOW E&M-EST. PATIENT-LVL III: CPT | Mod: PBBFAC,HCNC,, | Performed by: SURGERY

## 2019-10-03 NOTE — PROGRESS NOTES
Cc: post op    HPI: 83 y.o.  female  3 weeks s/p lap tyesha.   Pt notes that she is feeling well.  Having some improvement in her symptoms but notes continued nausea on occasion.       PE: AFVSS    AAOx3  CTA  Soft/NT/nd  Inc: c/d/i        Path:     FINAL PATHOLOGIC DIAGNOSIS  Gallbladder, cholecystectomy:  - Chronic cholecystitis.  - Cholelithiasis    A/P:   Pt doing well post surgery.   F/U with me prn

## 2019-10-09 ENCOUNTER — PATIENT MESSAGE (OUTPATIENT)
Dept: SURGERY | Facility: CLINIC | Age: 84
End: 2019-10-09

## 2019-10-11 ENCOUNTER — PATIENT MESSAGE (OUTPATIENT)
Dept: SURGERY | Facility: CLINIC | Age: 84
End: 2019-10-11

## 2019-10-24 RX ORDER — HYDROCODONE BITARTRATE AND ACETAMINOPHEN 10; 325 MG/1; MG/1
1 TABLET ORAL EVERY 6 HOURS PRN
Qty: 90 TABLET | Refills: 0 | Status: SHIPPED | OUTPATIENT
Start: 2019-10-24 | End: 2019-11-11 | Stop reason: SDUPTHER

## 2019-10-24 NOTE — TELEPHONE ENCOUNTER
----- Message from Miriam Chaudhry sent at 10/24/2019  3:06 PM CDT -----  Contact: pt  She's calling in regards to refill    ,HYDROcodone-acetaminophen (NORCO)  mg per tablet    pls call pt back at [phone] (home)

## 2019-10-28 ENCOUNTER — PATIENT OUTREACH (OUTPATIENT)
Dept: ADMINISTRATIVE | Facility: HOSPITAL | Age: 84
End: 2019-10-28

## 2019-11-11 ENCOUNTER — OFFICE VISIT (OUTPATIENT)
Dept: FAMILY MEDICINE | Facility: CLINIC | Age: 84
End: 2019-11-11
Payer: MEDICARE

## 2019-11-11 VITALS
TEMPERATURE: 99 F | HEIGHT: 60 IN | BODY MASS INDEX: 29.76 KG/M2 | HEART RATE: 84 BPM | WEIGHT: 151.56 LBS | SYSTOLIC BLOOD PRESSURE: 124 MMHG | DIASTOLIC BLOOD PRESSURE: 70 MMHG | RESPIRATION RATE: 16 BRPM

## 2019-11-11 DIAGNOSIS — E78.5 HYPERLIPIDEMIA, UNSPECIFIED HYPERLIPIDEMIA TYPE: ICD-10-CM

## 2019-11-11 DIAGNOSIS — I10 ESSENTIAL HYPERTENSION: ICD-10-CM

## 2019-11-11 DIAGNOSIS — F33.0 DEPRESSION, MAJOR, RECURRENT, MILD: ICD-10-CM

## 2019-11-11 DIAGNOSIS — M54.17 LUMBOSACRAL RADICULOPATHY: ICD-10-CM

## 2019-11-11 DIAGNOSIS — H53.9 VISION CHANGES: ICD-10-CM

## 2019-11-11 DIAGNOSIS — F11.20 CHRONIC NARCOTIC DEPENDENCE: ICD-10-CM

## 2019-11-11 DIAGNOSIS — M51.36 DDD (DEGENERATIVE DISC DISEASE), LUMBAR: ICD-10-CM

## 2019-11-11 DIAGNOSIS — R52 CHRONIC PAIN OF MULTIPLE SITES: ICD-10-CM

## 2019-11-11 DIAGNOSIS — M85.80 OSTEOPENIA, UNSPECIFIED LOCATION: ICD-10-CM

## 2019-11-11 DIAGNOSIS — G89.29 CHRONIC PAIN OF MULTIPLE SITES: ICD-10-CM

## 2019-11-11 DIAGNOSIS — H04.123 DRY EYES: Primary | ICD-10-CM

## 2019-11-11 PROCEDURE — 3074F PR MOST RECENT SYSTOLIC BLOOD PRESSURE < 130 MM HG: ICD-10-PCS | Mod: HCNC,CPTII,S$GLB, | Performed by: FAMILY MEDICINE

## 2019-11-11 PROCEDURE — 3074F SYST BP LT 130 MM HG: CPT | Mod: HCNC,CPTII,S$GLB, | Performed by: FAMILY MEDICINE

## 2019-11-11 PROCEDURE — 3078F PR MOST RECENT DIASTOLIC BLOOD PRESSURE < 80 MM HG: ICD-10-PCS | Mod: HCNC,CPTII,S$GLB, | Performed by: FAMILY MEDICINE

## 2019-11-11 PROCEDURE — 1101F PT FALLS ASSESS-DOCD LE1/YR: CPT | Mod: HCNC,CPTII,S$GLB, | Performed by: FAMILY MEDICINE

## 2019-11-11 PROCEDURE — 99214 PR OFFICE/OUTPT VISIT, EST, LEVL IV, 30-39 MIN: ICD-10-PCS | Mod: HCNC,S$GLB,, | Performed by: FAMILY MEDICINE

## 2019-11-11 PROCEDURE — 1101F PR PT FALLS ASSESS DOC 0-1 FALLS W/OUT INJ PAST YR: ICD-10-PCS | Mod: HCNC,CPTII,S$GLB, | Performed by: FAMILY MEDICINE

## 2019-11-11 PROCEDURE — 99999 PR PBB SHADOW E&M-EST. PATIENT-LVL IV: CPT | Mod: PBBFAC,HCNC,, | Performed by: FAMILY MEDICINE

## 2019-11-11 PROCEDURE — 3078F DIAST BP <80 MM HG: CPT | Mod: HCNC,CPTII,S$GLB, | Performed by: FAMILY MEDICINE

## 2019-11-11 PROCEDURE — 99499 RISK ADDL DX/OHS AUDIT: ICD-10-PCS | Mod: HCNC,S$GLB,, | Performed by: FAMILY MEDICINE

## 2019-11-11 PROCEDURE — 99499 UNLISTED E&M SERVICE: CPT | Mod: HCNC,S$GLB,, | Performed by: FAMILY MEDICINE

## 2019-11-11 PROCEDURE — 99999 PR PBB SHADOW E&M-EST. PATIENT-LVL IV: ICD-10-PCS | Mod: PBBFAC,HCNC,, | Performed by: FAMILY MEDICINE

## 2019-11-11 PROCEDURE — 99214 OFFICE O/P EST MOD 30 MIN: CPT | Mod: HCNC,S$GLB,, | Performed by: FAMILY MEDICINE

## 2019-11-11 RX ORDER — DICLOFENAC SODIUM 10 MG/G
2 GEL TOPICAL DAILY
Qty: 300 G | Refills: 1 | Status: SHIPPED | OUTPATIENT
Start: 2019-11-11 | End: 2020-05-06 | Stop reason: SDUPTHER

## 2019-11-11 RX ORDER — HYDROCODONE BITARTRATE AND ACETAMINOPHEN 10; 325 MG/1; MG/1
1 TABLET ORAL EVERY 6 HOURS PRN
Qty: 90 TABLET | Refills: 0 | Status: SHIPPED | OUTPATIENT
Start: 2019-11-11 | End: 2019-12-23 | Stop reason: SDUPTHER

## 2019-11-11 RX ORDER — HYDROCHLOROTHIAZIDE 25 MG/1
12.5 TABLET ORAL DAILY
Qty: 45 TABLET | Refills: 1 | Status: SHIPPED | OUTPATIENT
Start: 2019-11-11 | End: 2020-08-05

## 2019-11-11 RX ORDER — ALENDRONATE SODIUM 70 MG/1
70 TABLET ORAL
Qty: 12 TABLET | Refills: 1 | Status: SHIPPED | OUTPATIENT
Start: 2019-11-11 | End: 2020-10-16

## 2019-11-11 RX ORDER — PRAVASTATIN SODIUM 40 MG/1
40 TABLET ORAL DAILY
Qty: 90 TABLET | Refills: 1 | Status: SHIPPED | OUTPATIENT
Start: 2019-11-11 | End: 2020-08-05

## 2019-11-11 RX ORDER — TIZANIDINE 2 MG/1
TABLET ORAL
Qty: 30 TABLET | Refills: 1 | Status: CANCELLED | OUTPATIENT
Start: 2019-11-11

## 2019-11-11 RX ORDER — POTASSIUM CHLORIDE 750 MG/1
10 TABLET, EXTENDED RELEASE ORAL DAILY
Qty: 90 TABLET | Refills: 1 | Status: ON HOLD | OUTPATIENT
Start: 2019-11-11 | End: 2020-11-13 | Stop reason: SDUPTHER

## 2019-11-11 RX ORDER — GABAPENTIN 300 MG/1
300 CAPSULE ORAL 3 TIMES DAILY
Qty: 270 CAPSULE | Refills: 1 | Status: SHIPPED | OUTPATIENT
Start: 2019-11-11 | End: 2020-08-04 | Stop reason: SDUPTHER

## 2019-11-11 RX ORDER — SERTRALINE HYDROCHLORIDE 100 MG/1
TABLET, FILM COATED ORAL
Qty: 135 TABLET | Refills: 1 | Status: SHIPPED | OUTPATIENT
Start: 2019-11-11 | End: 2020-08-05

## 2019-11-11 NOTE — PROGRESS NOTES
" THIS DOCUMENT WAS MADE IN PART WITH VOICE RECOGNITION SOFTWARE.  OCCASIONALLY THIS SOFTWARE WILL MISINTERPRET WORDS OR PHRASES.      Summer Inman  1935    Summer was seen today for hypertension and other mentions.    Diagnoses and all orders for this visit:    Dry eyes  -     Ambulatory referral to Ophthalmology   patient is convinced that I told her she needed surgery help with the "tear duct". This is not something I would determine but I'm happy to refer her to ophth. for evaluation    Vision changes  -     Ambulatory referral to Ophthalmology    Essential hypertension    DDD (degenerative disc disease), lumbar  Chronic pain of multiple sites  Chronic narcotic dependence   relatively stable. Continue medications and follow back in three months     she also discussed her chronic abdominal pain. She is had lower abdominal pain off and on for years. She does not correlate this with constipation but I'm not so certain then asked her to evaluate this. She recently had laparoscopic cholecystectomy without any other obvious visual intra-abdominal finding. I suggested to consider a colonoscopy and referral to gastroenterology, she then stated she wanted to watch this a little while longer. No blood or emergency signs but she will let me know if this changes    Appointment duration greater than 25 min., more than 50% face-to-face counseling      Subjective     Chief Complaint   Patient presents with    Hypertension     follow up    other mentions       HPI      HPI elements addressed above in the assessment and plan including problems, diagnosis, stability/instability,  improving/worsening, and chronicity will not be duplicated in this section. Any important additional HPI topics will be discussed here if needed.    Active Ambulatory Problems     Diagnosis Date Noted    Hyperlipidemia     Gastroesophageal reflux disease     Seizure disorder     Chronic cough 03/08/2016    Anemia 03/10/2016    DDD " (degenerative disc disease), lumbar 06/15/2016    Lumbosacral radiculopathy 06/15/2016    Right hip pain 06/15/2016    Coronary artery disease involving native coronary artery of native heart without angina pectoris 10/17/2016    Essential hypertension 10/17/2016    Chronic obstructive pulmonary disease 10/17/2016    History of stroke 10/17/2016    Depression, major, recurrent, mild 10/17/2016    Insomnia 10/17/2016    Pulmonary hypertension 10/17/2016    Osteopenia 10/17/2016    Dysphagia 03/08/2018    Memory loss 07/26/2018    Seizures     Chronic narcotic dependence 06/07/2019    Atherosclerosis of aorta 06/07/2019    Chronic pain of multiple sites 01/30/2000    Chronic kidney disease, stage III (moderate) 06/17/2019    Biliary colic 09/18/2019     Resolved Ambulatory Problems     Diagnosis Date Noted    Chronic kidney disease, stage II (mild) 12/26/2012    Dysphagia 10/08/2014    Cervical radiculopathy 02/02/2015    UTI (urinary tract infection) 01/31/2016    Elevated lactic acid level 01/31/2016    Abdominal pain 01/31/2016    Cervical spondylosis with myelopathy 03/07/2016    Shortness of breath 03/08/2016    Obesity, Class I, BMI 30-34.9 03/08/2016    Hypokalemia 03/09/2016    Closed Colles' fracture of right radius 04/26/2017     Past Medical History:   Diagnosis Date    Anticoagulant long-term use     Arthritis     Asthma     Atrial premature beats     Benign neoplasm of adrenal gland     CKD (chronic kidney disease)     Colon polyp     COPD (chronic obstructive pulmonary disease)     Coronary artery disease     Depression     Dizziness     First degree AV block     General anesthetics causing adverse effect in therapeutic use     Hepatomegaly     Hypertension     Impaired mobility     Neck pain     Schatzki's ring     Seasonal allergies     Stroke     Trouble in sleeping     Wears dentures     Wears glasses          Review of Systems   Constitutional:  Positive for activity change and fatigue. Negative for fever and unexpected weight change.   HENT: Negative for trouble swallowing.    Eyes: Negative for visual disturbance.   Respiratory: Positive for shortness of breath. Negative for wheezing.    Cardiovascular: Negative for chest pain, palpitations and leg swelling.   Gastrointestinal: Positive for abdominal pain and constipation. Negative for abdominal distention.   Endocrine: Negative for polydipsia and polyuria.   Genitourinary: Negative for difficulty urinating, dysuria and hematuria.   Musculoskeletal: Positive for arthralgias, back pain, gait problem and neck stiffness.   Neurological: Positive for weakness.       Objective     Physical Exam   Constitutional: She is oriented to person, place, and time.  Non-toxic appearance. No distress.   Elderly  female.    HENT:   Head: Normocephalic and atraumatic.   Right Ear: Tympanic membrane, external ear and ear canal normal.   Left Ear: Tympanic membrane, external ear and ear canal normal.   Nose: Nose normal.   Mouth/Throat: Oropharynx is clear and moist. No oropharyngeal exudate.   Eyes: Pupils are equal, round, and reactive to light. Conjunctivae and EOM are normal. No scleral icterus.   Neck: Normal range of motion. Neck supple. No thyromegaly present.   Cardiovascular: Normal rate, regular rhythm and intact distal pulses. PMI is not displaced. Exam reveals no gallop and no friction rub.   Murmur heard.  Pulmonary/Chest: Effort normal and breath sounds normal. No respiratory distress. She has no wheezes. She has no rales.   Abdominal: Soft. Bowel sounds are normal. She exhibits no mass. There is no rebound.   Lymphadenopathy:     She has no cervical adenopathy.   Neurological: She is alert and oriented to person, place, and time. She displays normal reflexes. No cranial nerve deficit. She exhibits normal muscle tone.   In wheelchair, unable to walk long distances because of pain and fatigue.   Skin:  She is not diaphoretic.   Vitals reviewed.    Vitals:    11/11/19 1510   BP: 124/70   BP Location: Right arm   Patient Position: Sitting   BP Method: Medium (Manual)   Pulse: 84   Resp: 16   Temp: 98.5 °F (36.9 °C)   TempSrc: Oral   Weight: 68.8 kg (151 lb 9.1 oz)   Height: 5' (1.524 m)       MOST RECENT LABS IN OUR ELECTRONIC MEDICAL RECORD:     Results for orders placed or performed in visit on 08/22/19   Comprehensive metabolic panel   Result Value Ref Range    Sodium 134 (L) 136 - 145 mmol/L    Potassium 3.3 (L) 3.5 - 5.1 mmol/L    Chloride 95 95 - 110 mmol/L    CO2 29 23 - 29 mmol/L    Glucose 107 70 - 110 mg/dL    BUN, Bld 22 8 - 23 mg/dL    Creatinine 1.0 0.5 - 1.4 mg/dL    Calcium 10.0 8.7 - 10.5 mg/dL    Total Protein 7.4 6.0 - 8.4 g/dL    Albumin 3.8 3.5 - 5.2 g/dL    Total Bilirubin 0.2 0.1 - 1.0 mg/dL    Alkaline Phosphatase 133 55 - 135 U/L    AST 16 10 - 40 U/L    ALT 13 10 - 44 U/L    Anion Gap 10 8 - 16 mmol/L    eGFR if African American >60.0 >60 mL/min/1.73 m^2    eGFR if non  52.6 (A) >60 mL/min/1.73 m^2   CBC auto differential   Result Value Ref Range    WBC 8.90 3.90 - 12.70 K/uL    RBC 4.81 4.00 - 5.40 M/uL    Hemoglobin 12.7 12.0 - 16.0 g/dL    Hematocrit 40.4 37.0 - 48.5 %    Mean Corpuscular Volume 84 82 - 98 fL    Mean Corpuscular Hemoglobin 26.4 (L) 27.0 - 31.0 pg    Mean Corpuscular Hemoglobin Conc 31.4 (L) 32.0 - 36.0 g/dL    RDW 15.1 (H) 11.5 - 14.5 %    Platelets 334 150 - 350 K/uL    MPV 11.3 9.2 - 12.9 fL    Immature Granulocytes 0.3 0.0 - 0.5 %    Gran # (ANC) 5.4 1.8 - 7.7 K/uL    Immature Grans (Abs) 0.03 0.00 - 0.04 K/uL    Lymph # 2.2 1.0 - 4.8 K/uL    Mono # 1.0 0.3 - 1.0 K/uL    Eos # 0.2 0.0 - 0.5 K/uL    Baso # 0.07 0.00 - 0.20 K/uL    nRBC 0 0 /100 WBC    Gran% 61.2 38.0 - 73.0 %    Lymph% 24.3 18.0 - 48.0 %    Mono% 11.2 4.0 - 15.0 %    Eosinophil% 2.2 0.0 - 8.0 %    Basophil% 0.8 0.0 - 1.9 %    Differential Method Automated

## 2019-12-02 ENCOUNTER — TELEPHONE (OUTPATIENT)
Dept: GASTROENTEROLOGY | Facility: CLINIC | Age: 84
End: 2019-12-02

## 2019-12-02 DIAGNOSIS — K21.9 GASTROESOPHAGEAL REFLUX DISEASE WITHOUT ESOPHAGITIS: ICD-10-CM

## 2019-12-03 NOTE — TELEPHONE ENCOUNTER
Please inform the patient that I refilled the prescription for zantac and patient is due for a follow-up visit for continued evaluation and management.  Thanks  ADAM

## 2019-12-23 RX ORDER — HYDROCODONE BITARTRATE AND ACETAMINOPHEN 10; 325 MG/1; MG/1
1 TABLET ORAL EVERY 6 HOURS PRN
Qty: 90 TABLET | Refills: 0 | Status: SHIPPED | OUTPATIENT
Start: 2019-12-23 | End: 2020-01-22 | Stop reason: SDUPTHER

## 2019-12-23 NOTE — TELEPHONE ENCOUNTER
----- Message from Kirsty Horner sent at 12/23/2019  9:47 AM CST -----  Contact: self [phone]   Patient requesting a refill on HYDROcodone-acetaminophen (NORCO)  mg per tablet    Patient will be using [facility]     Please call patient at [phone].     Thanks!

## 2020-01-13 ENCOUNTER — TELEPHONE (OUTPATIENT)
Dept: FAMILY MEDICINE | Facility: CLINIC | Age: 85
End: 2020-01-13

## 2020-01-13 NOTE — TELEPHONE ENCOUNTER
----- Message from Aidee Dowd sent at 1/13/2020  3:20 PM CST -----  Contact: pt  Pt calling at 3:20 pm states that they are having car trouble and not going to be able to make it....[phone]

## 2020-01-13 NOTE — TELEPHONE ENCOUNTER
----- Message from Renato Garcia sent at 1/13/2020  4:11 PM CST -----  Contact: Patient  Type:  Patient Returning Call    Who Called:  Patient  Who Left Message for Patient:  Reina Andersen  Does the patient know what this is regarding?:  Returning call  Best Call Back Number:  [phone]   Additional Information:  NA

## 2020-01-22 RX ORDER — HYDROCODONE BITARTRATE AND ACETAMINOPHEN 10; 325 MG/1; MG/1
1 TABLET ORAL EVERY 6 HOURS PRN
Qty: 90 TABLET | Refills: 0 | Status: SHIPPED | OUTPATIENT
Start: 2020-01-22 | End: 2020-02-20 | Stop reason: SDUPTHER

## 2020-01-22 NOTE — TELEPHONE ENCOUNTER
----- Message from Renato Garcia sent at 1/22/2020  2:20 PM CST -----  Contact: Patient  Type:  RX Refill Request    Who Called:  Patient  Refill or New Rx:  Refill  RX Name and Strength:  HYDROcodone-acetaminophen (NORCO)  mg per tablet  How is the patient currently taking it? (ex. 1XDay):  As ordered  Is this a 30 day or 90 day RX:  30  Preferred Pharmacy with phone number:    [place], [place] - [address] [facility] [address]  [address] [facility] [address]  [facility] [address]  Phone: [phone] Fax: [phone]  Local or Mail Order:  Local  Ordering Provider:  Dr. Wilson  Best Call Back Number:  [phone]  Additional Information:  BETH

## 2020-01-29 ENCOUNTER — TELEPHONE (OUTPATIENT)
Dept: FAMILY MEDICINE | Facility: CLINIC | Age: 85
End: 2020-01-29

## 2020-01-29 ENCOUNTER — OFFICE VISIT (OUTPATIENT)
Dept: FAMILY MEDICINE | Facility: CLINIC | Age: 85
End: 2020-01-29
Payer: MEDICARE

## 2020-01-29 ENCOUNTER — LAB VISIT (OUTPATIENT)
Dept: LAB | Facility: HOSPITAL | Age: 85
End: 2020-01-29
Attending: FAMILY MEDICINE
Payer: MEDICARE

## 2020-01-29 VITALS
RESPIRATION RATE: 18 BRPM | HEIGHT: 60 IN | SYSTOLIC BLOOD PRESSURE: 112 MMHG | TEMPERATURE: 98 F | HEART RATE: 82 BPM | BODY MASS INDEX: 29.93 KG/M2 | DIASTOLIC BLOOD PRESSURE: 84 MMHG | WEIGHT: 152.44 LBS

## 2020-01-29 DIAGNOSIS — J44.9 CHRONIC OBSTRUCTIVE PULMONARY DISEASE, UNSPECIFIED COPD TYPE: ICD-10-CM

## 2020-01-29 DIAGNOSIS — F11.20 CHRONIC NARCOTIC DEPENDENCE: ICD-10-CM

## 2020-01-29 DIAGNOSIS — E78.5 HYPERLIPIDEMIA, UNSPECIFIED HYPERLIPIDEMIA TYPE: ICD-10-CM

## 2020-01-29 DIAGNOSIS — G89.29 CHRONIC PAIN OF MULTIPLE SITES: ICD-10-CM

## 2020-01-29 DIAGNOSIS — H91.90 HEARING LOSS, UNSPECIFIED HEARING LOSS TYPE, UNSPECIFIED LATERALITY: ICD-10-CM

## 2020-01-29 DIAGNOSIS — K21.9 GASTROESOPHAGEAL REFLUX DISEASE, ESOPHAGITIS PRESENCE NOT SPECIFIED: ICD-10-CM

## 2020-01-29 DIAGNOSIS — J02.9 SORE THROAT: ICD-10-CM

## 2020-01-29 DIAGNOSIS — I70.0 ATHEROSCLEROSIS OF AORTA: ICD-10-CM

## 2020-01-29 DIAGNOSIS — M51.36 DDD (DEGENERATIVE DISC DISEASE), LUMBAR: ICD-10-CM

## 2020-01-29 DIAGNOSIS — R13.10 DYSPHAGIA, UNSPECIFIED TYPE: ICD-10-CM

## 2020-01-29 DIAGNOSIS — R73.09 ELEVATED GLUCOSE: ICD-10-CM

## 2020-01-29 DIAGNOSIS — I25.10 CORONARY ARTERY DISEASE INVOLVING NATIVE CORONARY ARTERY OF NATIVE HEART WITHOUT ANGINA PECTORIS: ICD-10-CM

## 2020-01-29 DIAGNOSIS — I10 ESSENTIAL HYPERTENSION: Primary | ICD-10-CM

## 2020-01-29 DIAGNOSIS — I10 ESSENTIAL HYPERTENSION: ICD-10-CM

## 2020-01-29 DIAGNOSIS — R52 CHRONIC PAIN OF MULTIPLE SITES: ICD-10-CM

## 2020-01-29 LAB
BASOPHILS # BLD AUTO: 0.06 K/UL (ref 0–0.2)
BASOPHILS NFR BLD: 0.7 % (ref 0–1.9)
DIFFERENTIAL METHOD: ABNORMAL
EOSINOPHIL # BLD AUTO: 0.2 K/UL (ref 0–0.5)
EOSINOPHIL NFR BLD: 2.2 % (ref 0–8)
ERYTHROCYTE [DISTWIDTH] IN BLOOD BY AUTOMATED COUNT: 14.6 % (ref 11.5–14.5)
HCT VFR BLD AUTO: 40.4 % (ref 37–48.5)
HGB BLD-MCNC: 12.6 G/DL (ref 12–16)
IMM GRANULOCYTES # BLD AUTO: 0.01 K/UL (ref 0–0.04)
IMM GRANULOCYTES NFR BLD AUTO: 0.1 % (ref 0–0.5)
LYMPHOCYTES # BLD AUTO: 1.5 K/UL (ref 1–4.8)
LYMPHOCYTES NFR BLD: 17.7 % (ref 18–48)
MCH RBC QN AUTO: 26.8 PG (ref 27–31)
MCHC RBC AUTO-ENTMCNC: 31.2 G/DL (ref 32–36)
MCV RBC AUTO: 86 FL (ref 82–98)
MONOCYTES # BLD AUTO: 0.9 K/UL (ref 0.3–1)
MONOCYTES NFR BLD: 10.5 % (ref 4–15)
NEUTROPHILS # BLD AUTO: 5.6 K/UL (ref 1.8–7.7)
NEUTROPHILS NFR BLD: 68.8 % (ref 38–73)
NRBC BLD-RTO: 0 /100 WBC
PLATELET # BLD AUTO: 269 K/UL (ref 150–350)
PMV BLD AUTO: 11.5 FL (ref 9.2–12.9)
RBC # BLD AUTO: 4.7 M/UL (ref 4–5.4)
WBC # BLD AUTO: 8.19 K/UL (ref 3.9–12.7)

## 2020-01-29 PROCEDURE — 1101F PT FALLS ASSESS-DOCD LE1/YR: CPT | Mod: HCNC,CPTII,S$GLB, | Performed by: FAMILY MEDICINE

## 2020-01-29 PROCEDURE — 99499 RISK ADDL DX/OHS AUDIT: ICD-10-PCS | Mod: HCNC,S$GLB,, | Performed by: FAMILY MEDICINE

## 2020-01-29 PROCEDURE — 36415 COLL VENOUS BLD VENIPUNCTURE: CPT | Mod: HCNC,PN

## 2020-01-29 PROCEDURE — 99215 PR OFFICE/OUTPT VISIT, EST, LEVL V, 40-54 MIN: ICD-10-PCS | Mod: HCNC,S$GLB,, | Performed by: FAMILY MEDICINE

## 2020-01-29 PROCEDURE — 83036 HEMOGLOBIN GLYCOSYLATED A1C: CPT | Mod: HCNC

## 2020-01-29 PROCEDURE — 3074F SYST BP LT 130 MM HG: CPT | Mod: HCNC,CPTII,S$GLB, | Performed by: FAMILY MEDICINE

## 2020-01-29 PROCEDURE — 99499 UNLISTED E&M SERVICE: CPT | Mod: HCNC,S$GLB,, | Performed by: FAMILY MEDICINE

## 2020-01-29 PROCEDURE — 1125F AMNT PAIN NOTED PAIN PRSNT: CPT | Mod: HCNC,S$GLB,, | Performed by: FAMILY MEDICINE

## 2020-01-29 PROCEDURE — 3079F PR MOST RECENT DIASTOLIC BLOOD PRESSURE 80-89 MM HG: ICD-10-PCS | Mod: HCNC,CPTII,S$GLB, | Performed by: FAMILY MEDICINE

## 2020-01-29 PROCEDURE — 84443 ASSAY THYROID STIM HORMONE: CPT | Mod: HCNC

## 2020-01-29 PROCEDURE — 85025 COMPLETE CBC W/AUTO DIFF WBC: CPT | Mod: HCNC

## 2020-01-29 PROCEDURE — 1159F PR MEDICATION LIST DOCUMENTED IN MEDICAL RECORD: ICD-10-PCS | Mod: HCNC,S$GLB,, | Performed by: FAMILY MEDICINE

## 2020-01-29 PROCEDURE — 1101F PR PT FALLS ASSESS DOC 0-1 FALLS W/OUT INJ PAST YR: ICD-10-PCS | Mod: HCNC,CPTII,S$GLB, | Performed by: FAMILY MEDICINE

## 2020-01-29 PROCEDURE — 80061 LIPID PANEL: CPT | Mod: HCNC

## 2020-01-29 PROCEDURE — 1159F MED LIST DOCD IN RCRD: CPT | Mod: HCNC,S$GLB,, | Performed by: FAMILY MEDICINE

## 2020-01-29 PROCEDURE — 1125F PR PAIN SEVERITY QUANTIFIED, PAIN PRESENT: ICD-10-PCS | Mod: HCNC,S$GLB,, | Performed by: FAMILY MEDICINE

## 2020-01-29 PROCEDURE — 99999 PR PBB SHADOW E&M-EST. PATIENT-LVL IV: ICD-10-PCS | Mod: PBBFAC,HCNC,, | Performed by: FAMILY MEDICINE

## 2020-01-29 PROCEDURE — 99215 OFFICE O/P EST HI 40 MIN: CPT | Mod: HCNC,S$GLB,, | Performed by: FAMILY MEDICINE

## 2020-01-29 PROCEDURE — 3074F PR MOST RECENT SYSTOLIC BLOOD PRESSURE < 130 MM HG: ICD-10-PCS | Mod: HCNC,CPTII,S$GLB, | Performed by: FAMILY MEDICINE

## 2020-01-29 PROCEDURE — 80053 COMPREHEN METABOLIC PANEL: CPT | Mod: HCNC

## 2020-01-29 PROCEDURE — 99999 PR PBB SHADOW E&M-EST. PATIENT-LVL IV: CPT | Mod: PBBFAC,HCNC,, | Performed by: FAMILY MEDICINE

## 2020-01-29 PROCEDURE — 3079F DIAST BP 80-89 MM HG: CPT | Mod: HCNC,CPTII,S$GLB, | Performed by: FAMILY MEDICINE

## 2020-01-29 NOTE — PROGRESS NOTES
THIS DOCUMENT WAS MADE IN PART WITH VOICE RECOGNITION SOFTWARE.  OCCASIONALLY THIS SOFTWARE WILL MISINTERPRET WORDS OR PHRASES.      Summer Inman  1935    Summer was seen today for follow-up.    Diagnoses and all orders for this visit:    Essential hypertension  -     Comprehensive metabolic panel; Future  -     Lipid panel; Future  -     TSH; Future    Hyperlipidemia, unspecified hyperlipidemia type    Chronic obstructive pulmonary disease, unspecified COPD type  -     CBC auto differential; Future    Coronary artery disease involving native coronary artery of native heart without angina pectoris  Atherosclerosis of aorta  Stable    DDD (degenerative disc disease), lumbar  Chronic pain of multiple sites  Chronic narcotic dependence  Relatively stable on hydrocodone.  If neck and back pain worsens I did mention to consider consulting back with pain management.    Hearing loss, unspecified hearing loss type, unspecified laterality  -     Ambulatory consult to ENT  -     Ambulatory consult to Audiology    Sore throat  -     Ambulatory consult to ENT    Dysphagia, unspecified type  -     Ambulatory consult to ENT  -     Case request GI: EGD (ESOPHAGOGASTRODUODENOSCOPY)    Gastroesophageal reflux disease, esophagitis presence not specified  -     Case request GI: EGD (ESOPHAGOGASTRODUODENOSCOPY)    Elevated glucose  -     Hemoglobin A1c; Future        Subjective     Chief Complaint   Patient presents with    Follow-up       HPI    Reports 'cough is choking me', although once ice knees twice it goes away.  She does not complain of significant phlegm, no wheezing.  No fever.  She does complain of some soreness in her neck improved.  No lumps or swelling.  Some trouble swallowing, food gets stuck.  But appears to have some upper swallowing concerns as well.    He does complain of some hearing loss.  Feels that her ears are plugged.  She denies tinnitus.    Other topics outlined above    Active Ambulatory Problems      Diagnosis Date Noted    Hyperlipidemia     Gastroesophageal reflux disease     Seizure disorder     Chronic cough 03/08/2016    Anemia 03/10/2016    DDD (degenerative disc disease), lumbar 06/15/2016    Lumbosacral radiculopathy 06/15/2016    Right hip pain 06/15/2016    Coronary artery disease involving native coronary artery of native heart without angina pectoris 10/17/2016    Essential hypertension 10/17/2016    Chronic obstructive pulmonary disease 10/17/2016    History of stroke 10/17/2016    Depression, major, recurrent, mild 10/17/2016    Insomnia 10/17/2016    Pulmonary hypertension 10/17/2016    Osteopenia 10/17/2016    Dysphagia 03/08/2018    Memory loss 07/26/2018    Seizures     Chronic narcotic dependence 06/07/2019    Atherosclerosis of aorta 06/07/2019    Chronic pain of multiple sites 01/30/2000    Chronic kidney disease, stage III (moderate) 06/17/2019    Biliary colic 09/18/2019     Resolved Ambulatory Problems     Diagnosis Date Noted    Chronic kidney disease, stage II (mild) 12/26/2012    Dysphagia 10/08/2014    Cervical radiculopathy 02/02/2015    UTI (urinary tract infection) 01/31/2016    Elevated lactic acid level 01/31/2016    Abdominal pain 01/31/2016    Cervical spondylosis with myelopathy 03/07/2016    Shortness of breath 03/08/2016    Obesity, Class I, BMI 30-34.9 03/08/2016    Hypokalemia 03/09/2016    Closed Colles' fracture of right radius 04/26/2017     Past Medical History:   Diagnosis Date    Anticoagulant long-term use     Arthritis     Asthma     Atrial premature beats     Benign neoplasm of adrenal gland     CKD (chronic kidney disease)     Colon polyp     COPD (chronic obstructive pulmonary disease)     Coronary artery disease     Depression     Dizziness     First degree AV block     General anesthetics causing adverse effect in therapeutic use     Hepatomegaly     Hypertension     Impaired mobility     Neck pain      Schatzki's ring     Seasonal allergies     Stroke     Trouble in sleeping     Wears dentures     Wears glasses      Current Outpatient Medications on File Prior to Visit   Medication Sig Dispense Refill    alendronate (FOSAMAX) 70 MG tablet Take 1 tablet (70 mg total) by mouth every 7 days. 12 tablet 1    amLODIPine (NORVASC) 5 MG tablet TAKE 1 TABLET (5 MG TOTAL) ONE TIME DAILY. 90 tablet 3    benzonatate (TESSALON) 100 MG capsule Take 2 capsules (200 mg total) by mouth 3 (three) times daily as needed for Cough. 60 capsule 2    carvedilol (COREG) 6.25 MG tablet TAKE 1 TABLET (6.25 MG TOTAL) 2 TIMES DAILY WITH MEALS. 180 tablet 3    diclofenac sodium (VOLTAREN) 1 % Gel Apply 2 g topically once daily. 300 g 1    donepezil (ARICEPT) 10 MG tablet TAKE 1 TABLET (10 MG TOTAL) BY MOUTH EVERY MORNING. 90 tablet 3    erythromycin (ROMYCIN) ophthalmic ointment PLACE INTO THE RIGHT EYE EVERY EVENING. 1 g 1    gabapentin (NEURONTIN) 300 MG capsule Take 1 capsule (300 mg total) by mouth 3 (three) times daily. 270 capsule 1    hydroCHLOROthiazide (HYDRODIURIL) 25 MG tablet Take 0.5 tablets (12.5 mg total) by mouth once daily. 45 tablet 1    HYDROcodone-acetaminophen (NORCO)  mg per tablet Take 1 tablet by mouth every 6 (six) hours as needed for Pain. >7 day supply of opioid is medically necessary for chronic pain. 90 tablet 0    lamoTRIgine (LAMICTAL) 100 MG tablet TAKE 2 TABLETS IN THE MORNING  AND TAKE 3 TABLETS AT NIGHT 450 tablet 3    nitroGLYCERIN (NITROSTAT) 0.4 MG SL tablet Place 1 tablet (0.4 mg total) under the tongue every 5 (five) minutes as needed for Chest pain. For chest pain. Repeat in five minutes if pain persists. May repeat again, for total of 3 tablets.  Call 911 at the third tablet. 20 tablet 3    omeprazole (PRILOSEC) 40 MG capsule TAKE 1 CAPSULE EVERY DAY 90 capsule 1    ondansetron (ZOFRAN-ODT) 4 MG TbDL DISSOLVE 1 TABLET ON TONGUE EVERY 8 HOURS AS NEEDED 60 tablet 2     polyethylene glycol (GLYCOLAX) 17 gram/dose powder Take 17 g by mouth once daily. 510 g 0    potassium chloride (KLOR-CON) 10 MEQ TbSR Take 1 tablet (10 mEq total) by mouth once daily. 90 tablet 1    pravastatin (PRAVACHOL) 40 MG tablet Take 1 tablet (40 mg total) by mouth once daily. 90 tablet 1    ranitidine (ZANTAC) 300 MG tablet TAKE ONE TABLET BY MOUTH EVERY EVENING 90 tablet 0    sertraline (ZOLOFT) 100 MG tablet TAKE 1 AND 1/2 TABLETS ONE TIME DAILY 135 tablet 1    tiZANidine (ZANAFLEX) 2 MG tablet Take 1-2 po TID prn muscle spasm 30 tablet 1    TRUE METRIX AIR GLUCOSE METER kit USE AS DIRECTED 1 each 0    TRUE METRIX GLUCOSE TEST STRIP Strp TEST TWO TIMES DAILY 200 strip 3    TRUEPLUS LANCETS 33 gauge Misc TEST TWO TIMES DAILY 200 each 3     No current facility-administered medications on file prior to visit.      Review of patient's allergies indicates:   Allergen Reactions    Bleach (sodium hypochlorite) Nausea And Vomiting, Palpitations and Shortness Of Breath    Codeine Shortness Of Breath     Tongue swelled    Nifedipine Anaphylaxis    Ace inhibitors      Other reaction(s): Angioedema    Ketorolac      Other reaction(s): tongue swell    Milk      Other reaction(s): Stomach upset    Quinolones Other (See Comments)     Lowers seizure threshold.    Tramadol      Other reaction(s): seizure     Social History     Tobacco Use    Smoking status: Former Smoker     Packs/day: 0.50     Years: 19.00     Pack years: 9.50     Types: Cigarettes     Start date: 3/12/1984     Last attempt to quit: 10/8/2013     Years since quittin.3    Smokeless tobacco: Never Used   Substance Use Topics    Alcohol use: No    Drug use: No     Family History   Problem Relation Age of Onset    Heart disease Mother     Cancer Mother         Colon    Colon cancer Mother 74    Hypertension Daughter     Heart disease Father     Hypertension Son     Diabetes Son     Arthritis Son     Rheum arthritis Daughter      Cancer Daughter         Thyroid and Breast    Glaucoma Neg Hx     Crohn's disease Neg Hx     Ulcerative colitis Neg Hx     Stomach cancer Neg Hx     Esophageal cancer Neg Hx          Review of Systems   Constitutional: Positive for activity change and fatigue. Negative for fever and unexpected weight change.   HENT: Positive for sneezing, sore throat and trouble swallowing.    Eyes: Negative for visual disturbance.   Respiratory: Positive for shortness of breath. Negative for wheezing.    Cardiovascular: Negative for chest pain, palpitations and leg swelling.   Gastrointestinal: Positive for abdominal pain and constipation. Negative for abdominal distention.   Endocrine: Negative for polydipsia and polyuria.   Genitourinary: Negative for difficulty urinating, dysuria and hematuria.   Musculoskeletal: Positive for arthralgias, back pain, gait problem and neck stiffness.   Neurological: Positive for weakness.       Objective     Physical Exam   Constitutional: She is oriented to person, place, and time.  Non-toxic appearance. No distress.   Elderly  female.    HENT:   Head: Normocephalic and atraumatic.   Right Ear: Tympanic membrane, external ear and ear canal normal.   Left Ear: Tympanic membrane, external ear and ear canal normal.   Nose: Nose normal.   Mouth/Throat: Oropharynx is clear and moist. No oropharyngeal exudate.   Canals are clear except for few flecks of wax.  TMs appear normal.  Do not visualize any redness or abnormality in her throat   Eyes: Pupils are equal, round, and reactive to light. Conjunctivae and EOM are normal. No scleral icterus.   Neck: Normal range of motion. Neck supple. No thyromegaly present.   Cardiovascular: Normal rate, regular rhythm and intact distal pulses. PMI is not displaced. Exam reveals no gallop and no friction rub.   Murmur heard.  Pulmonary/Chest: Effort normal and breath sounds normal. No respiratory distress. She has no wheezes. She has no rales.    Abdominal: Soft. Bowel sounds are normal. She exhibits no mass. There is no rebound.   Lymphadenopathy:     She has no cervical adenopathy.   Neurological: She is alert and oriented to person, place, and time. She displays normal reflexes. No cranial nerve deficit. She exhibits normal muscle tone.   In wheelchair,   Skin: She is not diaphoretic.   Vitals reviewed.    Vitals:    01/29/20 1325   BP: 112/84   BP Location: Left arm   Patient Position: Sitting   BP Method: Medium (Manual)   Pulse: 82   Resp: 18   Temp: 98.3 °F (36.8 °C)   TempSrc: Oral   Weight: 69.2 kg (152 lb 7.2 oz)   Height: 5' (1.524 m)       MOST RECENT LABS IN OUR ELECTRONIC MEDICAL RECORD:     Results for orders placed or performed in visit on 08/22/19   Comprehensive metabolic panel   Result Value Ref Range    Sodium 134 (L) 136 - 145 mmol/L    Potassium 3.3 (L) 3.5 - 5.1 mmol/L    Chloride 95 95 - 110 mmol/L    CO2 29 23 - 29 mmol/L    Glucose 107 70 - 110 mg/dL    BUN, Bld 22 8 - 23 mg/dL    Creatinine 1.0 0.5 - 1.4 mg/dL    Calcium 10.0 8.7 - 10.5 mg/dL    Total Protein 7.4 6.0 - 8.4 g/dL    Albumin 3.8 3.5 - 5.2 g/dL    Total Bilirubin 0.2 0.1 - 1.0 mg/dL    Alkaline Phosphatase 133 55 - 135 U/L    AST 16 10 - 40 U/L    ALT 13 10 - 44 U/L    Anion Gap 10 8 - 16 mmol/L    eGFR if African American >60.0 >60 mL/min/1.73 m^2    eGFR if non  52.6 (A) >60 mL/min/1.73 m^2   CBC auto differential   Result Value Ref Range    WBC 8.90 3.90 - 12.70 K/uL    RBC 4.81 4.00 - 5.40 M/uL    Hemoglobin 12.7 12.0 - 16.0 g/dL    Hematocrit 40.4 37.0 - 48.5 %    Mean Corpuscular Volume 84 82 - 98 fL    Mean Corpuscular Hemoglobin 26.4 (L) 27.0 - 31.0 pg    Mean Corpuscular Hemoglobin Conc 31.4 (L) 32.0 - 36.0 g/dL    RDW 15.1 (H) 11.5 - 14.5 %    Platelets 334 150 - 350 K/uL    MPV 11.3 9.2 - 12.9 fL    Immature Granulocytes 0.3 0.0 - 0.5 %    Gran # (ANC) 5.4 1.8 - 7.7 K/uL    Immature Grans (Abs) 0.03 0.00 - 0.04 K/uL    Lymph # 2.2 1.0 - 4.8  K/uL    Mono # 1.0 0.3 - 1.0 K/uL    Eos # 0.2 0.0 - 0.5 K/uL    Baso # 0.07 0.00 - 0.20 K/uL    nRBC 0 0 /100 WBC    Gran% 61.2 38.0 - 73.0 %    Lymph% 24.3 18.0 - 48.0 %    Mono% 11.2 4.0 - 15.0 %    Eosinophil% 2.2 0.0 - 8.0 %    Basophil% 0.8 0.0 - 1.9 %    Differential Method Automated

## 2020-01-29 NOTE — TELEPHONE ENCOUNTER
Dr Wilson would like pt to f/u in 3 mo. Called pt to sched. No answer. Left msg for pt to call back

## 2020-01-30 LAB
ALBUMIN SERPL BCP-MCNC: 4 G/DL (ref 3.5–5.2)
ALP SERPL-CCNC: 127 U/L (ref 55–135)
ALT SERPL W/O P-5'-P-CCNC: 11 U/L (ref 10–44)
ANION GAP SERPL CALC-SCNC: 11 MMOL/L (ref 8–16)
AST SERPL-CCNC: 17 U/L (ref 10–40)
BILIRUB SERPL-MCNC: 0.3 MG/DL (ref 0.1–1)
BUN SERPL-MCNC: 18 MG/DL (ref 8–23)
CALCIUM SERPL-MCNC: 10.4 MG/DL (ref 8.7–10.5)
CHLORIDE SERPL-SCNC: 96 MMOL/L (ref 95–110)
CHOLEST SERPL-MCNC: 198 MG/DL (ref 120–199)
CHOLEST/HDLC SERPL: 3.1 {RATIO} (ref 2–5)
CO2 SERPL-SCNC: 27 MMOL/L (ref 23–29)
CREAT SERPL-MCNC: 1 MG/DL (ref 0.5–1.4)
EST. GFR  (AFRICAN AMERICAN): >60 ML/MIN/1.73 M^2
EST. GFR  (NON AFRICAN AMERICAN): 52.2 ML/MIN/1.73 M^2
ESTIMATED AVG GLUCOSE: 123 MG/DL (ref 68–131)
GLUCOSE SERPL-MCNC: 119 MG/DL (ref 70–110)
HBA1C MFR BLD HPLC: 5.9 % (ref 4–5.6)
HDLC SERPL-MCNC: 64 MG/DL (ref 40–75)
HDLC SERPL: 32.3 % (ref 20–50)
LDLC SERPL CALC-MCNC: 116.2 MG/DL (ref 63–159)
NONHDLC SERPL-MCNC: 134 MG/DL
POTASSIUM SERPL-SCNC: 3.5 MMOL/L (ref 3.5–5.1)
PROT SERPL-MCNC: 7.5 G/DL (ref 6–8.4)
SODIUM SERPL-SCNC: 134 MMOL/L (ref 136–145)
TRIGL SERPL-MCNC: 89 MG/DL (ref 30–150)
TSH SERPL DL<=0.005 MIU/L-ACNC: 1.05 UIU/ML (ref 0.4–4)

## 2020-02-11 ENCOUNTER — TELEPHONE (OUTPATIENT)
Dept: FAMILY MEDICINE | Facility: CLINIC | Age: 85
End: 2020-02-11

## 2020-02-11 NOTE — TELEPHONE ENCOUNTER
----- Message from Tosin Hammer sent at 2/11/2020  4:55 PM CST -----  Contact: Patient  Patient called to schedule appt.  Last appt on 1/29, would prefer in 2 months, instead of 3    Please call at: [phone]

## 2020-02-20 ENCOUNTER — TELEPHONE (OUTPATIENT)
Dept: FAMILY MEDICINE | Facility: CLINIC | Age: 85
End: 2020-02-20

## 2020-02-20 RX ORDER — HYDROCODONE BITARTRATE AND ACETAMINOPHEN 10; 325 MG/1; MG/1
1 TABLET ORAL EVERY 6 HOURS PRN
Qty: 90 TABLET | Refills: 0 | Status: SHIPPED | OUTPATIENT
Start: 2020-02-20 | End: 2020-02-20 | Stop reason: SDUPTHER

## 2020-02-20 RX ORDER — HYDROCODONE BITARTRATE AND ACETAMINOPHEN 10; 325 MG/1; MG/1
1 TABLET ORAL EVERY 6 HOURS PRN
Qty: 90 TABLET | Refills: 0 | Status: SHIPPED | OUTPATIENT
Start: 2020-02-20 | End: 2020-03-20 | Stop reason: SDUPTHER

## 2020-02-20 NOTE — TELEPHONE ENCOUNTER
This message was sent directly to me from phone staff.  I sent in the prescription but inadvertently sent into the mail order initially.  I then changed it to local pharmacy.  Please cancel the original prescription sent in to [facility] pharmacy.

## 2020-02-20 NOTE — TELEPHONE ENCOUNTER
----- Message from Aidee Dowd sent at 2/20/2020  3:10 PM CST -----  Contact: pt  Pt states need a Rx called in HYDROcodone-acetaminophen (NORCO)  mg per tablet..[phone] (home)               .      [facility] - [place] - [address] [facility] [address] [facility]  [address] [address] [place] [address]  Phone: [phone] Fax: [phone]

## 2020-02-26 ENCOUNTER — PES CALL (OUTPATIENT)
Dept: ADMINISTRATIVE | Facility: CLINIC | Age: 85
End: 2020-02-26

## 2020-02-26 DIAGNOSIS — I10 ESSENTIAL HYPERTENSION: ICD-10-CM

## 2020-02-27 DIAGNOSIS — R10.13 EPIGASTRIC PAIN: ICD-10-CM

## 2020-02-27 DIAGNOSIS — I10 ESSENTIAL HYPERTENSION: ICD-10-CM

## 2020-02-27 DIAGNOSIS — K21.9 GASTROESOPHAGEAL REFLUX DISEASE WITHOUT ESOPHAGITIS: ICD-10-CM

## 2020-02-27 DIAGNOSIS — Z87.19 HISTORY OF GASTRITIS: ICD-10-CM

## 2020-02-27 DIAGNOSIS — K44.9 HIATAL HERNIA: ICD-10-CM

## 2020-02-27 RX ORDER — AMLODIPINE BESYLATE 5 MG/1
TABLET ORAL
Qty: 90 TABLET | Refills: 3 | Status: SHIPPED | OUTPATIENT
Start: 2020-02-27 | End: 2020-10-16

## 2020-02-27 RX ORDER — OMEPRAZOLE 40 MG/1
40 CAPSULE, DELAYED RELEASE ORAL DAILY
Qty: 90 CAPSULE | Refills: 1 | Status: SHIPPED | OUTPATIENT
Start: 2020-02-27 | End: 2021-01-06

## 2020-02-27 RX ORDER — CARVEDILOL 6.25 MG/1
TABLET ORAL
Qty: 180 TABLET | Refills: 3 | Status: SHIPPED | OUTPATIENT
Start: 2020-02-27 | End: 2020-10-16

## 2020-02-27 NOTE — TELEPHONE ENCOUNTER
"----- Message from Israel VALDES Cathy sent at 2/27/2020  3:29 PM CST -----  Contact: same  Patient called in and the only thing she stated she knows is that her daughter told her to order refills on "all" of her Rx's??  Patient stated to just send them all to [facility] order?      [facility] - [place], [place] - [address]  [address]  [facility] [address]  Phone: [phone] Fax: [phone]    Patient call back number is [phone]  "

## 2020-03-25 ENCOUNTER — TELEPHONE (OUTPATIENT)
Dept: FAMILY MEDICINE | Facility: CLINIC | Age: 85
End: 2020-03-25

## 2020-03-25 NOTE — TELEPHONE ENCOUNTER
Left message asking pt to contact the office at .    Reason: AWV appts postponed until May. Need to r/s appt.

## 2020-03-25 NOTE — TELEPHONE ENCOUNTER
Left message for patient to call back to reschedule appt as a video visit or 30 days out at this time due to COVID-19 concern.

## 2020-03-26 ENCOUNTER — TELEPHONE (OUTPATIENT)
Dept: FAMILY MEDICINE | Facility: CLINIC | Age: 85
End: 2020-03-26

## 2020-03-26 RX ORDER — ONDANSETRON 4 MG/1
TABLET, ORALLY DISINTEGRATING ORAL
Qty: 30 TABLET | Refills: 2 | Status: SHIPPED | OUTPATIENT
Start: 2020-03-26 | End: 2020-08-04 | Stop reason: SDUPTHER

## 2020-03-26 NOTE — TELEPHONE ENCOUNTER
Spoke with pt, vanessat r/s to May 6th  She is requesting something for nausea. She has been nauseous for last week, vomited twice. No fever. She had diarrhea a few days ago but not since then. She is having body aches and weakness in her legs and arms. She believes she has the flu.

## 2020-03-26 NOTE — TELEPHONE ENCOUNTER
----- Message from Hank Clarke sent at 3/26/2020  9:03 AM CDT -----  Type:  Patient Returning Call    Who Called:  Self   Who Left Message for Patient:  self  Does the patient know what this is regarding?:   Best Call Back Number:  [phone]  Additional Information:  Patient requesting to speak with the nurse prior to rescheduling the appointment. Patient requesting a rx for nausea[facility]

## 2020-04-07 ENCOUNTER — TELEPHONE (OUTPATIENT)
Dept: FAMILY MEDICINE | Facility: CLINIC | Age: 85
End: 2020-04-07

## 2020-04-07 NOTE — TELEPHONE ENCOUNTER
Returned call to pt's daughter, RODRICK on file. She states pt's grandson lives with pt and provides care for her. He is a  and pt is concerned he will bring home COVID-19 to her. His work recommended FMLA so he can stay home with her for the next couple of weeks but that would require a note from pt's PCP. Neither of them are showing any sx at this point.

## 2020-04-07 NOTE — TELEPHONE ENCOUNTER
----- Message from Hank Clarke sent at 4/7/2020  2:07 PM CDT -----  Type: Needs Medical Advice    Who Called:  Daughter Gita Inman  Symptoms (please be specific):  How long has patient had these symptoms:   Pharmacy name and phone #:  NA  Best Call Back Number: [phone]  Additional Information: Patient's daughter states the grand son Jose Nelson  need a letter in writing for his employer. Stating the grand son  need to self isolate to care for his grandmother.

## 2020-04-08 NOTE — TELEPHONE ENCOUNTER
I can write a letter stating that Mrs. Inman is a high risk patient.  In this letter I can link references to the CDC and OHSA and their recommendations regarding this.  The patient then can use this letter and go to his personal physician to discuss FMLA.  Keep in mind that this situation is going to go on for months and months.  In some cases FMLA may protect somebody job while they are out of work.  But this does not necessarily mean that a person will be entitled to short-term disability or long-term disability income.  Especially when most of the country will likely be out of work or have similar requests for the next 3-6 months.  I am not  nor and  but this is my interpretation so if they have been advised otherwise by an , there , or there employ your then I defer.  What I can do is write a letter summarizing her condition

## 2020-04-11 NOTE — TELEPHONE ENCOUNTER
Pt daughter returned call, notified message per Dr. Wilson and she verbally understood. She stated that will work, and she would like letter sent to Mrs. Reynaga portal. Please advise.

## 2020-04-16 RX ORDER — HYDROCODONE BITARTRATE AND ACETAMINOPHEN 10; 325 MG/1; MG/1
1 TABLET ORAL EVERY 6 HOURS PRN
Qty: 90 TABLET | Refills: 0 | Status: SHIPPED | OUTPATIENT
Start: 2020-04-16 | End: 2020-05-21 | Stop reason: SDUPTHER

## 2020-04-16 NOTE — TELEPHONE ENCOUNTER
----- Message from Renato Garcia sent at 4/16/2020 12:51 PM CDT -----  Contact: Patient  Type:  RX Refill Request    Who Called:  Patient  Refill or New Rx:  Refill  RX Name and Strength:  HYDROcodone-acetaminophen (NORCO)  mg per tablet  How is the patient currently taking it? (ex. 1XDay):  As ordered  Is this a 30 day or 90 day RX:  90  Preferred Pharmacy with phone number:    [facility] - [place], [place] - [address] [address]  [address] [address] [address]  Phone: [phone] Fax: [phone]  Local or Mail Order:  Local  Ordering Provider:  Dr. Wilson  Best Call Back Number:  [phone]  Additional Information:  Please be advised of pharmacy update above. Thanks!

## 2020-04-20 ENCOUNTER — TELEPHONE (OUTPATIENT)
Dept: FAMILY MEDICINE | Facility: CLINIC | Age: 85
End: 2020-04-20

## 2020-04-20 NOTE — TELEPHONE ENCOUNTER
----- Message from Renato Garcia sent at 4/20/2020  4:26 PM CDT -----  Contact: Stefani Storm (Daughter)  Type: Needs Medical Advice    Who Called:  Stefani Flores Call Back Number: [phone]  Additional Information: Caller would like to discuss receiving letter. Please call to advise. Thanks!

## 2020-04-21 ENCOUNTER — TELEPHONE (OUTPATIENT)
Dept: FAMILY MEDICINE | Facility: CLINIC | Age: 85
End: 2020-04-21

## 2020-04-21 NOTE — TELEPHONE ENCOUNTER
----- Message from Princess SHAMAR Edwards sent at 4/21/2020 11:00 AM CDT -----  Contact: pt  Type:  Patient Returning Call    Who Called:  Patient  Who Left Message for Patient:  Nurse Milli  Does the patient know what this is regarding?:  yes  Best Call Back Number:    Additional Information: please add the Name [facility]) to the patient letter

## 2020-04-21 NOTE — TELEPHONE ENCOUNTER
I can only comment on my patient and her conditions.  I have no way to prove who is related to whom.  That is the responsibility of the individual family members.  I am just a physician.  I am not an , I do not have access to everyone's birth certificate, family lineage, etc..

## 2020-04-21 NOTE — TELEPHONE ENCOUNTER
----- Message from Tosin Hammer sent at 4/21/2020 10:41 AM CDT -----  Contact: Patient  Grandmother called in regards to the letter about her grandson taking care of her.  States that the letter does not have his name on it.    Requesting a new one be sent with his name- Jose Nelson      Patient call back: [phone]-

## 2020-04-22 ENCOUNTER — PATIENT MESSAGE (OUTPATIENT)
Dept: FAMILY MEDICINE | Facility: CLINIC | Age: 85
End: 2020-04-22

## 2020-05-04 ENCOUNTER — TELEPHONE (OUTPATIENT)
Dept: GASTROENTEROLOGY | Facility: CLINIC | Age: 85
End: 2020-05-04

## 2020-05-04 NOTE — TELEPHONE ENCOUNTER
Spoke with pt and she does want to have EGD done as she is currently symptomatic. She asked that I call her daughter to see about dates that would work, no answer on daughter's phone but vmail left and phone number provided for call back.

## 2020-05-06 ENCOUNTER — OFFICE VISIT (OUTPATIENT)
Dept: FAMILY MEDICINE | Facility: CLINIC | Age: 85
End: 2020-05-06
Payer: MEDICARE

## 2020-05-06 ENCOUNTER — PATIENT MESSAGE (OUTPATIENT)
Dept: ADMINISTRATIVE | Facility: HOSPITAL | Age: 85
End: 2020-05-06

## 2020-05-06 DIAGNOSIS — R05.3 CHRONIC COUGH: ICD-10-CM

## 2020-05-06 DIAGNOSIS — R52 CHRONIC PAIN OF MULTIPLE SITES: ICD-10-CM

## 2020-05-06 DIAGNOSIS — R79.9 ABNORMAL FINDING OF BLOOD CHEMISTRY, UNSPECIFIED: ICD-10-CM

## 2020-05-06 DIAGNOSIS — F11.20 CHRONIC NARCOTIC DEPENDENCE: ICD-10-CM

## 2020-05-06 DIAGNOSIS — M51.36 DDD (DEGENERATIVE DISC DISEASE), LUMBAR: ICD-10-CM

## 2020-05-06 DIAGNOSIS — J44.9 CHRONIC OBSTRUCTIVE PULMONARY DISEASE, UNSPECIFIED COPD TYPE: ICD-10-CM

## 2020-05-06 DIAGNOSIS — G89.29 CHRONIC PAIN OF MULTIPLE SITES: ICD-10-CM

## 2020-05-06 DIAGNOSIS — I10 ESSENTIAL HYPERTENSION: Primary | ICD-10-CM

## 2020-05-06 DIAGNOSIS — I25.10 CORONARY ARTERY DISEASE INVOLVING NATIVE CORONARY ARTERY OF NATIVE HEART WITHOUT ANGINA PECTORIS: ICD-10-CM

## 2020-05-06 DIAGNOSIS — E78.5 HYPERLIPIDEMIA, UNSPECIFIED HYPERLIPIDEMIA TYPE: ICD-10-CM

## 2020-05-06 DIAGNOSIS — I70.0 ATHEROSCLEROSIS OF AORTA: ICD-10-CM

## 2020-05-06 PROCEDURE — 1159F PR MEDICATION LIST DOCUMENTED IN MEDICAL RECORD: ICD-10-PCS | Mod: HCNC,95,, | Performed by: FAMILY MEDICINE

## 2020-05-06 PROCEDURE — 99442 PR PHYSICIAN TELEPHONE EVALUATION 11-20 MIN: CPT | Mod: HCNC,95,, | Performed by: FAMILY MEDICINE

## 2020-05-06 PROCEDURE — 1101F PR PT FALLS ASSESS DOC 0-1 FALLS W/OUT INJ PAST YR: ICD-10-PCS | Mod: HCNC,CPTII,95, | Performed by: FAMILY MEDICINE

## 2020-05-06 PROCEDURE — 1101F PT FALLS ASSESS-DOCD LE1/YR: CPT | Mod: HCNC,CPTII,95, | Performed by: FAMILY MEDICINE

## 2020-05-06 PROCEDURE — 99442 PR PHYSICIAN TELEPHONE EVALUATION 11-20 MIN: ICD-10-PCS | Mod: HCNC,95,, | Performed by: FAMILY MEDICINE

## 2020-05-06 PROCEDURE — 1159F MED LIST DOCD IN RCRD: CPT | Mod: HCNC,95,, | Performed by: FAMILY MEDICINE

## 2020-05-06 RX ORDER — DICLOFENAC SODIUM 10 MG/G
2 GEL TOPICAL DAILY
Qty: 300 G | Refills: 1 | Status: SHIPPED | OUTPATIENT
Start: 2020-05-06 | End: 2023-11-30 | Stop reason: SDUPTHER

## 2020-05-06 RX ORDER — TIZANIDINE 2 MG/1
TABLET ORAL
Qty: 60 TABLET | Refills: 1 | Status: SHIPPED | OUTPATIENT
Start: 2020-05-06 | End: 2020-09-30 | Stop reason: SDUPTHER

## 2020-05-06 RX ORDER — BENZONATATE 100 MG/1
200 CAPSULE ORAL 3 TIMES DAILY PRN
Qty: 60 CAPSULE | Refills: 2 | Status: SHIPPED | OUTPATIENT
Start: 2020-05-06 | End: 2020-08-04 | Stop reason: SDUPTHER

## 2020-05-06 NOTE — PROGRESS NOTES
Established Patient - Audio Only Telehealth Visit     The patient location is:  Home  The chief complaint leading to consultation is:  Medication management, narcotic management  Visit type: Virtual visit with audio only (telephone)  Total time spent with patient:  17 min       The reason for the audio only service rather than synchronous audio and video virtual visit was related to technical difficulties or patient preference/necessity.     Each patient to whom I provide medical services by telemedicine is:  (1) informed of the relationship between the physician and patient and the respective role of any other health care provider with respect to management of the patient; and (2) notified that they may decline to receive medical services by telemedicine and may withdraw from such care at any time. Patient verbally consented to receive this service via voice-only telephone call.       HPI:  Routine follow-up.  Discussed multiple topics including her chronic back and neck pain.  She has had a flare-up of neck pain but states the Voltaren gel work better than her pain pills and would like a refill.  She occasionally uses the muscle relaxant and needs a refill on the tizanidine.  No accidents or injuries.  She has a chronic cough and occasionally uses Tessalon and needs refills.  She reports that her other conditions have been stable.  She has postpone her EGD because of COVID19 concerns.     Assessment and plan:  Diagnoses and all orders for this visit:    Essential hypertension  -     Comprehensive metabolic panel; Future  -     TSH; Future  -     Hemoglobin A1C; Future    Hyperlipidemia, unspecified hyperlipidemia type  -     Lipid Panel; Future  -     TSH; Future  -     Hemoglobin A1C; Future    Chronic obstructive pulmonary disease, unspecified COPD type    Coronary artery disease involving native coronary artery of native heart without angina pectoris    Atherosclerosis of aorta    DDD (degenerative disc  disease), lumbar  -     tiZANidine (ZANAFLEX) 2 MG tablet; Take 1-2 po TID prn muscle spasm    Chronic narcotic dependence    Chronic pain of multiple sites  -     diclofenac sodium (VOLTAREN) 1 % Gel; Apply 2 g topically once daily.    Chronic cough  -     benzonatate (TESSALON) 100 MG capsule; Take 2 capsules (200 mg total) by mouth 3 (three) times daily as needed for Cough.    Abnormal finding of blood chemistry, unspecified   -     Hemoglobin A1C; Future      Will keep everything the same and plan to do labs in 3 months and follow in clinic afterwards                        This service was not originating from a related E/M service provided within the previous 7 days nor will  to an E/M service or procedure within the next 24 hours or my soonest available appointment.  Prevailing standard of care was able to be met in this audio-only visit.

## 2020-05-21 RX ORDER — HYDROCODONE BITARTRATE AND ACETAMINOPHEN 10; 325 MG/1; MG/1
1 TABLET ORAL EVERY 6 HOURS PRN
Qty: 90 TABLET | Refills: 0 | Status: SHIPPED | OUTPATIENT
Start: 2020-05-21 | End: 2020-06-19 | Stop reason: SDUPTHER

## 2020-05-21 NOTE — TELEPHONE ENCOUNTER
----- Message from Princess SHAMAR Edwards sent at 5/21/2020  4:29 PM CDT -----  Contact: Stefani Inman (Daughter)  Type:  RX Refill Request    Who Called: Stefani Inman (Daughter)  Refill or New Rx:  refill  RX Name and Strength:  #HYDROcodone-acetaminophen (NORCO)  mg per tablet  How is the patient currently taking it? (ex. 1XDay): Route: Take 1 tablet by mouth every 6 (six) hours as needed for Pain. >7 day supply of opioid is medically necessary for chronic pain  Is this a 30 day or 90 day RX:  30  Preferred Pharmacy with phone number:    [facility] [address] [address]  [address] [facility] [address] [facility] [address]  Phone: [phone] Fax: [phone]      Local or Mail Order:  Local  Ordering Provider:  Dr. Ludwig Flores Call Back Number:  [phone]    Additional Information:

## 2020-05-25 ENCOUNTER — TELEPHONE (OUTPATIENT)
Dept: GASTROENTEROLOGY | Facility: CLINIC | Age: 85
End: 2020-05-25

## 2020-05-25 NOTE — TELEPHONE ENCOUNTER
Spoke with pt about scheduling her EGD and she states that her children and Dr. Wilson agrees that she should not have the EGD done until COVID19 restrictions have been lifted. Phone number provided for call back.

## 2020-06-19 RX ORDER — HYDROCODONE BITARTRATE AND ACETAMINOPHEN 10; 325 MG/1; MG/1
1 TABLET ORAL EVERY 6 HOURS PRN
Qty: 90 TABLET | Refills: 0 | Status: SHIPPED | OUTPATIENT
Start: 2020-06-19 | End: 2020-07-19

## 2020-06-19 NOTE — TELEPHONE ENCOUNTER
----- Message from Adriana Fuentes sent at 6/19/2020 12:26 PM CDT -----  Regarding: refill  Contact: daughter-raul  Type:  RX Refill Request    Who Called:  Pt    Refill or New Rx:  refill  RX Name and Strength:  norco  How is the patient currently taking it? (ex. 1XDay):  As Directed  Is this a 30 day or 90 day RX:  as Directed  Preferred Pharmacy with phone number:    [facility] - [place] - [address] [address]  [address] [address]  [place] [address]  Phone: [phone] Fax: [phone]  Best Call Back Number:  [phone]  Additional Information:  Please Advise ---Thank you

## 2020-07-07 ENCOUNTER — TELEPHONE (OUTPATIENT)
Dept: FAMILY MEDICINE | Facility: CLINIC | Age: 85
End: 2020-07-07

## 2020-07-07 NOTE — TELEPHONE ENCOUNTER
----- Message from Brooke Ross MA sent at 7/7/2020  8:40 AM CDT -----  Regarding: CALL BACK ASAP  Pt stated she does not feel well and does not want to go to the ED. Pt did not stated the nature of the call but needs to speak with someone ASAP  Best Call Back # [phone]

## 2020-07-07 NOTE — TELEPHONE ENCOUNTER
Spoke with pt, she states she is having severe pain in her lower abdomen. She is having chills and constipation. She took a laxative yesterday and no BM since then. She is having nausea and vomiting. She does not have a thermometer so no idea if she has a fever but she does have chills.  Advised her that with her sx, she needs to be seen in ER. She agreed and her daughter will take her.

## 2020-07-17 ENCOUNTER — TELEPHONE (OUTPATIENT)
Dept: FAMILY MEDICINE | Facility: CLINIC | Age: 85
End: 2020-07-17

## 2020-07-17 NOTE — TELEPHONE ENCOUNTER
----- Message from Phill Douglas sent at 7/17/2020  4:25 PM CDT -----  Regarding: refill  Pt calling in regards to she needs a refill on medication and needs a nurse to contact her at her son [phone]            ===HYDROcodone-acetaminophen (NORCO)  mg per tablet      ---------[facility] - [place] - [address] [address]

## 2020-08-04 DIAGNOSIS — R52 CHRONIC PAIN OF MULTIPLE SITES: ICD-10-CM

## 2020-08-04 DIAGNOSIS — R05.3 CHRONIC COUGH: ICD-10-CM

## 2020-08-04 DIAGNOSIS — M51.36 DDD (DEGENERATIVE DISC DISEASE), LUMBAR: ICD-10-CM

## 2020-08-04 DIAGNOSIS — M54.17 LUMBOSACRAL RADICULOPATHY: ICD-10-CM

## 2020-08-04 DIAGNOSIS — G89.29 CHRONIC PAIN OF MULTIPLE SITES: ICD-10-CM

## 2020-08-04 RX ORDER — ONDANSETRON 4 MG/1
TABLET, ORALLY DISINTEGRATING ORAL
Qty: 30 TABLET | Refills: 2 | Status: SHIPPED | OUTPATIENT
Start: 2020-08-04 | End: 2020-08-13 | Stop reason: SDUPTHER

## 2020-08-04 RX ORDER — GABAPENTIN 300 MG/1
300 CAPSULE ORAL 3 TIMES DAILY
Qty: 270 CAPSULE | Refills: 1 | Status: SHIPPED | OUTPATIENT
Start: 2020-08-04 | End: 2020-09-30 | Stop reason: SDUPTHER

## 2020-08-04 RX ORDER — BENZONATATE 100 MG/1
200 CAPSULE ORAL 3 TIMES DAILY PRN
Qty: 60 CAPSULE | Refills: 2 | Status: SHIPPED | OUTPATIENT
Start: 2020-08-04 | End: 2020-08-13 | Stop reason: SDUPTHER

## 2020-08-04 NOTE — TELEPHONE ENCOUNTER
----- Message from Leah Tracy sent at 8/4/2020  2:11 PM CDT -----  Contact: self  Type:  RX Refill Request    Who Called:  patient  Refill or New Rx:  refill  RX Name and Strength:  benzonatate (TESSALON) 100 MG jelly caps, gabapentin (NEURONTIN) 300 MG capsule, ondansetron (ZOFRAN-ODT) 4 MG TbDL  How is the patient currently taking it? (ex. 1XDay):  as directed  Is this a 30 day or 90 day RX:  90  Preferred Pharmacy with phone number:    [facility] - [place], [place] - [address] [facility]  [address] [facility] [address]  Phone: [phone] Fax: [phone]  local or Mail Order:  mail order  Ordering Provider:  Dr Steve Flores Call Back Number:  [phone] (home)   Additional Information:  Thanks

## 2020-08-04 NOTE — TELEPHONE ENCOUNTER
----- Message from Kali Whitten sent at 8/4/2020  1:57 PM CDT -----  Regarding: Returning a call  Type: Needs Medical Advice    Who Called:  Ptnt  [phone]    Additional Information:     Advised returning a call from Jessa, about medication refills. Please call.

## 2020-08-06 ENCOUNTER — LAB VISIT (OUTPATIENT)
Dept: LAB | Facility: HOSPITAL | Age: 85
End: 2020-08-06
Attending: FAMILY MEDICINE
Payer: MEDICARE

## 2020-08-06 DIAGNOSIS — R79.9 ABNORMAL FINDING OF BLOOD CHEMISTRY, UNSPECIFIED: ICD-10-CM

## 2020-08-06 DIAGNOSIS — E78.5 HYPERLIPIDEMIA, UNSPECIFIED HYPERLIPIDEMIA TYPE: ICD-10-CM

## 2020-08-06 DIAGNOSIS — I10 ESSENTIAL HYPERTENSION: ICD-10-CM

## 2020-08-06 LAB
ALBUMIN SERPL BCP-MCNC: 3.9 G/DL (ref 3.5–5.2)
ALP SERPL-CCNC: 100 U/L (ref 55–135)
ALT SERPL W/O P-5'-P-CCNC: 12 U/L (ref 10–44)
ANION GAP SERPL CALC-SCNC: 7 MMOL/L (ref 8–16)
AST SERPL-CCNC: 15 U/L (ref 10–40)
BILIRUB SERPL-MCNC: 0.4 MG/DL (ref 0.1–1)
BUN SERPL-MCNC: 17 MG/DL (ref 8–23)
CALCIUM SERPL-MCNC: 9.8 MG/DL (ref 8.7–10.5)
CHLORIDE SERPL-SCNC: 96 MMOL/L (ref 95–110)
CHOLEST SERPL-MCNC: 241 MG/DL (ref 120–199)
CHOLEST/HDLC SERPL: 4 {RATIO} (ref 2–5)
CO2 SERPL-SCNC: 29 MMOL/L (ref 23–29)
CREAT SERPL-MCNC: 1.1 MG/DL (ref 0.5–1.4)
EST. GFR  (AFRICAN AMERICAN): 53.7 ML/MIN/1.73 M^2
EST. GFR  (NON AFRICAN AMERICAN): 46.5 ML/MIN/1.73 M^2
GLUCOSE SERPL-MCNC: 109 MG/DL (ref 70–110)
HDLC SERPL-MCNC: 60 MG/DL (ref 40–75)
HDLC SERPL: 24.9 % (ref 20–50)
LDLC SERPL CALC-MCNC: 155.2 MG/DL (ref 63–159)
NONHDLC SERPL-MCNC: 181 MG/DL
POTASSIUM SERPL-SCNC: 3.4 MMOL/L (ref 3.5–5.1)
PROT SERPL-MCNC: 7.3 G/DL (ref 6–8.4)
SODIUM SERPL-SCNC: 132 MMOL/L (ref 136–145)
TRIGL SERPL-MCNC: 129 MG/DL (ref 30–150)
TSH SERPL DL<=0.005 MIU/L-ACNC: 2.3 UIU/ML (ref 0.4–4)

## 2020-08-06 PROCEDURE — 80053 COMPREHEN METABOLIC PANEL: CPT | Mod: HCNC

## 2020-08-06 PROCEDURE — 80061 LIPID PANEL: CPT | Mod: HCNC

## 2020-08-06 PROCEDURE — 36415 COLL VENOUS BLD VENIPUNCTURE: CPT | Mod: HCNC,PO

## 2020-08-06 PROCEDURE — 83036 HEMOGLOBIN GLYCOSYLATED A1C: CPT | Mod: HCNC

## 2020-08-06 PROCEDURE — 84443 ASSAY THYROID STIM HORMONE: CPT | Mod: HCNC

## 2020-08-07 LAB
ESTIMATED AVG GLUCOSE: 126 MG/DL (ref 68–131)
HBA1C MFR BLD HPLC: 6 % (ref 4–5.6)

## 2020-08-11 ENCOUNTER — TELEPHONE (OUTPATIENT)
Dept: FAMILY MEDICINE | Facility: CLINIC | Age: 85
End: 2020-08-11

## 2020-08-11 NOTE — TELEPHONE ENCOUNTER
I spoke with Ms. Storm concerning scheduling her AWV appt. Pt will check with her family members to see who and when someone can bring her and requested I call her back later today.

## 2020-08-13 ENCOUNTER — OFFICE VISIT (OUTPATIENT)
Dept: FAMILY MEDICINE | Facility: CLINIC | Age: 85
End: 2020-08-13
Payer: MEDICARE

## 2020-08-13 ENCOUNTER — HOSPITAL ENCOUNTER (OUTPATIENT)
Dept: RADIOLOGY | Facility: HOSPITAL | Age: 85
Discharge: HOME | End: 2020-08-13
Attending: FAMILY MEDICINE
Payer: MEDICARE

## 2020-08-13 VITALS
HEIGHT: 60 IN | WEIGHT: 152.69 LBS | BODY MASS INDEX: 29.98 KG/M2 | HEART RATE: 82 BPM | SYSTOLIC BLOOD PRESSURE: 130 MMHG | RESPIRATION RATE: 17 BRPM | TEMPERATURE: 98 F | DIASTOLIC BLOOD PRESSURE: 76 MMHG

## 2020-08-13 DIAGNOSIS — F33.0 DEPRESSION, MAJOR, RECURRENT, MILD: ICD-10-CM

## 2020-08-13 DIAGNOSIS — R05.3 CHRONIC COUGH: ICD-10-CM

## 2020-08-13 DIAGNOSIS — R10.9 ABDOMINAL PAIN, UNSPECIFIED ABDOMINAL LOCATION: Primary | ICD-10-CM

## 2020-08-13 DIAGNOSIS — Z12.11 COLON CANCER SCREENING: ICD-10-CM

## 2020-08-13 DIAGNOSIS — M25.532 WRIST PAIN, ACUTE, LEFT: ICD-10-CM

## 2020-08-13 LAB
BACTERIA #/AREA URNS AUTO: NORMAL /HPF
BILIRUB SERPL-MCNC: NORMAL MG/DL
BLOOD URINE, POC: NORMAL
CLARITY, POC UA: NORMAL
COLOR, POC UA: NORMAL
GLUCOSE UR QL STRIP: NORMAL
KETONES UR QL STRIP: NORMAL
LEUKOCYTE ESTERASE URINE, POC: NORMAL
MICROSCOPIC COMMENT: NORMAL
NITRITE, POC UA: NORMAL
PH, POC UA: 5
PROTEIN, POC: NORMAL
RBC #/AREA URNS AUTO: 0 /HPF (ref 0–4)
SPECIFIC GRAVITY, POC UA: 1
SQUAMOUS #/AREA URNS AUTO: 0 /HPF
UROBILINOGEN, POC UA: NORMAL
WBC #/AREA URNS AUTO: 1 /HPF (ref 0–5)

## 2020-08-13 PROCEDURE — 73110 X-RAY EXAM OF WRIST: CPT | Mod: TC,HCNC,PN,LT

## 2020-08-13 PROCEDURE — 73110 X-RAY EXAM OF WRIST: CPT | Mod: 26,HCNC,LT, | Performed by: RADIOLOGY

## 2020-08-13 PROCEDURE — 1101F PR PT FALLS ASSESS DOC 0-1 FALLS W/OUT INJ PAST YR: ICD-10-PCS | Mod: HCNC,CPTII,S$GLB, | Performed by: FAMILY MEDICINE

## 2020-08-13 PROCEDURE — 73110 XR WRIST COMPLETE 3 VIEWS LEFT: ICD-10-PCS | Mod: 26,HCNC,LT, | Performed by: RADIOLOGY

## 2020-08-13 PROCEDURE — 99215 PR OFFICE/OUTPT VISIT, EST, LEVL V, 40-54 MIN: ICD-10-PCS | Mod: HCNC,S$GLB,25, | Performed by: FAMILY MEDICINE

## 2020-08-13 PROCEDURE — 82274 ASSAY TEST FOR BLOOD FECAL: CPT | Mod: HCNC

## 2020-08-13 PROCEDURE — 1159F PR MEDICATION LIST DOCUMENTED IN MEDICAL RECORD: ICD-10-PCS | Mod: HCNC,S$GLB,, | Performed by: FAMILY MEDICINE

## 2020-08-13 PROCEDURE — 1125F PR PAIN SEVERITY QUANTIFIED, PAIN PRESENT: ICD-10-PCS | Mod: HCNC,S$GLB,, | Performed by: FAMILY MEDICINE

## 2020-08-13 PROCEDURE — 81002 POCT URINE DIPSTICK WITHOUT MICROSCOPE: ICD-10-PCS | Mod: HCNC,S$GLB,, | Performed by: FAMILY MEDICINE

## 2020-08-13 PROCEDURE — 1125F AMNT PAIN NOTED PAIN PRSNT: CPT | Mod: HCNC,S$GLB,, | Performed by: FAMILY MEDICINE

## 2020-08-13 PROCEDURE — 81001 URINALYSIS AUTO W/SCOPE: CPT | Mod: HCNC

## 2020-08-13 PROCEDURE — 3078F PR MOST RECENT DIASTOLIC BLOOD PRESSURE < 80 MM HG: ICD-10-PCS | Mod: HCNC,CPTII,S$GLB, | Performed by: FAMILY MEDICINE

## 2020-08-13 PROCEDURE — 1159F MED LIST DOCD IN RCRD: CPT | Mod: HCNC,S$GLB,, | Performed by: FAMILY MEDICINE

## 2020-08-13 PROCEDURE — 3078F DIAST BP <80 MM HG: CPT | Mod: HCNC,CPTII,S$GLB, | Performed by: FAMILY MEDICINE

## 2020-08-13 PROCEDURE — 87086 URINE CULTURE/COLONY COUNT: CPT | Mod: HCNC

## 2020-08-13 PROCEDURE — 1101F PT FALLS ASSESS-DOCD LE1/YR: CPT | Mod: HCNC,CPTII,S$GLB, | Performed by: FAMILY MEDICINE

## 2020-08-13 PROCEDURE — 99215 OFFICE O/P EST HI 40 MIN: CPT | Mod: HCNC,S$GLB,25, | Performed by: FAMILY MEDICINE

## 2020-08-13 PROCEDURE — 99999 PR PBB SHADOW E&M-EST. PATIENT-LVL V: CPT | Mod: PBBFAC,HCNC,, | Performed by: FAMILY MEDICINE

## 2020-08-13 PROCEDURE — 81002 URINALYSIS NONAUTO W/O SCOPE: CPT | Mod: HCNC,S$GLB,, | Performed by: FAMILY MEDICINE

## 2020-08-13 PROCEDURE — 99499 RISK ADDL DX/OHS AUDIT: ICD-10-PCS | Mod: S$GLB,,, | Performed by: FAMILY MEDICINE

## 2020-08-13 PROCEDURE — 99499 UNLISTED E&M SERVICE: CPT | Mod: S$GLB,,, | Performed by: FAMILY MEDICINE

## 2020-08-13 PROCEDURE — 3075F PR MOST RECENT SYSTOLIC BLOOD PRESS GE 130-139MM HG: ICD-10-PCS | Mod: HCNC,CPTII,S$GLB, | Performed by: FAMILY MEDICINE

## 2020-08-13 PROCEDURE — 99999 PR PBB SHADOW E&M-EST. PATIENT-LVL V: ICD-10-PCS | Mod: PBBFAC,HCNC,, | Performed by: FAMILY MEDICINE

## 2020-08-13 PROCEDURE — 3075F SYST BP GE 130 - 139MM HG: CPT | Mod: HCNC,CPTII,S$GLB, | Performed by: FAMILY MEDICINE

## 2020-08-13 RX ORDER — SERTRALINE HYDROCHLORIDE 100 MG/1
150 TABLET, FILM COATED ORAL DAILY
Qty: 135 TABLET | Refills: 3 | Status: SHIPPED | OUTPATIENT
Start: 2020-08-13 | End: 2021-06-29 | Stop reason: SDUPTHER

## 2020-08-13 RX ORDER — ONDANSETRON 4 MG/1
TABLET, ORALLY DISINTEGRATING ORAL
Qty: 30 TABLET | Refills: 2 | Status: SHIPPED | OUTPATIENT
Start: 2020-08-13 | End: 2020-10-16

## 2020-08-13 RX ORDER — BENZONATATE 100 MG/1
200 CAPSULE ORAL 3 TIMES DAILY PRN
Qty: 120 CAPSULE | Refills: 2 | Status: SHIPPED | OUTPATIENT
Start: 2020-08-13 | End: 2020-10-16

## 2020-08-13 NOTE — PROGRESS NOTES
THIS DOCUMENT WAS MADE IN PART WITH VOICE RECOGNITION SOFTWARE.  OCCASIONALLY THIS SOFTWARE WILL MISINTERPRET WORDS OR PHRASES.      Summer Inman  1935    Summer was seen today for follow-up and dysuria.    Diagnoses and all orders for this visit:    Abdominal pain, unspecified abdominal location  -     Urinalysis; Future  -     Urine culture; Future  -     Urinalysis Microscopic; Future  -     Cancel: CT Abdomen Without Contrast; Future  -     POCT urine dipstick without microscope  -     Urinalysis Microscopic  -     Urine culture  -     Occult blood x 1, stool; Future  -     Occult blood x 1, stool; Future  -     Occult blood x 1, stool; Future  -     CT Abdomen Pelvis  Without Contrast; Future  If no source of pain found, may need to see gastroenterology and possibly Cardiology to evaluate abdominal circulation.    Colon cancer screening  -     Fecal Immunochemical Test (iFOBT); Future  -     Fecal Immunochemical Test (iFOBT)  Colonoscopy would be ideal, but patient has multiple risk factors, will start with this but defer to GI if symptoms do not improve  Scratch a  Wrist pain, acute, left  -     X-Ray Wrist Complete Left; Future    Depression, major, recurrent, mild  -     sertraline (ZOLOFT) 100 MG tablet; Take 1.5 tablets (150 mg total) by mouth once daily.    Chronic cough  -     benzonatate (TESSALON) 100 MG capsule; Take 2 capsules (200 mg total) by mouth 3 (three) times daily as needed for Cough.    Other orders  -     ondansetron (ZOFRAN-ODT) 4 MG TbDL; DISSOLVE 1 TABLET ON TONGUE EVERY 8 HOURS AS NEEDED    Hemoccults apparently not available, will send her home with a fit kit, also advised her that may need a rectal exam and colonoscopy    Handicap license form completed  Multiple topics today, encourage regular follow ups and to let us know if acute concerns in advance as we may need to address in separate visits.    Total time greater than 40 min, including chart review, lab reviewed, med  reconcilliation., more than 50% face-to-face counseling on a variety of topics      Subjective     Chief Complaint   Patient presents with    Follow-up    Dysuria     burning, urgency, frequency, cloudy urine, bladder pressure and back pain       HPI    Bilateral lower abd pain  recent uti, ER  Narrow stools sometimes not always but she reports this as a new complaint but symptoms present maybe four year yet she denies feeling constipated, denies straining, denies blood. sometimes stools are black or dark brown    Reviewed ultrasound last year, has had cholecystectomy    Laceration right hand, sheet metal, tdap up to date    Nodule left lateral wrist tender, seems to be enlarging over few months, no recollection of recent trauma      Active Ambulatory Problems     Diagnosis Date Noted    Hyperlipidemia     Gastroesophageal reflux disease     Seizure disorder     Chronic cough 03/08/2016    Anemia 03/10/2016    DDD (degenerative disc disease), lumbar 06/15/2016    Lumbosacral radiculopathy 06/15/2016    Right hip pain 06/15/2016    Coronary artery disease involving native coronary artery of native heart without angina pectoris 10/17/2016    Essential hypertension 10/17/2016    Chronic obstructive pulmonary disease 10/17/2016    History of stroke 10/17/2016    Depression, major, recurrent, mild 10/17/2016    Insomnia 10/17/2016    Pulmonary hypertension 10/17/2016    Osteopenia 10/17/2016    Dysphagia 03/08/2018    Memory loss 07/26/2018    Seizures     Chronic narcotic dependence 06/07/2019    Atherosclerosis of aorta 06/07/2019    Chronic pain of multiple sites 01/30/2000    Chronic kidney disease, stage III (moderate) 06/17/2019    Biliary colic 09/18/2019     Resolved Ambulatory Problems     Diagnosis Date Noted    Chronic kidney disease, stage II (mild) 12/26/2012    Dysphagia 10/08/2014    Cervical radiculopathy 02/02/2015    UTI (urinary tract infection) 01/31/2016    Elevated  lactic acid level 01/31/2016    Abdominal pain 01/31/2016    Cervical spondylosis with myelopathy 03/07/2016    Shortness of breath 03/08/2016    Obesity, Class I, BMI 30-34.9 03/08/2016    Hypokalemia 03/09/2016    Closed Colles' fracture of right radius 04/26/2017     Past Medical History:   Diagnosis Date    Anticoagulant long-term use     Arthritis     Asthma     Atrial premature beats     Benign neoplasm of adrenal gland     CKD (chronic kidney disease)     Colon polyp     COPD (chronic obstructive pulmonary disease)     Coronary artery disease     Depression     Dizziness     First degree AV block     General anesthetics causing adverse effect in therapeutic use     Hepatomegaly     Hypertension     Impaired mobility     Neck pain     Schatzki's ring     Seasonal allergies     Stroke     Trouble in sleeping     Wears dentures     Wears glasses          Review of Systems   Constitutional: Positive for activity change and fatigue. Negative for fever and unexpected weight change.   HENT: Negative for trouble swallowing.    Respiratory: Positive for cough and shortness of breath.    Cardiovascular: Negative for chest pain and leg swelling.   Gastrointestinal: Positive for abdominal pain and constipation. Negative for blood in stool, diarrhea and nausea.   Genitourinary: Negative for difficulty urinating, dysuria, flank pain and hematuria.   Musculoskeletal: Positive for arthralgias, back pain, gait problem, myalgias and neck stiffness.   Skin: Negative for wound.   Neurological: Positive for weakness.       Objective     Physical Exam  Vitals signs reviewed.   Constitutional:       General: She is not in acute distress.     Appearance: She is not toxic-appearing or diaphoretic.      Comments: Elderly  female.    HENT:      Head: Normocephalic and atraumatic.      Right Ear: Tympanic membrane, ear canal and external ear normal.      Left Ear: Tympanic membrane, ear canal and  external ear normal.      Nose: Nose normal.      Mouth/Throat:      Pharynx: No oropharyngeal exudate.   Eyes:      General: No scleral icterus.     Conjunctiva/sclera: Conjunctivae normal.      Pupils: Pupils are equal, round, and reactive to light.   Neck:      Musculoskeletal: Normal range of motion and neck supple.      Thyroid: No thyromegaly.   Cardiovascular:      Rate and Rhythm: Normal rate and regular rhythm.      Chest Wall: PMI is not displaced.      Heart sounds: Murmur present. No friction rub. No gallop.    Pulmonary:      Effort: Pulmonary effort is normal. No respiratory distress.      Breath sounds: Normal breath sounds. No wheezing or rales.   Abdominal:      Palpations: Abdomen is soft. There is no mass.      Tenderness: There is no rebound.      Comments: No distension no ascites.  There is mild diffuse tenderness but no focal tenderness or guarding or rebound.  Bowel sounds are present maybe somewhat hypoactive.   Lymphadenopathy:      Cervical: No cervical adenopathy.   Neurological:      Mental Status: She is alert and oriented to person, place, and time.      Cranial Nerves: No cranial nerve deficit.      Motor: No abnormal muscle tone.      Deep Tendon Reflexes: Reflexes normal.      Comments: In wheelchair       Vitals:    08/13/20 1353   BP: 130/76   BP Location: Left arm   Patient Position: Sitting   Pulse: 82   Resp: 17   Temp: 97.7 °F (36.5 °C)   Weight: 69.3 kg (152 lb 10.7 oz)   Height: 5' (1.524 m)       MOST RECENT LABS IN OUR ELECTRONIC MEDICAL RECORD:     Results for orders placed or performed in visit on 08/06/20   Comprehensive metabolic panel   Result Value Ref Range    Sodium 132 (L) 136 - 145 mmol/L    Potassium 3.4 (L) 3.5 - 5.1 mmol/L    Chloride 96 95 - 110 mmol/L    CO2 29 23 - 29 mmol/L    Glucose 109 70 - 110 mg/dL    BUN, Bld 17 8 - 23 mg/dL    Creatinine 1.1 0.5 - 1.4 mg/dL    Calcium 9.8 8.7 - 10.5 mg/dL    Total Protein 7.3 6.0 - 8.4 g/dL    Albumin 3.9 3.5 - 5.2  g/dL    Total Bilirubin 0.4 0.1 - 1.0 mg/dL    Alkaline Phosphatase 100 55 - 135 U/L    AST 15 10 - 40 U/L    ALT 12 10 - 44 U/L    Anion Gap 7 (L) 8 - 16 mmol/L    eGFR if African American 53.7 (A) >60 mL/min/1.73 m^2    eGFR if non  46.5 (A) >60 mL/min/1.73 m^2   Lipid Panel   Result Value Ref Range    Cholesterol 241 (H) 120 - 199 mg/dL    Triglycerides 129 30 - 150 mg/dL    HDL 60 40 - 75 mg/dL    LDL Cholesterol 155.2 63.0 - 159.0 mg/dL    Hdl/Cholesterol Ratio 24.9 20.0 - 50.0 %    Total Cholesterol/HDL Ratio 4.0 2.0 - 5.0    Non-HDL Cholesterol 181 mg/dL   TSH   Result Value Ref Range    TSH 2.299 0.400 - 4.000 uIU/mL   Hemoglobin A1C   Result Value Ref Range    Hemoglobin A1C 6.0 (H) 4.0 - 5.6 %    Estimated Avg Glucose 126 68 - 131 mg/dL

## 2020-08-14 LAB — BACTERIA UR CULT: NO GROWTH

## 2020-08-17 ENCOUNTER — TELEPHONE (OUTPATIENT)
Dept: FAMILY MEDICINE | Facility: CLINIC | Age: 85
End: 2020-08-17

## 2020-08-17 ENCOUNTER — HOSPITAL ENCOUNTER (OUTPATIENT)
Dept: RADIOLOGY | Facility: HOSPITAL | Age: 85
Discharge: HOME | End: 2020-08-17
Attending: FAMILY MEDICINE
Payer: MEDICARE

## 2020-08-17 DIAGNOSIS — I25.10 CORONARY ARTERY DISEASE INVOLVING NATIVE CORONARY ARTERY OF NATIVE HEART WITHOUT ANGINA PECTORIS: ICD-10-CM

## 2020-08-17 DIAGNOSIS — R10.9 ABDOMINAL PAIN, UNSPECIFIED ABDOMINAL LOCATION: ICD-10-CM

## 2020-08-17 DIAGNOSIS — I70.0 ATHEROSCLEROSIS OF AORTA: ICD-10-CM

## 2020-08-17 DIAGNOSIS — R10.9 ABDOMINAL PAIN, UNSPECIFIED ABDOMINAL LOCATION: Primary | ICD-10-CM

## 2020-08-17 PROCEDURE — 25500020 PHARM REV CODE 255: Mod: HCNC,PO | Performed by: FAMILY MEDICINE

## 2020-08-17 PROCEDURE — 74176 CT ABDOMEN PELVIS WITHOUT CONTRAST: ICD-10-PCS | Mod: 26,HCNC,, | Performed by: RADIOLOGY

## 2020-08-17 PROCEDURE — A9698 NON-RAD CONTRAST MATERIALNOC: HCPCS | Mod: HCNC,PO | Performed by: FAMILY MEDICINE

## 2020-08-17 PROCEDURE — 74176 CT ABD & PELVIS W/O CONTRAST: CPT | Mod: TC,HCNC,PO

## 2020-08-17 PROCEDURE — 74176 CT ABD & PELVIS W/O CONTRAST: CPT | Mod: 26,HCNC,, | Performed by: RADIOLOGY

## 2020-08-17 RX ADMIN — IOHEXOL 1000 ML: 9 SOLUTION ORAL at 02:08

## 2020-08-18 NOTE — TELEPHONE ENCOUNTER
Spoke with pt and went over CT results. Pt verbalized understanding and states she will contact GI and cardio.

## 2020-08-18 NOTE — TELEPHONE ENCOUNTER
Please call with CT results.  There are no acute processes.  So I recommend two steps, follow back with Gastroenterology and also recommend consulting with Cardiology as she has evidence of diffuse calcifications/atherosclerosis and has been many years since she has seen her cardiologist.   16

## 2020-08-19 RX ORDER — HYDROCODONE BITARTRATE AND ACETAMINOPHEN 10; 325 MG/1; MG/1
1 TABLET ORAL EVERY 6 HOURS PRN
Qty: 90 TABLET | Refills: 0 | Status: SHIPPED | OUTPATIENT
Start: 2020-08-19 | End: 2020-09-17 | Stop reason: SDUPTHER

## 2020-08-19 NOTE — TELEPHONE ENCOUNTER
----- Message from Delores Mayo sent at 8/19/2020  1:26 PM CDT -----  Type:  RX Refill Request    Who Called:  daughter Stefani  Refill or New Rx:  refill  RX Name and Strength:  HYDROcodone-acetaminophen (NORCO)  mg per tablet  How is the patient currently taking it? (ex. 1XDay):  one every 6 hours as needed for pain  Is this a 30 day or 90 day RX:  30  Preferred Pharmacy with phone number:    [facility] - [place], [place] - [address] [address]  [address] [address] [place] [address]  Phone: [phone] Fax: [phone]  Local or Mail Order:  local  Ordering Provider:  Dr Wilson  Best Call Back Number:  [phone]  Additional Information:  thank you!

## 2020-09-09 LAB — HEMOCCULT STL QL IA: POSITIVE

## 2020-09-10 ENCOUNTER — TELEPHONE (OUTPATIENT)
Dept: FAMILY MEDICINE | Facility: CLINIC | Age: 85
End: 2020-09-10

## 2020-09-15 ENCOUNTER — PATIENT OUTREACH (OUTPATIENT)
Dept: ADMINISTRATIVE | Facility: OTHER | Age: 85
End: 2020-09-15

## 2020-09-15 NOTE — PROGRESS NOTES
Health Maintenance Due   Topic Date Due    Shingles Vaccine (1 of 2) 11/30/1986    Influenza Vaccine (1) 08/01/2020     Updates were requested from care everywhere.  Chart was reviewed for overdue Proactive [facility] Encounters (ROBERTO CARLOS) topics (CRS, Breast Cancer Screening, Eye exam)  Health Maintenance has been updated.  LINKS immunization registry triggered.  Immunizations were reconciled.

## 2020-09-17 ENCOUNTER — OFFICE VISIT (OUTPATIENT)
Dept: GASTROENTEROLOGY | Facility: CLINIC | Age: 85
End: 2020-09-17
Payer: MEDICARE

## 2020-09-17 VITALS — RESPIRATION RATE: 19 BRPM | HEIGHT: 60 IN | WEIGHT: 152.75 LBS | BODY MASS INDEX: 29.99 KG/M2

## 2020-09-17 DIAGNOSIS — R10.9 ABDOMINAL PAIN, UNSPECIFIED ABDOMINAL LOCATION: ICD-10-CM

## 2020-09-17 DIAGNOSIS — Z01.812 PRE-PROCEDURE LAB EXAM: ICD-10-CM

## 2020-09-17 DIAGNOSIS — R19.4 CHANGE IN BOWEL HABITS: ICD-10-CM

## 2020-09-17 DIAGNOSIS — R19.5 POSITIVE FIT (FECAL IMMUNOCHEMICAL TEST): ICD-10-CM

## 2020-09-17 DIAGNOSIS — R13.19 ESOPHAGEAL DYSPHAGIA: Primary | ICD-10-CM

## 2020-09-17 DIAGNOSIS — K21.9 GASTROESOPHAGEAL REFLUX DISEASE, ESOPHAGITIS PRESENCE NOT SPECIFIED: ICD-10-CM

## 2020-09-17 PROCEDURE — 99214 PR OFFICE/OUTPT VISIT, EST, LEVL IV, 30-39 MIN: ICD-10-PCS | Mod: HCNC,S$GLB,, | Performed by: INTERNAL MEDICINE

## 2020-09-17 PROCEDURE — 1159F MED LIST DOCD IN RCRD: CPT | Mod: HCNC,S$GLB,, | Performed by: INTERNAL MEDICINE

## 2020-09-17 PROCEDURE — 1159F PR MEDICATION LIST DOCUMENTED IN MEDICAL RECORD: ICD-10-PCS | Mod: HCNC,S$GLB,, | Performed by: INTERNAL MEDICINE

## 2020-09-17 PROCEDURE — 1101F PT FALLS ASSESS-DOCD LE1/YR: CPT | Mod: HCNC,CPTII,S$GLB, | Performed by: INTERNAL MEDICINE

## 2020-09-17 PROCEDURE — 1101F PR PT FALLS ASSESS DOC 0-1 FALLS W/OUT INJ PAST YR: ICD-10-PCS | Mod: HCNC,CPTII,S$GLB, | Performed by: INTERNAL MEDICINE

## 2020-09-17 PROCEDURE — 99214 OFFICE O/P EST MOD 30 MIN: CPT | Mod: HCNC,S$GLB,, | Performed by: INTERNAL MEDICINE

## 2020-09-17 PROCEDURE — 1125F PR PAIN SEVERITY QUANTIFIED, PAIN PRESENT: ICD-10-PCS | Mod: HCNC,S$GLB,, | Performed by: INTERNAL MEDICINE

## 2020-09-17 PROCEDURE — 99999 PR PBB SHADOW E&M-EST. PATIENT-LVL IV: ICD-10-PCS | Mod: PBBFAC,HCNC,, | Performed by: INTERNAL MEDICINE

## 2020-09-17 PROCEDURE — 1125F AMNT PAIN NOTED PAIN PRSNT: CPT | Mod: HCNC,S$GLB,, | Performed by: INTERNAL MEDICINE

## 2020-09-17 PROCEDURE — 99999 PR PBB SHADOW E&M-EST. PATIENT-LVL IV: CPT | Mod: PBBFAC,HCNC,, | Performed by: INTERNAL MEDICINE

## 2020-09-17 RX ORDER — HYDROCODONE BITARTRATE AND ACETAMINOPHEN 10; 325 MG/1; MG/1
1 TABLET ORAL EVERY 6 HOURS PRN
Qty: 90 TABLET | Refills: 0 | Status: SHIPPED | OUTPATIENT
Start: 2020-09-17 | End: 2020-10-19 | Stop reason: SDUPTHER

## 2020-09-17 NOTE — LETTER
September 17, 2020      Ludwig Wilson MD  [address] [facility] [address]  [facility] [address]           [place] [facility] [address] [place] [address]  Phone: [phone]          Patient: Summer Inman   MR Number: [id number]   YOB: 1935   Date of Visit: 9/17/2020       Dear Dr. Ludwig Wilson:    Thank you for referring Summer Inman to me for evaluation. Attached you will find relevant portions of my assessment and plan of care.    If you have questions, please do not hesitate to call me. I look forward to following Summer Inman along with you.    Sincerely,    Lucas Nguyen MD    Enclosure  CC:  No Recipients    If you would like to receive this communication electronically, please contact [email or web address] or [phone] to request more information on [employer] Link access.    For providers and/or their staff who would like to refer a patient to [facility], please contact us through our one-stop-shop provider referral line, [facility] , at [phone].    If you feel you have received this communication in error or would no longer like to receive these types of communications, please e-mail [email or web address]

## 2020-09-17 NOTE — PROGRESS NOTES
Pt presents for evaluation dysphagia and change bowel habits. Intermittent dysphagia, mainly solids, years. Transient response to dilation in past. Chronic abd pain, generalized. Positive N/V. Positive pyrosis. Change in stool pattern, decreased caliber. Recent FIT positive. H/H normal. Last C-scope over 10 years ago. Last EGD 2018. Recent CT scan unremarkable. No fever or jaundice. Appetite and weight stable.     REVIEW OF SYSTEMS:   Constitutional: Negative for fever, appetite change and unexpected weight change.  HENT: Negative for sore throat; Positive trouble swallowing.  Eyes: Negative for visual disturbance.  Respiratory: Negative for chest tightness, shortness of breath and wheezing.  Cardiovascular: Negative for chest pain.  Gastrointestinal:  as per HPI  Genitourinary: Negative for dysuria, frequency and hematuria.  Musculoskeletal: Negative for myalgias, joint swelling and arthralgias.  Skin: Negative for color change and rash.   Neurological: Negative for syncope, weakness and headaches.    PHYSICAL EXAMINATION:                                                        GENERAL:  Comfortable, in no acute distress.      SKIN: Non-jaundiced.                             HEENT EXAM:  Nonicteric.  No adenopathy.  Oropharynx is clear.               NECK:  Supple.                                                               LUNGS:  Clear.                                                               CARDIAC:  Regular rate and rhythm.  S1, S2.  No murmur.                      ABDOMEN:  Soft, positive bowel sounds, nontender.  No hepatosplenomegaly or masses.  No rebound or guarding.                                             EXTREMITIES:  No edema.     NEURO: CN II-XII intact; motor/sensory non-focal.    IMP: 1. Dysphagia          2. GERD          3. Abd pain (chronic)          4. N/V (chronic)          5. Positive FIT          6. Change bowel habits    PLAN: EGD/C-scope.

## 2020-09-17 NOTE — TELEPHONE ENCOUNTER
----- Message from Emma Garcia sent at 9/17/2020  1:37 PM CDT -----  Type:  RX Refill Request    Who Called:  pt  Refill or New Rx:  refill  RX Name and Strength:  HYDROcodone-acetaminophen (NORCO)  mg per tablet  Preferred Pharmacy with phone number:    [facility] - [place] - [address] [address]  [address] [address] [place] [address]  Phone: [phone] Fax: [phone]  Best Call Back Number:  [phone]

## 2020-09-21 ENCOUNTER — TELEPHONE (OUTPATIENT)
Dept: GASTROENTEROLOGY | Facility: CLINIC | Age: 85
End: 2020-09-21

## 2020-09-21 NOTE — TELEPHONE ENCOUNTER
Spoke with pt's daughter, Kindra.  Due to her going out of town either her procedure needed to be rescheduled or her covid test. Rescheduled covid test up by one day. Kindra verbalized understanding.

## 2020-09-21 NOTE — TELEPHONE ENCOUNTER
----- Message from Sherron Trevizo sent at 9/21/2020 11:03 AM CDT -----  Regarding: appointment  Contact: daughterKindra  Type:  Reschedule Apoointment Request    Caller is requesting reschedule appointment.  Caller declined first available appointment listed below.  Caller will not accept being placed on the waitlist and is requesting a message be sent to doctor.    Name of Caller:  daughterKindra  When is the first available appointment?  10/29/20  Symptoms:  colonoscopy and endoscopy  Best Call Back Number:   [phone]   Additional Information:  Daughter states she needs to push it back a couple of days due to she will be out of town. Please call daughter to reschedule. Thanks!

## 2020-09-29 ENCOUNTER — PATIENT MESSAGE (OUTPATIENT)
Dept: OTHER | Facility: OTHER | Age: 85
End: 2020-09-29

## 2020-09-30 ENCOUNTER — HOSPITAL ENCOUNTER (OUTPATIENT)
Dept: RADIOLOGY | Facility: HOSPITAL | Age: 85
Discharge: HOME | End: 2020-09-30
Attending: FAMILY MEDICINE
Payer: MEDICARE

## 2020-09-30 ENCOUNTER — OFFICE VISIT (OUTPATIENT)
Dept: FAMILY MEDICINE | Facility: CLINIC | Age: 85
End: 2020-09-30
Payer: MEDICARE

## 2020-09-30 DIAGNOSIS — M25.551 BILATERAL HIP PAIN: ICD-10-CM

## 2020-09-30 DIAGNOSIS — R52 CHRONIC PAIN OF MULTIPLE SITES: ICD-10-CM

## 2020-09-30 DIAGNOSIS — M54.17 LUMBOSACRAL RADICULOPATHY: ICD-10-CM

## 2020-09-30 DIAGNOSIS — M25.50 MULTIPLE JOINT PAIN: Primary | ICD-10-CM

## 2020-09-30 DIAGNOSIS — M25.552 BILATERAL HIP PAIN: ICD-10-CM

## 2020-09-30 DIAGNOSIS — M51.36 DDD (DEGENERATIVE DISC DISEASE), LUMBAR: ICD-10-CM

## 2020-09-30 DIAGNOSIS — Z78.0 POST-MENOPAUSAL: ICD-10-CM

## 2020-09-30 DIAGNOSIS — R19.5 HEME POSITIVE STOOL: ICD-10-CM

## 2020-09-30 DIAGNOSIS — G89.29 CHRONIC PAIN OF MULTIPLE SITES: ICD-10-CM

## 2020-09-30 DIAGNOSIS — R79.9 ABNORMAL FINDING OF BLOOD CHEMISTRY, UNSPECIFIED: ICD-10-CM

## 2020-09-30 DIAGNOSIS — M79.10 MYALGIA: ICD-10-CM

## 2020-09-30 PROCEDURE — 99215 PR OFFICE/OUTPT VISIT, EST, LEVL V, 40-54 MIN: ICD-10-PCS | Mod: HCNC,25,S$GLB, | Performed by: FAMILY MEDICINE

## 2020-09-30 PROCEDURE — 1101F PT FALLS ASSESS-DOCD LE1/YR: CPT | Mod: HCNC,CPTII,S$GLB, | Performed by: FAMILY MEDICINE

## 2020-09-30 PROCEDURE — 99215 OFFICE O/P EST HI 40 MIN: CPT | Mod: HCNC,25,S$GLB, | Performed by: FAMILY MEDICINE

## 2020-09-30 PROCEDURE — 3078F PR MOST RECENT DIASTOLIC BLOOD PRESSURE < 80 MM HG: ICD-10-PCS | Mod: HCNC,CPTII,S$GLB, | Performed by: FAMILY MEDICINE

## 2020-09-30 PROCEDURE — 1159F PR MEDICATION LIST DOCUMENTED IN MEDICAL RECORD: ICD-10-PCS | Mod: HCNC,S$GLB,, | Performed by: FAMILY MEDICINE

## 2020-09-30 PROCEDURE — 99999 PR PBB SHADOW E&M-EST. PATIENT-LVL V: CPT | Mod: PBBFAC,HCNC,, | Performed by: FAMILY MEDICINE

## 2020-09-30 PROCEDURE — 3078F DIAST BP <80 MM HG: CPT | Mod: HCNC,CPTII,S$GLB, | Performed by: FAMILY MEDICINE

## 2020-09-30 PROCEDURE — 1101F PR PT FALLS ASSESS DOC 0-1 FALLS W/OUT INJ PAST YR: ICD-10-PCS | Mod: HCNC,CPTII,S$GLB, | Performed by: FAMILY MEDICINE

## 2020-09-30 PROCEDURE — 90694 FLU VACCINE - QUADRIVALENT - ADJUVANTED: ICD-10-PCS | Mod: HCNC,S$GLB,, | Performed by: FAMILY MEDICINE

## 2020-09-30 PROCEDURE — 3074F PR MOST RECENT SYSTOLIC BLOOD PRESSURE < 130 MM HG: ICD-10-PCS | Mod: HCNC,CPTII,S$GLB, | Performed by: FAMILY MEDICINE

## 2020-09-30 PROCEDURE — 73521 X-RAY EXAM HIPS BI 2 VIEWS: CPT | Mod: 26,HCNC,, | Performed by: RADIOLOGY

## 2020-09-30 PROCEDURE — 73521 XR HIPS BILATERAL 2 VIEW INCL AP PELVIS: ICD-10-PCS | Mod: 26,HCNC,, | Performed by: RADIOLOGY

## 2020-09-30 PROCEDURE — G0008 ADMIN INFLUENZA VIRUS VAC: HCPCS | Mod: HCNC,S$GLB,, | Performed by: FAMILY MEDICINE

## 2020-09-30 PROCEDURE — 1159F MED LIST DOCD IN RCRD: CPT | Mod: HCNC,S$GLB,, | Performed by: FAMILY MEDICINE

## 2020-09-30 PROCEDURE — 90694 VACC AIIV4 NO PRSRV 0.5ML IM: CPT | Mod: HCNC,S$GLB,, | Performed by: FAMILY MEDICINE

## 2020-09-30 PROCEDURE — 99999 PR PBB SHADOW E&M-EST. PATIENT-LVL V: ICD-10-PCS | Mod: PBBFAC,HCNC,, | Performed by: FAMILY MEDICINE

## 2020-09-30 PROCEDURE — 3074F SYST BP LT 130 MM HG: CPT | Mod: HCNC,CPTII,S$GLB, | Performed by: FAMILY MEDICINE

## 2020-09-30 PROCEDURE — 73521 X-RAY EXAM HIPS BI 2 VIEWS: CPT | Mod: TC,HCNC,PN

## 2020-09-30 PROCEDURE — G0008 FLU VACCINE - QUADRIVALENT - ADJUVANTED: ICD-10-PCS | Mod: HCNC,S$GLB,, | Performed by: FAMILY MEDICINE

## 2020-09-30 RX ORDER — GABAPENTIN 300 MG/1
300 CAPSULE ORAL 3 TIMES DAILY
Qty: 270 CAPSULE | Refills: 1 | Status: SHIPPED | OUTPATIENT
Start: 2020-09-30 | End: 2020-10-19 | Stop reason: SDUPTHER

## 2020-09-30 RX ORDER — TIZANIDINE 2 MG/1
TABLET ORAL
Qty: 120 TABLET | Refills: 1 | Status: SHIPPED | OUTPATIENT
Start: 2020-09-30 | End: 2021-04-04

## 2020-09-30 NOTE — PROGRESS NOTES
THIS DOCUMENT WAS MADE IN PART WITH VOICE RECOGNITION SOFTWARE.  OCCASIONALLY THIS SOFTWARE WILL MISINTERPRET WORDS OR PHRASES.      Summer Inman  1935    Summer was seen today for extremity weakness.    Diagnoses and all orders for this visit:    Multiple joint pain  -     Sedimentation rate; Future  -     Rheumatoid factor; Future  -     C-Reactive Protein; Future  -     TAY Screen w/Reflex; Future    Heme positive stool  -     CBC auto differential; Future  -     Ferritin; Future  -     Iron and TIBC; Future  Has endoscopy schedule, will repeat CBC to make certain no worsening or developing anemia.  She has seen no gross blood no melena    Abnormal finding of blood chemistry, unspecified   -     Ferritin; Future  -     Iron and TIBC; Future    Lumbosacral radiculopathy  -     gabapentin (NEURONTIN) 300 MG capsule; Take 1 capsule (300 mg total) by mouth 3 (three) times daily.    DDD (degenerative disc disease), lumbar  -     gabapentin (NEURONTIN) 300 MG capsule; Take 1 capsule (300 mg total) by mouth 3 (three) times daily.  -     tiZANidine (ZANAFLEX) 2 MG tablet; Take 1-2 po TID prn muscle spasm    Chronic pain of multiple sites  -     gabapentin (NEURONTIN) 300 MG capsule; Take 1 capsule (300 mg total) by mouth 3 (three) times daily.    Post-menopausal  -     DXA Bone Density Spine And Hip; Future    Bilateral hip pain  -     X-Ray Hips Bilateral 2 View Incl AP Pelvis; Future    Myalgia    Other orders  -     Influenza - Quadrivalent (Adjuvanted)    Total time today was more than 40 minutes.  More than half face-to-face counseling with the patient and her daughter.  Will hold pravastatin.  If symptoms do not improve re-evaluate.  Check inflammatory markers, consider rheumatology follow-up if needed.      Subjective     Chief Complaint   Patient presents with    Extremity Weakness     bilateral leg weakness       HPI    Follow-up and review of multiple chronic conditions.  However her primary concern is  subjective weakness and leg pain.      Active Ambulatory Problems     Diagnosis Date Noted    Hyperlipidemia     Gastroesophageal reflux disease     Seizure disorder     Chronic cough 03/08/2016    Anemia 03/10/2016    DDD (degenerative disc disease), lumbar 06/15/2016    Lumbosacral radiculopathy 06/15/2016    Right hip pain 06/15/2016    Coronary artery disease involving native coronary artery of native heart without angina pectoris 10/17/2016    Essential hypertension 10/17/2016    Chronic obstructive pulmonary disease 10/17/2016    History of stroke 10/17/2016    Depression, major, recurrent, mild 10/17/2016    Insomnia 10/17/2016    Pulmonary hypertension 10/17/2016    Osteopenia 10/17/2016    Dysphagia 03/08/2018    Memory loss 07/26/2018    Seizures     Chronic narcotic dependence 06/07/2019    Atherosclerosis of aorta 06/07/2019    Chronic pain of multiple sites 01/30/2000    Chronic kidney disease, stage III (moderate) 06/17/2019    Biliary colic 09/18/2019     Resolved Ambulatory Problems     Diagnosis Date Noted    Chronic kidney disease, stage II (mild) 12/26/2012    Dysphagia 10/08/2014    Cervical radiculopathy 02/02/2015    UTI (urinary tract infection) 01/31/2016    Elevated lactic acid level 01/31/2016    Abdominal pain 01/31/2016    Cervical spondylosis with myelopathy 03/07/2016    Shortness of breath 03/08/2016    Obesity, Class I, BMI 30-34.9 03/08/2016    Hypokalemia 03/09/2016    Closed Colles' fracture of right radius 04/26/2017     Past Medical History:   Diagnosis Date    Anticoagulant long-term use     Arthritis     Asthma     Atrial premature beats     Benign neoplasm of adrenal gland     CKD (chronic kidney disease)     Colon polyp     COPD (chronic obstructive pulmonary disease)     Coronary artery disease     Depression     Dizziness     First degree AV block     General anesthetics causing adverse effect in therapeutic use      Hepatomegaly     Hypertension     Impaired mobility     Neck pain     Schatzki's ring     Seasonal allergies     Stroke     Trouble in sleeping     Wears dentures     Wears glasses          Review of Systems   Constitutional: Positive for activity change and fatigue. Negative for fever.   Respiratory: Negative for shortness of breath.    Cardiovascular: Negative for chest pain.   Gastrointestinal: Negative for abdominal pain and blood in stool.   Genitourinary: Negative for difficulty urinating, flank pain and hematuria.   Musculoskeletal: Positive for arthralgias, back pain, gait problem, myalgias and neck stiffness.   Neurological: Positive for weakness.       Objective     Physical Exam  Vitals signs reviewed.   Constitutional:       General: She is not in acute distress.     Appearance: She is not toxic-appearing or diaphoretic.      Comments: Elderly  female.    HENT:      Head: Normocephalic and atraumatic.      Right Ear: Tympanic membrane, ear canal and external ear normal.      Left Ear: Tympanic membrane, ear canal and external ear normal.      Nose: Nose normal.      Mouth/Throat:      Pharynx: No oropharyngeal exudate.   Eyes:      General: No scleral icterus.     Conjunctiva/sclera: Conjunctivae normal.      Pupils: Pupils are equal, round, and reactive to light.   Neck:      Musculoskeletal: Normal range of motion and neck supple.      Thyroid: No thyromegaly.   Cardiovascular:      Rate and Rhythm: Normal rate and regular rhythm.      Chest Wall: PMI is not displaced.      Heart sounds: Murmur present. No friction rub. No gallop.    Pulmonary:      Effort: Pulmonary effort is normal. No respiratory distress.      Breath sounds: Normal breath sounds. No wheezing or rales.   Abdominal:      General: Bowel sounds are normal.      Palpations: Abdomen is soft. There is no mass.      Tenderness: There is no abdominal tenderness. There is no guarding or rebound.   Lymphadenopathy:       Cervical: No cervical adenopathy.   Neurological:      Mental Status: She is alert and oriented to person, place, and time.      Cranial Nerves: No cranial nerve deficit.      Motor: No atrophy or abnormal muscle tone.      Deep Tendon Reflexes: Reflexes normal.      Reflex Scores:       Tricep reflexes are 2+ on the right side and 2+ on the left side.       Bicep reflexes are 2+ on the right side and 2+ on the left side.       Brachioradialis reflexes are 2+ on the right side and 2+ on the left side.       Patellar reflexes are 2+ on the right side and 2+ on the left side.       Achilles reflexes are 2+ on the right side and 2+ on the left side.     Comments: Ambulatory, walker  She has no here focal weakness although she does have some guarding and subjective weakness because of effort.  She moves all four extremities well.  She has diffuse tenderness with palpation over the shoulders upper back thighs lower back.  But no visible or focal pathology.       Vitals:    09/30/20 1530 09/30/20 1603   BP: (!) 144/70 128/60   BP Location: Left arm    Patient Position: Sitting    BP Method: Medium (Manual)    Pulse: 78    Temp: 97.7 °F (36.5 °C)    TempSrc: Skin    SpO2: 95%    Weight: 69.8 kg (153 lb 14.1 oz)    Height: 5' (1.524 m)        MOST RECENT LABS IN OUR ELECTRONIC MEDICAL RECORD:     Results for orders placed or performed in visit on 08/13/20   Urine culture    Specimen: Urine, Clean Catch   Result Value Ref Range    Urine Culture, Routine No growth    Urinalysis Microscopic   Result Value Ref Range    RBC, UA 0 0 - 4 /hpf    WBC, UA 1 0 - 5 /hpf    Bacteria Rare None-Occ /hpf    Squam Epithel, UA 0 /hpf    Microscopic Comment SEE COMMENT    Fecal Immunochemical Test (iFOBT)   Result Value Ref Range    Fecal Immunochemical Test (iFOBT) Positive (A) Negative   POCT urine dipstick without microscope   Result Value Ref Range    Color, UA Light Yellow     Spec Grav UA 1.005     pH, UA 5     WBC, UA Trace      Nitrite, UA Neg     Protein Neg     Glucose, UA Normal     Ketones, UA Neg     Urobilinogen, UA Normal     Bilirubin Neg     Blood, UA Trace     Clarity, UA Cloudy

## 2020-10-01 ENCOUNTER — TELEPHONE (OUTPATIENT)
Dept: FAMILY MEDICINE | Facility: CLINIC | Age: 85
End: 2020-10-01

## 2020-10-01 DIAGNOSIS — R70.0 ELEVATED SED RATE: Primary | ICD-10-CM

## 2020-10-01 NOTE — TELEPHONE ENCOUNTER
Please call with lab results.  Her inflammation levels are elevated.  I am concerned about the possibility of a condition called PMR/polymyalgia rheumatica.  Although her symptoms still could be related arthritis and possibly a side effect of the statin medication.  So I would like to repeat the blood test both the sed rate and a CRP on Monday or Tuesday.  I also want to check with her at that time to see if she is improving off of the statin medication.  If the inflammatory markers are going up and she is not feeling better I may need to consider beginning prednisone.    Also I did enter a referral to Rheumatology although I suspect this will take a while to get in but please see what is available.

## 2020-10-03 VITALS
DIASTOLIC BLOOD PRESSURE: 60 MMHG | BODY MASS INDEX: 30.21 KG/M2 | WEIGHT: 153.88 LBS | OXYGEN SATURATION: 95 % | TEMPERATURE: 98 F | HEART RATE: 78 BPM | SYSTOLIC BLOOD PRESSURE: 128 MMHG | HEIGHT: 60 IN

## 2020-10-05 ENCOUNTER — TELEPHONE (OUTPATIENT)
Dept: FAMILY MEDICINE | Facility: CLINIC | Age: 85
End: 2020-10-05

## 2020-10-05 NOTE — TELEPHONE ENCOUNTER
I need her in today or tomorrow to repeat the inflammatory markers as I stated in my previous telephone message.  Please try to reach her again ASAP.

## 2020-10-05 NOTE — TELEPHONE ENCOUNTER
June is not acceptable.  Unfortunately we are going to have to refer her to the [facility].  I realize that might be inconvenient but since she is struggling so much I feel rheumatology can help her the most. I will provide additional information once I see the repeat sedimentation rate and there may be something I can do in the meantime depending on that result.

## 2020-10-06 ENCOUNTER — LAB VISIT (OUTPATIENT)
Dept: LAB | Facility: HOSPITAL | Age: 85
End: 2020-10-06
Attending: FAMILY MEDICINE
Payer: MEDICARE

## 2020-10-06 DIAGNOSIS — R70.0 ELEVATED SED RATE: ICD-10-CM

## 2020-10-06 LAB — ERYTHROCYTE [SEDIMENTATION RATE] IN BLOOD BY WESTERGREN METHOD: 30 MM/HR (ref 0–36)

## 2020-10-06 PROCEDURE — 85652 RBC SED RATE AUTOMATED: CPT | Mod: HCNC

## 2020-10-06 PROCEDURE — 36415 COLL VENOUS BLD VENIPUNCTURE: CPT | Mod: HCNC,PN

## 2020-10-06 PROCEDURE — 86140 C-REACTIVE PROTEIN: CPT | Mod: HCNC

## 2020-10-07 ENCOUNTER — TELEPHONE (OUTPATIENT)
Dept: FAMILY MEDICINE | Facility: CLINIC | Age: 85
End: 2020-10-07

## 2020-10-07 DIAGNOSIS — R70.0 ELEVATED SED RATE: Primary | ICD-10-CM

## 2020-10-07 LAB — CRP SERPL-MCNC: 9.3 MG/L (ref 0–8.2)

## 2020-10-07 NOTE — TELEPHONE ENCOUNTER
The inflammatory markers have improved.  I think this reduces the risk that she may have polymyalgia rheumatica.  However I want her to carefully monitor symptoms as well.  If she has not already done so I do recommend holding the statin medication (pravastatin) and see if this improves her musculoskeletal complaints over the next several weeks.  I would like to repeat the inflammatory markers in about four weeks however if she has any sudden worsening of her pains we should do it sooner.  Please schedule

## 2020-10-14 NOTE — TELEPHONE ENCOUNTER
Called pt to advise of below message  Pt stated that she has been in severe lower abd pain, with N/V, no appetite. I advised pt go to ER. Pt son will be bringing her now.

## 2020-10-15 ENCOUNTER — NURSE TRIAGE (OUTPATIENT)
Dept: ADMINISTRATIVE | Facility: CLINIC | Age: 85
End: 2020-10-15

## 2020-10-16 PROBLEM — N17.9 AKI (ACUTE KIDNEY INJURY): Status: ACTIVE | Noted: 2020-10-16

## 2020-10-16 PROBLEM — I47.10 SVT (SUPRAVENTRICULAR TACHYCARDIA): Status: ACTIVE | Noted: 2020-10-16

## 2020-10-16 PROBLEM — R19.7 DIARRHEA: Status: ACTIVE | Noted: 2020-10-16

## 2020-10-16 PROBLEM — Z71.89 ACP (ADVANCE CARE PLANNING): Status: ACTIVE | Noted: 2020-10-16

## 2020-10-16 PROBLEM — R79.89 ELEVATED TROPONIN: Status: ACTIVE | Noted: 2020-10-16

## 2020-10-16 PROBLEM — E87.1 HYPONATREMIA: Status: ACTIVE | Noted: 2020-10-16

## 2020-10-16 NOTE — TELEPHONE ENCOUNTER
Caller states that pt was dx in ED with colitis x 1 day.  HR currently 187 and BP low per daughter. Caller states pt has weakness.  Pt advised per protocol and verbalized understanding.    Reason for Disposition   [1] Dizziness, lightheadedness, or weakness AND [2] heart beating very rapidly (e.g., > 140 / minute)    Additional Information   Negative: Passed out (i.e., lost consciousness, collapsed and was not responding)   Negative: Shock suspected (e.g., cold/pale/clammy skin, too weak to stand, low BP, rapid pulse)   Negative: Difficult to awaken or acting confused (e.g., disoriented, slurred speech)   Negative: Visible sweat on face or sweat dripping down face   Negative: Unable to walk, or can only walk with assistance (e.g., requires support)   Negative: [1] Received SHOCK from implantable cardiac defibrillator AND [2] persisting symptoms (i.e., palpitations, lightheadedness)    Protocols used: HEART RATE AND HEARTBEAT [id number]

## 2020-10-17 PROBLEM — R73.03 PREDIABETES: Status: ACTIVE | Noted: 2020-10-17

## 2020-10-19 DIAGNOSIS — R52 CHRONIC PAIN OF MULTIPLE SITES: ICD-10-CM

## 2020-10-19 DIAGNOSIS — M54.17 LUMBOSACRAL RADICULOPATHY: ICD-10-CM

## 2020-10-19 DIAGNOSIS — M51.36 DDD (DEGENERATIVE DISC DISEASE), LUMBAR: ICD-10-CM

## 2020-10-19 DIAGNOSIS — G89.29 CHRONIC PAIN OF MULTIPLE SITES: ICD-10-CM

## 2020-10-19 RX ORDER — GABAPENTIN 300 MG/1
300 CAPSULE ORAL 3 TIMES DAILY
Qty: 270 CAPSULE | Refills: 1 | Status: SHIPPED | OUTPATIENT
Start: 2020-10-19 | End: 2021-01-06

## 2020-10-19 RX ORDER — HYDROCODONE BITARTRATE AND ACETAMINOPHEN 10; 325 MG/1; MG/1
1 TABLET ORAL EVERY 6 HOURS PRN
Qty: 90 TABLET | Refills: 0 | Status: SHIPPED | OUTPATIENT
Start: 2020-10-19 | End: 2020-11-23 | Stop reason: SDUPTHER

## 2020-10-20 ENCOUNTER — TELEPHONE (OUTPATIENT)
Dept: FAMILY MEDICINE | Facility: CLINIC | Age: 85
End: 2020-10-20

## 2020-10-20 NOTE — TELEPHONE ENCOUNTER
----- Message from Adriana Fuentes sent at 10/19/2020  4:31 PM CDT -----  Contact: daughter  Type: Needs Medical Advice    Who Called:  pt daughter  Best Call Back Number:   Additional Information: Requesting a call back regarding scheduling a hospital follow up for pt pt will only see yulisa pt was offered with another associate but pt refused  Please Advise ---Thank you

## 2020-10-20 NOTE — TELEPHONE ENCOUNTER
----- Message from Freda Bryant sent at 10/19/2020  3:54 PM CDT -----  Regarding: refill  Contact: Daughter/Elizabeth/[phone]  Type:  RX Refill Request    Who Called:  Daughter/Elizabeth/[phone]  Refill or New Rx:  refill  RX Name and Strength:  HYDROcodone-acetaminophen (NORCO)  mg per tablet and gabapentin (NEURONTIN) 300 MG capsule         Preferred Pharmacy with phone number:        [facility] - [place] - [address] [address]  [address] [address] [address] [address]  Phone: [phone] Fax: [phone]     Local or Mail Order:  local  Ordering Provider:  same    Additional Information: Please call when completed.

## 2020-10-23 ENCOUNTER — OFFICE VISIT (OUTPATIENT)
Dept: FAMILY MEDICINE | Facility: CLINIC | Age: 85
End: 2020-10-23
Payer: MEDICARE

## 2020-10-23 VITALS
HEIGHT: 60 IN | WEIGHT: 159.63 LBS | HEART RATE: 66 BPM | BODY MASS INDEX: 31.34 KG/M2 | OXYGEN SATURATION: 95 % | DIASTOLIC BLOOD PRESSURE: 52 MMHG | SYSTOLIC BLOOD PRESSURE: 112 MMHG | TEMPERATURE: 98 F

## 2020-10-23 DIAGNOSIS — R76.8 RHEUMATOID FACTOR POSITIVE: ICD-10-CM

## 2020-10-23 DIAGNOSIS — M25.50 MULTIPLE JOINT PAIN: ICD-10-CM

## 2020-10-23 DIAGNOSIS — G89.29 CHRONIC PAIN OF MULTIPLE SITES: ICD-10-CM

## 2020-10-23 DIAGNOSIS — N17.9 AKI (ACUTE KIDNEY INJURY): ICD-10-CM

## 2020-10-23 DIAGNOSIS — R70.0 ELEVATED SED RATE: ICD-10-CM

## 2020-10-23 DIAGNOSIS — J44.9 CHRONIC OBSTRUCTIVE PULMONARY DISEASE, UNSPECIFIED COPD TYPE: ICD-10-CM

## 2020-10-23 DIAGNOSIS — K59.09 CHRONIC CONSTIPATION: ICD-10-CM

## 2020-10-23 DIAGNOSIS — R52 CHRONIC PAIN OF MULTIPLE SITES: ICD-10-CM

## 2020-10-23 DIAGNOSIS — I47.10 SVT (SUPRAVENTRICULAR TACHYCARDIA): Primary | ICD-10-CM

## 2020-10-23 DIAGNOSIS — I27.21 PULMONARY ARTERY HYPERTENSION: ICD-10-CM

## 2020-10-23 DIAGNOSIS — R06.02 SHORTNESS OF BREATH: ICD-10-CM

## 2020-10-23 DIAGNOSIS — R76.8 POSITIVE ANA (ANTINUCLEAR ANTIBODY): ICD-10-CM

## 2020-10-23 PROCEDURE — 3074F SYST BP LT 130 MM HG: CPT | Mod: HCNC,CPTII,S$GLB, | Performed by: FAMILY MEDICINE

## 2020-10-23 PROCEDURE — 99499 RISK ADDL DX/OHS AUDIT: ICD-10-PCS | Mod: S$GLB,,, | Performed by: FAMILY MEDICINE

## 2020-10-23 PROCEDURE — 1159F MED LIST DOCD IN RCRD: CPT | Mod: HCNC,S$GLB,, | Performed by: FAMILY MEDICINE

## 2020-10-23 PROCEDURE — 99215 OFFICE O/P EST HI 40 MIN: CPT | Mod: HCNC,S$GLB,, | Performed by: FAMILY MEDICINE

## 2020-10-23 PROCEDURE — 99999 PR PBB SHADOW E&M-EST. PATIENT-LVL IV: ICD-10-PCS | Mod: PBBFAC,HCNC,, | Performed by: FAMILY MEDICINE

## 2020-10-23 PROCEDURE — 1101F PT FALLS ASSESS-DOCD LE1/YR: CPT | Mod: HCNC,CPTII,S$GLB, | Performed by: FAMILY MEDICINE

## 2020-10-23 PROCEDURE — 1159F PR MEDICATION LIST DOCUMENTED IN MEDICAL RECORD: ICD-10-PCS | Mod: HCNC,S$GLB,, | Performed by: FAMILY MEDICINE

## 2020-10-23 PROCEDURE — 1101F PR PT FALLS ASSESS DOC 0-1 FALLS W/OUT INJ PAST YR: ICD-10-PCS | Mod: HCNC,CPTII,S$GLB, | Performed by: FAMILY MEDICINE

## 2020-10-23 PROCEDURE — 99499 UNLISTED E&M SERVICE: CPT | Mod: HCNC,S$GLB,, | Performed by: FAMILY MEDICINE

## 2020-10-23 PROCEDURE — 3074F PR MOST RECENT SYSTOLIC BLOOD PRESSURE < 130 MM HG: ICD-10-PCS | Mod: HCNC,CPTII,S$GLB, | Performed by: FAMILY MEDICINE

## 2020-10-23 PROCEDURE — 3078F PR MOST RECENT DIASTOLIC BLOOD PRESSURE < 80 MM HG: ICD-10-PCS | Mod: HCNC,CPTII,S$GLB, | Performed by: FAMILY MEDICINE

## 2020-10-23 PROCEDURE — 3078F DIAST BP <80 MM HG: CPT | Mod: HCNC,CPTII,S$GLB, | Performed by: FAMILY MEDICINE

## 2020-10-23 PROCEDURE — 99999 PR PBB SHADOW E&M-EST. PATIENT-LVL IV: CPT | Mod: PBBFAC,HCNC,, | Performed by: FAMILY MEDICINE

## 2020-10-23 PROCEDURE — 99215 PR OFFICE/OUTPT VISIT, EST, LEVL V, 40-54 MIN: ICD-10-PCS | Mod: HCNC,S$GLB,, | Performed by: FAMILY MEDICINE

## 2020-10-23 RX ORDER — FLUTICASONE PROPIONATE 50 MCG
2 SPRAY, SUSPENSION (ML) NASAL DAILY
Qty: 16 G | Refills: 3 | Status: SHIPPED | OUTPATIENT
Start: 2020-10-23

## 2020-10-23 NOTE — PROGRESS NOTES
Patient, Summer Inman (MRN #[id number]), presented with a recent Estimated Glumerular Filtration Rate (EGFR) between 30 and 45 consistent with the definition of chronic kidney disease stage 3 - moderate (ICD10 - N18.3).    eGFR if non    Date Value Ref Range Status   10/16/2020 43 (A) >60 mL/min/1.73 m^2 Final     Comment:     Calculation used to obtain the estimated glomerular filtration  rate (eGFR) is the CKD-EPI equation.          The patient's chronic kidney disease stage 3 was monitored, evaluated, addressed and/or treated. This addendum to the medical record is made on 10/23/2020.

## 2020-10-23 NOTE — PROGRESS NOTES
" THIS DOCUMENT WAS MADE IN PART WITH VOICE RECOGNITION SOFTWARE.  OCCASIONALLY THIS SOFTWARE WILL MISINTERPRET WORDS OR PHRASES.      Summer Inman  1935    Summer was seen today for hospital follow up.    Diagnoses and all orders for this visit:    SVT (supraventricular tachycardia)  Resolved    BILLY (acute kidney injury)  Resolved    Chronic constipation  Continue a stool softener and regular use of MiraLax.  Also advised her to cut out drinking diet Cokes all day and drink more water.  If not helping consider adding lactulose    Chronic pain of multiple sites  Multiple joint pain  Elevated sed rate  Positive TAY (antinuclear antibody)  Rheumatoid factor positive  She complains less pain today, back toward her baseline.  The sedimentation rate has improved.  Although with the abnormal results I still would recommend consulting with rheumatology once she completes her upcoming GI workup and cardiac follow-up.    Chronic obstructive pulmonary disease, unspecified COPD type  Shortness of breath  Stable    Pulmonary artery hypertension  Stable    Nasal congestion  -     fluticasone propionate (FLONASE) 50 mcg/actuation nasal spray; 2 sprays (100 mcg total) by Each Nostril route once daily.        Subjective     Chief Complaint   Patient presents with    Hospital Follow Up     dx tachacardia       \[facility] [place] [facility]\""    Hospital follow-up.  Present to the hospital with diarrhea, originally treated released but returned with tachycardia, SVT, from discharge summary as follows:    "84-year-old female with history of coronary artery disease, COPD, seizure disorder, hypertension, and diabetes mellitus type 2 presented to the emergency room for palpitations.  Patient was seen in the emergency room yesterday which time she complained of diarrhea, abdominal pain, and nausea.  She was diagnosed with colitis and discharged home with Cipro and Flagyl.  Since being home she has continued to have diarrhea as well as nausea.  She denies " "emesis.  Earlier tonight the patient's daughter checked her vital signs and noted the patient had a rapid hea scratch scratch scratch a rt rate and low blood pressure prompting the daughter to bring the patient to the emergency room.  In the emergency room she was found to be in SVT and required 2 doses of adenosine with conversion to sinus rhythm.  Lab work was notable for BILLY and elevated troponin.  Hospital Medicine consulted for admission.     * No surgery found *       Hospital Course:   The patient was placed under observation with consult to cardiology. Patient given IVF and BP meds put on hold due to hypotension and BILLY.  Patient remained in NSR after 2 doses of adenosine given in the ER.  Troponin levels were mildly elevated but trended down (likely demand ischemia due to SVT). Patient started on low dose beta blocker (lopressor per cardiology) and K supplementation given. ECHO results showed normal systolic function 55-60% with indeterminate diastolic function. Cardiology did not recommend further evaluation and was cleared to be discharged.  Patient reported having diarrhea, which is induced by laxative used as she has constipation.  Patient did not have any more recurrence of diarrhea. She was started on stool softeners and will see GI as OP (already has appointment with Dr. gNuyen for EGD and colonoscopy). Antibiotics discontinued.  With no recurrence of symptoms and clearance from consultant, the patient was discharged with stable vital signs.     Off note, some medications discontinued on discharge medications are medications that she has not been taking.  "    She does complain of intermittent shortness of breath.  In particular nasal congestion which seems to aggravate this.  Vicks does help.  She has had a dry cough, she has occasional left-sided anterior    Total time greater than 40 minutes.  Components of this time include chart review, lab review, radiology review, examination, documentation, " consultation/specialist reports (when applicable), medication reconciliation, as well as discussion/counseling.  At least 50% of the time was spent face-to-face discussion/counseling.      Active Ambulatory Problems     Diagnosis Date Noted    Hyperlipidemia     Gastroesophageal reflux disease     Seizure disorder     Chronic cough 03/08/2016    Hypokalemia 03/09/2016    Anemia 03/10/2016    DDD (degenerative disc disease), lumbar 06/15/2016    Lumbosacral radiculopathy 06/15/2016    Right hip pain 06/15/2016    Coronary artery disease involving native coronary artery of native heart without angina pectoris 10/17/2016    Essential hypertension 10/17/2016    Chronic obstructive pulmonary disease 10/17/2016    History of stroke 10/17/2016    Depression, major, recurrent, mild 10/17/2016    Insomnia 10/17/2016    Pulmonary hypertension 10/17/2016    Osteopenia 10/17/2016    Dysphagia 03/08/2018    Memory loss 07/26/2018    Seizures     Chronic narcotic dependence 06/07/2019    Atherosclerosis of aorta 06/07/2019    Chronic pain of multiple sites 01/30/2000    Chronic kidney disease, stage III (moderate) 06/17/2019    Biliary colic 09/18/2019    SVT (supraventricular tachycardia) 10/16/2020    BILLY (acute kidney injury) 10/16/2020    ACP (advance care planning) 10/16/2020    Elevated troponin 10/16/2020    Diarrhea 10/16/2020    Hyponatremia 10/16/2020    Prediabetes 10/17/2020     Resolved Ambulatory Problems     Diagnosis Date Noted    Chronic kidney disease, stage II (mild) 12/26/2012    Dysphagia 10/08/2014    Cervical radiculopathy 02/02/2015    UTI (urinary tract infection) 01/31/2016    Elevated lactic acid level 01/31/2016    Abdominal pain 01/31/2016    Cervical spondylosis with myelopathy 03/07/2016    Shortness of breath 03/08/2016    Obesity, Class I, BMI 30-34.9 03/08/2016    Closed Colles' fracture of right radius 04/26/2017     Past Medical History:   Diagnosis  Date    Anticoagulant long-term use     Arthritis     Asthma     Atrial premature beats     Benign neoplasm of adrenal gland     CKD (chronic kidney disease)     Colon polyp     COPD (chronic obstructive pulmonary disease)     Coronary artery disease     Depression     Dizziness     First degree AV block     General anesthetics causing adverse effect in therapeutic use     Hepatomegaly     Hypertension     Impaired mobility     Neck pain     Schatzki's ring     Seasonal allergies     Stroke     Trouble in sleeping     Wears dentures     Wears glasses          Review of Systems   Constitutional: Positive for activity change and fatigue. Negative for fever.   HENT: Negative for trouble swallowing.    Respiratory: Positive for shortness of breath. Negative for cough and wheezing.    Cardiovascular: Negative for chest pain.   Gastrointestinal: Positive for constipation. Negative for abdominal pain, blood in stool and diarrhea.   Genitourinary: Negative for difficulty urinating, flank pain and hematuria.   Musculoskeletal: Positive for arthralgias, back pain, gait problem, myalgias and neck stiffness.   Neurological: Positive for weakness.       Objective     Physical Exam  Vitals signs reviewed.   Constitutional:       General: She is not in acute distress.     Appearance: She is not toxic-appearing or diaphoretic.      Comments: Elderly  female.    HENT:      Head: Normocephalic and atraumatic.      Right Ear: Tympanic membrane, ear canal and external ear normal.      Left Ear: Tympanic membrane, ear canal and external ear normal.      Nose: Nose normal.      Mouth/Throat:      Pharynx: No oropharyngeal exudate.   Eyes:      General: No scleral icterus.     Conjunctiva/sclera: Conjunctivae normal.      Pupils: Pupils are equal, round, and reactive to light.   Neck:      Musculoskeletal: Normal range of motion and neck supple.      Thyroid: No thyromegaly.   Cardiovascular:      Rate and  Rhythm: Normal rate and regular rhythm.      Chest Wall: PMI is not displaced.      Heart sounds: Murmur present. No friction rub. No gallop.    Pulmonary:      Effort: Pulmonary effort is normal. No respiratory distress.      Comments: Mildly diminished breath sounds bilaterally.  No wheezes and no crackles  Abdominal:      General: Bowel sounds are normal.      Palpations: Abdomen is soft. There is no mass.      Tenderness: There is no rebound.   Lymphadenopathy:      Cervical: No cervical adenopathy.   Neurological:      Mental Status: She is alert and oriented to person, place, and time.      Cranial Nerves: No cranial nerve deficit.      Motor: No abnormal muscle tone.      Deep Tendon Reflexes: Reflexes normal.      Comments: Using a walker       Vitals:    10/23/20 1017   BP: (!) 112/52   BP Location: Left arm   Patient Position: Sitting   BP Method: Medium (Manual)   Pulse: 66   Temp: 98.1 °F (36.7 °C)   TempSrc: Oral   SpO2: 95%   Weight: 72.4 kg (159 lb 9.8 oz)   Height: 5' (1.524 m)       MOST RECENT LABS IN OUR ELECTRONIC MEDICAL RECORD:     Results for orders placed or performed during the hospital encounter of 10/15/20   CBC auto differential   Result Value Ref Range    WBC 10.21 3.90 - 12.70 K/uL    RBC 4.87 4.00 - 5.40 M/uL    Hemoglobin 13.4 12.0 - 16.0 g/dL    Hematocrit 40.1 37.0 - 48.5 %    Mean Corpuscular Volume 82 82 - 98 fL    Mean Corpuscular Hemoglobin 27.5 27.0 - 31.0 pg    Mean Corpuscular Hemoglobin Conc 33.4 32.0 - 36.0 g/dL    RDW 14.6 (H) 11.5 - 14.5 %    Platelets 386 (H) 150 - 350 K/uL    MPV 10.9 9.2 - 12.9 fL    Immature Granulocytes 0.3 0.0 - 0.5 %    Gran # (ANC) 6.8 1.8 - 7.7 K/uL    Immature Grans (Abs) 0.03 0.00 - 0.04 K/uL    Lymph # 2.2 1.0 - 4.8 K/uL    Mono # 1.0 0.3 - 1.0 K/uL    Eos # 0.1 0.0 - 0.5 K/uL    Baso # 0.06 0.00 - 0.20 K/uL    nRBC 0 0 /100 WBC    Gran% 66.5 38.0 - 73.0 %    Lymph% 21.3 18.0 - 48.0 %    Mono% 10.1 4.0 - 15.0 %    Eosinophil% 1.2 0.0 - 8.0 %     Basophil% 0.6 0.0 - 1.9 %    Platelet Estimate Increased (A)     Differential Method Automated    Comprehensive metabolic panel   Result Value Ref Range    Sodium 131 (L) 136 - 145 mmol/L    Potassium 3.3 (L) 3.5 - 5.1 mmol/L    Chloride 95 95 - 110 mmol/L    CO2 26 22 - 31 mmol/L    Glucose 132 (H) 70 - 110 mg/dL    BUN, Bld 19 (H) 7 - 18 mg/dL    Creatinine 1.42 (H) 0.50 - 1.40 mg/dL    Calcium 9.4 8.4 - 10.2 mg/dL    Total Protein 7.0 6.0 - 8.4 g/dL    Albumin 4.1 3.5 - 5.2 g/dL    Total Bilirubin 0.4 0.2 - 1.3 mg/dL    Alkaline Phosphatase 102 38 - 145 U/L    AST 27 14 - 36 U/L    ALT 17 0 - 35 U/L    Anion Gap 10 8 - 16 mmol/L    eGFR if African American 39 (A) >60 mL/min/1.73 m^2    eGFR if non African American 34 (A) >60 mL/min/1.73 m^2   Troponin   Result Value Ref Range    Troponin I 0.050 (H) 0.012 - 0.034 ng/mL   Magnesium   Result Value Ref Range    Magnesium 1.7 1.6 - 2.6 mg/dL   COVID-19 Rapid Screening   Result Value Ref Range    SARS-CoV-2 RNA, Amplification, Qual Negative Negative   Basic Metabolic Panel (BMP)   Result Value Ref Range    Sodium 134 (L) 136 - 145 mmol/L    Potassium 3.7 3.5 - 5.1 mmol/L    Chloride 102 95 - 110 mmol/L    CO2 26 22 - 31 mmol/L    Glucose 114 (H) 70 - 110 mg/dL    BUN, Bld 19 (H) 7 - 18 mg/dL    Creatinine 1.17 0.50 - 1.40 mg/dL    Calcium 8.7 8.4 - 10.2 mg/dL    Anion Gap 6 (L) 8 - 16 mmol/L    eGFR if African American 50 (A) >60 mL/min/1.73 m^2    eGFR if non African American 43 (A) >60 mL/min/1.73 m^2   CBC with Automated Differential   Result Value Ref Range    WBC 7.88 3.90 - 12.70 K/uL    RBC 4.17 4.00 - 5.40 M/uL    Hemoglobin 11.2 (L) 12.0 - 16.0 g/dL    Hematocrit 34.4 (L) 37.0 - 48.5 %    Mean Corpuscular Volume 83 82 - 98 fL    Mean Corpuscular Hemoglobin 26.9 (L) 27.0 - 31.0 pg    Mean Corpuscular Hemoglobin Conc 32.6 32.0 - 36.0 g/dL    RDW 14.6 (H) 11.5 - 14.5 %    Platelets 229 150 - 350 K/uL    MPV 10.8 9.2 - 12.9 fL    Immature Granulocytes 0.3 0.0  - 0.5 %    Gran # (ANC) 5.4 1.8 - 7.7 K/uL    Immature Grans (Abs) 0.02 0.00 - 0.04 K/uL    Lymph # 1.6 1.0 - 4.8 K/uL    Mono # 0.8 0.3 - 1.0 K/uL    Eos # 0.0 0.0 - 0.5 K/uL    Baso # 0.04 0.00 - 0.20 K/uL    nRBC 0 0 /100 WBC    Gran% 68.8 38.0 - 73.0 %    Lymph% 20.4 18.0 - 48.0 %    Mono% 9.5 4.0 - 15.0 %    Eosinophil% 0.5 0.0 - 8.0 %    Basophil% 0.5 0.0 - 1.9 %    Platelet Estimate Appears normal     Differential Method Automated    Magnesium   Result Value Ref Range    Magnesium 2.2 1.6 - 2.6 mg/dL   Troponin I   Result Value Ref Range    Troponin I 0.082 (H) 0.012 - 0.034 ng/mL   Troponin I   Result Value Ref Range    Troponin I 0.116 (H) 0.012 - 0.034 ng/mL   Phosphorus   Result Value Ref Range    Phosphorus 4.1 2.7 - 4.5 mg/dL   TSH   Result Value Ref Range    TSH 1.160 0.400 - 4.000 uIU/mL   C-Reactive Protein   Result Value Ref Range    CRP 1.10 (H) 0.00 - 0.90 mg/dL   Lipid Panel   Result Value Ref Range    Cholesterol 202 (H) 120 - 199 mg/dL    Triglycerides 87 30 - 150 mg/dL    HDL 51 40 - 75 mg/dL    LDL Cholesterol 133.6 63.0 - 159.0 mg/dL    Hdl/Cholesterol Ratio 25.2 20.0 - 50.0 %    Total Cholesterol/HDL Ratio 4.0 2.0 - 5.0    Non-HDL Cholesterol 151 mg/dL   Sedimentation rate   Result Value Ref Range    Sed Rate 15 0 - 29 mm/Hr   Troponin I   Result Value Ref Range    Troponin I 0.091 (H) 0.012 - 0.034 ng/mL   Echo Color Flow Doppler? Yes   Result Value Ref Range    LVIDs 2.87 2.1 - 4.0 cm    Ascending aorta 2.8 cm    STJ 2.68 cm    Ao peak mode 1.22 m/s    IVS 1.21 (A) 0.6 - 1.1 cm    LA size 3.7 cm    LVIDd 4.03 3.5 - 6.0 cm    LVOT diameter 1.8 cm    Posterior Wall 1.02 0.6 - 1.1 cm    MV Peak A Mode 1.13 m/s    E wave decelartion time 209 msec    RA Major Axis 4.54 cm    RA Width 4.12 cm    TR Max Mode 2.46 m/s    TDI LATERAL 0.07 m/s    TDI SEPTAL 0.06 m/s    LA WIDTH 4.36 cm    PV PEAK VELOCITY 75.2 cm/s    BSA 1.74 m2    LV LATERAL E/E' RATIO 12.71 m/s    LV SEPTAL E/E' RATIO 14.83 m/s     FS 29 28 - 44 %    LA volume 71.00 cm3    LV mass 150.25 g    Left Ventricle Relative Wall Thickness 0.51 cm    MV valve area p 1/2 method 2.53 cm2    E/A ratio 0.79     Mean e' 0.07 m/s    LVOT area 2.5 cm2    AV peak gradient 6 mmHg    E/E' ratio 13.69 m/s    MV Peak E Mode 0.89 m/s    MV stenosis pressure 1/2 time 87 ms    LA Volume Index 42.2 mL/m2    LV Mass Index 89 g/m2    Left Atrium Minor Axis 5.08 cm    Left Atrium Major Axis 5.28 cm    Triscuspid Valve Regurgitation Peak Gradient 24 mmHg    Right Atrial Pressure (from IVC) 3 mmHg    TV rest pulmonary artery pressure 27 mmHg   POCT glucose   Result Value Ref Range    POCT Glucose 110 70 - 110 mg/dL   POCT glucose   Result Value Ref Range    POCT Glucose 123 (H) 70 - 110 mg/dL   POCT glucose   Result Value Ref Range    POCT Glucose 115 (H) 70 - 110 mg/dL   POCT glucose   Result Value Ref Range    POCT Glucose 129 (H) 70 - 110 mg/dL   POCT glucose   Result Value Ref Range    POCT Glucose 111 (H) 70 - 110 mg/dL   POCT glucose   Result Value Ref Range    POCT Glucose 97 70 - 110 mg/dL

## 2020-10-27 ENCOUNTER — LAB VISIT (OUTPATIENT)
Dept: FAMILY MEDICINE | Facility: CLINIC | Age: 85
End: 2020-10-27
Payer: MEDICARE

## 2020-10-27 DIAGNOSIS — Z01.812 PRE-PROCEDURE LAB EXAM: ICD-10-CM

## 2020-10-27 PROCEDURE — U0003 INFECTIOUS AGENT DETECTION BY NUCLEIC ACID (DNA OR RNA); SEVERE ACUTE RESPIRATORY SYNDROME CORONAVIRUS 2 (SARS-COV-2) (CORONAVIRUS DISEASE [COVID-19]), AMPLIFIED PROBE TECHNIQUE, MAKING USE OF HIGH THROUGHPUT TECHNOLOGIES AS DESCRIBED BY CMS-2020-01-R: HCPCS | Mod: HCNC

## 2020-10-28 LAB — SARS-COV-2 RNA RESP QL NAA+PROBE: NOT DETECTED

## 2020-11-04 PROBLEM — E87.20 LACTIC ACIDOSIS: Status: ACTIVE | Noted: 2020-11-04

## 2020-11-04 PROBLEM — J18.9 PNEUMONIA OF RIGHT MIDDLE LOBE DUE TO INFECTIOUS ORGANISM: Status: ACTIVE | Noted: 2020-11-04

## 2020-11-04 PROBLEM — J96.01 ACUTE HYPOXEMIC RESPIRATORY FAILURE: Status: ACTIVE | Noted: 2020-11-04

## 2020-11-04 NOTE — TELEPHONE ENCOUNTER
Medication appears to have been d/c on 10- during an office visit. Please review and advise. Pt is requesting a refill of zoloft one sent locally ([facility]) and the other refills sent to [facility].   [facility] pharmacy called and stated that all doses of Atenolol is on backorder and they don't know when it will be back in stock.  Patient is out of Atenolol and pharmacy is requesting replacement.    Please advise, thank you

## 2020-11-23 ENCOUNTER — TELEPHONE (OUTPATIENT)
Dept: GASTROENTEROLOGY | Facility: CLINIC | Age: 85
End: 2020-11-23

## 2020-11-23 RX ORDER — HYDROCODONE BITARTRATE AND ACETAMINOPHEN 10; 325 MG/1; MG/1
1 TABLET ORAL EVERY 6 HOURS PRN
Qty: 90 TABLET | Refills: 0 | Status: SHIPPED | OUTPATIENT
Start: 2020-11-23 | End: 2020-12-21 | Stop reason: SDUPTHER

## 2020-11-23 NOTE — TELEPHONE ENCOUNTER
----- Message from Eveline Horner sent at 11/23/2020  2:04 PM CST -----  Contact: Caryn with [facility]  Caryn with [facility]# [phone], called stated patient started to complain of black stool that started on 11/22/2020.  Please advise

## 2020-11-23 NOTE — TELEPHONE ENCOUNTER
----- Message from Arsh Rock sent at 11/23/2020  2:39 PM CST -----  Regarding: pt daughter raul  Type:  RX Refill Request    Who Called:  daughter  Refill or New Rx:  refill  RX Name and Strength:    HYDROcodone-acetaminophen (NORCO)  mg per tablet 90 tablet 0 10/19/2020  Sig - Route: Take 1 tablet by mouth every 6 (six) hours as needed. - Oral   Patient taking differently: Take 0.5 tablets by mouth every 6 (six) hours as needed for Pain.   Sent to pharmacy as: HYDROcodone-acetaminophen (NORCO)  mg per tablet   Earliest Fill Date: 10/19/2020   Notes to Pharmacy: Quantity prescribed more than 7 day supply? Yes, quantity medically necessary    How is the patient currently taking it? (ex. 1XDay):  see above  Is this a 30 day or 90 day RX:  30  Preferred Pharmacy with phone number:    [facility] [place], [place] - [address] [address]  [address] [address] [address]  Phone: [phone] Fax: [phone]    Local or Mail Order:  local  Ordering Provider:  Dr Pace  Best Call Back Number:  [phone]  Additional Information:

## 2020-11-23 NOTE — TELEPHONE ENCOUNTER
"Spoke with Caryn with [facility]. Caryn states that pt reports having black stool followed by abd pain that "last a while". Caryn was still with pt; Caryn informed pt should go to ER. Caryn verbalized understanding & stated she would advise pt.   "

## 2020-11-27 ENCOUNTER — OFFICE VISIT (OUTPATIENT)
Dept: CARDIOLOGY | Facility: CLINIC | Age: 85
End: 2020-11-27
Payer: MEDICARE

## 2020-11-27 VITALS
WEIGHT: 153.25 LBS | DIASTOLIC BLOOD PRESSURE: 80 MMHG | BODY MASS INDEX: 30.09 KG/M2 | HEART RATE: 50 BPM | SYSTOLIC BLOOD PRESSURE: 154 MMHG | HEIGHT: 60 IN

## 2020-11-27 DIAGNOSIS — R73.03 PREDIABETES: ICD-10-CM

## 2020-11-27 DIAGNOSIS — R79.89 ELEVATED TROPONIN: ICD-10-CM

## 2020-11-27 DIAGNOSIS — I27.20 PULMONARY HYPERTENSION: ICD-10-CM

## 2020-11-27 DIAGNOSIS — N18.32 STAGE 3B CHRONIC KIDNEY DISEASE: ICD-10-CM

## 2020-11-27 DIAGNOSIS — E78.5 HYPERLIPIDEMIA, UNSPECIFIED HYPERLIPIDEMIA TYPE: Primary | ICD-10-CM

## 2020-11-27 DIAGNOSIS — I47.10 SVT (SUPRAVENTRICULAR TACHYCARDIA): ICD-10-CM

## 2020-11-27 DIAGNOSIS — I10 ESSENTIAL HYPERTENSION: ICD-10-CM

## 2020-11-27 DIAGNOSIS — I25.10 CORONARY ARTERY DISEASE INVOLVING NATIVE CORONARY ARTERY OF NATIVE HEART WITHOUT ANGINA PECTORIS: ICD-10-CM

## 2020-11-27 DIAGNOSIS — I70.0 ATHEROSCLEROSIS OF AORTA: ICD-10-CM

## 2020-11-27 PROCEDURE — 3077F SYST BP >= 140 MM HG: CPT | Mod: HCNC,CPTII,S$GLB, | Performed by: INTERNAL MEDICINE

## 2020-11-27 PROCEDURE — 3079F PR MOST RECENT DIASTOLIC BLOOD PRESSURE 80-89 MM HG: ICD-10-PCS | Mod: HCNC,CPTII,S$GLB, | Performed by: INTERNAL MEDICINE

## 2020-11-27 PROCEDURE — 1159F PR MEDICATION LIST DOCUMENTED IN MEDICAL RECORD: ICD-10-PCS | Mod: HCNC,S$GLB,, | Performed by: INTERNAL MEDICINE

## 2020-11-27 PROCEDURE — 99215 OFFICE O/P EST HI 40 MIN: CPT | Mod: HCNC,S$GLB,, | Performed by: INTERNAL MEDICINE

## 2020-11-27 PROCEDURE — 1157F PR ADVANCE CARE PLAN OR EQUIV PRESENT IN MEDICAL RECORD: ICD-10-PCS | Mod: HCNC,S$GLB,, | Performed by: INTERNAL MEDICINE

## 2020-11-27 PROCEDURE — 1157F ADVNC CARE PLAN IN RCRD: CPT | Mod: HCNC,S$GLB,, | Performed by: INTERNAL MEDICINE

## 2020-11-27 PROCEDURE — 1126F PR PAIN SEVERITY QUANTIFIED, NO PAIN PRESENT: ICD-10-PCS | Mod: HCNC,S$GLB,, | Performed by: INTERNAL MEDICINE

## 2020-11-27 PROCEDURE — 99215 PR OFFICE/OUTPT VISIT, EST, LEVL V, 40-54 MIN: ICD-10-PCS | Mod: HCNC,S$GLB,, | Performed by: INTERNAL MEDICINE

## 2020-11-27 PROCEDURE — 3077F PR MOST RECENT SYSTOLIC BLOOD PRESSURE >= 140 MM HG: ICD-10-PCS | Mod: HCNC,CPTII,S$GLB, | Performed by: INTERNAL MEDICINE

## 2020-11-27 PROCEDURE — 1159F MED LIST DOCD IN RCRD: CPT | Mod: HCNC,S$GLB,, | Performed by: INTERNAL MEDICINE

## 2020-11-27 PROCEDURE — 1101F PR PT FALLS ASSESS DOC 0-1 FALLS W/OUT INJ PAST YR: ICD-10-PCS | Mod: HCNC,CPTII,S$GLB, | Performed by: INTERNAL MEDICINE

## 2020-11-27 PROCEDURE — 1126F AMNT PAIN NOTED NONE PRSNT: CPT | Mod: HCNC,S$GLB,, | Performed by: INTERNAL MEDICINE

## 2020-11-27 PROCEDURE — 3079F DIAST BP 80-89 MM HG: CPT | Mod: HCNC,CPTII,S$GLB, | Performed by: INTERNAL MEDICINE

## 2020-11-27 PROCEDURE — 99999 PR PBB SHADOW E&M-EST. PATIENT-LVL IV: CPT | Mod: PBBFAC,HCNC,, | Performed by: INTERNAL MEDICINE

## 2020-11-27 PROCEDURE — 1101F PT FALLS ASSESS-DOCD LE1/YR: CPT | Mod: HCNC,CPTII,S$GLB, | Performed by: INTERNAL MEDICINE

## 2020-11-27 PROCEDURE — 3288F PR FALLS RISK ASSESSMENT DOCUMENTED: ICD-10-PCS | Mod: HCNC,CPTII,S$GLB, | Performed by: INTERNAL MEDICINE

## 2020-11-27 PROCEDURE — 3288F FALL RISK ASSESSMENT DOCD: CPT | Mod: HCNC,CPTII,S$GLB, | Performed by: INTERNAL MEDICINE

## 2020-11-27 PROCEDURE — 99999 PR PBB SHADOW E&M-EST. PATIENT-LVL IV: ICD-10-PCS | Mod: PBBFAC,HCNC,, | Performed by: INTERNAL MEDICINE

## 2020-11-27 RX ORDER — POTASSIUM CHLORIDE 1500 MG/1
20 TABLET, EXTENDED RELEASE ORAL DAILY
Qty: 90 TABLET | Refills: 5 | Status: SHIPPED | OUTPATIENT
Start: 2020-11-27 | End: 2021-03-29

## 2020-11-27 RX ORDER — METOPROLOL SUCCINATE 25 MG/1
12.5 TABLET, EXTENDED RELEASE ORAL DAILY
Qty: 90 TABLET | Refills: 6 | Status: SHIPPED | OUTPATIENT
Start: 2020-11-27 | End: 2021-04-06

## 2020-11-27 NOTE — PROGRESS NOTES
Subjective:    Patient ID:  Summer Inman is a 84 y.o. female who presents for follow-up of CAD F/U and Medication Refill      HPI  Here for follow up of recent hospital stay due to this year with acute renal sufficiency and elevated troponin/PSVT.  Treated medically.  Readmit 2 weeks later with upper respiratory infection pneumonia.  No hx of syncope.     Review of Systems   Constitution: Negative for malaise/fatigue.   Eyes: Negative for blurred vision.   Cardiovascular: Negative for chest pain, claudication, cyanosis, dyspnea on exertion, irregular heartbeat, leg swelling, near-syncope, orthopnea, palpitations, paroxysmal nocturnal dyspnea and syncope.   Respiratory: Negative for cough and shortness of breath.    Hematologic/Lymphatic: Does not bruise/bleed easily.   Musculoskeletal: Negative for back pain, falls, joint pain, muscle cramps, muscle weakness and myalgias.   Gastrointestinal: Negative for abdominal pain, change in bowel habit, nausea and vomiting.   Genitourinary: Negative for urgency.   Neurological: Negative for dizziness, focal weakness and light-headedness.       Past Medical History:   Diagnosis Date    Anticoagulant long-term use     ASA 81mg    Arthritis     Asthma     Atrial premature beats     Benign neoplasm of adrenal gland     CKD (chronic kidney disease)     Colon polyp     COPD (chronic obstructive pulmonary disease)     Coronary artery disease     nonocclusive disease, no prior intervention    Depression     Dizziness     and balance issues    First degree AV block     Gastroesophageal reflux disease     General anesthetics causing adverse effect in therapeutic use     hard to wake up    Hepatomegaly     Hyperlipidemia     Hypertension     Impaired mobility     uses walker or cane    Neck pain     into chest and arms with numbness in arms    Schatzki's ring     Seasonal allergies     Seizures     Last one recently    Stroke     tia    Trouble in sleeping      Wears dentures     upper    Wears glasses      There are no hospital problems to display for this patient.       Objective:     Vitals:    11/27/20 0901   BP: (!) 154/80   Pulse: (!) 50        Physical Exam   Constitutional: She is oriented to person, place, and time. She appears well-developed and well-nourished.   HENT:   Head: Normocephalic.   Eyes: Conjunctivae are normal.   Neck: Normal range of motion. Neck supple. No JVD present.   Cardiovascular: Normal rate, regular rhythm, normal heart sounds and intact distal pulses.   Pulses:       Carotid pulses are 2+ on the right side and 2+ on the left side.       Radial pulses are 2+ on the right side and 2+ on the left side.        Dorsalis pedis pulses are 2+ on the right side and 2+ on the left side.        Posterior tibial pulses are 2+ on the right side and 2+ on the left side.   Pulmonary/Chest: Effort normal and breath sounds normal.   Abdominal: Soft. Bowel sounds are normal.   Musculoskeletal:         General: No tenderness or edema.   Neurological: She is alert and oriented to person, place, and time. Gait normal.   Skin: Skin is warm, dry and intact. No cyanosis. Nails show no clubbing.   Psychiatric: She has a normal mood and affect. Her speech is normal and behavior is normal. Thought content normal.   Nursing note and vitals reviewed.            ..    Chemistry        Component Value Date/Time     11/13/2020 0928    K 3.4 (L) 11/13/2020 0928     11/13/2020 0928    CO2 32 (H) 11/13/2020 0928    BUN 9 11/13/2020 0928    CREATININE 0.77 11/13/2020 0928     (H) 11/13/2020 0928        Component Value Date/Time    CALCIUM 9.3 11/13/2020 0928    ALKPHOS 176 (H) 11/11/2020 0534    AST 25 11/11/2020 0534    AST 24 01/31/2016 1156    ALT 14 11/11/2020 0534    BILITOT 0.3 11/11/2020 0534    ESTGFRAFRICA >60 11/13/2020 0928    EGFRNONAA >60 11/13/2020 0928            ..  Lab Results   Component Value Date    CHOL 202 (H) 10/16/2020    CHOL 241  (H) 08/06/2020    CHOL 198 01/29/2020     Lab Results   Component Value Date    HDL 51 10/16/2020    HDL 60 08/06/2020    HDL 64 01/29/2020     Lab Results   Component Value Date    LDLCALC 133.6 10/16/2020    LDLCALC 155.2 08/06/2020    LDLCALC 116.2 01/29/2020     Lab Results   Component Value Date    TRIG 87 10/16/2020    TRIG 129 08/06/2020    TRIG 89 01/29/2020     Lab Results   Component Value Date    CHOLHDL 25.2 10/16/2020    CHOLHDL 24.9 08/06/2020    CHOLHDL 32.3 01/29/2020     ..  Lab Results   Component Value Date    WBC 7.06 11/11/2020    HGB 9.8 (L) 11/11/2020    HCT 31.5 (L) 11/11/2020    MCV 84 11/11/2020     11/11/2020       Test(s) Reviewed  I have reviewed the following in detail:  [] Stress test   [] Angiography   [x] Echocardiogram   [x] Labs   [] Other:       Assessment:         ICD-10-CM ICD-9-CM   1. Hyperlipidemia, unspecified hyperlipidemia type  E78.5 272.4   2. Atherosclerosis of aorta  I70.0 440.0   3. Coronary artery disease involving native coronary artery of native heart without angina pectoris  I25.10 414.01   4. Essential hypertension  I10 401.9   5. Pulmonary hypertension  I27.20 416.8   6. Stage 3b chronic kidney disease  N18.32 585.3   7. SVT (supraventricular tachycardia)  I47.1 427.89   8. Prediabetes  R73.03 790.29   9. Elevated troponin  R77.8 790.6     Problem List Items Addressed This Visit     Hyperlipidemia - Primary    Coronary artery disease involving native coronary artery of native heart without angina pectoris    Essential hypertension    Pulmonary hypertension    Atherosclerosis of aorta    Chronic kidney disease, stage III (moderate)    SVT (supraventricular tachycardia)    Elevated troponin    Overview     10/20    [facility] 5/14:  1.  LM:  normal sized, normal contour; no evidence of disease.  2.  LAD:  normal sized, wraps around the apex; less than 30% stenosis seen in the proximal LAD.                          D1:     3.  LCx:  LCx and obtuse marginals are  normal in appearance for patient's age.                          OM1,OM2:  4.  RCA:  normal sized; normal in appearance for patient's age.            Prediabetes    Overview     hbg a1c I 8/2020 - 6                Plan:           Return to clinic 6 months   Low level/low impact aerobic exercise 5x's/wk. Heart healthy diet and risk factor modification.    See labs and med orders.  Replace K   nakul stress due to elevated trop in setting of pre-renal   Decrease BB    Portions of this note may have been created with voice recognition software.  Grammatical, syntax and spelling errors may be inevitable.

## 2020-12-11 ENCOUNTER — PATIENT MESSAGE (OUTPATIENT)
Dept: OTHER | Facility: OTHER | Age: 85
End: 2020-12-11

## 2020-12-16 ENCOUNTER — HOSPITAL ENCOUNTER (OUTPATIENT)
Dept: RADIOLOGY | Facility: HOSPITAL | Age: 85
Discharge: HOME | End: 2020-12-16
Attending: INTERNAL MEDICINE
Payer: MEDICARE

## 2020-12-16 ENCOUNTER — CLINICAL SUPPORT (OUTPATIENT)
Dept: CARDIOLOGY | Facility: CLINIC | Age: 85
End: 2020-12-16
Attending: INTERNAL MEDICINE
Payer: MEDICARE

## 2020-12-16 VITALS — BODY MASS INDEX: 30.04 KG/M2 | HEIGHT: 60 IN | WEIGHT: 153 LBS

## 2020-12-16 DIAGNOSIS — I25.10 CORONARY ARTERY DISEASE INVOLVING NATIVE CORONARY ARTERY OF NATIVE HEART WITHOUT ANGINA PECTORIS: ICD-10-CM

## 2020-12-16 DIAGNOSIS — E78.5 HYPERLIPIDEMIA, UNSPECIFIED HYPERLIPIDEMIA TYPE: ICD-10-CM

## 2020-12-16 DIAGNOSIS — I10 ESSENTIAL HYPERTENSION: ICD-10-CM

## 2020-12-16 DIAGNOSIS — I70.0 ATHEROSCLEROSIS OF AORTA: ICD-10-CM

## 2020-12-16 PROCEDURE — 93017 CV STRESS TEST TRACING ONLY: CPT | Mod: PBBFAC,PO

## 2020-12-16 PROCEDURE — 78452 HT MUSCLE IMAGE SPECT MULT: CPT | Mod: 26,HCNC,S$GLB, | Performed by: INTERNAL MEDICINE

## 2020-12-16 PROCEDURE — 78452 HT MUSCLE IMAGE SPECT MULT: CPT | Mod: PBBFAC,PO

## 2020-12-16 PROCEDURE — 78452 STRESS TEST WITH MYOCARDIAL PERFUSION (CUPID ONLY): ICD-10-PCS | Mod: 26,HCNC,S$GLB, | Performed by: INTERNAL MEDICINE

## 2020-12-16 PROCEDURE — 99999 PR PBB SHADOW E&M-EST. PATIENT-LVL I: CPT | Mod: PBBFAC,HCNC,,

## 2020-12-16 PROCEDURE — 99999 PR PBB SHADOW E&M-EST. PATIENT-LVL I: ICD-10-PCS | Mod: PBBFAC,HCNC,,

## 2020-12-16 PROCEDURE — 93015 STRESS TEST WITH MYOCARDIAL PERFUSION (CUPID ONLY): ICD-10-PCS | Mod: HCNC,S$GLB,, | Performed by: INTERNAL MEDICINE

## 2020-12-16 PROCEDURE — 93015 CV STRESS TEST SUPVJ I&R: CPT | Mod: HCNC,S$GLB,, | Performed by: INTERNAL MEDICINE

## 2020-12-17 LAB
CV STRESS BASE HR: 58 BPM
DIASTOLIC BLOOD PRESSURE: 77 MMHG
NUC REST DIASTOLIC VOLUME INDEX: 57
NUC REST EJECTION FRACTION: 76
NUC REST SYSTOLIC VOLUME INDEX: 22
OHS CV CPX 1 MINUTE RECOVERY HEART RATE: 86 BPM
OHS CV CPX 85 PERCENT MAX PREDICTED HEART RATE MALE: 112
OHS CV CPX MAX PREDICTED HEART RATE: 132
OHS CV CPX PATIENT IS FEMALE: 1
OHS CV CPX PATIENT IS MALE: 0
OHS CV CPX PEAK DIASTOLIC BLOOD PRESSURE: 77 MMHG
OHS CV CPX PEAK HEAR RATE: 97 BPM
OHS CV CPX PEAK RATE PRESSURE PRODUCT: NORMAL
OHS CV CPX PEAK SYSTOLIC BLOOD PRESSURE: 141 MMHG
OHS CV CPX PERCENT MAX PREDICTED HEART RATE ACHIEVED: 73
OHS CV CPX RATE PRESSURE PRODUCT PRESENTING: 8178
SUMMED DIFFERENCE: 1
SUMMED REST SCORE: 0
SUMMED STRESS SCORE: 1
SYSTOLIC BLOOD PRESSURE: 141 MMHG

## 2020-12-21 RX ORDER — HYDROCODONE BITARTRATE AND ACETAMINOPHEN 10; 325 MG/1; MG/1
1 TABLET ORAL EVERY 6 HOURS PRN
Qty: 90 TABLET | Refills: 0 | Status: SHIPPED | OUTPATIENT
Start: 2020-12-21 | End: 2021-01-21 | Stop reason: SDUPTHER

## 2020-12-21 NOTE — TELEPHONE ENCOUNTER
----- Message from Adrianne Hills sent at 12/21/2020  2:00 PM CST -----  Type:  RX Refill Request    Who Called:  Daughter (Elizabeth)  RX Name and Strength: HYDROcodone-acetaminophen (NORCO)  mg per tablet  Pref[facility] Pharm[facility] with [facility] number[facility] Call Back Number:  [phone]  Additional Information:

## 2020-12-22 RX ORDER — HYDROCHLOROTHIAZIDE 25 MG/1
25 TABLET ORAL DAILY
COMMUNITY
End: 2021-03-29

## 2020-12-22 RX ORDER — PRAVASTATIN SODIUM 40 MG/1
40 TABLET ORAL DAILY
COMMUNITY
End: 2021-01-06

## 2020-12-22 RX ORDER — ONDANSETRON 4 MG/1
TABLET, ORALLY DISINTEGRATING ORAL
COMMUNITY
Start: 2020-11-16 | End: 2021-01-27 | Stop reason: SDUPTHER

## 2020-12-23 ENCOUNTER — OFFICE VISIT (OUTPATIENT)
Dept: FAMILY MEDICINE | Facility: CLINIC | Age: 85
End: 2020-12-23
Payer: MEDICARE

## 2020-12-23 VITALS
BODY MASS INDEX: 29.22 KG/M2 | SYSTOLIC BLOOD PRESSURE: 142 MMHG | DIASTOLIC BLOOD PRESSURE: 80 MMHG | OXYGEN SATURATION: 97 % | WEIGHT: 148.81 LBS | HEIGHT: 60 IN | TEMPERATURE: 98 F | HEART RATE: 63 BPM

## 2020-12-23 DIAGNOSIS — I47.10 SVT (SUPRAVENTRICULAR TACHYCARDIA): ICD-10-CM

## 2020-12-23 DIAGNOSIS — M51.36 DDD (DEGENERATIVE DISC DISEASE), LUMBAR: ICD-10-CM

## 2020-12-23 DIAGNOSIS — M25.50 MULTIPLE JOINT PAIN: ICD-10-CM

## 2020-12-23 DIAGNOSIS — G89.29 CHRONIC PAIN OF MULTIPLE SITES: Primary | ICD-10-CM

## 2020-12-23 DIAGNOSIS — R19.8 IRREGULAR BOWEL HABITS: ICD-10-CM

## 2020-12-23 DIAGNOSIS — R94.39 ABNORMAL CARDIOVASCULAR STRESS TEST: ICD-10-CM

## 2020-12-23 DIAGNOSIS — F11.90 CHRONIC NARCOTIC USE: ICD-10-CM

## 2020-12-23 DIAGNOSIS — J44.9 CHRONIC OBSTRUCTIVE PULMONARY DISEASE, UNSPECIFIED COPD TYPE: ICD-10-CM

## 2020-12-23 DIAGNOSIS — R52 CHRONIC PAIN OF MULTIPLE SITES: Primary | ICD-10-CM

## 2020-12-23 PROCEDURE — 1159F MED LIST DOCD IN RCRD: CPT | Mod: HCNC,S$GLB,, | Performed by: FAMILY MEDICINE

## 2020-12-23 PROCEDURE — 99214 OFFICE O/P EST MOD 30 MIN: CPT | Mod: HCNC,S$GLB,, | Performed by: FAMILY MEDICINE

## 2020-12-23 PROCEDURE — 3077F PR MOST RECENT SYSTOLIC BLOOD PRESSURE >= 140 MM HG: ICD-10-PCS | Mod: HCNC,CPTII,S$GLB, | Performed by: FAMILY MEDICINE

## 2020-12-23 PROCEDURE — 3288F PR FALLS RISK ASSESSMENT DOCUMENTED: ICD-10-PCS | Mod: HCNC,CPTII,S$GLB, | Performed by: FAMILY MEDICINE

## 2020-12-23 PROCEDURE — 1159F PR MEDICATION LIST DOCUMENTED IN MEDICAL RECORD: ICD-10-PCS | Mod: HCNC,S$GLB,, | Performed by: FAMILY MEDICINE

## 2020-12-23 PROCEDURE — 3079F DIAST BP 80-89 MM HG: CPT | Mod: HCNC,CPTII,S$GLB, | Performed by: FAMILY MEDICINE

## 2020-12-23 PROCEDURE — 1101F PT FALLS ASSESS-DOCD LE1/YR: CPT | Mod: HCNC,CPTII,S$GLB, | Performed by: FAMILY MEDICINE

## 2020-12-23 PROCEDURE — 1101F PR PT FALLS ASSESS DOC 0-1 FALLS W/OUT INJ PAST YR: ICD-10-PCS | Mod: HCNC,CPTII,S$GLB, | Performed by: FAMILY MEDICINE

## 2020-12-23 PROCEDURE — 99499 UNLISTED E&M SERVICE: CPT | Mod: S$GLB,,, | Performed by: FAMILY MEDICINE

## 2020-12-23 PROCEDURE — 1157F ADVNC CARE PLAN IN RCRD: CPT | Mod: HCNC,S$GLB,, | Performed by: FAMILY MEDICINE

## 2020-12-23 PROCEDURE — 3288F FALL RISK ASSESSMENT DOCD: CPT | Mod: HCNC,CPTII,S$GLB, | Performed by: FAMILY MEDICINE

## 2020-12-23 PROCEDURE — 3077F SYST BP >= 140 MM HG: CPT | Mod: HCNC,CPTII,S$GLB, | Performed by: FAMILY MEDICINE

## 2020-12-23 PROCEDURE — 1157F PR ADVANCE CARE PLAN OR EQUIV PRESENT IN MEDICAL RECORD: ICD-10-PCS | Mod: HCNC,S$GLB,, | Performed by: FAMILY MEDICINE

## 2020-12-23 PROCEDURE — 99999 PR PBB SHADOW E&M-EST. PATIENT-LVL IV: ICD-10-PCS | Mod: PBBFAC,HCNC,, | Performed by: FAMILY MEDICINE

## 2020-12-23 PROCEDURE — 3079F PR MOST RECENT DIASTOLIC BLOOD PRESSURE 80-89 MM HG: ICD-10-PCS | Mod: HCNC,CPTII,S$GLB, | Performed by: FAMILY MEDICINE

## 2020-12-23 PROCEDURE — 99214 PR OFFICE/OUTPT VISIT, EST, LEVL IV, 30-39 MIN: ICD-10-PCS | Mod: HCNC,S$GLB,, | Performed by: FAMILY MEDICINE

## 2020-12-23 PROCEDURE — 99499 RISK ADDL DX/OHS AUDIT: ICD-10-PCS | Mod: S$GLB,,, | Performed by: FAMILY MEDICINE

## 2020-12-23 PROCEDURE — 99999 PR PBB SHADOW E&M-EST. PATIENT-LVL IV: CPT | Mod: PBBFAC,HCNC,, | Performed by: FAMILY MEDICINE

## 2021-01-05 ENCOUNTER — TELEPHONE (OUTPATIENT)
Dept: RHEUMATOLOGY | Facility: CLINIC | Age: 86
End: 2021-01-05

## 2021-01-05 ENCOUNTER — TELEPHONE (OUTPATIENT)
Dept: FAMILY MEDICINE | Facility: CLINIC | Age: 86
End: 2021-01-05

## 2021-01-13 RX ORDER — DONEPEZIL HYDROCHLORIDE 10 MG/1
10 TABLET, FILM COATED ORAL EVERY MORNING
Qty: 90 TABLET | Refills: 3 | Status: CANCELLED | OUTPATIENT
Start: 2021-01-13

## 2021-01-21 RX ORDER — HYDROCODONE BITARTRATE AND ACETAMINOPHEN 10; 325 MG/1; MG/1
1 TABLET ORAL EVERY 6 HOURS PRN
Qty: 90 TABLET | Refills: 0 | Status: SHIPPED | OUTPATIENT
Start: 2021-01-21 | End: 2021-02-19 | Stop reason: SDUPTHER

## 2021-01-25 ENCOUNTER — OFFICE VISIT (OUTPATIENT)
Dept: NEUROLOGY | Facility: CLINIC | Age: 86
End: 2021-01-25
Payer: MEDICARE

## 2021-01-25 VITALS
BODY MASS INDEX: 29.62 KG/M2 | HEIGHT: 60 IN | SYSTOLIC BLOOD PRESSURE: 155 MMHG | WEIGHT: 150.88 LBS | RESPIRATION RATE: 17 BRPM | HEART RATE: 63 BPM | DIASTOLIC BLOOD PRESSURE: 74 MMHG | TEMPERATURE: 97 F

## 2021-01-25 DIAGNOSIS — R56.9 SEIZURES: Primary | ICD-10-CM

## 2021-01-25 DIAGNOSIS — R41.3 MEMORY LOSS: ICD-10-CM

## 2021-01-25 DIAGNOSIS — G40.909 SEIZURE DISORDER: ICD-10-CM

## 2021-01-25 PROCEDURE — 3288F FALL RISK ASSESSMENT DOCD: CPT | Mod: CPTII,S$GLB,, | Performed by: NURSE PRACTITIONER

## 2021-01-25 PROCEDURE — 99999 PR PBB SHADOW E&M-EST. PATIENT-LVL V: ICD-10-PCS | Mod: PBBFAC,,, | Performed by: NURSE PRACTITIONER

## 2021-01-25 PROCEDURE — 1157F ADVNC CARE PLAN IN RCRD: CPT | Mod: S$GLB,,, | Performed by: NURSE PRACTITIONER

## 2021-01-25 PROCEDURE — 1100F PR PT FALLS ASSESS DOC 2+ FALLS/FALL W/INJURY/YR: ICD-10-PCS | Mod: CPTII,S$GLB,, | Performed by: NURSE PRACTITIONER

## 2021-01-25 PROCEDURE — 1159F PR MEDICATION LIST DOCUMENTED IN MEDICAL RECORD: ICD-10-PCS | Mod: S$GLB,,, | Performed by: NURSE PRACTITIONER

## 2021-01-25 PROCEDURE — 3078F PR MOST RECENT DIASTOLIC BLOOD PRESSURE < 80 MM HG: ICD-10-PCS | Mod: CPTII,S$GLB,, | Performed by: NURSE PRACTITIONER

## 2021-01-25 PROCEDURE — 3078F DIAST BP <80 MM HG: CPT | Mod: CPTII,S$GLB,, | Performed by: NURSE PRACTITIONER

## 2021-01-25 PROCEDURE — 3077F PR MOST RECENT SYSTOLIC BLOOD PRESSURE >= 140 MM HG: ICD-10-PCS | Mod: CPTII,S$GLB,, | Performed by: NURSE PRACTITIONER

## 2021-01-25 PROCEDURE — 99214 OFFICE O/P EST MOD 30 MIN: CPT | Mod: S$GLB,,, | Performed by: NURSE PRACTITIONER

## 2021-01-25 PROCEDURE — 3077F SYST BP >= 140 MM HG: CPT | Mod: CPTII,S$GLB,, | Performed by: NURSE PRACTITIONER

## 2021-01-25 PROCEDURE — 1125F AMNT PAIN NOTED PAIN PRSNT: CPT | Mod: S$GLB,,, | Performed by: NURSE PRACTITIONER

## 2021-01-25 PROCEDURE — 1125F PR PAIN SEVERITY QUANTIFIED, PAIN PRESENT: ICD-10-PCS | Mod: S$GLB,,, | Performed by: NURSE PRACTITIONER

## 2021-01-25 PROCEDURE — 99214 PR OFFICE/OUTPT VISIT, EST, LEVL IV, 30-39 MIN: ICD-10-PCS | Mod: S$GLB,,, | Performed by: NURSE PRACTITIONER

## 2021-01-25 PROCEDURE — 1100F PTFALLS ASSESS-DOCD GE2>/YR: CPT | Mod: CPTII,S$GLB,, | Performed by: NURSE PRACTITIONER

## 2021-01-25 PROCEDURE — 1157F PR ADVANCE CARE PLAN OR EQUIV PRESENT IN MEDICAL RECORD: ICD-10-PCS | Mod: S$GLB,,, | Performed by: NURSE PRACTITIONER

## 2021-01-25 PROCEDURE — 1159F MED LIST DOCD IN RCRD: CPT | Mod: S$GLB,,, | Performed by: NURSE PRACTITIONER

## 2021-01-25 PROCEDURE — 99999 PR PBB SHADOW E&M-EST. PATIENT-LVL V: CPT | Mod: PBBFAC,,, | Performed by: NURSE PRACTITIONER

## 2021-01-25 PROCEDURE — 3288F PR FALLS RISK ASSESSMENT DOCUMENTED: ICD-10-PCS | Mod: CPTII,S$GLB,, | Performed by: NURSE PRACTITIONER

## 2021-01-25 RX ORDER — LAMOTRIGINE 100 MG/1
TABLET ORAL
Qty: 450 TABLET | Refills: 3 | Status: SHIPPED | OUTPATIENT
Start: 2021-01-25 | End: 2021-09-27

## 2021-01-25 RX ORDER — BENZONATATE 100 MG/1
CAPSULE ORAL
COMMUNITY
Start: 2021-01-19 | End: 2022-12-29

## 2021-01-25 RX ORDER — DONEPEZIL HYDROCHLORIDE 10 MG/1
10 TABLET, FILM COATED ORAL EVERY MORNING
Qty: 90 TABLET | Refills: 3 | Status: SHIPPED | OUTPATIENT
Start: 2021-01-25 | End: 2021-09-27

## 2021-01-27 ENCOUNTER — OFFICE VISIT (OUTPATIENT)
Dept: PRIMARY CARE CLINIC | Facility: CLINIC | Age: 86
End: 2021-01-27
Payer: MEDICARE

## 2021-01-27 DIAGNOSIS — E78.5 HYPERLIPIDEMIA, UNSPECIFIED HYPERLIPIDEMIA TYPE: Chronic | ICD-10-CM

## 2021-01-27 DIAGNOSIS — T46.6X5A ADVERSE EFFECT OF STATIN: ICD-10-CM

## 2021-01-27 DIAGNOSIS — I47.10 SVT (SUPRAVENTRICULAR TACHYCARDIA): ICD-10-CM

## 2021-01-27 DIAGNOSIS — R73.03 PREDIABETES: Chronic | ICD-10-CM

## 2021-01-27 DIAGNOSIS — R56.9 SEIZURES: Chronic | ICD-10-CM

## 2021-01-27 DIAGNOSIS — J44.9 CHRONIC OBSTRUCTIVE PULMONARY DISEASE, UNSPECIFIED COPD TYPE: Chronic | ICD-10-CM

## 2021-01-27 DIAGNOSIS — N18.32 STAGE 3B CHRONIC KIDNEY DISEASE: Chronic | ICD-10-CM

## 2021-01-27 DIAGNOSIS — M79.18 CHRONIC MUSCULOSKELETAL PAIN: ICD-10-CM

## 2021-01-27 DIAGNOSIS — I25.10 CORONARY ARTERY DISEASE INVOLVING NATIVE CORONARY ARTERY OF NATIVE HEART WITHOUT ANGINA PECTORIS: Chronic | ICD-10-CM

## 2021-01-27 DIAGNOSIS — R52 CHRONIC PAIN OF MULTIPLE SITES: Chronic | ICD-10-CM

## 2021-01-27 DIAGNOSIS — I27.20 PULMONARY HYPERTENSION: Chronic | ICD-10-CM

## 2021-01-27 DIAGNOSIS — K21.9 GASTROESOPHAGEAL REFLUX DISEASE, UNSPECIFIED WHETHER ESOPHAGITIS PRESENT: Chronic | ICD-10-CM

## 2021-01-27 DIAGNOSIS — F11.20 CHRONIC NARCOTIC DEPENDENCE: Chronic | ICD-10-CM

## 2021-01-27 DIAGNOSIS — G40.909 SEIZURE DISORDER: Chronic | ICD-10-CM

## 2021-01-27 DIAGNOSIS — I70.0 ATHEROSCLEROSIS OF AORTA: ICD-10-CM

## 2021-01-27 DIAGNOSIS — G89.29 CHRONIC PAIN OF MULTIPLE SITES: Chronic | ICD-10-CM

## 2021-01-27 DIAGNOSIS — I10 ESSENTIAL HYPERTENSION: Chronic | ICD-10-CM

## 2021-01-27 DIAGNOSIS — Z86.73 HISTORY OF STROKE: Chronic | ICD-10-CM

## 2021-01-27 DIAGNOSIS — M54.17 LUMBOSACRAL RADICULOPATHY: Chronic | ICD-10-CM

## 2021-01-27 DIAGNOSIS — M51.36 DDD (DEGENERATIVE DISC DISEASE), LUMBAR: Primary | Chronic | ICD-10-CM

## 2021-01-27 DIAGNOSIS — F33.0 DEPRESSION, MAJOR, RECURRENT, MILD: ICD-10-CM

## 2021-01-27 DIAGNOSIS — G89.29 CHRONIC MUSCULOSKELETAL PAIN: ICD-10-CM

## 2021-01-27 PROBLEM — N17.9 AKI (ACUTE KIDNEY INJURY): Status: RESOLVED | Noted: 2020-10-16 | Resolved: 2021-01-27

## 2021-01-27 PROBLEM — J96.01 ACUTE HYPOXEMIC RESPIRATORY FAILURE: Status: RESOLVED | Noted: 2020-11-04 | Resolved: 2021-01-27

## 2021-01-27 PROBLEM — N18.30 CHRONIC KIDNEY DISEASE, STAGE III (MODERATE): Chronic | Status: ACTIVE | Noted: 2019-06-17

## 2021-01-27 PROBLEM — J18.9 PNEUMONIA OF RIGHT MIDDLE LOBE DUE TO INFECTIOUS ORGANISM: Status: RESOLVED | Noted: 2020-11-04 | Resolved: 2021-01-27

## 2021-01-27 PROCEDURE — 99215 OFFICE O/P EST HI 40 MIN: CPT | Mod: S$GLB,,, | Performed by: FAMILY MEDICINE

## 2021-01-27 PROCEDURE — 3288F PR FALLS RISK ASSESSMENT DOCUMENTED: ICD-10-PCS | Mod: CPTII,S$GLB,, | Performed by: FAMILY MEDICINE

## 2021-01-27 PROCEDURE — 1159F MED LIST DOCD IN RCRD: CPT | Mod: S$GLB,,, | Performed by: FAMILY MEDICINE

## 2021-01-27 PROCEDURE — 3078F PR MOST RECENT DIASTOLIC BLOOD PRESSURE < 80 MM HG: ICD-10-PCS | Mod: CPTII,S$GLB,, | Performed by: FAMILY MEDICINE

## 2021-01-27 PROCEDURE — 3078F DIAST BP <80 MM HG: CPT | Mod: CPTII,S$GLB,, | Performed by: FAMILY MEDICINE

## 2021-01-27 PROCEDURE — 1157F ADVNC CARE PLAN IN RCRD: CPT | Mod: S$GLB,,, | Performed by: FAMILY MEDICINE

## 2021-01-27 PROCEDURE — 99499 RISK ADDL DX/OHS AUDIT: ICD-10-PCS | Mod: S$GLB,,, | Performed by: FAMILY MEDICINE

## 2021-01-27 PROCEDURE — 99999 PR PBB SHADOW E&M-EST. PATIENT-LVL IV: ICD-10-PCS | Mod: PBBFAC,,, | Performed by: FAMILY MEDICINE

## 2021-01-27 PROCEDURE — 3075F SYST BP GE 130 - 139MM HG: CPT | Mod: CPTII,S$GLB,, | Performed by: FAMILY MEDICINE

## 2021-01-27 PROCEDURE — 99215 PR OFFICE/OUTPT VISIT, EST, LEVL V, 40-54 MIN: ICD-10-PCS | Mod: S$GLB,,, | Performed by: FAMILY MEDICINE

## 2021-01-27 PROCEDURE — 1157F PR ADVANCE CARE PLAN OR EQUIV PRESENT IN MEDICAL RECORD: ICD-10-PCS | Mod: S$GLB,,, | Performed by: FAMILY MEDICINE

## 2021-01-27 PROCEDURE — 3288F FALL RISK ASSESSMENT DOCD: CPT | Mod: CPTII,S$GLB,, | Performed by: FAMILY MEDICINE

## 2021-01-27 PROCEDURE — 1159F PR MEDICATION LIST DOCUMENTED IN MEDICAL RECORD: ICD-10-PCS | Mod: S$GLB,,, | Performed by: FAMILY MEDICINE

## 2021-01-27 PROCEDURE — 1100F PTFALLS ASSESS-DOCD GE2>/YR: CPT | Mod: CPTII,S$GLB,, | Performed by: FAMILY MEDICINE

## 2021-01-27 PROCEDURE — 3075F PR MOST RECENT SYSTOLIC BLOOD PRESS GE 130-139MM HG: ICD-10-PCS | Mod: CPTII,S$GLB,, | Performed by: FAMILY MEDICINE

## 2021-01-27 PROCEDURE — 99499 UNLISTED E&M SERVICE: CPT | Mod: S$GLB,,, | Performed by: FAMILY MEDICINE

## 2021-01-27 PROCEDURE — 1100F PR PT FALLS ASSESS DOC 2+ FALLS/FALL W/INJURY/YR: ICD-10-PCS | Mod: CPTII,S$GLB,, | Performed by: FAMILY MEDICINE

## 2021-01-27 PROCEDURE — 99999 PR PBB SHADOW E&M-EST. PATIENT-LVL IV: CPT | Mod: PBBFAC,,, | Performed by: FAMILY MEDICINE

## 2021-01-29 ENCOUNTER — OUTPATIENT CASE MANAGEMENT (OUTPATIENT)
Dept: ADMINISTRATIVE | Facility: OTHER | Age: 86
End: 2021-01-29

## 2021-01-29 VITALS
WEIGHT: 153 LBS | BODY MASS INDEX: 30.04 KG/M2 | SYSTOLIC BLOOD PRESSURE: 132 MMHG | TEMPERATURE: 99 F | OXYGEN SATURATION: 96 % | HEIGHT: 60 IN | HEART RATE: 62 BPM | DIASTOLIC BLOOD PRESSURE: 74 MMHG

## 2021-02-19 ENCOUNTER — TELEPHONE (OUTPATIENT)
Dept: PRIMARY CARE CLINIC | Facility: CLINIC | Age: 86
End: 2021-02-19

## 2021-02-19 RX ORDER — HYDROCODONE BITARTRATE AND ACETAMINOPHEN 10; 325 MG/1; MG/1
1 TABLET ORAL EVERY 6 HOURS PRN
Qty: 90 TABLET | Refills: 0 | Status: SHIPPED | OUTPATIENT
Start: 2021-02-19 | End: 2021-03-18 | Stop reason: SDUPTHER

## 2021-03-15 ENCOUNTER — PES CALL (OUTPATIENT)
Dept: ADMINISTRATIVE | Facility: CLINIC | Age: 86
End: 2021-03-15

## 2021-03-18 RX ORDER — HYDROCODONE BITARTRATE AND ACETAMINOPHEN 10; 325 MG/1; MG/1
1 TABLET ORAL EVERY 6 HOURS PRN
Qty: 90 TABLET | Refills: 0 | Status: SHIPPED | OUTPATIENT
Start: 2021-03-18 | End: 2021-04-06 | Stop reason: SDUPTHER

## 2021-03-24 ENCOUNTER — OFFICE VISIT (OUTPATIENT)
Dept: OPTOMETRY | Facility: CLINIC | Age: 86
End: 2021-03-24
Payer: MEDICARE

## 2021-03-24 DIAGNOSIS — H10.13 ALLERGIC CONJUNCTIVITIS OF BOTH EYES: ICD-10-CM

## 2021-03-24 DIAGNOSIS — Z96.1 BILATERAL PSEUDOPHAKIA: ICD-10-CM

## 2021-03-24 DIAGNOSIS — H10.503 BLEPHAROCONJUNCTIVITIS OF BOTH EYES, UNSPECIFIED BLEPHAROCONJUNCTIVITIS TYPE: ICD-10-CM

## 2021-03-24 DIAGNOSIS — H04.123 DRY EYES, BILATERAL: ICD-10-CM

## 2021-03-24 DIAGNOSIS — Z13.5 GLAUCOMA SCREENING: ICD-10-CM

## 2021-03-24 DIAGNOSIS — H43.813 POSTERIOR VITREOUS DETACHMENT, BILATERAL: Primary | ICD-10-CM

## 2021-03-24 PROCEDURE — 1100F PR PT FALLS ASSESS DOC 2+ FALLS/FALL W/INJURY/YR: ICD-10-PCS | Mod: CPTII,S$GLB,, | Performed by: OPTOMETRIST

## 2021-03-24 PROCEDURE — 1157F ADVNC CARE PLAN IN RCRD: CPT | Mod: S$GLB,,, | Performed by: OPTOMETRIST

## 2021-03-24 PROCEDURE — 99999 PR PBB SHADOW E&M-EST. PATIENT-LVL IV: ICD-10-PCS | Mod: PBBFAC,,, | Performed by: OPTOMETRIST

## 2021-03-24 PROCEDURE — 1126F AMNT PAIN NOTED NONE PRSNT: CPT | Mod: S$GLB,,, | Performed by: OPTOMETRIST

## 2021-03-24 PROCEDURE — 92015 PR REFRACTION: ICD-10-PCS | Mod: S$GLB,,, | Performed by: OPTOMETRIST

## 2021-03-24 PROCEDURE — 1100F PTFALLS ASSESS-DOCD GE2>/YR: CPT | Mod: CPTII,S$GLB,, | Performed by: OPTOMETRIST

## 2021-03-24 PROCEDURE — 92014 COMPRE OPH EXAM EST PT 1/>: CPT | Mod: S$GLB,,, | Performed by: OPTOMETRIST

## 2021-03-24 PROCEDURE — 1126F PR PAIN SEVERITY QUANTIFIED, NO PAIN PRESENT: ICD-10-PCS | Mod: S$GLB,,, | Performed by: OPTOMETRIST

## 2021-03-24 PROCEDURE — 92015 DETERMINE REFRACTIVE STATE: CPT | Mod: S$GLB,,, | Performed by: OPTOMETRIST

## 2021-03-24 PROCEDURE — 1157F PR ADVANCE CARE PLAN OR EQUIV PRESENT IN MEDICAL RECORD: ICD-10-PCS | Mod: S$GLB,,, | Performed by: OPTOMETRIST

## 2021-03-24 PROCEDURE — 3288F PR FALLS RISK ASSESSMENT DOCUMENTED: ICD-10-PCS | Mod: CPTII,S$GLB,, | Performed by: OPTOMETRIST

## 2021-03-24 PROCEDURE — 92014 PR EYE EXAM, EST PATIENT,COMPREHESV: ICD-10-PCS | Mod: S$GLB,,, | Performed by: OPTOMETRIST

## 2021-03-24 PROCEDURE — 3288F FALL RISK ASSESSMENT DOCD: CPT | Mod: CPTII,S$GLB,, | Performed by: OPTOMETRIST

## 2021-03-24 PROCEDURE — 99999 PR PBB SHADOW E&M-EST. PATIENT-LVL IV: CPT | Mod: PBBFAC,,, | Performed by: OPTOMETRIST

## 2021-03-24 RX ORDER — NEOMYCIN SULFATE, POLYMYXIN B SULFATE AND DEXAMETHASONE 3.5; 10000; 1 MG/ML; [USP'U]/ML; MG/ML
1 SUSPENSION/ DROPS OPHTHALMIC 3 TIMES DAILY
Qty: 5 ML | Refills: 1 | Status: SHIPPED | OUTPATIENT
Start: 2021-03-24 | End: 2021-04-07

## 2021-03-29 ENCOUNTER — OFFICE VISIT (OUTPATIENT)
Dept: CARDIOLOGY | Facility: CLINIC | Age: 86
End: 2021-03-29
Payer: MEDICARE

## 2021-03-29 VITALS
DIASTOLIC BLOOD PRESSURE: 73 MMHG | BODY MASS INDEX: 30.21 KG/M2 | HEART RATE: 106 BPM | WEIGHT: 153.88 LBS | SYSTOLIC BLOOD PRESSURE: 121 MMHG | HEIGHT: 60 IN

## 2021-03-29 DIAGNOSIS — I47.10 SVT (SUPRAVENTRICULAR TACHYCARDIA): ICD-10-CM

## 2021-03-29 DIAGNOSIS — I25.10 CORONARY ARTERY DISEASE INVOLVING NATIVE CORONARY ARTERY OF NATIVE HEART WITHOUT ANGINA PECTORIS: Primary | Chronic | ICD-10-CM

## 2021-03-29 DIAGNOSIS — E78.5 HYPERLIPIDEMIA, UNSPECIFIED HYPERLIPIDEMIA TYPE: Chronic | ICD-10-CM

## 2021-03-29 DIAGNOSIS — I10 ESSENTIAL HYPERTENSION: Chronic | ICD-10-CM

## 2021-03-29 PROCEDURE — 99499 RISK ADDL DX/OHS AUDIT: ICD-10-PCS | Mod: S$GLB,,, | Performed by: INTERNAL MEDICINE

## 2021-03-29 PROCEDURE — 1126F AMNT PAIN NOTED NONE PRSNT: CPT | Mod: S$GLB,,, | Performed by: INTERNAL MEDICINE

## 2021-03-29 PROCEDURE — 1157F ADVNC CARE PLAN IN RCRD: CPT | Mod: S$GLB,,, | Performed by: INTERNAL MEDICINE

## 2021-03-29 PROCEDURE — 1126F PR PAIN SEVERITY QUANTIFIED, NO PAIN PRESENT: ICD-10-PCS | Mod: S$GLB,,, | Performed by: INTERNAL MEDICINE

## 2021-03-29 PROCEDURE — 99214 PR OFFICE/OUTPT VISIT, EST, LEVL IV, 30-39 MIN: ICD-10-PCS | Mod: S$GLB,,, | Performed by: INTERNAL MEDICINE

## 2021-03-29 PROCEDURE — 3078F PR MOST RECENT DIASTOLIC BLOOD PRESSURE < 80 MM HG: ICD-10-PCS | Mod: CPTII,S$GLB,, | Performed by: INTERNAL MEDICINE

## 2021-03-29 PROCEDURE — 3074F PR MOST RECENT SYSTOLIC BLOOD PRESSURE < 130 MM HG: ICD-10-PCS | Mod: CPTII,S$GLB,, | Performed by: INTERNAL MEDICINE

## 2021-03-29 PROCEDURE — 3288F FALL RISK ASSESSMENT DOCD: CPT | Mod: CPTII,S$GLB,, | Performed by: INTERNAL MEDICINE

## 2021-03-29 PROCEDURE — 1157F PR ADVANCE CARE PLAN OR EQUIV PRESENT IN MEDICAL RECORD: ICD-10-PCS | Mod: S$GLB,,, | Performed by: INTERNAL MEDICINE

## 2021-03-29 PROCEDURE — 99999 PR PBB SHADOW E&M-EST. PATIENT-LVL IV: CPT | Mod: PBBFAC,,, | Performed by: INTERNAL MEDICINE

## 2021-03-29 PROCEDURE — 3074F SYST BP LT 130 MM HG: CPT | Mod: CPTII,S$GLB,, | Performed by: INTERNAL MEDICINE

## 2021-03-29 PROCEDURE — 1101F PT FALLS ASSESS-DOCD LE1/YR: CPT | Mod: CPTII,S$GLB,, | Performed by: INTERNAL MEDICINE

## 2021-03-29 PROCEDURE — 99499 UNLISTED E&M SERVICE: CPT | Mod: S$GLB,,, | Performed by: INTERNAL MEDICINE

## 2021-03-29 PROCEDURE — 3078F DIAST BP <80 MM HG: CPT | Mod: CPTII,S$GLB,, | Performed by: INTERNAL MEDICINE

## 2021-03-29 PROCEDURE — 1101F PR PT FALLS ASSESS DOC 0-1 FALLS W/OUT INJ PAST YR: ICD-10-PCS | Mod: CPTII,S$GLB,, | Performed by: INTERNAL MEDICINE

## 2021-03-29 PROCEDURE — 1159F PR MEDICATION LIST DOCUMENTED IN MEDICAL RECORD: ICD-10-PCS | Mod: S$GLB,,, | Performed by: INTERNAL MEDICINE

## 2021-03-29 PROCEDURE — 1159F MED LIST DOCD IN RCRD: CPT | Mod: S$GLB,,, | Performed by: INTERNAL MEDICINE

## 2021-03-29 PROCEDURE — 99214 OFFICE O/P EST MOD 30 MIN: CPT | Mod: S$GLB,,, | Performed by: INTERNAL MEDICINE

## 2021-03-29 PROCEDURE — 99999 PR PBB SHADOW E&M-EST. PATIENT-LVL IV: ICD-10-PCS | Mod: PBBFAC,,, | Performed by: INTERNAL MEDICINE

## 2021-03-29 PROCEDURE — 3288F PR FALLS RISK ASSESSMENT DOCUMENTED: ICD-10-PCS | Mod: CPTII,S$GLB,, | Performed by: INTERNAL MEDICINE

## 2021-04-06 ENCOUNTER — OFFICE VISIT (OUTPATIENT)
Dept: PRIMARY CARE CLINIC | Facility: CLINIC | Age: 86
End: 2021-04-06
Payer: MEDICARE

## 2021-04-06 ENCOUNTER — LAB VISIT (OUTPATIENT)
Dept: LAB | Facility: HOSPITAL | Age: 86
End: 2021-04-06
Attending: FAMILY MEDICINE
Payer: MEDICARE

## 2021-04-06 VITALS
HEART RATE: 78 BPM | DIASTOLIC BLOOD PRESSURE: 60 MMHG | WEIGHT: 153.88 LBS | RESPIRATION RATE: 18 BRPM | SYSTOLIC BLOOD PRESSURE: 122 MMHG | HEIGHT: 60 IN | OXYGEN SATURATION: 95 % | TEMPERATURE: 99 F | BODY MASS INDEX: 30.21 KG/M2

## 2021-04-06 DIAGNOSIS — I10 ESSENTIAL HYPERTENSION: Chronic | ICD-10-CM

## 2021-04-06 DIAGNOSIS — M54.17 LUMBOSACRAL RADICULOPATHY: Chronic | ICD-10-CM

## 2021-04-06 DIAGNOSIS — R52 CHRONIC PAIN OF MULTIPLE SITES: Chronic | ICD-10-CM

## 2021-04-06 DIAGNOSIS — M51.36 DDD (DEGENERATIVE DISC DISEASE), LUMBAR: Primary | Chronic | ICD-10-CM

## 2021-04-06 DIAGNOSIS — F11.20 CHRONIC NARCOTIC DEPENDENCE: Chronic | ICD-10-CM

## 2021-04-06 DIAGNOSIS — R10.84 GENERALIZED ABDOMINAL PAIN: ICD-10-CM

## 2021-04-06 DIAGNOSIS — R10.9 ABDOMINAL PAIN, UNSPECIFIED ABDOMINAL LOCATION: ICD-10-CM

## 2021-04-06 DIAGNOSIS — G89.29 CHRONIC PAIN OF MULTIPLE SITES: Chronic | ICD-10-CM

## 2021-04-06 DIAGNOSIS — J44.9 CHRONIC OBSTRUCTIVE PULMONARY DISEASE, UNSPECIFIED COPD TYPE: Chronic | ICD-10-CM

## 2021-04-06 PROCEDURE — 85025 COMPLETE CBC W/AUTO DIFF WBC: CPT | Performed by: FAMILY MEDICINE

## 2021-04-06 PROCEDURE — 1159F MED LIST DOCD IN RCRD: CPT | Mod: S$GLB,,, | Performed by: FAMILY MEDICINE

## 2021-04-06 PROCEDURE — 99215 PR OFFICE/OUTPT VISIT, EST, LEVL V, 40-54 MIN: ICD-10-PCS | Mod: S$GLB,,, | Performed by: FAMILY MEDICINE

## 2021-04-06 PROCEDURE — 1157F ADVNC CARE PLAN IN RCRD: CPT | Mod: S$GLB,,, | Performed by: FAMILY MEDICINE

## 2021-04-06 PROCEDURE — 1159F PR MEDICATION LIST DOCUMENTED IN MEDICAL RECORD: ICD-10-PCS | Mod: S$GLB,,, | Performed by: FAMILY MEDICINE

## 2021-04-06 PROCEDURE — 1126F PR PAIN SEVERITY QUANTIFIED, NO PAIN PRESENT: ICD-10-PCS | Mod: S$GLB,,, | Performed by: FAMILY MEDICINE

## 2021-04-06 PROCEDURE — 1157F PR ADVANCE CARE PLAN OR EQUIV PRESENT IN MEDICAL RECORD: ICD-10-PCS | Mod: S$GLB,,, | Performed by: FAMILY MEDICINE

## 2021-04-06 PROCEDURE — 1101F PT FALLS ASSESS-DOCD LE1/YR: CPT | Mod: CPTII,S$GLB,, | Performed by: FAMILY MEDICINE

## 2021-04-06 PROCEDURE — 1126F AMNT PAIN NOTED NONE PRSNT: CPT | Mod: S$GLB,,, | Performed by: FAMILY MEDICINE

## 2021-04-06 PROCEDURE — 3078F DIAST BP <80 MM HG: CPT | Mod: CPTII,S$GLB,, | Performed by: FAMILY MEDICINE

## 2021-04-06 PROCEDURE — 99215 OFFICE O/P EST HI 40 MIN: CPT | Mod: S$GLB,,, | Performed by: FAMILY MEDICINE

## 2021-04-06 PROCEDURE — 3288F PR FALLS RISK ASSESSMENT DOCUMENTED: ICD-10-PCS | Mod: CPTII,S$GLB,, | Performed by: FAMILY MEDICINE

## 2021-04-06 PROCEDURE — 36415 COLL VENOUS BLD VENIPUNCTURE: CPT | Mod: PN | Performed by: FAMILY MEDICINE

## 2021-04-06 PROCEDURE — 3288F FALL RISK ASSESSMENT DOCD: CPT | Mod: CPTII,S$GLB,, | Performed by: FAMILY MEDICINE

## 2021-04-06 PROCEDURE — 80048 BASIC METABOLIC PNL TOTAL CA: CPT | Performed by: FAMILY MEDICINE

## 2021-04-06 PROCEDURE — 3078F PR MOST RECENT DIASTOLIC BLOOD PRESSURE < 80 MM HG: ICD-10-PCS | Mod: CPTII,S$GLB,, | Performed by: FAMILY MEDICINE

## 2021-04-06 PROCEDURE — 99999 PR PBB SHADOW E&M-EST. PATIENT-LVL V: ICD-10-PCS | Mod: PBBFAC,,, | Performed by: FAMILY MEDICINE

## 2021-04-06 PROCEDURE — 3074F SYST BP LT 130 MM HG: CPT | Mod: CPTII,S$GLB,, | Performed by: FAMILY MEDICINE

## 2021-04-06 PROCEDURE — 3074F PR MOST RECENT SYSTOLIC BLOOD PRESSURE < 130 MM HG: ICD-10-PCS | Mod: CPTII,S$GLB,, | Performed by: FAMILY MEDICINE

## 2021-04-06 PROCEDURE — 99999 PR PBB SHADOW E&M-EST. PATIENT-LVL V: CPT | Mod: PBBFAC,,, | Performed by: FAMILY MEDICINE

## 2021-04-06 PROCEDURE — 1101F PR PT FALLS ASSESS DOC 0-1 FALLS W/OUT INJ PAST YR: ICD-10-PCS | Mod: CPTII,S$GLB,, | Performed by: FAMILY MEDICINE

## 2021-04-06 RX ORDER — HYDROCODONE BITARTRATE AND ACETAMINOPHEN 10; 325 MG/1; MG/1
1 TABLET ORAL EVERY 6 HOURS PRN
Qty: 90 TABLET | Refills: 0 | Status: SHIPPED | OUTPATIENT
Start: 2021-04-18 | End: 2021-05-18 | Stop reason: SDUPTHER

## 2021-04-07 LAB
ANION GAP SERPL CALC-SCNC: 8 MMOL/L (ref 8–16)
BASOPHILS # BLD AUTO: 0.04 K/UL (ref 0–0.2)
BASOPHILS NFR BLD: 0.5 % (ref 0–1.9)
BUN SERPL-MCNC: 21 MG/DL (ref 8–23)
CALCIUM SERPL-MCNC: 8.9 MG/DL (ref 8.7–10.5)
CHLORIDE SERPL-SCNC: 107 MMOL/L (ref 95–110)
CO2 SERPL-SCNC: 23 MMOL/L (ref 23–29)
CREAT SERPL-MCNC: 1 MG/DL (ref 0.5–1.4)
DIFFERENTIAL METHOD: ABNORMAL
EOSINOPHIL # BLD AUTO: 0.2 K/UL (ref 0–0.5)
EOSINOPHIL NFR BLD: 2.5 % (ref 0–8)
ERYTHROCYTE [DISTWIDTH] IN BLOOD BY AUTOMATED COUNT: 16 % (ref 11.5–14.5)
EST. GFR  (AFRICAN AMERICAN): 59.8 ML/MIN/1.73 M^2
EST. GFR  (NON AFRICAN AMERICAN): 51.9 ML/MIN/1.73 M^2
GLUCOSE SERPL-MCNC: 111 MG/DL (ref 70–110)
HCT VFR BLD AUTO: 38.6 % (ref 37–48.5)
HGB BLD-MCNC: 11.9 G/DL (ref 12–16)
IMM GRANULOCYTES # BLD AUTO: 0.01 K/UL (ref 0–0.04)
IMM GRANULOCYTES NFR BLD AUTO: 0.1 % (ref 0–0.5)
LYMPHOCYTES # BLD AUTO: 1.3 K/UL (ref 1–4.8)
LYMPHOCYTES NFR BLD: 18.3 % (ref 18–48)
MCH RBC QN AUTO: 26.7 PG (ref 27–31)
MCHC RBC AUTO-ENTMCNC: 30.8 G/DL (ref 32–36)
MCV RBC AUTO: 87 FL (ref 82–98)
MONOCYTES # BLD AUTO: 0.7 K/UL (ref 0.3–1)
MONOCYTES NFR BLD: 10.1 % (ref 4–15)
NEUTROPHILS # BLD AUTO: 5 K/UL (ref 1.8–7.7)
NEUTROPHILS NFR BLD: 68.5 % (ref 38–73)
NRBC BLD-RTO: 0 /100 WBC
PLATELET # BLD AUTO: 230 K/UL (ref 150–450)
PMV BLD AUTO: 11.7 FL (ref 9.2–12.9)
POTASSIUM SERPL-SCNC: 4.7 MMOL/L (ref 3.5–5.1)
RBC # BLD AUTO: 4.46 M/UL (ref 4–5.4)
SODIUM SERPL-SCNC: 138 MMOL/L (ref 136–145)
WBC # BLD AUTO: 7.32 K/UL (ref 3.9–12.7)

## 2021-04-08 ENCOUNTER — TELEPHONE (OUTPATIENT)
Dept: FAMILY MEDICINE | Facility: CLINIC | Age: 86
End: 2021-04-08

## 2021-04-08 ENCOUNTER — HOSPITAL ENCOUNTER (OUTPATIENT)
Dept: RADIOLOGY | Facility: HOSPITAL | Age: 86
Discharge: HOME | End: 2021-04-08
Attending: FAMILY MEDICINE
Payer: MEDICARE

## 2021-04-08 DIAGNOSIS — K30 DELAYED GASTRIC EMPTYING: ICD-10-CM

## 2021-04-08 DIAGNOSIS — N94.89 ADNEXAL MASS: Primary | ICD-10-CM

## 2021-04-08 DIAGNOSIS — R10.9 ABDOMINAL PAIN, UNSPECIFIED ABDOMINAL LOCATION: ICD-10-CM

## 2021-04-08 PROCEDURE — 74177 CT ABD & PELVIS W/CONTRAST: CPT | Mod: TC,PO

## 2021-04-08 PROCEDURE — 74177 CT ABDOMEN PELVIS WITH CONTRAST: ICD-10-PCS | Mod: 26,,, | Performed by: RADIOLOGY

## 2021-04-08 PROCEDURE — 25500020 PHARM REV CODE 255: Mod: PO | Performed by: FAMILY MEDICINE

## 2021-04-08 PROCEDURE — 74177 CT ABD & PELVIS W/CONTRAST: CPT | Mod: 26,,, | Performed by: RADIOLOGY

## 2021-04-08 PROCEDURE — A9698 NON-RAD CONTRAST MATERIALNOC: HCPCS | Mod: PO | Performed by: FAMILY MEDICINE

## 2021-04-08 RX ADMIN — IOHEXOL 500 ML: 9 SOLUTION ORAL at 03:04

## 2021-04-08 RX ADMIN — IOHEXOL 75 ML: 350 INJECTION, SOLUTION INTRAVENOUS at 03:04

## 2021-04-15 ENCOUNTER — TELEPHONE (OUTPATIENT)
Dept: PRIMARY CARE CLINIC | Facility: CLINIC | Age: 86
End: 2021-04-15

## 2021-04-20 ENCOUNTER — HOSPITAL ENCOUNTER (OUTPATIENT)
Dept: RADIOLOGY | Facility: HOSPITAL | Age: 86
Discharge: HOME | End: 2021-04-20
Attending: FAMILY MEDICINE
Payer: MEDICARE

## 2021-04-20 DIAGNOSIS — N94.89 ADNEXAL MASS: ICD-10-CM

## 2021-04-20 PROCEDURE — 76830 TRANSVAGINAL US NON-OB: CPT | Mod: 26,,, | Performed by: RADIOLOGY

## 2021-04-20 PROCEDURE — 76856 US PELVIS COMP WITH TRANSVAG NON-OB (XPD): ICD-10-PCS | Mod: 26,,, | Performed by: RADIOLOGY

## 2021-04-20 PROCEDURE — 76856 US EXAM PELVIC COMPLETE: CPT | Mod: TC,PO

## 2021-04-20 PROCEDURE — 76830 US PELVIS COMP WITH TRANSVAG NON-OB (XPD): ICD-10-PCS | Mod: 26,,, | Performed by: RADIOLOGY

## 2021-04-20 PROCEDURE — 76856 US EXAM PELVIC COMPLETE: CPT | Mod: 26,,, | Performed by: RADIOLOGY

## 2021-04-21 ENCOUNTER — TELEPHONE (OUTPATIENT)
Dept: PRIMARY CARE CLINIC | Facility: CLINIC | Age: 86
End: 2021-04-21

## 2021-04-21 DIAGNOSIS — R19.09 OTHER INTRA-ABDOMINAL AND PELVIC SWELLING, MASS AND LUMP: ICD-10-CM

## 2021-04-26 ENCOUNTER — OFFICE VISIT (OUTPATIENT)
Dept: GASTROENTEROLOGY | Facility: CLINIC | Age: 86
End: 2021-04-26
Payer: MEDICARE

## 2021-04-26 VITALS — WEIGHT: 147.25 LBS | HEIGHT: 60 IN | BODY MASS INDEX: 28.91 KG/M2

## 2021-04-26 DIAGNOSIS — R11.2 NON-INTRACTABLE VOMITING WITH NAUSEA: ICD-10-CM

## 2021-04-26 DIAGNOSIS — R10.84 GENERALIZED ABDOMINAL PAIN: ICD-10-CM

## 2021-04-26 DIAGNOSIS — R49.0 HOARSENESS OF VOICE: ICD-10-CM

## 2021-04-26 DIAGNOSIS — R63.4 WEIGHT LOSS: ICD-10-CM

## 2021-04-26 DIAGNOSIS — Z87.19 HISTORY OF GASTROESOPHAGEAL REFLUX (GERD): ICD-10-CM

## 2021-04-26 DIAGNOSIS — R13.14 PHARYNGOESOPHAGEAL DYSPHAGIA: ICD-10-CM

## 2021-04-26 DIAGNOSIS — K92.1 BLACK STOOL: ICD-10-CM

## 2021-04-26 DIAGNOSIS — R09.A2 GLOBUS SENSATION: ICD-10-CM

## 2021-04-26 DIAGNOSIS — K31.84 GASTROPARESIS: Primary | ICD-10-CM

## 2021-04-26 DIAGNOSIS — R10.13 EPIGASTRIC PAIN: ICD-10-CM

## 2021-04-26 PROCEDURE — 3288F FALL RISK ASSESSMENT DOCD: CPT | Mod: CPTII,S$GLB,, | Performed by: NURSE PRACTITIONER

## 2021-04-26 PROCEDURE — 99999 PR PBB SHADOW E&M-EST. PATIENT-LVL IV: ICD-10-PCS | Mod: PBBFAC,,, | Performed by: NURSE PRACTITIONER

## 2021-04-26 PROCEDURE — 1126F AMNT PAIN NOTED NONE PRSNT: CPT | Mod: S$GLB,,, | Performed by: NURSE PRACTITIONER

## 2021-04-26 PROCEDURE — 1157F PR ADVANCE CARE PLAN OR EQUIV PRESENT IN MEDICAL RECORD: ICD-10-PCS | Mod: S$GLB,,, | Performed by: NURSE PRACTITIONER

## 2021-04-26 PROCEDURE — 1101F PR PT FALLS ASSESS DOC 0-1 FALLS W/OUT INJ PAST YR: ICD-10-PCS | Mod: CPTII,S$GLB,, | Performed by: NURSE PRACTITIONER

## 2021-04-26 PROCEDURE — 3288F PR FALLS RISK ASSESSMENT DOCUMENTED: ICD-10-PCS | Mod: CPTII,S$GLB,, | Performed by: NURSE PRACTITIONER

## 2021-04-26 PROCEDURE — 99214 OFFICE O/P EST MOD 30 MIN: CPT | Mod: S$GLB,,, | Performed by: NURSE PRACTITIONER

## 2021-04-26 PROCEDURE — 1159F MED LIST DOCD IN RCRD: CPT | Mod: S$GLB,,, | Performed by: NURSE PRACTITIONER

## 2021-04-26 PROCEDURE — 1101F PT FALLS ASSESS-DOCD LE1/YR: CPT | Mod: CPTII,S$GLB,, | Performed by: NURSE PRACTITIONER

## 2021-04-26 PROCEDURE — 1126F PR PAIN SEVERITY QUANTIFIED, NO PAIN PRESENT: ICD-10-PCS | Mod: S$GLB,,, | Performed by: NURSE PRACTITIONER

## 2021-04-26 PROCEDURE — 1159F PR MEDICATION LIST DOCUMENTED IN MEDICAL RECORD: ICD-10-PCS | Mod: S$GLB,,, | Performed by: NURSE PRACTITIONER

## 2021-04-26 PROCEDURE — 99214 PR OFFICE/OUTPT VISIT, EST, LEVL IV, 30-39 MIN: ICD-10-PCS | Mod: S$GLB,,, | Performed by: NURSE PRACTITIONER

## 2021-04-26 PROCEDURE — 1157F ADVNC CARE PLAN IN RCRD: CPT | Mod: S$GLB,,, | Performed by: NURSE PRACTITIONER

## 2021-04-26 PROCEDURE — 99999 PR PBB SHADOW E&M-EST. PATIENT-LVL IV: CPT | Mod: PBBFAC,,, | Performed by: NURSE PRACTITIONER

## 2021-04-26 RX ORDER — POLYETHYLENE GLYCOL 3350 17 G/17G
17 POWDER, FOR SOLUTION ORAL DAILY PRN
COMMUNITY

## 2021-05-06 ENCOUNTER — TELEPHONE (OUTPATIENT)
Dept: GASTROENTEROLOGY | Facility: CLINIC | Age: 86
End: 2021-05-06

## 2021-05-18 RX ORDER — HYDROCODONE BITARTRATE AND ACETAMINOPHEN 10; 325 MG/1; MG/1
1 TABLET ORAL EVERY 6 HOURS PRN
Qty: 90 TABLET | Refills: 0 | Status: SHIPPED | OUTPATIENT
Start: 2021-05-18 | End: 2021-06-18 | Stop reason: SDUPTHER

## 2021-05-18 RX ORDER — HYDROCODONE BITARTRATE AND ACETAMINOPHEN 10; 325 MG/1; MG/1
1 TABLET ORAL EVERY 6 HOURS PRN
Qty: 90 TABLET | Refills: 0 | Status: SHIPPED | OUTPATIENT
Start: 2021-05-18 | End: 2021-05-18 | Stop reason: SDUPTHER

## 2021-06-18 RX ORDER — HYDROCODONE BITARTRATE AND ACETAMINOPHEN 10; 325 MG/1; MG/1
1 TABLET ORAL EVERY 6 HOURS PRN
Qty: 90 TABLET | Refills: 0 | Status: SHIPPED | OUTPATIENT
Start: 2021-06-18 | End: 2021-07-16 | Stop reason: SDUPTHER

## 2021-06-29 ENCOUNTER — OFFICE VISIT (OUTPATIENT)
Dept: PRIMARY CARE CLINIC | Facility: CLINIC | Age: 86
End: 2021-06-29
Payer: MEDICARE

## 2021-06-29 VITALS
OXYGEN SATURATION: 96 % | DIASTOLIC BLOOD PRESSURE: 72 MMHG | WEIGHT: 152.56 LBS | BODY MASS INDEX: 29.95 KG/M2 | SYSTOLIC BLOOD PRESSURE: 116 MMHG | HEART RATE: 86 BPM | RESPIRATION RATE: 18 BRPM | HEIGHT: 60 IN

## 2021-06-29 DIAGNOSIS — I25.10 CORONARY ARTERY DISEASE INVOLVING NATIVE CORONARY ARTERY OF NATIVE HEART WITHOUT ANGINA PECTORIS: Chronic | ICD-10-CM

## 2021-06-29 DIAGNOSIS — D64.9 ANEMIA, UNSPECIFIED TYPE: ICD-10-CM

## 2021-06-29 DIAGNOSIS — R79.9 ABNORMAL FINDING OF BLOOD CHEMISTRY, UNSPECIFIED: ICD-10-CM

## 2021-06-29 DIAGNOSIS — G89.29 CHRONIC PAIN OF MULTIPLE SITES: Chronic | ICD-10-CM

## 2021-06-29 DIAGNOSIS — R10.9 CHRONIC ABDOMINAL PAIN: ICD-10-CM

## 2021-06-29 DIAGNOSIS — J44.9 CHRONIC OBSTRUCTIVE PULMONARY DISEASE, UNSPECIFIED COPD TYPE: Chronic | ICD-10-CM

## 2021-06-29 DIAGNOSIS — G89.29 CHRONIC ABDOMINAL PAIN: ICD-10-CM

## 2021-06-29 DIAGNOSIS — I10 ESSENTIAL HYPERTENSION: Primary | Chronic | ICD-10-CM

## 2021-06-29 DIAGNOSIS — F33.0 DEPRESSION, MAJOR, RECURRENT, MILD: ICD-10-CM

## 2021-06-29 DIAGNOSIS — N83.201 CYSTS OF BOTH OVARIES: ICD-10-CM

## 2021-06-29 DIAGNOSIS — F11.20 CHRONIC NARCOTIC DEPENDENCE: Chronic | ICD-10-CM

## 2021-06-29 DIAGNOSIS — R52 CHRONIC PAIN OF MULTIPLE SITES: Chronic | ICD-10-CM

## 2021-06-29 DIAGNOSIS — N83.202 CYSTS OF BOTH OVARIES: ICD-10-CM

## 2021-06-29 DIAGNOSIS — E78.5 HYPERLIPIDEMIA, UNSPECIFIED HYPERLIPIDEMIA TYPE: Chronic | ICD-10-CM

## 2021-06-29 DIAGNOSIS — N18.32 STAGE 3B CHRONIC KIDNEY DISEASE: Chronic | ICD-10-CM

## 2021-06-29 DIAGNOSIS — M51.36 DDD (DEGENERATIVE DISC DISEASE), LUMBAR: Chronic | ICD-10-CM

## 2021-06-29 PROCEDURE — 3288F FALL RISK ASSESSMENT DOCD: CPT | Mod: CPTII,S$GLB,, | Performed by: FAMILY MEDICINE

## 2021-06-29 PROCEDURE — 99999 PR PBB SHADOW E&M-EST. PATIENT-LVL IV: ICD-10-PCS | Mod: PBBFAC,,, | Performed by: FAMILY MEDICINE

## 2021-06-29 PROCEDURE — 1126F PR PAIN SEVERITY QUANTIFIED, NO PAIN PRESENT: ICD-10-PCS | Mod: S$GLB,,, | Performed by: FAMILY MEDICINE

## 2021-06-29 PROCEDURE — 99215 OFFICE O/P EST HI 40 MIN: CPT | Mod: S$GLB,,, | Performed by: FAMILY MEDICINE

## 2021-06-29 PROCEDURE — 3074F PR MOST RECENT SYSTOLIC BLOOD PRESSURE < 130 MM HG: ICD-10-PCS | Mod: CPTII,S$GLB,, | Performed by: FAMILY MEDICINE

## 2021-06-29 PROCEDURE — 1157F ADVNC CARE PLAN IN RCRD: CPT | Mod: S$GLB,,, | Performed by: FAMILY MEDICINE

## 2021-06-29 PROCEDURE — 1159F MED LIST DOCD IN RCRD: CPT | Mod: S$GLB,,, | Performed by: FAMILY MEDICINE

## 2021-06-29 PROCEDURE — 1101F PR PT FALLS ASSESS DOC 0-1 FALLS W/OUT INJ PAST YR: ICD-10-PCS | Mod: CPTII,S$GLB,, | Performed by: FAMILY MEDICINE

## 2021-06-29 PROCEDURE — 1126F AMNT PAIN NOTED NONE PRSNT: CPT | Mod: S$GLB,,, | Performed by: FAMILY MEDICINE

## 2021-06-29 PROCEDURE — 99499 RISK ADDL DX/OHS AUDIT: ICD-10-PCS | Mod: HCNC,S$GLB,, | Performed by: FAMILY MEDICINE

## 2021-06-29 PROCEDURE — 1157F PR ADVANCE CARE PLAN OR EQUIV PRESENT IN MEDICAL RECORD: ICD-10-PCS | Mod: S$GLB,,, | Performed by: FAMILY MEDICINE

## 2021-06-29 PROCEDURE — 1159F PR MEDICATION LIST DOCUMENTED IN MEDICAL RECORD: ICD-10-PCS | Mod: S$GLB,,, | Performed by: FAMILY MEDICINE

## 2021-06-29 PROCEDURE — 99499 UNLISTED E&M SERVICE: CPT | Mod: HCNC,S$GLB,, | Performed by: FAMILY MEDICINE

## 2021-06-29 PROCEDURE — 99215 PR OFFICE/OUTPT VISIT, EST, LEVL V, 40-54 MIN: ICD-10-PCS | Mod: S$GLB,,, | Performed by: FAMILY MEDICINE

## 2021-06-29 PROCEDURE — 3074F SYST BP LT 130 MM HG: CPT | Mod: CPTII,S$GLB,, | Performed by: FAMILY MEDICINE

## 2021-06-29 PROCEDURE — 99999 PR PBB SHADOW E&M-EST. PATIENT-LVL IV: CPT | Mod: PBBFAC,,, | Performed by: FAMILY MEDICINE

## 2021-06-29 PROCEDURE — 1101F PT FALLS ASSESS-DOCD LE1/YR: CPT | Mod: CPTII,S$GLB,, | Performed by: FAMILY MEDICINE

## 2021-06-29 PROCEDURE — 3078F PR MOST RECENT DIASTOLIC BLOOD PRESSURE < 80 MM HG: ICD-10-PCS | Mod: CPTII,S$GLB,, | Performed by: FAMILY MEDICINE

## 2021-06-29 PROCEDURE — 3078F DIAST BP <80 MM HG: CPT | Mod: CPTII,S$GLB,, | Performed by: FAMILY MEDICINE

## 2021-06-29 PROCEDURE — 3288F PR FALLS RISK ASSESSMENT DOCUMENTED: ICD-10-PCS | Mod: CPTII,S$GLB,, | Performed by: FAMILY MEDICINE

## 2021-06-29 RX ORDER — SERTRALINE HYDROCHLORIDE 100 MG/1
150 TABLET, FILM COATED ORAL DAILY
Qty: 135 TABLET | Refills: 3 | Status: SHIPPED | OUTPATIENT
Start: 2021-06-29 | End: 2021-07-01

## 2021-07-14 RX ORDER — HYDROCODONE BITARTRATE AND ACETAMINOPHEN 10; 325 MG/1; MG/1
1 TABLET ORAL EVERY 6 HOURS PRN
Qty: 90 TABLET | Refills: 0 | Status: CANCELLED | OUTPATIENT
Start: 2021-07-14

## 2021-07-16 ENCOUNTER — LAB VISIT (OUTPATIENT)
Dept: LAB | Facility: HOSPITAL | Age: 86
End: 2021-07-16
Attending: FAMILY MEDICINE
Payer: MEDICARE

## 2021-07-16 DIAGNOSIS — I10 ESSENTIAL HYPERTENSION: Chronic | ICD-10-CM

## 2021-07-16 DIAGNOSIS — R79.9 ABNORMAL FINDING OF BLOOD CHEMISTRY, UNSPECIFIED: ICD-10-CM

## 2021-07-16 LAB
ALBUMIN SERPL BCP-MCNC: 3.5 G/DL (ref 3.5–5.2)
ALP SERPL-CCNC: 114 U/L (ref 55–135)
ALT SERPL W/O P-5'-P-CCNC: 10 U/L (ref 10–44)
ANION GAP SERPL CALC-SCNC: 11 MMOL/L (ref 8–16)
AST SERPL-CCNC: 15 U/L (ref 10–40)
BASOPHILS # BLD AUTO: 0.04 K/UL (ref 0–0.2)
BASOPHILS NFR BLD: 0.6 % (ref 0–1.9)
BILIRUB SERPL-MCNC: 0.2 MG/DL (ref 0.1–1)
BUN SERPL-MCNC: 17 MG/DL (ref 8–23)
CALCIUM SERPL-MCNC: 9.2 MG/DL (ref 8.7–10.5)
CHLORIDE SERPL-SCNC: 107 MMOL/L (ref 95–110)
CHOLEST SERPL-MCNC: 227 MG/DL (ref 120–199)
CHOLEST/HDLC SERPL: 3.8 {RATIO} (ref 2–5)
CO2 SERPL-SCNC: 21 MMOL/L (ref 23–29)
CREAT SERPL-MCNC: 0.9 MG/DL (ref 0.5–1.4)
DIFFERENTIAL METHOD: ABNORMAL
EOSINOPHIL # BLD AUTO: 0.2 K/UL (ref 0–0.5)
EOSINOPHIL NFR BLD: 2.3 % (ref 0–8)
ERYTHROCYTE [DISTWIDTH] IN BLOOD BY AUTOMATED COUNT: 14.7 % (ref 11.5–14.5)
EST. GFR  (AFRICAN AMERICAN): >60 ML/MIN/1.73 M^2
EST. GFR  (NON AFRICAN AMERICAN): 58.9 ML/MIN/1.73 M^2
ESTIMATED AVG GLUCOSE: 123 MG/DL (ref 68–131)
GLUCOSE SERPL-MCNC: 101 MG/DL (ref 70–110)
HBA1C MFR BLD: 5.9 % (ref 4–5.6)
HCT VFR BLD AUTO: 37.5 % (ref 37–48.5)
HDLC SERPL-MCNC: 59 MG/DL (ref 40–75)
HDLC SERPL: 26 % (ref 20–50)
HGB BLD-MCNC: 11.7 G/DL (ref 12–16)
IMM GRANULOCYTES # BLD AUTO: 0.03 K/UL (ref 0–0.04)
IMM GRANULOCYTES NFR BLD AUTO: 0.4 % (ref 0–0.5)
LDLC SERPL CALC-MCNC: 147 MG/DL (ref 63–159)
LYMPHOCYTES # BLD AUTO: 1.8 K/UL (ref 1–4.8)
LYMPHOCYTES NFR BLD: 26 % (ref 18–48)
MCH RBC QN AUTO: 27.2 PG (ref 27–31)
MCHC RBC AUTO-ENTMCNC: 31.2 G/DL (ref 32–36)
MCV RBC AUTO: 87 FL (ref 82–98)
MONOCYTES # BLD AUTO: 0.6 K/UL (ref 0.3–1)
MONOCYTES NFR BLD: 9.2 % (ref 4–15)
NEUTROPHILS # BLD AUTO: 4.3 K/UL (ref 1.8–7.7)
NEUTROPHILS NFR BLD: 61.5 % (ref 38–73)
NONHDLC SERPL-MCNC: 168 MG/DL
NRBC BLD-RTO: 0 /100 WBC
PLATELET # BLD AUTO: 228 K/UL (ref 150–450)
PMV BLD AUTO: 11.2 FL (ref 9.2–12.9)
POTASSIUM SERPL-SCNC: 4.4 MMOL/L (ref 3.5–5.1)
PROT SERPL-MCNC: 6.7 G/DL (ref 6–8.4)
RBC # BLD AUTO: 4.3 M/UL (ref 4–5.4)
SODIUM SERPL-SCNC: 139 MMOL/L (ref 136–145)
TRIGL SERPL-MCNC: 105 MG/DL (ref 30–150)
TSH SERPL DL<=0.005 MIU/L-ACNC: 1.22 UIU/ML (ref 0.4–4)
WBC # BLD AUTO: 6.97 K/UL (ref 3.9–12.7)

## 2021-07-16 PROCEDURE — 85025 COMPLETE CBC W/AUTO DIFF WBC: CPT | Performed by: FAMILY MEDICINE

## 2021-07-16 PROCEDURE — 83036 HEMOGLOBIN GLYCOSYLATED A1C: CPT | Performed by: FAMILY MEDICINE

## 2021-07-16 PROCEDURE — 80053 COMPREHEN METABOLIC PANEL: CPT | Performed by: FAMILY MEDICINE

## 2021-07-16 PROCEDURE — 80061 LIPID PANEL: CPT | Performed by: FAMILY MEDICINE

## 2021-07-16 PROCEDURE — 84443 ASSAY THYROID STIM HORMONE: CPT | Performed by: FAMILY MEDICINE

## 2021-07-16 PROCEDURE — 36415 COLL VENOUS BLD VENIPUNCTURE: CPT | Mod: PO | Performed by: FAMILY MEDICINE

## 2021-07-16 RX ORDER — HYDROCODONE BITARTRATE AND ACETAMINOPHEN 10; 325 MG/1; MG/1
1 TABLET ORAL EVERY 6 HOURS PRN
Qty: 90 TABLET | Refills: 0 | Status: SHIPPED | OUTPATIENT
Start: 2021-07-16 | End: 2021-08-13 | Stop reason: SDUPTHER

## 2021-08-04 ENCOUNTER — PES CALL (OUTPATIENT)
Dept: ADMINISTRATIVE | Facility: CLINIC | Age: 86
End: 2021-08-04

## 2021-08-13 ENCOUNTER — OFFICE VISIT (OUTPATIENT)
Dept: FAMILY MEDICINE | Facility: CLINIC | Age: 86
End: 2021-08-13
Payer: MEDICARE

## 2021-08-13 VITALS
OXYGEN SATURATION: 96 % | HEIGHT: 60 IN | HEART RATE: 95 BPM | SYSTOLIC BLOOD PRESSURE: 126 MMHG | WEIGHT: 155.88 LBS | DIASTOLIC BLOOD PRESSURE: 74 MMHG | BODY MASS INDEX: 30.61 KG/M2

## 2021-08-13 DIAGNOSIS — N18.32 STAGE 3B CHRONIC KIDNEY DISEASE: Chronic | ICD-10-CM

## 2021-08-13 DIAGNOSIS — J44.9 CHRONIC OBSTRUCTIVE PULMONARY DISEASE, UNSPECIFIED COPD TYPE: Chronic | ICD-10-CM

## 2021-08-13 DIAGNOSIS — G40.909 SEIZURE DISORDER: Chronic | ICD-10-CM

## 2021-08-13 DIAGNOSIS — I70.0 ATHEROSCLEROSIS OF AORTA: Chronic | ICD-10-CM

## 2021-08-13 DIAGNOSIS — I27.20 PULMONARY HYPERTENSION: Chronic | ICD-10-CM

## 2021-08-13 DIAGNOSIS — Z74.09 IMPAIRED MOBILITY: ICD-10-CM

## 2021-08-13 DIAGNOSIS — Z00.00 ENCOUNTER FOR PREVENTIVE HEALTH EXAMINATION: Primary | ICD-10-CM

## 2021-08-13 DIAGNOSIS — I47.10 SVT (SUPRAVENTRICULAR TACHYCARDIA): ICD-10-CM

## 2021-08-13 DIAGNOSIS — Z99.89 DEPENDENCE ON OTHER ENABLING MACHINES AND DEVICES: ICD-10-CM

## 2021-08-13 DIAGNOSIS — R26.9 ABNORMALITY OF GAIT AND MOBILITY: ICD-10-CM

## 2021-08-13 DIAGNOSIS — F33.0 DEPRESSION, MAJOR, RECURRENT, MILD: ICD-10-CM

## 2021-08-13 PROCEDURE — 1157F ADVNC CARE PLAN IN RCRD: CPT | Mod: CPTII,S$GLB,, | Performed by: NURSE PRACTITIONER

## 2021-08-13 PROCEDURE — 1125F AMNT PAIN NOTED PAIN PRSNT: CPT | Mod: CPTII,S$GLB,, | Performed by: NURSE PRACTITIONER

## 2021-08-13 PROCEDURE — 3078F DIAST BP <80 MM HG: CPT | Mod: CPTII,S$GLB,, | Performed by: NURSE PRACTITIONER

## 2021-08-13 PROCEDURE — 1100F PTFALLS ASSESS-DOCD GE2>/YR: CPT | Mod: CPTII,S$GLB,, | Performed by: NURSE PRACTITIONER

## 2021-08-13 PROCEDURE — 1160F PR REVIEW ALL MEDS BY PRESCRIBER/CLIN PHARMACIST DOCUMENTED: ICD-10-PCS | Mod: CPTII,S$GLB,, | Performed by: NURSE PRACTITIONER

## 2021-08-13 PROCEDURE — 3074F SYST BP LT 130 MM HG: CPT | Mod: CPTII,S$GLB,, | Performed by: NURSE PRACTITIONER

## 2021-08-13 PROCEDURE — 3288F FALL RISK ASSESSMENT DOCD: CPT | Mod: CPTII,S$GLB,, | Performed by: NURSE PRACTITIONER

## 2021-08-13 PROCEDURE — 1160F RVW MEDS BY RX/DR IN RCRD: CPT | Mod: CPTII,S$GLB,, | Performed by: NURSE PRACTITIONER

## 2021-08-13 PROCEDURE — 1159F PR MEDICATION LIST DOCUMENTED IN MEDICAL RECORD: ICD-10-PCS | Mod: CPTII,S$GLB,, | Performed by: NURSE PRACTITIONER

## 2021-08-13 PROCEDURE — G0439 PPPS, SUBSEQ VISIT: HCPCS | Mod: S$GLB,,, | Performed by: NURSE PRACTITIONER

## 2021-08-13 PROCEDURE — 99499 UNLISTED E&M SERVICE: CPT | Mod: HCNC,S$GLB,, | Performed by: NURSE PRACTITIONER

## 2021-08-13 PROCEDURE — 3074F PR MOST RECENT SYSTOLIC BLOOD PRESSURE < 130 MM HG: ICD-10-PCS | Mod: CPTII,S$GLB,, | Performed by: NURSE PRACTITIONER

## 2021-08-13 PROCEDURE — G0439 PR MEDICARE ANNUAL WELLNESS SUBSEQUENT VISIT: ICD-10-PCS | Mod: S$GLB,,, | Performed by: NURSE PRACTITIONER

## 2021-08-13 PROCEDURE — 1157F PR ADVANCE CARE PLAN OR EQUIV PRESENT IN MEDICAL RECORD: ICD-10-PCS | Mod: CPTII,S$GLB,, | Performed by: NURSE PRACTITIONER

## 2021-08-13 PROCEDURE — 1159F MED LIST DOCD IN RCRD: CPT | Mod: CPTII,S$GLB,, | Performed by: NURSE PRACTITIONER

## 2021-08-13 PROCEDURE — 1125F PR PAIN SEVERITY QUANTIFIED, PAIN PRESENT: ICD-10-PCS | Mod: CPTII,S$GLB,, | Performed by: NURSE PRACTITIONER

## 2021-08-13 PROCEDURE — 3078F PR MOST RECENT DIASTOLIC BLOOD PRESSURE < 80 MM HG: ICD-10-PCS | Mod: CPTII,S$GLB,, | Performed by: NURSE PRACTITIONER

## 2021-08-13 PROCEDURE — 3288F PR FALLS RISK ASSESSMENT DOCUMENTED: ICD-10-PCS | Mod: CPTII,S$GLB,, | Performed by: NURSE PRACTITIONER

## 2021-08-13 PROCEDURE — 99999 PR PBB SHADOW E&M-EST. PATIENT-LVL IV: ICD-10-PCS | Mod: PBBFAC,,, | Performed by: NURSE PRACTITIONER

## 2021-08-13 PROCEDURE — 1100F PR PT FALLS ASSESS DOC 2+ FALLS/FALL W/INJURY/YR: ICD-10-PCS | Mod: CPTII,S$GLB,, | Performed by: NURSE PRACTITIONER

## 2021-08-13 PROCEDURE — 99999 PR PBB SHADOW E&M-EST. PATIENT-LVL IV: CPT | Mod: PBBFAC,,, | Performed by: NURSE PRACTITIONER

## 2021-08-13 PROCEDURE — 99499 RISK ADDL DX/OHS AUDIT: ICD-10-PCS | Mod: HCNC,S$GLB,, | Performed by: NURSE PRACTITIONER

## 2021-08-13 RX ORDER — HYDROCODONE BITARTRATE AND ACETAMINOPHEN 10; 325 MG/1; MG/1
1 TABLET ORAL EVERY 6 HOURS PRN
Qty: 90 TABLET | Refills: 0 | Status: SHIPPED | OUTPATIENT
Start: 2021-08-13 | End: 2021-09-13 | Stop reason: SDUPTHER

## 2021-09-13 RX ORDER — HYDROCODONE BITARTRATE AND ACETAMINOPHEN 10; 325 MG/1; MG/1
1 TABLET ORAL EVERY 6 HOURS PRN
Qty: 90 TABLET | Refills: 0 | Status: SHIPPED | OUTPATIENT
Start: 2021-09-13 | End: 2021-09-30 | Stop reason: SDUPTHER

## 2021-09-30 ENCOUNTER — OFFICE VISIT (OUTPATIENT)
Dept: PRIMARY CARE CLINIC | Facility: CLINIC | Age: 86
End: 2021-09-30
Payer: MEDICARE

## 2021-09-30 ENCOUNTER — LAB VISIT (OUTPATIENT)
Dept: LAB | Facility: HOSPITAL | Age: 86
End: 2021-09-30
Attending: FAMILY MEDICINE
Payer: MEDICARE

## 2021-09-30 VITALS
BODY MASS INDEX: 30.61 KG/M2 | OXYGEN SATURATION: 96 % | HEIGHT: 60 IN | HEART RATE: 76 BPM | DIASTOLIC BLOOD PRESSURE: 66 MMHG | WEIGHT: 155.88 LBS | RESPIRATION RATE: 18 BRPM | SYSTOLIC BLOOD PRESSURE: 128 MMHG

## 2021-09-30 DIAGNOSIS — M79.10 MYALGIA: ICD-10-CM

## 2021-09-30 DIAGNOSIS — F11.20 CHRONIC NARCOTIC DEPENDENCE: Chronic | ICD-10-CM

## 2021-09-30 DIAGNOSIS — J44.9 CHRONIC OBSTRUCTIVE PULMONARY DISEASE, UNSPECIFIED COPD TYPE: Chronic | ICD-10-CM

## 2021-09-30 DIAGNOSIS — E78.5 HYPERLIPIDEMIA, UNSPECIFIED HYPERLIPIDEMIA TYPE: Chronic | ICD-10-CM

## 2021-09-30 DIAGNOSIS — M51.36 DDD (DEGENERATIVE DISC DISEASE), LUMBAR: Primary | Chronic | ICD-10-CM

## 2021-09-30 DIAGNOSIS — R52 CHRONIC PAIN OF MULTIPLE SITES: Chronic | ICD-10-CM

## 2021-09-30 DIAGNOSIS — G89.29 CHRONIC PAIN OF MULTIPLE SITES: Chronic | ICD-10-CM

## 2021-09-30 DIAGNOSIS — G40.909 SEIZURE DISORDER: Chronic | ICD-10-CM

## 2021-09-30 DIAGNOSIS — D64.9 ANEMIA, UNSPECIFIED TYPE: ICD-10-CM

## 2021-09-30 DIAGNOSIS — I10 ESSENTIAL HYPERTENSION: Chronic | ICD-10-CM

## 2021-09-30 DIAGNOSIS — M54.17 LUMBOSACRAL RADICULOPATHY: ICD-10-CM

## 2021-09-30 DIAGNOSIS — N18.32 STAGE 3B CHRONIC KIDNEY DISEASE: Chronic | ICD-10-CM

## 2021-09-30 DIAGNOSIS — R73.03 PREDIABETES: Chronic | ICD-10-CM

## 2021-09-30 LAB
CRP SERPL-MCNC: 44.8 MG/L (ref 0–8.2)
ERYTHROCYTE [SEDIMENTATION RATE] IN BLOOD BY WESTERGREN METHOD: 44 MM/HR (ref 0–36)

## 2021-09-30 PROCEDURE — 99999 PR PBB SHADOW E&M-EST. PATIENT-LVL IV: ICD-10-PCS | Mod: PBBFAC,HCNC,, | Performed by: FAMILY MEDICINE

## 2021-09-30 PROCEDURE — 3078F PR MOST RECENT DIASTOLIC BLOOD PRESSURE < 80 MM HG: ICD-10-PCS | Mod: HCNC,CPTII,S$GLB, | Performed by: FAMILY MEDICINE

## 2021-09-30 PROCEDURE — 3074F PR MOST RECENT SYSTOLIC BLOOD PRESSURE < 130 MM HG: ICD-10-PCS | Mod: HCNC,CPTII,S$GLB, | Performed by: FAMILY MEDICINE

## 2021-09-30 PROCEDURE — 1159F PR MEDICATION LIST DOCUMENTED IN MEDICAL RECORD: ICD-10-PCS | Mod: HCNC,CPTII,S$GLB, | Performed by: FAMILY MEDICINE

## 2021-09-30 PROCEDURE — 36415 COLL VENOUS BLD VENIPUNCTURE: CPT | Mod: HCNC,PN | Performed by: FAMILY MEDICINE

## 2021-09-30 PROCEDURE — 85652 RBC SED RATE AUTOMATED: CPT | Mod: HCNC | Performed by: FAMILY MEDICINE

## 2021-09-30 PROCEDURE — 99215 OFFICE O/P EST HI 40 MIN: CPT | Mod: HCNC,S$GLB,, | Performed by: FAMILY MEDICINE

## 2021-09-30 PROCEDURE — 1125F PR PAIN SEVERITY QUANTIFIED, PAIN PRESENT: ICD-10-PCS | Mod: HCNC,CPTII,S$GLB, | Performed by: FAMILY MEDICINE

## 2021-09-30 PROCEDURE — 3074F SYST BP LT 130 MM HG: CPT | Mod: HCNC,CPTII,S$GLB, | Performed by: FAMILY MEDICINE

## 2021-09-30 PROCEDURE — 1160F RVW MEDS BY RX/DR IN RCRD: CPT | Mod: HCNC,CPTII,S$GLB, | Performed by: FAMILY MEDICINE

## 2021-09-30 PROCEDURE — 1157F PR ADVANCE CARE PLAN OR EQUIV PRESENT IN MEDICAL RECORD: ICD-10-PCS | Mod: HCNC,CPTII,S$GLB, | Performed by: FAMILY MEDICINE

## 2021-09-30 PROCEDURE — 1101F PR PT FALLS ASSESS DOC 0-1 FALLS W/OUT INJ PAST YR: ICD-10-PCS | Mod: HCNC,CPTII,S$GLB, | Performed by: FAMILY MEDICINE

## 2021-09-30 PROCEDURE — 99999 PR PBB SHADOW E&M-EST. PATIENT-LVL IV: CPT | Mod: PBBFAC,HCNC,, | Performed by: FAMILY MEDICINE

## 2021-09-30 PROCEDURE — 1160F PR REVIEW ALL MEDS BY PRESCRIBER/CLIN PHARMACIST DOCUMENTED: ICD-10-PCS | Mod: HCNC,CPTII,S$GLB, | Performed by: FAMILY MEDICINE

## 2021-09-30 PROCEDURE — 1157F ADVNC CARE PLAN IN RCRD: CPT | Mod: HCNC,CPTII,S$GLB, | Performed by: FAMILY MEDICINE

## 2021-09-30 PROCEDURE — 1125F AMNT PAIN NOTED PAIN PRSNT: CPT | Mod: HCNC,CPTII,S$GLB, | Performed by: FAMILY MEDICINE

## 2021-09-30 PROCEDURE — 1101F PT FALLS ASSESS-DOCD LE1/YR: CPT | Mod: HCNC,CPTII,S$GLB, | Performed by: FAMILY MEDICINE

## 2021-09-30 PROCEDURE — 3078F DIAST BP <80 MM HG: CPT | Mod: HCNC,CPTII,S$GLB, | Performed by: FAMILY MEDICINE

## 2021-09-30 PROCEDURE — 1159F MED LIST DOCD IN RCRD: CPT | Mod: HCNC,CPTII,S$GLB, | Performed by: FAMILY MEDICINE

## 2021-09-30 PROCEDURE — 86140 C-REACTIVE PROTEIN: CPT | Mod: HCNC | Performed by: FAMILY MEDICINE

## 2021-09-30 PROCEDURE — 3288F FALL RISK ASSESSMENT DOCD: CPT | Mod: HCNC,CPTII,S$GLB, | Performed by: FAMILY MEDICINE

## 2021-09-30 PROCEDURE — 3288F PR FALLS RISK ASSESSMENT DOCUMENTED: ICD-10-PCS | Mod: HCNC,CPTII,S$GLB, | Performed by: FAMILY MEDICINE

## 2021-09-30 PROCEDURE — 99215 PR OFFICE/OUTPT VISIT, EST, LEVL V, 40-54 MIN: ICD-10-PCS | Mod: HCNC,S$GLB,, | Performed by: FAMILY MEDICINE

## 2021-09-30 RX ORDER — HYDROCODONE BITARTRATE AND ACETAMINOPHEN 10; 325 MG/1; MG/1
1 TABLET ORAL EVERY 6 HOURS PRN
Qty: 90 TABLET | Refills: 0 | Status: SHIPPED | OUTPATIENT
Start: 2021-10-14 | End: 2021-11-11 | Stop reason: SDUPTHER

## 2021-09-30 RX ORDER — GABAPENTIN 300 MG/1
300 CAPSULE ORAL 3 TIMES DAILY
Qty: 90 CAPSULE | Refills: 1 | Status: SHIPPED | OUTPATIENT
Start: 2021-09-30 | End: 2021-09-30 | Stop reason: SDUPTHER

## 2021-09-30 RX ORDER — POTASSIUM CHLORIDE 1500 MG/1
TABLET, EXTENDED RELEASE ORAL
COMMUNITY
Start: 2021-08-02 | End: 2021-12-03

## 2021-09-30 RX ORDER — GABAPENTIN 300 MG/1
300 CAPSULE ORAL 3 TIMES DAILY
Qty: 270 CAPSULE | Refills: 3 | Status: SHIPPED | OUTPATIENT
Start: 2021-09-30 | End: 2021-10-16

## 2021-10-01 ENCOUNTER — TELEPHONE (OUTPATIENT)
Dept: PRIMARY CARE CLINIC | Facility: CLINIC | Age: 86
End: 2021-10-01

## 2021-10-01 DIAGNOSIS — M79.10 MYALGIA: ICD-10-CM

## 2021-10-01 DIAGNOSIS — R70.0 ELEVATED SEDIMENTATION RATE: Primary | ICD-10-CM

## 2021-10-04 ENCOUNTER — TELEPHONE (OUTPATIENT)
Dept: PRIMARY CARE CLINIC | Facility: CLINIC | Age: 86
End: 2021-10-04

## 2021-10-08 ENCOUNTER — LAB VISIT (OUTPATIENT)
Dept: LAB | Facility: HOSPITAL | Age: 86
End: 2021-10-08
Attending: FAMILY MEDICINE
Payer: MEDICARE

## 2021-10-08 DIAGNOSIS — M79.10 MYALGIA: ICD-10-CM

## 2021-10-08 DIAGNOSIS — R70.0 ELEVATED SEDIMENTATION RATE: ICD-10-CM

## 2021-10-08 LAB
CRP SERPL-MCNC: 9.9 MG/L (ref 0–8.2)
ERYTHROCYTE [SEDIMENTATION RATE] IN BLOOD BY WESTERGREN METHOD: 17 MM/HR (ref 0–20)

## 2021-10-08 PROCEDURE — 86140 C-REACTIVE PROTEIN: CPT | Mod: HCNC | Performed by: FAMILY MEDICINE

## 2021-10-08 PROCEDURE — 36415 COLL VENOUS BLD VENIPUNCTURE: CPT | Mod: HCNC,PO | Performed by: FAMILY MEDICINE

## 2021-10-08 PROCEDURE — 85651 RBC SED RATE NONAUTOMATED: CPT | Mod: HCNC,PO | Performed by: FAMILY MEDICINE

## 2021-11-11 RX ORDER — HYDROCODONE BITARTRATE AND ACETAMINOPHEN 10; 325 MG/1; MG/1
1 TABLET ORAL EVERY 6 HOURS PRN
Qty: 90 TABLET | Refills: 0 | Status: SHIPPED | OUTPATIENT
Start: 2021-11-11 | End: 2021-12-13 | Stop reason: SDUPTHER

## 2021-12-13 RX ORDER — HYDROCODONE BITARTRATE AND ACETAMINOPHEN 10; 325 MG/1; MG/1
1 TABLET ORAL EVERY 6 HOURS PRN
Qty: 90 TABLET | Refills: 0 | Status: SHIPPED | OUTPATIENT
Start: 2021-12-13 | End: 2022-01-12 | Stop reason: SDUPTHER

## 2021-12-15 DIAGNOSIS — M51.36 DDD (DEGENERATIVE DISC DISEASE), LUMBAR: ICD-10-CM

## 2021-12-15 RX ORDER — ONDANSETRON 4 MG/1
TABLET, ORALLY DISINTEGRATING ORAL
Qty: 30 TABLET | Refills: 1 | Status: SHIPPED | OUTPATIENT
Start: 2021-12-15 | End: 2022-01-03

## 2021-12-15 RX ORDER — TIZANIDINE 2 MG/1
TABLET ORAL
Qty: 120 TABLET | Refills: 1 | Status: SHIPPED | OUTPATIENT
Start: 2021-12-15 | End: 2022-01-19

## 2021-12-21 ENCOUNTER — OFFICE VISIT (OUTPATIENT)
Dept: PRIMARY CARE CLINIC | Facility: CLINIC | Age: 86
End: 2021-12-21
Payer: MEDICARE

## 2021-12-21 VITALS
SYSTOLIC BLOOD PRESSURE: 118 MMHG | RESPIRATION RATE: 18 BRPM | WEIGHT: 158.5 LBS | HEIGHT: 60 IN | HEART RATE: 78 BPM | OXYGEN SATURATION: 96 % | DIASTOLIC BLOOD PRESSURE: 60 MMHG | BODY MASS INDEX: 31.12 KG/M2

## 2021-12-21 DIAGNOSIS — R52 CHRONIC PAIN OF MULTIPLE SITES: Chronic | ICD-10-CM

## 2021-12-21 DIAGNOSIS — I10 ESSENTIAL HYPERTENSION: Primary | Chronic | ICD-10-CM

## 2021-12-21 DIAGNOSIS — Z12.31 ENCOUNTER FOR SCREENING MAMMOGRAM FOR MALIGNANT NEOPLASM OF BREAST: ICD-10-CM

## 2021-12-21 DIAGNOSIS — G89.29 CHRONIC PAIN OF MULTIPLE SITES: Chronic | ICD-10-CM

## 2021-12-21 DIAGNOSIS — F11.20 CHRONIC NARCOTIC DEPENDENCE: Chronic | ICD-10-CM

## 2021-12-21 DIAGNOSIS — M54.17 LUMBOSACRAL RADICULOPATHY: Chronic | ICD-10-CM

## 2021-12-21 DIAGNOSIS — J44.9 CHRONIC OBSTRUCTIVE PULMONARY DISEASE, UNSPECIFIED COPD TYPE: Chronic | ICD-10-CM

## 2021-12-21 DIAGNOSIS — M51.36 DDD (DEGENERATIVE DISC DISEASE), LUMBAR: Chronic | ICD-10-CM

## 2021-12-21 DIAGNOSIS — I25.10 CORONARY ARTERY DISEASE INVOLVING NATIVE CORONARY ARTERY OF NATIVE HEART WITHOUT ANGINA PECTORIS: Chronic | ICD-10-CM

## 2021-12-21 PROCEDURE — 1125F PR PAIN SEVERITY QUANTIFIED, PAIN PRESENT: ICD-10-PCS | Mod: HCNC,CPTII,S$GLB, | Performed by: FAMILY MEDICINE

## 2021-12-21 PROCEDURE — 1160F RVW MEDS BY RX/DR IN RCRD: CPT | Mod: HCNC,CPTII,S$GLB, | Performed by: FAMILY MEDICINE

## 2021-12-21 PROCEDURE — 1101F PR PT FALLS ASSESS DOC 0-1 FALLS W/OUT INJ PAST YR: ICD-10-PCS | Mod: HCNC,CPTII,S$GLB, | Performed by: FAMILY MEDICINE

## 2021-12-21 PROCEDURE — 1157F PR ADVANCE CARE PLAN OR EQUIV PRESENT IN MEDICAL RECORD: ICD-10-PCS | Mod: HCNC,CPTII,S$GLB, | Performed by: FAMILY MEDICINE

## 2021-12-21 PROCEDURE — 3078F DIAST BP <80 MM HG: CPT | Mod: HCNC,CPTII,S$GLB, | Performed by: FAMILY MEDICINE

## 2021-12-21 PROCEDURE — 99499 RISK ADDL DX/OHS AUDIT: ICD-10-PCS | Mod: HCNC,S$GLB,, | Performed by: FAMILY MEDICINE

## 2021-12-21 PROCEDURE — 1125F AMNT PAIN NOTED PAIN PRSNT: CPT | Mod: HCNC,CPTII,S$GLB, | Performed by: FAMILY MEDICINE

## 2021-12-21 PROCEDURE — 1101F PT FALLS ASSESS-DOCD LE1/YR: CPT | Mod: HCNC,CPTII,S$GLB, | Performed by: FAMILY MEDICINE

## 2021-12-21 PROCEDURE — 99999 PR PBB SHADOW E&M-EST. PATIENT-LVL V: CPT | Mod: PBBFAC,HCNC,, | Performed by: FAMILY MEDICINE

## 2021-12-21 PROCEDURE — 3074F SYST BP LT 130 MM HG: CPT | Mod: HCNC,CPTII,S$GLB, | Performed by: FAMILY MEDICINE

## 2021-12-21 PROCEDURE — 99214 OFFICE O/P EST MOD 30 MIN: CPT | Mod: HCNC,S$GLB,, | Performed by: FAMILY MEDICINE

## 2021-12-21 PROCEDURE — 3074F PR MOST RECENT SYSTOLIC BLOOD PRESSURE < 130 MM HG: ICD-10-PCS | Mod: HCNC,CPTII,S$GLB, | Performed by: FAMILY MEDICINE

## 2021-12-21 PROCEDURE — 1157F ADVNC CARE PLAN IN RCRD: CPT | Mod: HCNC,CPTII,S$GLB, | Performed by: FAMILY MEDICINE

## 2021-12-21 PROCEDURE — 1160F PR REVIEW ALL MEDS BY PRESCRIBER/CLIN PHARMACIST DOCUMENTED: ICD-10-PCS | Mod: HCNC,CPTII,S$GLB, | Performed by: FAMILY MEDICINE

## 2021-12-21 PROCEDURE — 1159F MED LIST DOCD IN RCRD: CPT | Mod: HCNC,CPTII,S$GLB, | Performed by: FAMILY MEDICINE

## 2021-12-21 PROCEDURE — 99999 PR PBB SHADOW E&M-EST. PATIENT-LVL V: ICD-10-PCS | Mod: PBBFAC,HCNC,, | Performed by: FAMILY MEDICINE

## 2021-12-21 PROCEDURE — 3288F PR FALLS RISK ASSESSMENT DOCUMENTED: ICD-10-PCS | Mod: HCNC,CPTII,S$GLB, | Performed by: FAMILY MEDICINE

## 2021-12-21 PROCEDURE — 3288F FALL RISK ASSESSMENT DOCD: CPT | Mod: HCNC,CPTII,S$GLB, | Performed by: FAMILY MEDICINE

## 2021-12-21 PROCEDURE — 99499 UNLISTED E&M SERVICE: CPT | Mod: HCNC,S$GLB,, | Performed by: FAMILY MEDICINE

## 2021-12-21 PROCEDURE — 3078F PR MOST RECENT DIASTOLIC BLOOD PRESSURE < 80 MM HG: ICD-10-PCS | Mod: HCNC,CPTII,S$GLB, | Performed by: FAMILY MEDICINE

## 2021-12-21 PROCEDURE — 99214 PR OFFICE/OUTPT VISIT, EST, LEVL IV, 30-39 MIN: ICD-10-PCS | Mod: HCNC,S$GLB,, | Performed by: FAMILY MEDICINE

## 2021-12-21 PROCEDURE — 1159F PR MEDICATION LIST DOCUMENTED IN MEDICAL RECORD: ICD-10-PCS | Mod: HCNC,CPTII,S$GLB, | Performed by: FAMILY MEDICINE

## 2021-12-21 RX ORDER — MEMANTINE HYDROCHLORIDE 5 MG/1
5 TABLET ORAL 2 TIMES DAILY
Qty: 180 TABLET | Refills: 3 | Status: SHIPPED | OUTPATIENT
Start: 2021-12-21 | End: 2022-11-24

## 2021-12-21 RX ORDER — GABAPENTIN 300 MG/1
CAPSULE ORAL
Qty: 360 CAPSULE | Refills: 3 | Status: SHIPPED | OUTPATIENT
Start: 2021-12-21 | End: 2022-11-24

## 2021-12-21 RX ORDER — FLUTICASONE PROPIONATE AND SALMETEROL 250; 50 UG/1; UG/1
1 POWDER RESPIRATORY (INHALATION) 2 TIMES DAILY
Qty: 180 EACH | Refills: 3 | Status: SHIPPED | OUTPATIENT
Start: 2021-12-21 | End: 2024-03-22

## 2021-12-30 ENCOUNTER — TELEPHONE (OUTPATIENT)
Dept: PRIMARY CARE CLINIC | Facility: CLINIC | Age: 86
End: 2021-12-30
Payer: MEDICARE

## 2022-01-03 DIAGNOSIS — K21.9 GASTROESOPHAGEAL REFLUX DISEASE WITHOUT ESOPHAGITIS: ICD-10-CM

## 2022-01-03 DIAGNOSIS — Z87.19 HISTORY OF GASTRITIS: ICD-10-CM

## 2022-01-03 DIAGNOSIS — K44.9 HIATAL HERNIA: ICD-10-CM

## 2022-01-03 DIAGNOSIS — R10.13 EPIGASTRIC PAIN: ICD-10-CM

## 2022-01-03 RX ORDER — OMEPRAZOLE 40 MG/1
CAPSULE, DELAYED RELEASE ORAL
Qty: 90 CAPSULE | Refills: 1 | Status: SHIPPED | OUTPATIENT
Start: 2022-01-03 | End: 2022-05-30

## 2022-01-03 RX ORDER — ONDANSETRON 4 MG/1
TABLET, ORALLY DISINTEGRATING ORAL
Qty: 30 TABLET | Refills: 1 | Status: SHIPPED | OUTPATIENT
Start: 2022-01-03 | End: 2022-02-16

## 2022-01-07 ENCOUNTER — TELEPHONE (OUTPATIENT)
Dept: NEUROLOGY | Facility: CLINIC | Age: 87
End: 2022-01-07
Payer: MEDICARE

## 2022-01-07 NOTE — TELEPHONE ENCOUNTER
----- Message from Arsh Rock sent at 1/7/2022  2:14 PM CST -----  Regarding: memory f/u  Type:  Sooner Apoointment Request    Caller is requesting a sooner appointment.  Caller declined first available appointment listed below.  Caller will not accept being placed on the waitlist and is requesting a message be sent to doctor.    Name of Caller:  daughter // nely    When is the first available appointment?  Feb 2022    Symptoms:  memory    Best Call Back Number:  [phone]    Additional Information:  pt was seen by dr dooley in 2018 and JAMAL Kruger in 2021. Is hoping to se Dr Dooley again. Please call to discuss.

## 2022-01-12 RX ORDER — HYDROCODONE BITARTRATE AND ACETAMINOPHEN 10; 325 MG/1; MG/1
1 TABLET ORAL EVERY 6 HOURS PRN
Qty: 90 TABLET | Refills: 0 | Status: SHIPPED | OUTPATIENT
Start: 2022-01-12 | End: 2022-02-15 | Stop reason: SDUPTHER

## 2022-01-12 NOTE — TELEPHONE ENCOUNTER
----- Message from Dana Sullivan sent at 1/12/2022  4:28 PM CST -----  Contact: stefani  Type:  RX Refill Request    Who Called:  Stefani, daughter  Refill or New Rx:  refill  RX Name and Strength:  HYDROcodone-acetaminophen (NORCO)  mg per tablet  How is the patient currently taking it? (ex. 1XDay):  as needed  Is this a 30 day or 90 day RX:  30  Preferred Pharmacy with phone number:    [facility] - [place] - [address] [address]  [address] [address]  [place] [address]  Phone: [phone] Fax: [phone]  Local or Mail Order:  local  Ordering Provider:  Steve  Best Call Back Number:  [phone]  Additional Information:

## 2022-01-18 DIAGNOSIS — M51.36 DDD (DEGENERATIVE DISC DISEASE), LUMBAR: ICD-10-CM

## 2022-01-19 RX ORDER — TIZANIDINE 2 MG/1
TABLET ORAL
Qty: 120 TABLET | Refills: 1 | Status: SHIPPED | OUTPATIENT
Start: 2022-01-19 | End: 2022-02-23

## 2022-01-27 ENCOUNTER — HOSPITAL ENCOUNTER (OUTPATIENT)
Dept: RADIOLOGY | Facility: HOSPITAL | Age: 87
Discharge: HOME | End: 2022-01-27
Attending: FAMILY MEDICINE
Payer: MEDICARE

## 2022-01-27 DIAGNOSIS — Z12.31 ENCOUNTER FOR SCREENING MAMMOGRAM FOR MALIGNANT NEOPLASM OF BREAST: ICD-10-CM

## 2022-01-27 PROCEDURE — 77063 MAMMO DIGITAL SCREENING BILAT WITH TOMO: ICD-10-PCS | Mod: 26,HCNC,, | Performed by: RADIOLOGY

## 2022-01-27 PROCEDURE — 77067 MAMMO DIGITAL SCREENING BILAT WITH TOMO: ICD-10-PCS | Mod: 26,HCNC,, | Performed by: RADIOLOGY

## 2022-01-27 PROCEDURE — 77067 SCR MAMMO BI INCL CAD: CPT | Mod: TC,HCNC,PO

## 2022-01-27 PROCEDURE — 77067 SCR MAMMO BI INCL CAD: CPT | Mod: 26,HCNC,, | Performed by: RADIOLOGY

## 2022-01-27 PROCEDURE — 77063 BREAST TOMOSYNTHESIS BI: CPT | Mod: 26,HCNC,, | Performed by: RADIOLOGY

## 2022-02-15 RX ORDER — HYDROCODONE BITARTRATE AND ACETAMINOPHEN 10; 325 MG/1; MG/1
1 TABLET ORAL EVERY 6 HOURS PRN
Qty: 90 TABLET | Refills: 0 | Status: SHIPPED | OUTPATIENT
Start: 2022-02-15 | End: 2022-03-15 | Stop reason: SDUPTHER

## 2022-02-15 NOTE — TELEPHONE ENCOUNTER
----- Message from Hank Clarke sent at 2/15/2022  1:15 PM CST -----  Regarding: rx  Contact: daughter  Type:  RX Refill Request    Who Called:  daughter -Stefnai  Refill or New Rx:  refill  RX Name and Strength:  norco  How is the patient currently taking it? (ex. 1XDay):  3 x day  Is this a 30 day or 90 day RX:  30 day supply  Preferred Pharmacy with phone number:  [facility] in [place]  Local or Mail Order:  local  Ordering Provider:  Dr Wilson  Best Call Back Number:  [phone]  Additional Information:

## 2022-02-16 RX ORDER — ONDANSETRON 4 MG/1
TABLET, ORALLY DISINTEGRATING ORAL
Qty: 30 TABLET | Refills: 1 | Status: SHIPPED | OUTPATIENT
Start: 2022-02-16 | End: 2022-03-30

## 2022-02-23 DIAGNOSIS — M51.36 DDD (DEGENERATIVE DISC DISEASE), LUMBAR: ICD-10-CM

## 2022-02-23 RX ORDER — TIZANIDINE 2 MG/1
TABLET ORAL
Qty: 120 TABLET | Refills: 1 | Status: SHIPPED | OUTPATIENT
Start: 2022-02-23 | End: 2022-03-30

## 2022-03-15 RX ORDER — HYDROCODONE BITARTRATE AND ACETAMINOPHEN 10; 325 MG/1; MG/1
1 TABLET ORAL EVERY 6 HOURS PRN
Qty: 90 TABLET | Refills: 0 | Status: SHIPPED | OUTPATIENT
Start: 2022-03-15 | End: 2022-04-14 | Stop reason: SDUPTHER

## 2022-03-15 NOTE — TELEPHONE ENCOUNTER
----- Message from Hank Clarke sent at 3/15/2022  4:25 PM CDT -----  Regarding: rx  Contact: daughter  Type:  RX Refill Request    Who Called:  daughter -Stefani  Refill or New Rx:  refill  RX Name and Strength:  hydrocodone  How is the patient currently taking it? (ex. 1XDay):  3 x day  Is this a 30 day or 90 day RX:  30 day supply  Preferred Pharmacy with phone number:  [facility]  Local or Mail Order:  local  Ordering Provider:  Dr Wilson  Best Call Back Number:  [phone]  Additional Information:

## 2022-03-22 ENCOUNTER — PATIENT OUTREACH (OUTPATIENT)
Dept: ADMINISTRATIVE | Facility: OTHER | Age: 87
End: 2022-03-22
Payer: MEDICARE

## 2022-03-22 NOTE — PROGRESS NOTES
Health Maintenance Due   Topic Date Due    Sign Pain Contract  Never done    Aspirin/Antiplatelet Therapy  Never done    Shingles Vaccine (1 of 2) Never done    DEXA Scan  06/08/2020     Updates were requested from care everywhere.  Chart was reviewed for overdue Proactive [facility] Encounters (ROBERTO CARLOS) topics (CRS, Breast Cancer Screening, Eye exam)  Health Maintenance has been updated.  LINKS immunization registry triggered.  Immunizations were reconciled.

## 2022-03-23 ENCOUNTER — OFFICE VISIT (OUTPATIENT)
Dept: NEUROLOGY | Facility: CLINIC | Age: 87
End: 2022-03-23
Payer: MEDICARE

## 2022-03-23 VITALS
DIASTOLIC BLOOD PRESSURE: 77 MMHG | HEART RATE: 82 BPM | WEIGHT: 158.06 LBS | RESPIRATION RATE: 18 BRPM | HEIGHT: 60 IN | BODY MASS INDEX: 31.03 KG/M2 | SYSTOLIC BLOOD PRESSURE: 178 MMHG

## 2022-03-23 DIAGNOSIS — R51.9 NONINTRACTABLE HEADACHE, UNSPECIFIED CHRONICITY PATTERN, UNSPECIFIED HEADACHE TYPE: ICD-10-CM

## 2022-03-23 DIAGNOSIS — R56.9 SEIZURES: Primary | ICD-10-CM

## 2022-03-23 DIAGNOSIS — R41.3 MEMORY LOSS: ICD-10-CM

## 2022-03-23 PROCEDURE — 99214 PR OFFICE/OUTPT VISIT, EST, LEVL IV, 30-39 MIN: ICD-10-PCS | Mod: S$GLB,,, | Performed by: PSYCHIATRY & NEUROLOGY

## 2022-03-23 PROCEDURE — 1157F ADVNC CARE PLAN IN RCRD: CPT | Mod: CPTII,S$GLB,, | Performed by: PSYCHIATRY & NEUROLOGY

## 2022-03-23 PROCEDURE — 1101F PR PT FALLS ASSESS DOC 0-1 FALLS W/OUT INJ PAST YR: ICD-10-PCS | Mod: CPTII,S$GLB,, | Performed by: PSYCHIATRY & NEUROLOGY

## 2022-03-23 PROCEDURE — 3077F SYST BP >= 140 MM HG: CPT | Mod: CPTII,S$GLB,, | Performed by: PSYCHIATRY & NEUROLOGY

## 2022-03-23 PROCEDURE — 1159F PR MEDICATION LIST DOCUMENTED IN MEDICAL RECORD: ICD-10-PCS | Mod: CPTII,S$GLB,, | Performed by: PSYCHIATRY & NEUROLOGY

## 2022-03-23 PROCEDURE — 1126F PR PAIN SEVERITY QUANTIFIED, NO PAIN PRESENT: ICD-10-PCS | Mod: CPTII,S$GLB,, | Performed by: PSYCHIATRY & NEUROLOGY

## 2022-03-23 PROCEDURE — 99214 OFFICE O/P EST MOD 30 MIN: CPT | Mod: S$GLB,,, | Performed by: PSYCHIATRY & NEUROLOGY

## 2022-03-23 PROCEDURE — 1160F RVW MEDS BY RX/DR IN RCRD: CPT | Mod: CPTII,S$GLB,, | Performed by: PSYCHIATRY & NEUROLOGY

## 2022-03-23 PROCEDURE — 1159F MED LIST DOCD IN RCRD: CPT | Mod: CPTII,S$GLB,, | Performed by: PSYCHIATRY & NEUROLOGY

## 2022-03-23 PROCEDURE — 3077F PR MOST RECENT SYSTOLIC BLOOD PRESSURE >= 140 MM HG: ICD-10-PCS | Mod: CPTII,S$GLB,, | Performed by: PSYCHIATRY & NEUROLOGY

## 2022-03-23 PROCEDURE — 99999 PR PBB SHADOW E&M-EST. PATIENT-LVL IV: CPT | Mod: PBBFAC,,, | Performed by: PSYCHIATRY & NEUROLOGY

## 2022-03-23 PROCEDURE — 3078F PR MOST RECENT DIASTOLIC BLOOD PRESSURE < 80 MM HG: ICD-10-PCS | Mod: CPTII,S$GLB,, | Performed by: PSYCHIATRY & NEUROLOGY

## 2022-03-23 PROCEDURE — 99999 PR PBB SHADOW E&M-EST. PATIENT-LVL IV: ICD-10-PCS | Mod: PBBFAC,,, | Performed by: PSYCHIATRY & NEUROLOGY

## 2022-03-23 PROCEDURE — 1126F AMNT PAIN NOTED NONE PRSNT: CPT | Mod: CPTII,S$GLB,, | Performed by: PSYCHIATRY & NEUROLOGY

## 2022-03-23 PROCEDURE — 1160F PR REVIEW ALL MEDS BY PRESCRIBER/CLIN PHARMACIST DOCUMENTED: ICD-10-PCS | Mod: CPTII,S$GLB,, | Performed by: PSYCHIATRY & NEUROLOGY

## 2022-03-23 PROCEDURE — 3078F DIAST BP <80 MM HG: CPT | Mod: CPTII,S$GLB,, | Performed by: PSYCHIATRY & NEUROLOGY

## 2022-03-23 PROCEDURE — 1157F PR ADVANCE CARE PLAN OR EQUIV PRESENT IN MEDICAL RECORD: ICD-10-PCS | Mod: CPTII,S$GLB,, | Performed by: PSYCHIATRY & NEUROLOGY

## 2022-03-23 PROCEDURE — 3288F PR FALLS RISK ASSESSMENT DOCUMENTED: ICD-10-PCS | Mod: CPTII,S$GLB,, | Performed by: PSYCHIATRY & NEUROLOGY

## 2022-03-23 PROCEDURE — 1101F PT FALLS ASSESS-DOCD LE1/YR: CPT | Mod: CPTII,S$GLB,, | Performed by: PSYCHIATRY & NEUROLOGY

## 2022-03-23 PROCEDURE — 3288F FALL RISK ASSESSMENT DOCD: CPT | Mod: CPTII,S$GLB,, | Performed by: PSYCHIATRY & NEUROLOGY

## 2022-03-23 NOTE — PROGRESS NOTES
"    Date: 3/23/2022    Patient ID: Summer Inman is a 86 y.o. female.    Chief Complaint: Memory Loss and Seizures      History of Present Illness:  Ms. Inman is a 86 y.o. female who presents for evaluation of memory and seizures. She was last seen by me in 2018 but seen by Marcie Kruger NP in 2021. The patient was accompanied by her daughter who also contributed to the following history.     Interval history: She is on aricept 10 mg daily for memory started by Dr. Souza. She has forgetfulness but felt to be stable. MMSE is Jan 2021 was 26/30. She is also on namenda 5 mg BID started by Dr. Wilson.     She continues on lamictal 200mg in the morning and 300 mg at night for seizures. No seizure activity noted.     She used to have bad migraines in the past. She has ice pick stabbing headaches lately. She will get it on either side.     My prior HPI from 2018:  Since our last visit, we obtained MRI brain that did not show a definite cause for the seizures. 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 I recommended aspirin 81 mg daily be started and she has been taking that daily. The lamotrigine level was normal.      About 3 weeks ago, she had an unusual episode. She had a 9 hour long episode where she was very confused/delirious. She was yelling at people. She recalls that the TV was "rolling". No dizziness.  Her son says that she was confused and thought she was at [employer]. She was unable to stand as well. She kept sliding out of the bed and her grandson kept trying to pick her up. She is unclear if she had any seizure like activity. When she came out of it, she was totally fine. She denies that she took medication incorrectly.      No other episodes since that time. She has passed out in the past before but this was not a passing out episode. No seizures that her and her son.      She states she is taking lamictal 200 mg in the morning and 300 mg in the evening (previously " reported that she was on 200 mg once daily). It is unclear what she is actually taking. Per my review of Dr. Souza's records he prescribed 200mg/300mg. She has had a normal EEG with him.     Allergies:  Review of patient's allergies indicates:   Allergen Reactions    Bleach (sodium hypochlorite) Nausea And Vomiting, Palpitations and Shortness Of Breath    Codeine Shortness Of Breath     Tongue swelled    Nifedipine Anaphylaxis    Pravastatin      Muscle aches    Ace inhibitors      Other reaction(s): Angioedema    Ketorolac      Other reaction(s): tongue swell    Milk      Other reaction(s): Stomach upset    Quinolones Other (See Comments)     Lowers seizure threshold.    Tramadol      Other reaction(s): seizure       Current Medications:  Current Outpatient Medications   Medication Sig Dispense Refill    benzonatate (TESSALON) 100 MG capsule       diclofenac sodium (VOLTAREN) 1 % Gel Apply 2 g topically once daily. 300 g 1    donepeziL (ARICEPT) 10 MG tablet TAKE 1 TABLET (10 MG TOTAL) BY MOUTH EVERY MORNING. 90 tablet 3    fluticasone propionate (FLONASE) 50 mcg/actuation nasal spray 2 sprays (100 mcg total) by Each Nostril route once daily. 16 g 3    fluticasone-salmeterol diskus inhaler 250-50 mcg Inhale 1 puff into the lungs 2 (two) times daily. Controller 180 each 3    gabapentin (NEURONTIN) 300 MG capsule Take one in am, one with lunch, and 1-2 in evening 360 capsule 3    HYDROcodone-acetaminophen (NORCO)  mg per tablet Take 1 tablet by mouth every 6 (six) hours as needed (pain). 90 tablet 0    lamoTRIgine (LAMICTAL) 100 MG tablet TAKE 2 TABLETS IN THE MORNING  AND TAKE 3 TABLETS AT NIGHT 450 tablet 3    memantine (NAMENDA) 5 MG Tab Take 1 tablet (5 mg total) by mouth 2 (two) times daily. 180 tablet 3    nitroGLYCERIN (NITROSTAT) 0.4 MG SL tablet Place 1 tablet (0.4 mg total) under the tongue every 5 (five) minutes as needed for Chest pain. For chest pain. Repeat in five minutes if  pain persists. May repeat again, for total of 3 tablets.  Call 911 at the third tablet. 20 tablet 3    omeprazole (PRILOSEC) 40 MG capsule TAKE 1 CAPSULE EVERY DAY 90 capsule 1    ondansetron (ZOFRAN-ODT) 4 MG TbDL DISSOLVE 1 TABLET ON TONGUE EVERY 8 HOURS AS NEEDED 30 tablet 1    polyethylene glycol (GLYCOLAX) 17 gram/dose powder Take 17 g by mouth daily as needed.      potassium chloride (K-TAB) 20 mEq TAKE 1 TABLET (20 MEQ TOTAL) BY MOUTH  DAILY. 90 tablet 4    sertraline (ZOLOFT) 100 MG tablet TAKE 1 AND 1/2 TABLETS (150 MG TOTAL) BY MOUTH ONCE DAILY. 135 tablet 3    tiZANidine (ZANAFLEX) 2 MG tablet TAKE 1 TO 2 TABLETS THREE TIMES DAILY AS NEEDED FOR MUSCLE SPASM(S) 120 tablet 1    TRUE METRIX AIR GLUCOSE METER kit USE AS DIRECTED (Patient taking differently: 1 each by Other route 2 (two) times a day.) 1 each 0    TRUE METRIX GLUCOSE TEST STRIP Strp TEST TWO TIMES DAILY (Patient taking differently: 1 strip by In Vitro route 2 (two) times a day.) 200 strip 3    TRUEPLUS LANCETS 33 gauge Misc TEST TWO TIMES DAILY (Patient taking differently: 1 lancet by Subdermal route 2 (two) times a day.) 200 each 3     No current facility-administered medications for this visit.       Past Medical History:  Past Medical History:   Diagnosis Date    Anticoagulant long-term use     ASA 81mg    Arthritis     Asthma     Atrial premature beats     Benign neoplasm of adrenal gland     Cholelithiasis     CKD (chronic kidney disease)     Colon polyp     COPD (chronic obstructive pulmonary disease)     Coronary artery disease     nonocclusive disease, no prior intervention    Depression     Dizziness     and balance issues    First degree AV block     Gastroesophageal reflux disease     General anesthetics causing adverse effect in therapeutic use     hard to wake up    Hepatomegaly     Hyperlipidemia     Hypertension     Impaired mobility     uses walker or cane    Neck pain     into chest and arms with  numbness in arms    Schatzki's ring     Seasonal allergies     Seizures     Last one recently    Stroke     tia    Trouble in sleeping     Wears dentures     upper    Wears glasses        Past Surgical History:  Past Surgical History:   Procedure Laterality Date    APPENDECTOMY      CERVICAL FUSION  3/2016 Dr. Cuevas    COLONOSCOPY  2008 Dr. Nguyen    internal hemorrhoids otherwise normal findings; repeat in 4 years    COLONOSCOPY N/A 10/29/2020    Dr. Nguyen;  Location: [facility]; Multiple 5 to 10 mm polyps in the descending colon, in the transverse colon & in the ascending colon removed, diverticulosis, hemorrhoids; Repeat colonoscopy is not recommended for surveillance. biopsy: tubular adenomas & TUBULOVILLOUS ADENOMA    ESOPHAGEAL DILATION N/A 10/29/2020    Procedure: DILATION, ESOPHAGUS;  Surgeon: Lucas Nguyen MD;  Location: [facility];  Service: Endoscopy;  Laterality: N/A;    ESOPHAGOGASTRODUODENOSCOPY      ESOPHAGOGASTRODUODENOSCOPY N/A 10/29/2020    Procedure: EGD (ESOPHAGOGASTRODUODENOSCOPY);  Surgeon: Lucas Nguyen MD;  Location: [facility];  Service: Endoscopy;  Laterality: N/A; Normal esophagus. Dilated. gastritis; biopsy: stomach- reactive gastropathy, h pylori negative    EYE SURGERY      phaco bilateral    HYSTERECTOMY      partial hysterectomy    LAPAROSCOPIC CHOLECYSTECTOMY N/A 9/18/2019    Procedure: CHOLECYSTECTOMY, LAPAROSCOPIC;  Surgeon: Eric Mathias MD;  Location[facility];  Service: General;  Laterality: N/A;    mesh implant      TONSILLECTOMY      UPPER GASTROINTESTINAL ENDOSCOPY  2014 Dr. Nguyen    UPPER GASTROINTESTINAL ENDOSCOPY  02/01/2016    Dr. Nguyen    UPPER GASTROINTESTINAL ENDOSCOPY  03/08/2018    Dr. Nguyen: small hiatal hernia, gastritis, esophageal dilation performed; biopsy: stomach and esophagus- unremarkable, negative for nicholson's, h pylori negative       Family History:  family history includes Arthritis in her son; Cancer in  "her daughter; Colon cancer (age of onset: 74) in her mother; Diabetes in her son; Heart disease in her father and mother; Hypertension in her daughter and son; Rheum arthritis in her daughter.    Social History:   reports that she quit smoking about 8 years ago. Her smoking use included cigarettes. She started smoking about 38 years ago. She has a 9.50 pack-year smoking history. She has never used smokeless tobacco. She reports current drug use. Drug: Hydrocodone. She reports that she does not drink alcohol.    Physical Exam:  Vitals:    03/23/22 1426   BP: (!) 178/77   Pulse: 82   Resp: 18   Weight: 71.7 kg (158 lb 1.1 oz)   Height: 5' (1.524 m)   PainSc: 0-No pain     Body mass index is 30.87 kg/m².    Neurological Exam:  General: well-developed, well-nourished, no distress  Mental status: Awake and alert. MMSE 28/30  Speech language: voice tremor  Cranial nerves: Face symmetric  Motor: Moves all extremities well    MMSE 3/23/2022   What is the (year), (season), (date), (day), (month)? 5   Where are we (state), (country), (town or city), (hospital), (floor)? 5   Name 3 common objects (eg. "apple", "table", "otis"). Take 1 second to say each. Then ask the patient to repeat all 3. Give 1 point for each correct answer. Then repeat them until he/she learns all 3. Count trials and record. 3   Serial 7's backwards. Stop after 5 answers. (100,93,86,79,72) or alternatively  spell "WORLD" backwards. (D..L..R..O..W). The score is the number of letters in correct order. 4   Ask for the 3 common objects named earlier in the exam. Give 1 point for each correct answer. 3   Name a "pencil" and "watch." 2   Repeat the following: "No ifs, ands, or buts." 1   Follow a 3-stage command: "Take a paper in your right hand, fold it in half, & put it on the floor." 3   Read and obey the following: (see paper exam) 1   Write a sentence. 1   Copy the following design: (see paper exam) 0   Total MMSE Score 28   Some recent data might be " hidden       Data:  I have personally reviewed the referring provider's notes, labs, & imaging made available to me today.     Labs:  CBC:   Lab Results   Component Value Date    WBC 6.97 07/16/2021    HGB 11.7 (L) 07/16/2021    HCT 37.5 07/16/2021     07/16/2021    MCV 87 07/16/2021    RDW 14.7 (H) 07/16/2021     BMP:   Lab Results   Component Value Date     07/16/2021    K 4.4 07/16/2021     07/16/2021    CO2 21 (L) 07/16/2021    BUN 17 07/16/2021    CREATININE 0.9 07/16/2021     07/16/2021    CALCIUM 9.2 07/16/2021    MG 2.2 10/16/2020    PHOS 4.1 10/16/2020     LFTS;   Lab Results   Component Value Date    PROT 6.7 07/16/2021    ALBUMIN 3.5 07/16/2021    BILITOT 0.2 07/16/2021    AST 15 07/16/2021    ALKPHOS 114 07/16/2021    ALT 10 07/16/2021     COAGS: No results found for: INR, PROTIME, PTT  FLP:   Lab Results   Component Value Date    CHOL 227 (H) 07/16/2021    HDL 59 07/16/2021    LDLCALC 147.0 07/16/2021    TRIG 105 07/16/2021    CHOLHDL 26.0 07/16/2021         Assessment and Plan:  Ms. Inman is a 86 y.o. female here for f/u of memory and seizures.     No seizures in years. Will continue on lamictal 200mg/300mg. Could check level at next bloodwork.     Her MMSE is stable. She can continue on aricept and namenda.     For the headaches, I advised magnesium. If that is not helpful, could try to increase namenda and/or gabapentin.     Seizures  -     Lamotrigine level; Future; Expected date: 03/23/2022    Memory loss    Nonintractable headache, unspecified chronicity pattern, unspecified headache type      I spent a total of 33 minutes on the day of the visit.This includes face to face time and non-face to face time preparing to see the patient (eg, review of tests), Obtaining and/or reviewing separately obtained history, Documenting clinical information in the electronic or other health record, Independently interpreting results and communicating results to the  patient/family/caregiver, or Care coordination.

## 2022-03-23 NOTE — PATIENT INSTRUCTIONS
Try magensium oxide 400 mg daily for headaches. If it doesn't help, let me know and we can try something else.

## 2022-03-25 ENCOUNTER — OFFICE VISIT (OUTPATIENT)
Dept: PRIMARY CARE CLINIC | Facility: CLINIC | Age: 87
End: 2022-03-25
Payer: MEDICARE

## 2022-03-25 ENCOUNTER — HOSPITAL ENCOUNTER (OUTPATIENT)
Dept: RADIOLOGY | Facility: HOSPITAL | Age: 87
Discharge: HOME | End: 2022-03-25
Attending: FAMILY MEDICINE
Payer: MEDICARE

## 2022-03-25 ENCOUNTER — OFFICE VISIT (OUTPATIENT)
Dept: CARDIOLOGY | Facility: CLINIC | Age: 87
End: 2022-03-25
Payer: MEDICARE

## 2022-03-25 VITALS
RESPIRATION RATE: 18 BRPM | HEART RATE: 98 BPM | SYSTOLIC BLOOD PRESSURE: 138 MMHG | WEIGHT: 156.5 LBS | BODY MASS INDEX: 30.73 KG/M2 | HEIGHT: 60 IN | DIASTOLIC BLOOD PRESSURE: 76 MMHG | OXYGEN SATURATION: 97 %

## 2022-03-25 VITALS
HEART RATE: 85 BPM | BODY MASS INDEX: 30.91 KG/M2 | SYSTOLIC BLOOD PRESSURE: 143 MMHG | DIASTOLIC BLOOD PRESSURE: 72 MMHG | WEIGHT: 157.44 LBS | HEIGHT: 60 IN

## 2022-03-25 DIAGNOSIS — R52 CHRONIC PAIN OF MULTIPLE SITES: Chronic | ICD-10-CM

## 2022-03-25 DIAGNOSIS — G40.909 SEIZURE DISORDER: Chronic | ICD-10-CM

## 2022-03-25 DIAGNOSIS — I10 ESSENTIAL HYPERTENSION: Chronic | ICD-10-CM

## 2022-03-25 DIAGNOSIS — Z78.0 POSTMENOPAUSAL: ICD-10-CM

## 2022-03-25 DIAGNOSIS — I27.20 PULMONARY HYPERTENSION: Chronic | ICD-10-CM

## 2022-03-25 DIAGNOSIS — F11.20 CHRONIC NARCOTIC DEPENDENCE: Chronic | ICD-10-CM

## 2022-03-25 DIAGNOSIS — Z86.73 HISTORY OF STROKE: Chronic | ICD-10-CM

## 2022-03-25 DIAGNOSIS — R73.03 PREDIABETES: Chronic | ICD-10-CM

## 2022-03-25 DIAGNOSIS — G89.29 CHRONIC PAIN OF MULTIPLE SITES: Chronic | ICD-10-CM

## 2022-03-25 DIAGNOSIS — I25.10 CORONARY ARTERY DISEASE INVOLVING NATIVE CORONARY ARTERY OF NATIVE HEART WITHOUT ANGINA PECTORIS: Chronic | ICD-10-CM

## 2022-03-25 DIAGNOSIS — F33.0 DEPRESSION, MAJOR, RECURRENT, MILD: ICD-10-CM

## 2022-03-25 DIAGNOSIS — M51.36 DDD (DEGENERATIVE DISC DISEASE), LUMBAR: Chronic | ICD-10-CM

## 2022-03-25 DIAGNOSIS — N18.32 STAGE 3B CHRONIC KIDNEY DISEASE: Chronic | ICD-10-CM

## 2022-03-25 DIAGNOSIS — K21.9 GASTROESOPHAGEAL REFLUX DISEASE, UNSPECIFIED WHETHER ESOPHAGITIS PRESENT: Chronic | ICD-10-CM

## 2022-03-25 DIAGNOSIS — E78.5 HYPERLIPIDEMIA, UNSPECIFIED HYPERLIPIDEMIA TYPE: Chronic | ICD-10-CM

## 2022-03-25 DIAGNOSIS — J44.9 CHRONIC OBSTRUCTIVE PULMONARY DISEASE, UNSPECIFIED COPD TYPE: Chronic | ICD-10-CM

## 2022-03-25 DIAGNOSIS — N18.32 STAGE 3B CHRONIC KIDNEY DISEASE: Primary | Chronic | ICD-10-CM

## 2022-03-25 DIAGNOSIS — I47.10 SVT (SUPRAVENTRICULAR TACHYCARDIA): ICD-10-CM

## 2022-03-25 DIAGNOSIS — G89.29 CHRONIC RIGHT SHOULDER PAIN: ICD-10-CM

## 2022-03-25 DIAGNOSIS — M25.511 CHRONIC RIGHT SHOULDER PAIN: ICD-10-CM

## 2022-03-25 DIAGNOSIS — I70.0 ATHEROSCLEROSIS OF AORTA: Chronic | ICD-10-CM

## 2022-03-25 DIAGNOSIS — I25.10 CORONARY ARTERY DISEASE INVOLVING NATIVE CORONARY ARTERY OF NATIVE HEART WITHOUT ANGINA PECTORIS: Primary | Chronic | ICD-10-CM

## 2022-03-25 DIAGNOSIS — M54.17 LUMBOSACRAL RADICULOPATHY: Chronic | ICD-10-CM

## 2022-03-25 DIAGNOSIS — R94.39 ABNORMAL THALLIUM STRESS TEST: ICD-10-CM

## 2022-03-25 PROCEDURE — 3078F PR MOST RECENT DIASTOLIC BLOOD PRESSURE < 80 MM HG: ICD-10-PCS | Mod: CPTII,S$GLB,, | Performed by: FAMILY MEDICINE

## 2022-03-25 PROCEDURE — 3078F DIAST BP <80 MM HG: CPT | Mod: CPTII,S$GLB,, | Performed by: FAMILY MEDICINE

## 2022-03-25 PROCEDURE — 3077F PR MOST RECENT SYSTOLIC BLOOD PRESSURE >= 140 MM HG: ICD-10-PCS | Mod: CPTII,S$GLB,, | Performed by: INTERNAL MEDICINE

## 2022-03-25 PROCEDURE — 1157F ADVNC CARE PLAN IN RCRD: CPT | Mod: CPTII,S$GLB,, | Performed by: INTERNAL MEDICINE

## 2022-03-25 PROCEDURE — 3078F PR MOST RECENT DIASTOLIC BLOOD PRESSURE < 80 MM HG: ICD-10-PCS | Mod: CPTII,S$GLB,, | Performed by: INTERNAL MEDICINE

## 2022-03-25 PROCEDURE — 1125F AMNT PAIN NOTED PAIN PRSNT: CPT | Mod: CPTII,S$GLB,, | Performed by: FAMILY MEDICINE

## 2022-03-25 PROCEDURE — 99999 PR PBB SHADOW E&M-EST. PATIENT-LVL V: ICD-10-PCS | Mod: PBBFAC,,, | Performed by: FAMILY MEDICINE

## 2022-03-25 PROCEDURE — 1157F PR ADVANCE CARE PLAN OR EQUIV PRESENT IN MEDICAL RECORD: ICD-10-PCS | Mod: CPTII,S$GLB,, | Performed by: FAMILY MEDICINE

## 2022-03-25 PROCEDURE — 99214 PR OFFICE/OUTPT VISIT, EST, LEVL IV, 30-39 MIN: ICD-10-PCS | Mod: S$GLB,,, | Performed by: FAMILY MEDICINE

## 2022-03-25 PROCEDURE — 99214 PR OFFICE/OUTPT VISIT, EST, LEVL IV, 30-39 MIN: ICD-10-PCS | Mod: S$GLB,,, | Performed by: INTERNAL MEDICINE

## 2022-03-25 PROCEDURE — 3077F SYST BP >= 140 MM HG: CPT | Mod: CPTII,S$GLB,, | Performed by: INTERNAL MEDICINE

## 2022-03-25 PROCEDURE — 1160F RVW MEDS BY RX/DR IN RCRD: CPT | Mod: CPTII,S$GLB,, | Performed by: FAMILY MEDICINE

## 2022-03-25 PROCEDURE — 3288F FALL RISK ASSESSMENT DOCD: CPT | Mod: CPTII,S$GLB,, | Performed by: FAMILY MEDICINE

## 2022-03-25 PROCEDURE — 3075F SYST BP GE 130 - 139MM HG: CPT | Mod: CPTII,S$GLB,, | Performed by: FAMILY MEDICINE

## 2022-03-25 PROCEDURE — 1126F PR PAIN SEVERITY QUANTIFIED, NO PAIN PRESENT: ICD-10-PCS | Mod: CPTII,S$GLB,, | Performed by: INTERNAL MEDICINE

## 2022-03-25 PROCEDURE — 1101F PR PT FALLS ASSESS DOC 0-1 FALLS W/OUT INJ PAST YR: ICD-10-PCS | Mod: CPTII,S$GLB,, | Performed by: FAMILY MEDICINE

## 2022-03-25 PROCEDURE — 3288F PR FALLS RISK ASSESSMENT DOCUMENTED: ICD-10-PCS | Mod: CPTII,S$GLB,, | Performed by: FAMILY MEDICINE

## 2022-03-25 PROCEDURE — 1159F PR MEDICATION LIST DOCUMENTED IN MEDICAL RECORD: ICD-10-PCS | Mod: CPTII,S$GLB,, | Performed by: INTERNAL MEDICINE

## 2022-03-25 PROCEDURE — 99999 PR PBB SHADOW E&M-EST. PATIENT-LVL IV: ICD-10-PCS | Mod: PBBFAC,,, | Performed by: INTERNAL MEDICINE

## 2022-03-25 PROCEDURE — 1126F AMNT PAIN NOTED NONE PRSNT: CPT | Mod: CPTII,S$GLB,, | Performed by: INTERNAL MEDICINE

## 2022-03-25 PROCEDURE — 99214 OFFICE O/P EST MOD 30 MIN: CPT | Mod: S$GLB,,, | Performed by: INTERNAL MEDICINE

## 2022-03-25 PROCEDURE — 1160F RVW MEDS BY RX/DR IN RCRD: CPT | Mod: CPTII,S$GLB,, | Performed by: INTERNAL MEDICINE

## 2022-03-25 PROCEDURE — 1125F PR PAIN SEVERITY QUANTIFIED, PAIN PRESENT: ICD-10-PCS | Mod: CPTII,S$GLB,, | Performed by: FAMILY MEDICINE

## 2022-03-25 PROCEDURE — 3078F DIAST BP <80 MM HG: CPT | Mod: CPTII,S$GLB,, | Performed by: INTERNAL MEDICINE

## 2022-03-25 PROCEDURE — 1159F MED LIST DOCD IN RCRD: CPT | Mod: CPTII,S$GLB,, | Performed by: FAMILY MEDICINE

## 2022-03-25 PROCEDURE — 1101F PT FALLS ASSESS-DOCD LE1/YR: CPT | Mod: CPTII,S$GLB,, | Performed by: FAMILY MEDICINE

## 2022-03-25 PROCEDURE — 1159F PR MEDICATION LIST DOCUMENTED IN MEDICAL RECORD: ICD-10-PCS | Mod: CPTII,S$GLB,, | Performed by: FAMILY MEDICINE

## 2022-03-25 PROCEDURE — 99999 PR PBB SHADOW E&M-EST. PATIENT-LVL V: CPT | Mod: PBBFAC,,, | Performed by: FAMILY MEDICINE

## 2022-03-25 PROCEDURE — 1159F MED LIST DOCD IN RCRD: CPT | Mod: CPTII,S$GLB,, | Performed by: INTERNAL MEDICINE

## 2022-03-25 PROCEDURE — 1157F ADVNC CARE PLAN IN RCRD: CPT | Mod: CPTII,S$GLB,, | Performed by: FAMILY MEDICINE

## 2022-03-25 PROCEDURE — 1160F PR REVIEW ALL MEDS BY PRESCRIBER/CLIN PHARMACIST DOCUMENTED: ICD-10-PCS | Mod: CPTII,S$GLB,, | Performed by: FAMILY MEDICINE

## 2022-03-25 PROCEDURE — 1160F PR REVIEW ALL MEDS BY PRESCRIBER/CLIN PHARMACIST DOCUMENTED: ICD-10-PCS | Mod: CPTII,S$GLB,, | Performed by: INTERNAL MEDICINE

## 2022-03-25 PROCEDURE — 1157F PR ADVANCE CARE PLAN OR EQUIV PRESENT IN MEDICAL RECORD: ICD-10-PCS | Mod: CPTII,S$GLB,, | Performed by: INTERNAL MEDICINE

## 2022-03-25 PROCEDURE — 99214 OFFICE O/P EST MOD 30 MIN: CPT | Mod: S$GLB,,, | Performed by: FAMILY MEDICINE

## 2022-03-25 PROCEDURE — 99999 PR PBB SHADOW E&M-EST. PATIENT-LVL IV: CPT | Mod: PBBFAC,,, | Performed by: INTERNAL MEDICINE

## 2022-03-25 PROCEDURE — 3075F PR MOST RECENT SYSTOLIC BLOOD PRESS GE 130-139MM HG: ICD-10-PCS | Mod: CPTII,S$GLB,, | Performed by: FAMILY MEDICINE

## 2022-03-25 NOTE — PROGRESS NOTES
. THIS DOCUMENT WAS MADE IN PART WITH VOICE RECOGNITION SOFTWARE.  OCCASIONALLY THIS SOFTWARE WILL MISINTERPRET WORDS OR PHRASES.      Primary Care Provider Appointment - Dada MARIE [facility] ([facility])      Patient ID: Summer Inman is a 86 y.o. female.    ASSESSMENT/PLAN by Problem List:  Problem List Items Addressed This Visit     Essential hypertension (Chronic)     Chronic condition.  Stable, satisfactory control.  Currently diet controlled.              Chronic kidney disease, stage III (moderate) - Primary (Chronic)     Baseline creatinine is 0.9-1.0.  With an estimated GFR of around 55.  This is stable and age-appropriate.  She does not take any oral NSAIDs on a regular basis.  No urinary obstructive symptoms and no uremic symptoms.           Hyperlipidemia (Chronic)     Statin intolerant, multiple attempts.  Chronic musculoskeletal pain exacerbated with statins.           Coronary artery disease involving native coronary artery of native heart without angina pectoris (Chronic)     Nonocclusive disease on previous heart catheterization in 2014. No active angina.  She follows with cardiology.             Atherosclerosis of aorta (Chronic)     Chronic, stable.  She has been intolerant of multiple statin secondary to exacerbation of her chronic musculoskeletal pain.  Continue to work on healthy lifestyle and other ways to reduce risk factors.           Chronic obstructive pulmonary disease (Chronic)     Stable she remains on Advair Diskus 250-50 mcg daily.  No recent exacerbation.           Pulmonary hypertension (Chronic)     Chronic shortness of breath but stable.           DDD (degenerative disc disease), lumbar (Chronic)     Persistent but stable on hydrocodone.  Declines any additional help, PT, pain management, etc           Lumbosacral radiculopathy (Chronic)     As above           Chronic pain of multiple sites (Chronic)     Chronic muscle pain, avoid NSAIDs because of age and mild  chronic kidney disease.  She does remain on hydrocodone which helps make this manageable.  She does report increase in shoulder pain.  I offered PT or ortho, she declines           Chronic narcotic dependence (Chronic)     She remains on hydrocodone under supervision for chronic pain.           Gastroesophageal reflux disease (Chronic)     Chronic stable on omeprazole 40 mg daily           Seizure disorder (Chronic)     Stable, no recent seizures, followed by neurology           History of stroke (Chronic)     Stable           Prediabetes (Chronic)     Diet controlled.  A1c has been averaging around 6.0.           Relevant Orders    Hemoglobin A1C (Completed)    Depression, major, recurrent, mild     Stable on sertraline, no SI           SVT (supraventricular tachycardia)     Stable,              Other Visit Diagnoses     Chronic right shoulder pain        Relevant Orders    X-ray Shoulder 2 or More Views Right    Postmenopausal        Relevant Orders    DXA Bone Density Spine And Hip        Declines PT and ortho    Follow Up:  3 months      Health Maintenance       Date Due Completion Date    Sign Pain Contract Never done ---    Aspirin/Antiplatelet Therapy Never done ---    Shingles Vaccine (1 of 2) Never done ---    DEXA Scan 06/08/2020 6/8/2017    Lipid Panel 07/16/2022 7/16/2021    TETANUS VACCINE 08/25/2024 8/25/2014              Subjective:     Chief Complaint   Patient presents with    Hypertension     3 month f/u visit      I have reviewed the information entered by the ancillary staff regarding the chief complaint as well as the related history.    HPI    Patient is a/an 86 y.o.  female   RISK of ADMIT/ED: 35    Follow up multiple chronic conditions, see above for details      Active Ambulatory Problems     Diagnosis Date Noted    Hyperlipidemia     Gastroesophageal reflux disease     Seizure disorder     Chronic cough 03/08/2016    Hypokalemia 03/09/2016    Anemia 03/10/2016    DDD (degenerative  disc disease), lumbar 06/15/2016    Lumbosacral radiculopathy 06/15/2016    Right hip pain 06/15/2016    Coronary artery disease involving native coronary artery of native heart without angina pectoris 10/17/2016    Essential hypertension 10/17/2016    Chronic obstructive pulmonary disease 10/17/2016    History of stroke 10/17/2016    Depression, major, recurrent, mild 10/17/2016    Insomnia 10/17/2016    Pulmonary hypertension 10/17/2016    Osteopenia 10/17/2016    Dysphagia 03/08/2018    Memory loss 07/26/2018    Chronic narcotic dependence 06/07/2019    Atherosclerosis of aorta 06/07/2019    Chronic pain of multiple sites 01/30/2000    Chronic kidney disease, stage III (moderate) 06/17/2019    Biliary colic 09/18/2019    SVT (supraventricular tachycardia) 10/16/2020    ACP (advance care planning) 10/16/2020    Elevated troponin 10/16/2020    Diarrhea 10/16/2020    Hyponatremia 10/16/2020    Prediabetes 10/17/2020    Lactic acidosis 11/04/2020    Abnormal thallium stress test 03/25/2022     Resolved Ambulatory Problems     Diagnosis Date Noted    Chronic kidney disease, stage II (mild) 12/26/2012    Dysphagia 10/08/2014    Cervical radiculopathy 02/02/2015    UTI (urinary tract infection) 01/31/2016    Elevated lactic acid level 01/31/2016    Abdominal pain 01/31/2016    Cervical spondylosis with myelopathy 03/07/2016    Shortness of breath 03/08/2016    Obesity, Class I, BMI 30-34.9 03/08/2016    Closed Colles' fracture of right radius 04/26/2017    BILLY (acute kidney injury) 10/16/2020    Pneumonia of right middle lobe due to infectious organism 11/04/2020    Acute hypoxemic respiratory failure 11/04/2020     Past Medical History:   Diagnosis Date    Anticoagulant long-term use     Arthritis     Asthma     Atrial premature beats     Benign neoplasm of adrenal gland     Cholelithiasis     CKD (chronic kidney disease)     Colon polyp     COPD (chronic obstructive  pulmonary disease)     Coronary artery disease     Depression     Dizziness     First degree AV block     General anesthetics causing adverse effect in therapeutic use     Hepatomegaly     Hypertension     Impaired mobility     Neck pain     Schatzki's ring     Seasonal allergies     Seizures     Stroke     Trouble in sleeping     Wears dentures     Wears glasses        Past Surgical History:   Procedure Laterality Date    APPENDECTOMY      CERVICAL FUSION  3/2016 Dr. Cuevas    COLONOSCOPY  2008 Dr. Nguyen    internal hemorrhoids otherwise normal findings; repeat in 4 years    COLONOSCOPY N/A 10/29/2020    Dr. Nguyen;  Location: [facility]; Multiple 5 to 10 mm polyps in the descending colon, in the transverse colon & in the ascending colon removed, diverticulosis, hemorrhoids; Repeat colonoscopy is not recommended for surveillance. biopsy: tubular adenomas & TUBULOVILLOUS ADENOMA    ESOPHAGEAL DILATION N/A 10/29/2020    Procedure: DILATION, ESOPHAGUS;  Surgeon: Lucas Nguyen MD;  Location: [facility];  Service: Endoscopy;  Laterality: N/A;    ESOPHAGOGASTRODUODENOSCOPY      ESOPHAGOGASTRODUODENOSCOPY N/A 10/29/2020    Procedure: EGD (ESOPHAGOGASTRODUODENOSCOPY);  Surgeon: Lucas Nguyen MD;  Location: [facility];  Service: Endoscopy;  Laterality: N/A; Normal esophagus. Dilated. gastritis; biopsy: stomach- reactive gastropathy, h pylori negative    EYE SURGERY      phaco bilateral    HYSTERECTOMY      partial hysterectomy    LAPAROSCOPIC CHOLECYSTECTOMY N/A 9/18/2019    Procedure: CHOLECYSTECTOMY, LAPAROSCOPIC;  Surgeon: Eric Mathias MD;  Location: [facility];  Service: General;  Laterality: N/A;    mesh implant      TONSILLECTOMY      UPPER GASTROINTESTINAL ENDOSCOPY  2014 Dr. Nguyen    UPPER GASTROINTESTINAL ENDOSCOPY  02/01/2016    Dr. Nguyen    UPPER GASTROINTESTINAL ENDOSCOPY  03/08/2018    Dr. Nguyen: small hiatal hernia, gastritis, esophageal dilation  performed; biopsy: stomach and esophagus- unremarkable, negative for nicholson's, h pylori negative       Past Medical History:   Diagnosis Date    Anticoagulant long-term use     ASA 81mg    Arthritis     Asthma     Atrial premature beats     Benign neoplasm of adrenal gland     Cholelithiasis     CKD (chronic kidney disease)     Colon polyp     COPD (chronic obstructive pulmonary disease)     Coronary artery disease     nonocclusive disease, no prior intervention    Depression     Dizziness     and balance issues    First degree AV block     Gastroesophageal reflux disease     General anesthetics causing adverse effect in therapeutic use     hard to wake up    Hepatomegaly     Hyperlipidemia     Hypertension     Impaired mobility     uses walker or cane    Neck pain     into chest and arms with numbness in arms    Schatzki's ring     Seasonal allergies     Seizures     Last one recently    Stroke     tia    Trouble in sleeping     Wears dentures     upper    Wears glasses        Social History     Socioeconomic History    Marital status:    Tobacco Use    Smoking status: Former Smoker     Packs/day: 0.50     Years: 19.00     Pack years: 9.50     Types: Cigarettes     Start date: 3/12/1984     Quit date: 10/8/2013     Years since quittin.4    Smokeless tobacco: Never Used   Substance and Sexual Activity    Alcohol use: No    Drug use: Yes     Types: Hydrocodone       Review of Systems   Constitutional: Positive for fatigue.   HENT: Negative.    Cardiovascular: Negative for chest pain.   Musculoskeletal: Positive for arthralgias and back pain.   Psychiatric/Behavioral: Negative for dysphoric mood and suicidal ideas.       Objective     Physical Exam  Vitals reviewed.   Constitutional:       General: She is not in acute distress.     Appearance: She is not toxic-appearing.      Comments: Elderly  female.    HENT:      Head: Normocephalic and atraumatic.       Mouth/Throat:      Pharynx: Oropharynx is clear. No oropharyngeal exudate.   Eyes:      General: No scleral icterus.     Conjunctiva/sclera: Conjunctivae normal.   Neck:      Thyroid: No thyromegaly.   Cardiovascular:      Rate and Rhythm: Normal rate and regular rhythm.      Chest Wall: PMI is not displaced.      Heart sounds: Murmur heard.     No friction rub. No gallop.   Pulmonary:      Effort: Pulmonary effort is normal. No respiratory distress.      Comments: Mildly diminished breath sounds bilaterally.  No wheezes and no crackles  Musculoskeletal:      Cervical back: Normal range of motion and neck supple.   Lymphadenopathy:      Cervical: No cervical adenopathy.   Skin:     Findings: No lesion or rash.   Neurological:      Mental Status: She is alert and oriented to person, place, and time.      Motor: No abnormal muscle tone.       Vitals:    03/25/22 1553   BP: 138/76   BP Location: Left arm   Patient Position: Sitting   BP Method: Medium (Manual)   Pulse: 98   Resp: 18   SpO2: 97%   Weight: 71 kg (156 lb 8.4 oz)   Height: 5' (1.524 m)       RECENT LABS:    Lab Results   Component Value Date    WBC 8.02 03/25/2022    HGB 11.6 (L) 03/25/2022    HCT 39.2 03/25/2022     03/25/2022    CHOL 227 (H) 07/16/2021    TRIG 105 07/16/2021    HDL 59 07/16/2021    ALT 10 07/16/2021    AST 15 07/16/2021     07/16/2021    K 4.4 07/16/2021     07/16/2021    CREATININE 0.9 07/16/2021    BUN 17 07/16/2021    CO2 21 (L) 07/16/2021    TSH 1.216 07/16/2021    GLUF 110 02/15/2006    HGBA1C 5.7 (H) 03/25/2022       Results for orders placed or performed in visit on 10/08/21   Sedimentation rate   Result Value Ref Range    Sed Rate 17 0 - 20 mm/Hr   C-Reactive Protein   Result Value Ref Range    CRP 9.9 (H) 0.0 - 8.2 mg/L

## 2022-03-25 NOTE — ASSESSMENT & PLAN NOTE
Nonocclusive disease on previous heart catheterization in 2014. No active angina.  She follows with cardiology.

## 2022-03-25 NOTE — ASSESSMENT & PLAN NOTE
Chronic muscle pain, avoid NSAIDs because of age and mild chronic kidney disease.  She does remain on hydrocodone which helps make this manageable.  She does report increase in shoulder pain.  I offered PT or ortho, she declines

## 2022-03-25 NOTE — ASSESSMENT & PLAN NOTE
Chronic, stable.  She has been intolerant of multiple statin secondary to exacerbation of her chronic musculoskeletal pain.  Continue to work on healthy lifestyle and other ways to reduce risk factors.

## 2022-03-25 NOTE — PROGRESS NOTES
Subjective:    Patient ID:  Summer Inman is a 86 y.o. female who presents for follow-up of Coronary Artery Disease      HPI  Here for follow up of acute renal insufficiency and elevated troponin/PSVT (11/20).  Stable SHARMA. No CP.     Review of Systems   Constitutional: Negative for malaise/fatigue.   Eyes: Negative for blurred vision.   Cardiovascular: Negative for chest pain, claudication, cyanosis, dyspnea on exertion, irregular heartbeat, leg swelling, near-syncope, orthopnea, palpitations, paroxysmal nocturnal dyspnea and syncope.   Respiratory: Negative for cough and shortness of breath.    Hematologic/Lymphatic: Does not bruise/bleed easily.   Musculoskeletal: Negative for back pain, falls, joint pain, muscle cramps, muscle weakness and myalgias.   Gastrointestinal: Negative for abdominal pain, change in bowel habit, nausea and vomiting.   Genitourinary: Negative for urgency.   Neurological: Negative for dizziness, focal weakness and light-headedness.       Past Medical History:   Diagnosis Date    Anticoagulant long-term use     ASA 81mg    Arthritis     Asthma     Atrial premature beats     Benign neoplasm of adrenal gland     Cholelithiasis     CKD (chronic kidney disease)     Colon polyp     COPD (chronic obstructive pulmonary disease)     Coronary artery disease     nonocclusive disease, no prior intervention    Depression     Dizziness     and balance issues    First degree AV block     Gastroesophageal reflux disease     General anesthetics causing adverse effect in therapeutic use     hard to wake up    Hepatomegaly     Hyperlipidemia     Hypertension     Impaired mobility     uses walker or cane    Neck pain     into chest and arms with numbness in arms    Schatzki's ring     Seasonal allergies     Seizures     Last one recently    Stroke     tia    Trouble in sleeping     Wears dentures     upper    Wears glasses      There are no hospital problems to display for this  patient.       Objective:     Vitals:    03/25/22 1401   BP: (!) 143/72   Pulse: 85        Physical Exam  Vitals and nursing note reviewed.   Constitutional:       Appearance: She is well-developed.   HENT:      Head: Normocephalic.   Eyes:      Conjunctiva/sclera: Conjunctivae normal.   Neck:      Vascular: No JVD.   Cardiovascular:      Rate and Rhythm: Normal rate and regular rhythm.      Pulses: Intact distal pulses.           Carotid pulses are 2+ on the right side and 2+ on the left side.       Radial pulses are 2+ on the right side and 2+ on the left side.        Dorsalis pedis pulses are 2+ on the right side and 2+ on the left side.        Posterior tibial pulses are 2+ on the right side and 2+ on the left side.      Heart sounds: Normal heart sounds.   Pulmonary:      Effort: Pulmonary effort is normal.      Breath sounds: Normal breath sounds.   Abdominal:      General: Bowel sounds are normal.      Palpations: Abdomen is soft.   Musculoskeletal:         General: No tenderness.      Cervical back: Normal range of motion and neck supple.   Skin:     General: Skin is warm and dry.      Nails: There is no clubbing.   Neurological:      Mental Status: She is alert and oriented to person, place, and time.      Gait: Gait normal.   Psychiatric:         Speech: Speech normal.         Behavior: Behavior normal.         Thought Content: Thought content normal.               ..    Chemistry        Component Value Date/Time     07/16/2021 1505    K 4.4 07/16/2021 1505     07/16/2021 1505    CO2 21 (L) 07/16/2021 1505    BUN 17 07/16/2021 1505    CREATININE 0.9 07/16/2021 1505     07/16/2021 1505        Component Value Date/Time    CALCIUM 9.2 07/16/2021 1505    ALKPHOS 114 07/16/2021 1505    AST 15 07/16/2021 1505    AST 24 01/31/2016 1156    ALT 10 07/16/2021 1505    BILITOT 0.2 07/16/2021 1505    ESTGFRAFRICA >60.0 07/16/2021 1505    EGFRNONAA 58.9 (A) 07/16/2021 1505            ..  Lab Results    Component Value Date    CHOL 227 (H) 07/16/2021    CHOL 202 (H) 10/16/2020    CHOL 241 (H) 08/06/2020     Lab Results   Component Value Date    HDL 59 07/16/2021    HDL 51 10/16/2020    HDL 60 08/06/2020     Lab Results   Component Value Date    LDLCALC 147.0 07/16/2021    LDLCALC 133.6 10/16/2020    LDLCALC 155.2 08/06/2020     Lab Results   Component Value Date    TRIG 105 07/16/2021    TRIG 87 10/16/2020    TRIG 129 08/06/2020     Lab Results   Component Value Date    CHOLHDL 26.0 07/16/2021    CHOLHDL 25.2 10/16/2020    CHOLHDL 24.9 08/06/2020     ..  Lab Results   Component Value Date    WBC 6.97 07/16/2021    HGB 11.7 (L) 07/16/2021    HCT 37.5 07/16/2021    MCV 87 07/16/2021     07/16/2021       Test(s) Reviewed  I have reviewed the following in detail:  [] Stress test   [] Angiography   [x] Echocardiogram   [x] Labs   [] Other:       Assessment:         ICD-10-CM ICD-9-CM   1. Coronary artery disease involving native coronary artery of native heart without angina pectoris  I25.10 414.01   2. Essential hypertension  I10 401.9   3. Hyperlipidemia, unspecified hyperlipidemia type  E78.5 272.4   4. Pulmonary hypertension  I27.20 416.8   5. Stage 3b chronic kidney disease  N18.32 585.3   6. History of stroke  Z86.73 V12.54   7. Abnormal thallium stress test  R94.39 794.39     Active Problem List with Overview Notes    Diagnosis Date Noted    Abnormal thallium stress test 03/25/2022    Lactic acidosis 11/04/2020    Prediabetes 10/17/2020     hbg a1c I 8/2020 - 6      SVT (supraventricular tachycardia) 10/16/2020    ACP (advance care planning) 10/16/2020    Elevated troponin 10/16/2020     10/20    [facility] 5/14:  1.  LM:  normal sized, normal contour; no evidence of disease.  2.  LAD:  normal sized, wraps around the apex; less than 30% stenosis seen in the proximal LAD.                          D1:     3.  LCx:  LCx and obtuse marginals are normal in appearance for patient's age.                           OM1,OM2:  4.  RCA:  normal sized; normal in appearance for patient's age.         Diarrhea 10/16/2020     Laxative induced      Hyponatremia 10/16/2020    Biliary colic 09/18/2019    Chronic kidney disease, stage III (moderate) 06/17/2019    Chronic narcotic dependence 06/07/2019    Atherosclerosis of aorta 06/07/2019    Memory loss 07/26/2018    Dysphagia 03/08/2018    Coronary artery disease involving native coronary artery of native heart without angina pectoris 10/17/2016     [facility] 5/14:  1.  LM:  normal sized, normal contour; no evidence of disease.  2.  LAD:  normal sized, wraps around the apex; less than 30% stenosis seen in the proximal LAD.                          D1:     3.  LCx:  LCx and obtuse marginals are normal in appearance for patient's age.                          OM1,OM2:  4.  RCA:  normal sized; normal in appearance for patient's age.      Essential hypertension 10/17/2016    Chronic obstructive pulmonary disease 10/17/2016    History of stroke 10/17/2016    Depression, major, recurrent, mild 10/17/2016    Insomnia 10/17/2016    Pulmonary hypertension 10/17/2016    Osteopenia 10/17/2016    DDD (degenerative disc disease), lumbar 06/15/2016    Lumbosacral radiculopathy 06/15/2016    Right hip pain 06/15/2016    Anemia 03/10/2016    Hypokalemia 03/09/2016    Chronic cough 03/08/2016    Seizure disorder     Hyperlipidemia     Gastroesophageal reflux disease     Chronic pain of multiple sites 01/30/2000        Plan:           Return to clinic 12 months   Low level/low impact aerobic exercise 5x's/wk. Heart healthy diet and risk factor modification.    See labs and med orders.  Labs all results. Has PCP appt  CFD rtc 9 months    Portions of this note may have been created with voice recognition software.  Grammatical, syntax and spelling errors may be inevitable.

## 2022-03-25 NOTE — ASSESSMENT & PLAN NOTE
Baseline creatinine is 0.9-1.0.  With an estimated GFR of around 55.  This is stable and age-appropriate.  She does not take any oral NSAIDs on a regular basis.  No urinary obstructive symptoms and no uremic symptoms.

## 2022-03-28 ENCOUNTER — PATIENT MESSAGE (OUTPATIENT)
Dept: CARDIOLOGY | Facility: CLINIC | Age: 87
End: 2022-03-28
Payer: MEDICARE

## 2022-03-30 DIAGNOSIS — M51.36 DDD (DEGENERATIVE DISC DISEASE), LUMBAR: ICD-10-CM

## 2022-03-30 RX ORDER — TIZANIDINE 2 MG/1
TABLET ORAL
Qty: 120 TABLET | Refills: 1 | Status: SHIPPED | OUTPATIENT
Start: 2022-03-30 | End: 2022-05-11

## 2022-03-30 RX ORDER — ONDANSETRON 4 MG/1
TABLET, ORALLY DISINTEGRATING ORAL
Qty: 30 TABLET | Refills: 1 | Status: SHIPPED | OUTPATIENT
Start: 2022-03-30 | End: 2022-05-11

## 2022-04-07 ENCOUNTER — OFFICE VISIT (OUTPATIENT)
Dept: OPTOMETRY | Facility: CLINIC | Age: 87
End: 2022-04-07
Payer: MEDICARE

## 2022-04-07 DIAGNOSIS — Z96.1 BILATERAL PSEUDOPHAKIA: ICD-10-CM

## 2022-04-07 DIAGNOSIS — Z13.5 GLAUCOMA SCREENING: ICD-10-CM

## 2022-04-07 DIAGNOSIS — H10.13 ALLERGIC CONJUNCTIVITIS OF BOTH EYES: ICD-10-CM

## 2022-04-07 DIAGNOSIS — H10.503 BLEPHAROCONJUNCTIVITIS OF BOTH EYES, UNSPECIFIED BLEPHAROCONJUNCTIVITIS TYPE: ICD-10-CM

## 2022-04-07 DIAGNOSIS — H43.813 POSTERIOR VITREOUS DETACHMENT, BILATERAL: Primary | ICD-10-CM

## 2022-04-07 PROCEDURE — 1126F AMNT PAIN NOTED NONE PRSNT: CPT | Mod: CPTII,S$GLB,, | Performed by: OPTOMETRIST

## 2022-04-07 PROCEDURE — 1126F PR PAIN SEVERITY QUANTIFIED, NO PAIN PRESENT: ICD-10-PCS | Mod: CPTII,S$GLB,, | Performed by: OPTOMETRIST

## 2022-04-07 PROCEDURE — 92014 PR EYE EXAM, EST PATIENT,COMPREHESV: ICD-10-PCS | Mod: S$GLB,,, | Performed by: OPTOMETRIST

## 2022-04-07 PROCEDURE — 1157F PR ADVANCE CARE PLAN OR EQUIV PRESENT IN MEDICAL RECORD: ICD-10-PCS | Mod: CPTII,S$GLB,, | Performed by: OPTOMETRIST

## 2022-04-07 PROCEDURE — 1159F PR MEDICATION LIST DOCUMENTED IN MEDICAL RECORD: ICD-10-PCS | Mod: CPTII,S$GLB,, | Performed by: OPTOMETRIST

## 2022-04-07 PROCEDURE — 1101F PR PT FALLS ASSESS DOC 0-1 FALLS W/OUT INJ PAST YR: ICD-10-PCS | Mod: CPTII,S$GLB,, | Performed by: OPTOMETRIST

## 2022-04-07 PROCEDURE — 1101F PT FALLS ASSESS-DOCD LE1/YR: CPT | Mod: CPTII,S$GLB,, | Performed by: OPTOMETRIST

## 2022-04-07 PROCEDURE — 99999 PR PBB SHADOW E&M-EST. PATIENT-LVL III: CPT | Mod: PBBFAC,,, | Performed by: OPTOMETRIST

## 2022-04-07 PROCEDURE — 1159F MED LIST DOCD IN RCRD: CPT | Mod: CPTII,S$GLB,, | Performed by: OPTOMETRIST

## 2022-04-07 PROCEDURE — 1157F ADVNC CARE PLAN IN RCRD: CPT | Mod: CPTII,S$GLB,, | Performed by: OPTOMETRIST

## 2022-04-07 PROCEDURE — 92014 COMPRE OPH EXAM EST PT 1/>: CPT | Mod: S$GLB,,, | Performed by: OPTOMETRIST

## 2022-04-07 PROCEDURE — 3288F FALL RISK ASSESSMENT DOCD: CPT | Mod: CPTII,S$GLB,, | Performed by: OPTOMETRIST

## 2022-04-07 PROCEDURE — 3288F PR FALLS RISK ASSESSMENT DOCUMENTED: ICD-10-PCS | Mod: CPTII,S$GLB,, | Performed by: OPTOMETRIST

## 2022-04-07 PROCEDURE — 99999 PR PBB SHADOW E&M-EST. PATIENT-LVL III: ICD-10-PCS | Mod: PBBFAC,,, | Performed by: OPTOMETRIST

## 2022-04-07 NOTE — PROGRESS NOTES
HPI     Routine eye exam-dle-3/21    Pt complains of blurred vision occasionally. Has rx glasses but only reads   otc reading. No flashes or floaters. Complains of skin tag itching OD.     Last edited by Georgette Kidd on 4/7/2022  4:46 PM. (History)        ROS     Positive for: Eyes    Negative for: Constitutional, Gastrointestinal, Neurological, Skin,   Genitourinary, Musculoskeletal, HENT, Endocrine, Cardiovascular,   Respiratory, Psychiatric, Allergic/Imm, Heme/Lymph    Last edited by FANNY Servin, OD on 4/7/2022  5:08 PM. (History)        Assessment /Plan     For exam results, see Encounter Report.    Posterior vitreous detachment, bilateral    Blepharoconjunctivitis of both eyes, unspecified blepharoconjunctivitis type    Allergic conjunctivitis of both eyes    Glaucoma screening    Bilateral pseudophakia      1. RD precautions given and reviewed. Patient knows to call/ message if any further changes in symptoms occur.  2,3. Transient / seasonal s/s   Use ATs and otc allergy gtts as previous, gave suggestions   Chronic complaint of discomfort at OD nasal canthus / caruncle after lesion removed  No gross inflammation noted    Consider otc ophth kristofer to apply to area, avoid rubbing /scratching this area to further inflame    4. Not suspect  5. Stable OU    Excellent retina / macular health for age     No specs needed , use otc for near prn     Discussed and educated patient on current findings /plan.  RTC 1 year, prn if any changes / issues

## 2022-04-07 NOTE — PATIENT INSTRUCTIONS
"DRY EYES -- BURNING OR SCOOBY SYMPTOMS:  Use Over The Counter artificial tears as needed for dry eye symptoms.   Some common brands include:  Systane, Optive, Refresh, and Thera-Tears.  These drops can be used as frequently as desired, but may be most helpful use during long periods of concentrated work.  For example, reading / working at the computer. Start with 3-4x per day.     Nighttime Ophthalmic gel or ointments are available: Refresh PM, Genteal, and Lacrilube.    Avoid drops that "get redness out" (Visine, Murine, Clear Eyes), as these may contain medication that could further irritate the eyes, especially with chronic use.    ALLERGY EYES -- ITCHING SYMPTOMS:  Over the counter medications include--Pataday, Zaditor, and Alaway.  Use as directed 1-2 drops daily for symptoms of itching / watering eyes.  These drops will not help for dry eye or exposure symptoms.    REDNESS RELIEF:  Lumify---is a good redness reliever that will not cause irritation if used chronically.        FLASHES / FLOATERS / POSTERIOR VITREOUS DETACHMENT    Call the clinic if you have any further changes in symptoms.  Including:  Increased numbers of floaters or flashing lights, dimness or darkness that moves through or stays constant in your vision, or any pain in the eye (s).    You may sometimes see small specks or clouds moving in your field of vision.  They are called FLOATERS.  You can often see them when looking at a plain background, like a blank wall or blue genevieve.  Floaters are actually tiny clumps of gel or cells inside the VITREOUS, the clear jelly-like fluid that fills the inside of your eye.    While these objects look like they are in front of your eye, they are actually floating inside.  What you see are the shadows they cast on the RETINA, the nerve layer at the back of the eye that senses light and allows you to see.      POSTERIOR VITREOUS DETACHMENT    The appearance of new floaters may be alarming.  If you suddenly " develop new floaters, you should contact your eye care professional  right away.    The retina can tear if the shrinking vitreous pulls away from the wall of the eye.  This sometimes causes a small amount of bleeding in the eye that may appear as new floaters.    A torn retina is always a serious problem, since it can lead to a retinal detachment.  You should see your eye care professional as soon as possible if:    even one new floater appears suddenly;  you see sudden flashes of light;  you notice other symptoms, like the loss of side vision, or a curtain closes down in your vision        POSTERIOR VITREOUS DETACHMENT is more common for people who:    are nearsighted;  have had cataract surgery;  have had YAG laser surgery of the eye;  have had inflammation inside the eye;  are over age 60.      While some floaters may remain visible, many of them will fade over time and become less noticeable.  Even if you've had some floaters for years, you should have your eyes checked as soon as possible if you notice new ones.    FLASHING LIGHTS    When the vitreous gel rubs or pulls on the retina, you may see what look like flashing lights or lightning streaks.  These flashes can appear off and on for several weeks or months.      Some people experience flashes of light that appear as jagged lines or heat waves in both eyes, lasting 10-20 minutes.  These flashes are caused by a spasm of blood vessels in the brain, which is called a migraine.    If a headache follows these flashes, it's called a migraine headache.  If   no headache occurs, these flashes are called Ophthalmic or Ocular Migraine.           Using the Amsler Grid  If you are at risk for vision loss, you may be told to check your eyesight regularly using the Amsler grid. Below is the grid and instructions for using it.         The Amsler grid helps you track changes in your vision.    How to Use the Amsler Grid  Use the grid in a well-lighted area.  Wear  glasses or contact lenses if you usually wear them.  Hold the grid at your normal reading distance (about 16 inches).  Cover your left eye.  With your right eye, look at the dot in the center of the grid.  While looking at the dot, notice if any of the lines look wavy, if any lines disappear, or if the boxes change shape.  Write down on a piece of paper any vision changes from the last time you used the grid.  Now repeat the exercise, this time covering your right eye.  Call your doctor right away if you notice any vision changes.  How Often Should I Check My Vision?  Use the Amsler grid as often as your eye doctor suggests. Keep the grid where youll remember to use it. Call your eye doctor right away if you notice any changes with your eyesight. This includes if your vision improves.  © 5386-6719 [employer], [address] [place] [address], [place] [address]. All rights reserved. This information is not intended as a substitute for professional medical care. Always follow your healthcare professional's instructions.

## 2022-04-14 RX ORDER — HYDROCODONE BITARTRATE AND ACETAMINOPHEN 10; 325 MG/1; MG/1
1 TABLET ORAL EVERY 6 HOURS PRN
Qty: 90 TABLET | Refills: 0 | Status: SHIPPED | OUTPATIENT
Start: 2022-04-14 | End: 2022-05-13 | Stop reason: SDUPTHER

## 2022-04-14 NOTE — TELEPHONE ENCOUNTER
----- Message from Oneil Clarke sent at 4/14/2022  1:57 PM CDT -----  Contact: Stefani Inman (Daughter)  Type:  RX Refill Request    Who Called: Stefani Inman (Daughter)    Refill or New Rx: Refill     RX Name and Strength:HYDROcodone-acetaminophen (NORCO)  mg per tablet    How is the patient currently taking it? (ex. 1XDay):    Is this a 30 day or 90 day RX:    Preferred Pharmacy with phone number:[facility] - [place], [place] - [address] [address]    Local or Mail Order: Local     Ordering Provider:    Would the patient rather a call back or a response via [facility]?     Best Call Back Number: [phone] (mobile)    Additional Information:

## 2022-04-27 DIAGNOSIS — F33.0 DEPRESSION, MAJOR, RECURRENT, MILD: ICD-10-CM

## 2022-04-27 RX ORDER — SERTRALINE HYDROCHLORIDE 100 MG/1
TABLET, FILM COATED ORAL
Qty: 135 TABLET | Refills: 3 | Status: SHIPPED | OUTPATIENT
Start: 2022-04-27 | End: 2023-06-29

## 2022-04-27 NOTE — TELEPHONE ENCOUNTER
Refill Authorization Note   Summer Inman  is requesting a refill authorization.  Brief Assessment and Rationale for Refill:  Approve     Medication Therapy Plan:       Medication Reconciliation Completed: No   Comments:     No Care Gaps recommended.     Note composed:6:36 PM 04/27/2022

## 2022-04-27 NOTE — TELEPHONE ENCOUNTER
No new care gaps identified.  Powered by [email or web address]. Reference number: [id number].   4/27/2022 6:31:58 PM CDT

## 2022-04-28 ENCOUNTER — HOSPITAL ENCOUNTER (OUTPATIENT)
Dept: RADIOLOGY | Facility: HOSPITAL | Age: 87
Discharge: HOME | End: 2022-04-28
Attending: FAMILY MEDICINE
Payer: MEDICARE

## 2022-04-28 DIAGNOSIS — Z78.0 POSTMENOPAUSAL: ICD-10-CM

## 2022-04-28 PROCEDURE — 73030 X-RAY EXAM OF SHOULDER: CPT | Mod: 26,RT,, | Performed by: RADIOLOGY

## 2022-04-28 PROCEDURE — 73030 XR SHOULDER COMPLETE 2 OR MORE VIEWS RIGHT: ICD-10-PCS | Mod: 26,RT,, | Performed by: RADIOLOGY

## 2022-04-28 PROCEDURE — 73030 X-RAY EXAM OF SHOULDER: CPT | Mod: TC,FY,PO,RT

## 2022-04-28 PROCEDURE — 77080 DEXA BONE DENSITY SPINE HIP: ICD-10-PCS | Mod: 26,,, | Performed by: RADIOLOGY

## 2022-04-28 PROCEDURE — 77080 DXA BONE DENSITY AXIAL: CPT | Mod: 26,,, | Performed by: RADIOLOGY

## 2022-04-28 PROCEDURE — 77080 DXA BONE DENSITY AXIAL: CPT | Mod: TC,PO

## 2022-04-29 ENCOUNTER — TELEPHONE (OUTPATIENT)
Dept: PRIMARY CARE CLINIC | Facility: CLINIC | Age: 87
End: 2022-04-29
Payer: MEDICARE

## 2022-04-29 NOTE — TELEPHONE ENCOUNTER
Please call to discuss x-ray and bone density.  X-ray reveals no fracture or injury.    Bone density does reveal osteopenia.  In a range where we typically recommend medication.  But this does require discussion so will review in more detail at her follow-up before I prescribe something.

## 2022-04-30 ENCOUNTER — NURSE TRIAGE (OUTPATIENT)
Dept: ADMINISTRATIVE | Facility: CLINIC | Age: 87
End: 2022-04-30
Payer: MEDICARE

## 2022-05-01 NOTE — TELEPHONE ENCOUNTER
Bilateral leg pain that is causing difficulty walking with some noted difficulty breathing. Advised, per protocol. Verbalizes understanding.     Reason for Disposition   Difficulty breathing    Additional Information   Negative: Looks like a broken bone or dislocated joint (e.g., crooked or deformed)   Negative: Sounds like a life-threatening emergency to the triager    Protocols used: LEG PAIN-A-AH

## 2022-05-02 NOTE — TELEPHONE ENCOUNTER
Called patient and asked her how she was feeling today, since calling the on-call nurse on Saturday. Pt states that she is feeling better, as she has been lying in the bed majority of the time. Asked patient about the SOB and patient states this has improved as well. Requested that patient schedule an appointment to see Dr. Wilson. Pt states she could not schedule anything until the end of next week, as her daughter brings her and she is leaving for vacation today. Advised patient to monitor her symptoms and to let me know if anything gets worse. Verbalized understanding.

## 2022-05-10 DIAGNOSIS — M51.36 DDD (DEGENERATIVE DISC DISEASE), LUMBAR: ICD-10-CM

## 2022-05-11 DIAGNOSIS — Z78.0 ASYMPTOMATIC MENOPAUSAL STATE: ICD-10-CM

## 2022-05-11 RX ORDER — ONDANSETRON 4 MG/1
TABLET, ORALLY DISINTEGRATING ORAL
Qty: 30 TABLET | Refills: 1 | Status: SHIPPED | OUTPATIENT
Start: 2022-05-11 | End: 2022-07-27

## 2022-05-11 RX ORDER — TIZANIDINE 2 MG/1
TABLET ORAL
Qty: 120 TABLET | Refills: 1 | Status: SHIPPED | OUTPATIENT
Start: 2022-05-11 | End: 2022-07-27

## 2022-05-13 RX ORDER — HYDROCODONE BITARTRATE AND ACETAMINOPHEN 10; 325 MG/1; MG/1
1 TABLET ORAL EVERY 6 HOURS PRN
Qty: 90 TABLET | Refills: 0 | Status: SHIPPED | OUTPATIENT
Start: 2022-05-13 | End: 2022-06-14 | Stop reason: SDUPTHER

## 2022-05-13 NOTE — TELEPHONE ENCOUNTER
----- Message from Miriam Fernandes, Patient Care Assistant sent at 5/13/2022 11:33 AM CDT -----  Regarding: refill  Contact: pt  Type:  RX Refill Request    Who Called:  pt   Refill or New Rx:  refill  RX Name and Strength:  HYDROcodone-acetaminophen (NORCO)  mg per tablet    How is the patient currently taking it? 3Xday  Is this a 30 day or 90 day RX:  90  Preferred Pharmacy with phone number:    [facility] - [place], [place] - [address] [address]  [address] [address] [address]  Phone: [phone] Fax: [phone]    local or Mail Order:  local  Ordering Provider:  Steve   Best Call Back Number:  [phone] (home) [phone] (work)    Additional Information:  please call pt to advise. Thanks!

## 2022-05-16 ENCOUNTER — OFFICE VISIT (OUTPATIENT)
Dept: PRIMARY CARE CLINIC | Facility: CLINIC | Age: 87
End: 2022-05-16
Payer: MEDICARE

## 2022-05-16 ENCOUNTER — PATIENT MESSAGE (OUTPATIENT)
Dept: ADMINISTRATIVE | Facility: HOSPITAL | Age: 87
End: 2022-05-16
Payer: MEDICARE

## 2022-05-16 VITALS
SYSTOLIC BLOOD PRESSURE: 134 MMHG | WEIGHT: 158.63 LBS | OXYGEN SATURATION: 97 % | HEART RATE: 68 BPM | DIASTOLIC BLOOD PRESSURE: 66 MMHG | HEIGHT: 60 IN | BODY MASS INDEX: 31.14 KG/M2 | RESPIRATION RATE: 18 BRPM

## 2022-05-16 DIAGNOSIS — R29.898 WEAKNESS OF LOWER EXTREMITY, UNSPECIFIED LATERALITY: ICD-10-CM

## 2022-05-16 DIAGNOSIS — M79.604 PAIN IN BOTH LOWER EXTREMITIES: ICD-10-CM

## 2022-05-16 DIAGNOSIS — M25.561 CHRONIC PAIN OF RIGHT KNEE: ICD-10-CM

## 2022-05-16 DIAGNOSIS — M85.80 OSTEOPENIA AFTER MENOPAUSE: ICD-10-CM

## 2022-05-16 DIAGNOSIS — Z78.0 OSTEOPENIA AFTER MENOPAUSE: ICD-10-CM

## 2022-05-16 DIAGNOSIS — M79.605 PAIN IN BOTH LOWER EXTREMITIES: ICD-10-CM

## 2022-05-16 DIAGNOSIS — G89.29 CHRONIC PAIN OF RIGHT KNEE: ICD-10-CM

## 2022-05-16 DIAGNOSIS — M51.36 DDD (DEGENERATIVE DISC DISEASE), LUMBAR: ICD-10-CM

## 2022-05-16 PROCEDURE — 3288F PR FALLS RISK ASSESSMENT DOCUMENTED: ICD-10-PCS | Mod: CPTII,S$GLB,, | Performed by: FAMILY MEDICINE

## 2022-05-16 PROCEDURE — 3288F FALL RISK ASSESSMENT DOCD: CPT | Mod: CPTII,S$GLB,, | Performed by: FAMILY MEDICINE

## 2022-05-16 PROCEDURE — 99214 PR OFFICE/OUTPT VISIT, EST, LEVL IV, 30-39 MIN: ICD-10-PCS | Mod: S$GLB,,, | Performed by: FAMILY MEDICINE

## 2022-05-16 PROCEDURE — 1101F PR PT FALLS ASSESS DOC 0-1 FALLS W/OUT INJ PAST YR: ICD-10-PCS | Mod: CPTII,S$GLB,, | Performed by: FAMILY MEDICINE

## 2022-05-16 PROCEDURE — 1159F PR MEDICATION LIST DOCUMENTED IN MEDICAL RECORD: ICD-10-PCS | Mod: CPTII,S$GLB,, | Performed by: FAMILY MEDICINE

## 2022-05-16 PROCEDURE — 3078F PR MOST RECENT DIASTOLIC BLOOD PRESSURE < 80 MM HG: ICD-10-PCS | Mod: CPTII,S$GLB,, | Performed by: FAMILY MEDICINE

## 2022-05-16 PROCEDURE — 1160F PR REVIEW ALL MEDS BY PRESCRIBER/CLIN PHARMACIST DOCUMENTED: ICD-10-PCS | Mod: CPTII,S$GLB,, | Performed by: FAMILY MEDICINE

## 2022-05-16 PROCEDURE — 1160F RVW MEDS BY RX/DR IN RCRD: CPT | Mod: CPTII,S$GLB,, | Performed by: FAMILY MEDICINE

## 2022-05-16 PROCEDURE — 1125F PR PAIN SEVERITY QUANTIFIED, PAIN PRESENT: ICD-10-PCS | Mod: CPTII,S$GLB,, | Performed by: FAMILY MEDICINE

## 2022-05-16 PROCEDURE — 3075F SYST BP GE 130 - 139MM HG: CPT | Mod: CPTII,S$GLB,, | Performed by: FAMILY MEDICINE

## 2022-05-16 PROCEDURE — 1157F ADVNC CARE PLAN IN RCRD: CPT | Mod: CPTII,S$GLB,, | Performed by: FAMILY MEDICINE

## 2022-05-16 PROCEDURE — 3075F PR MOST RECENT SYSTOLIC BLOOD PRESS GE 130-139MM HG: ICD-10-PCS | Mod: CPTII,S$GLB,, | Performed by: FAMILY MEDICINE

## 2022-05-16 PROCEDURE — 3078F DIAST BP <80 MM HG: CPT | Mod: CPTII,S$GLB,, | Performed by: FAMILY MEDICINE

## 2022-05-16 PROCEDURE — 99999 PR PBB SHADOW E&M-EST. PATIENT-LVL V: ICD-10-PCS | Mod: PBBFAC,,, | Performed by: FAMILY MEDICINE

## 2022-05-16 PROCEDURE — 1159F MED LIST DOCD IN RCRD: CPT | Mod: CPTII,S$GLB,, | Performed by: FAMILY MEDICINE

## 2022-05-16 PROCEDURE — 99214 OFFICE O/P EST MOD 30 MIN: CPT | Mod: S$GLB,,, | Performed by: FAMILY MEDICINE

## 2022-05-16 PROCEDURE — 1101F PT FALLS ASSESS-DOCD LE1/YR: CPT | Mod: CPTII,S$GLB,, | Performed by: FAMILY MEDICINE

## 2022-05-16 PROCEDURE — 99999 PR PBB SHADOW E&M-EST. PATIENT-LVL V: CPT | Mod: PBBFAC,,, | Performed by: FAMILY MEDICINE

## 2022-05-16 PROCEDURE — 1157F PR ADVANCE CARE PLAN OR EQUIV PRESENT IN MEDICAL RECORD: ICD-10-PCS | Mod: CPTII,S$GLB,, | Performed by: FAMILY MEDICINE

## 2022-05-16 PROCEDURE — 1125F AMNT PAIN NOTED PAIN PRSNT: CPT | Mod: CPTII,S$GLB,, | Performed by: FAMILY MEDICINE

## 2022-05-16 RX ORDER — IBANDRONATE SODIUM 150 MG/1
150 TABLET, FILM COATED ORAL
Qty: 3 TABLET | Refills: 3 | Status: SHIPPED | OUTPATIENT
Start: 2022-05-16 | End: 2023-01-23 | Stop reason: SDUPTHER

## 2022-05-16 NOTE — Clinical Note
Please call her daughter about today's visit.  I had intended to get an x-ray of her right knee but we got carried away on many topics and I forgot to let them know this before they left.  So if she wants to come in and get x-ray of that knee at her convenience she can.  Order entered.  It is not emergent.

## 2022-05-16 NOTE — PROGRESS NOTES
THIS DOCUMENT WAS MADE IN PART WITH VOICE RECOGNITION SOFTWARE.  OCCASIONALLY THIS SOFTWARE WILL MISINTERPRET WORDS OR PHRASES.      Primary Care Provider Appointment - [facility],   [facility] ([facility])      Patient ID: Summer Inman is a 86 y.o. female.    Summer was seen today for leg pain and results.    Diagnoses and all orders for this visit:    Pain in both lower extremities  Chronic pain of right knee  Weakness of lower extremity, unspecified laterality  DDD (degenerative disc disease), lumbar  She reports a mixed symptoms involving the lower extremities.  It sounds like she has some chronic right knee pain that is worsening.  She does have intermittent pain and weakness in both lower legs, possibly exacerbated by the pain in the knee but probably also related to her lumbar degenerative disc disease.  She had no immediate focal deficits today.  I recommended physical therapy but she declined.  I offered home health but she declined.  She did agree to start some mild exercises at home.  I also offered referral to orthopedic specifically to help with the right knee.  But she declined.  So she will continue to take hydrocodone as needed.  I really would like to see her do some therapy.  If symptoms fail to improve she should reconsider.  We could consider an MRI of the lumbar spine however that is unlikely to change my current recommendation as I would still likely recommend PT but if we did find significant worsening there she might be a candidate to consider pain management or other.  She can contact me sooner if she changes her mind.  Otherwise I will see her back in three months.    Will see about getting an x-ray of her right knee to see how bad the arthritis maybe.      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  I recommended that she begin vitamin-D supplements 2000 IU daily.  She may also consider taking calcium supplement, either a low-dose every day like  600 mg or a few times a week and should start this a few weeks before beginning the Boniva.  We did discuss precautions with the Boniva extensively.  She has no remaining teeth of her own and no active periodontal disease.  We did discuss administration and risk of esophageal irritation so she should take this as instructed once a month, 1st thing in the morning, on an empty stomach, with at least 8 oz of water and should remain upright for 30-45 minutes.  And wait at least that long before eating anything or taking her other medications.  If she develops any discomfort or difficulty swallowing she should stop and let us know.  -     ibandronate (BONIVA) 150 mg tablet; Take 1 tablet (150 mg total) by mouth every 30 days.        Follow Up:  Three months      Health Maintenance       Date Due Completion Date    Sign Pain Contract Never done ---    Aspirin/Antiplatelet Therapy Never done ---    Shingles Vaccine (1 of 2) Never done ---    COVID-19 Vaccine (4 - Booster for Moderna series) 04/16/2022 12/16/2021    Lipid Panel 07/16/2022 7/16/2021    TETANUS VACCINE 08/25/2024 8/25/2014    DEXA Scan 04/28/2025 4/28/2022              Subjective:     Chief Complaint   Patient presents with    Leg Pain     Rt. Leg pain, she thinks it is due to arthritis.     Results     Needs to discuss DEXA scan results and Vitamin D recommendations     I have reviewed the information entered by the ancillary staff regarding the chief complaint as well as the related history.    HPI    Patient is a/an 86 y.o.  female   RISK of ADMIT/ED: 35    Patient reports worsening leg pain bilaterally and some intermittent weakness.  Pain is vague and difficult to localize although she does seem to be having some localization in the right knee.  Other times they just feel weaker heavy.  She does not describe any claudication.  No cold extremities, no cyanotic extremities.  She does have chronic lumbar degenerative disc disease and back pain and  stiffness.  Activity level remains very low.  Typically if she stands too long she will develop some weakness in her legs which requires her to sit down and she will feel better after a while.    For complete problem list, past medical history, surgical history, social history, etc., see appropriate section in the electronic medical record    Review of Systems   Genitourinary: Negative.    Musculoskeletal: Positive for arthralgias, back pain and gait problem.       Objective     Physical Exam  Vitals reviewed.   Constitutional:       General: She is not in acute distress.     Appearance: She is well-developed. She is not diaphoretic.   HENT:      Head: Normocephalic and atraumatic.   Eyes:      General: No scleral icterus.  Pulmonary:      Effort: Pulmonary effort is normal. No respiratory distress.   Musculoskeletal:      Comments: She has an antalgic gait.  She walks with a walker.  There is no redness or significant swelling of either knee.  There are some arthritis changes noted of both knees.   Neurological:      Mental Status: She is alert and oriented to person, place, and time.   Psychiatric:         Behavior: Behavior normal.       Vitals:    05/16/22 1448   BP: 134/66   BP Location: Left arm   Patient Position: Sitting   BP Method: Medium (Manual)   Pulse: 68   Resp: 18   SpO2: 97%   Weight: 72 kg (158 lb 9.9 oz)   Height: 5' (1.524 m)       RECENT LABS:    Lab Results   Component Value Date    WBC 8.02 03/25/2022    HGB 11.6 (L) 03/25/2022    HCT 39.2 03/25/2022     03/25/2022    CHOL 227 (H) 07/16/2021    TRIG 105 07/16/2021    HDL 59 07/16/2021    ALT 10 07/16/2021    AST 15 07/16/2021     07/16/2021    K 4.4 07/16/2021     07/16/2021    CREATININE 0.9 07/16/2021    BUN 17 07/16/2021    CO2 21 (L) 07/16/2021    TSH 1.216 07/16/2021    GLUF 110 02/15/2006    HGBA1C 5.7 (H) 03/25/2022       Results for orders placed or performed in visit on 03/25/22   CBC Auto Differential   Result  Value Ref Range    WBC 8.02 3.90 - 12.70 K/uL    RBC 4.48 4.00 - 5.40 M/uL    Hemoglobin 11.6 (L) 12.0 - 16.0 g/dL    Hematocrit 39.2 37.0 - 48.5 %    MCV 88 82 - 98 fL    MCH 25.9 (L) 27.0 - 31.0 pg    MCHC 29.6 (L) 32.0 - 36.0 g/dL    RDW 16.3 (H) 11.5 - 14.5 %    Platelets 252 150 - 450 K/uL    MPV 11.4 9.2 - 12.9 fL    Immature Granulocytes 0.2 0.0 - 0.5 %    Gran # (ANC) 5.6 1.8 - 7.7 K/uL    Immature Grans (Abs) 0.02 0.00 - 0.04 K/uL    Lymph # 1.5 1.0 - 4.8 K/uL    Mono # 0.6 0.3 - 1.0 K/uL    Eos # 0.2 0.0 - 0.5 K/uL    Baso # 0.04 0.00 - 0.20 K/uL    nRBC 0 0 /100 WBC    Gran % 70.3 38.0 - 73.0 %    Lymph % 19.1 18.0 - 48.0 %    Mono % 8.0 4.0 - 15.0 %    Eosinophil % 1.9 0.0 - 8.0 %    Basophil % 0.5 0.0 - 1.9 %    Differential Method Automated    Hemoglobin A1C   Result Value Ref Range    Hemoglobin A1C 5.7 (H) 4.0 - 5.6 %    Estimated Avg Glucose 117 68 - 131 mg/dL

## 2022-05-17 ENCOUNTER — TELEPHONE (OUTPATIENT)
Dept: PRIMARY CARE CLINIC | Facility: CLINIC | Age: 87
End: 2022-05-17
Payer: MEDICARE

## 2022-05-17 NOTE — TELEPHONE ENCOUNTER
----- Message from Ludwig Wilson MD sent at 5/16/2022  5:21 PM CDT -----  Please call her daughter about today's visit.  I had intended to get an x-ray of her right knee but we got carried away on many topics and I forgot to let them know this before they left.  So if she wants to come in and get x-ray of that knee at her convenience she can.  Order entered.  It is not emergent.

## 2022-05-30 DIAGNOSIS — K21.9 GASTROESOPHAGEAL REFLUX DISEASE WITHOUT ESOPHAGITIS: ICD-10-CM

## 2022-05-30 DIAGNOSIS — R10.13 EPIGASTRIC PAIN: ICD-10-CM

## 2022-05-30 DIAGNOSIS — K44.9 HIATAL HERNIA: ICD-10-CM

## 2022-05-30 DIAGNOSIS — Z87.19 HISTORY OF GASTRITIS: ICD-10-CM

## 2022-05-30 RX ORDER — OMEPRAZOLE 40 MG/1
CAPSULE, DELAYED RELEASE ORAL
Qty: 90 CAPSULE | Refills: 3 | Status: SHIPPED | OUTPATIENT
Start: 2022-05-30 | End: 2023-06-29

## 2022-05-31 NOTE — TELEPHONE ENCOUNTER
No new care gaps identified.  [facility] Embedded Care Gaps. Reference number: [id number]. 5/30/2022   7:40:49 PM CDT

## 2022-05-31 NOTE — TELEPHONE ENCOUNTER
Refill Authorization Note   Summer Inman  is requesting a refill authorization.  Brief Assessment and Rationale for Refill:  Approve     Medication Therapy Plan:       Medication Reconciliation Completed: No   Comments:     No Care Gaps recommended.     Note composed:7:41 PM 05/30/2022

## 2022-06-03 ENCOUNTER — HOSPITAL ENCOUNTER (OUTPATIENT)
Dept: RADIOLOGY | Facility: HOSPITAL | Age: 87
Discharge: HOME | End: 2022-06-03
Attending: FAMILY MEDICINE
Payer: MEDICARE

## 2022-06-03 DIAGNOSIS — M25.561 CHRONIC PAIN OF RIGHT KNEE: ICD-10-CM

## 2022-06-03 DIAGNOSIS — G89.29 CHRONIC PAIN OF RIGHT KNEE: ICD-10-CM

## 2022-06-03 PROCEDURE — 73562 XR KNEE ORTHO RIGHT: ICD-10-PCS | Mod: 26,RT,, | Performed by: RADIOLOGY

## 2022-06-03 PROCEDURE — 73560 X-RAY EXAM OF KNEE 1 OR 2: CPT | Mod: TC,FY,PO,LT

## 2022-06-03 PROCEDURE — 73560 X-RAY EXAM OF KNEE 1 OR 2: CPT | Mod: 26,LT,, | Performed by: RADIOLOGY

## 2022-06-03 PROCEDURE — 73560 XR KNEE ORTHO RIGHT: ICD-10-PCS | Mod: 26,LT,, | Performed by: RADIOLOGY

## 2022-06-03 PROCEDURE — 73562 X-RAY EXAM OF KNEE 3: CPT | Mod: 26,RT,, | Performed by: RADIOLOGY

## 2022-06-14 RX ORDER — HYDROCODONE BITARTRATE AND ACETAMINOPHEN 10; 325 MG/1; MG/1
1 TABLET ORAL EVERY 6 HOURS PRN
Qty: 90 TABLET | Refills: 0 | Status: SHIPPED | OUTPATIENT
Start: 2022-06-14 | End: 2022-07-13 | Stop reason: SDUPTHER

## 2022-06-14 NOTE — TELEPHONE ENCOUNTER
----- Message from Nicol Garg sent at 6/14/2022  1:41 PM CDT -----  Regarding: Refill  Type: RX Refill Request    Who Called:AARON WING [[id number]]    RX Name and Strength:HYDROcodone-acetaminophen (NORCO)  mg per tablet    Preferred Pharmacy with phone number:[facility] - [place], [place] - [address] [facility] [address] [facility]    Would the patient rather a call back or a response via [facility]?call back    Best Call Back Number:[phone]    Additional Information:

## 2022-07-13 NOTE — TELEPHONE ENCOUNTER
----- Message from Brisa Anders sent at 7/13/2022  1:24 PM CDT -----  Contact: @Stefani  Type:  RX Refill Request    Who Called:  daughter   Refill or New Rx:  refill   RX Name and Strength:  HYDROcodone-acetaminophen (NORCO)  mg per tablet  How is the patient currently taking it? (ex. 1XDay):  as directed   Is this a 30 day or 90 day RX:  30  Preferred Pharmacy with phone number:    [facility] - [place] - [address] [address]  [address] [address]  [place] [address]  Phone: [phone] Fax: [phone]  Local or Mail Order:  local   Ordering Provider:  yulisa  Best Call Back Number: [phone]   Additional Information:  please advise-thank you

## 2022-07-14 RX ORDER — HYDROCODONE BITARTRATE AND ACETAMINOPHEN 10; 325 MG/1; MG/1
1 TABLET ORAL EVERY 6 HOURS PRN
Qty: 90 TABLET | Refills: 0 | Status: SHIPPED | OUTPATIENT
Start: 2022-07-14 | End: 2022-08-15 | Stop reason: SDUPTHER

## 2022-08-11 ENCOUNTER — OFFICE VISIT (OUTPATIENT)
Dept: PRIMARY CARE CLINIC | Facility: CLINIC | Age: 87
End: 2022-08-11
Payer: MEDICARE

## 2022-08-11 VITALS
HEIGHT: 60 IN | HEART RATE: 74 BPM | SYSTOLIC BLOOD PRESSURE: 130 MMHG | OXYGEN SATURATION: 96 % | RESPIRATION RATE: 18 BRPM | BODY MASS INDEX: 30.21 KG/M2 | WEIGHT: 153.88 LBS | DIASTOLIC BLOOD PRESSURE: 70 MMHG

## 2022-08-11 DIAGNOSIS — M51.36 DDD (DEGENERATIVE DISC DISEASE), LUMBAR: Chronic | ICD-10-CM

## 2022-08-11 DIAGNOSIS — K31.84 GASTROPARESIS: ICD-10-CM

## 2022-08-11 DIAGNOSIS — R10.9 CHRONIC ABDOMINAL PAIN: ICD-10-CM

## 2022-08-11 DIAGNOSIS — R52 CHRONIC PAIN OF MULTIPLE SITES: Chronic | ICD-10-CM

## 2022-08-11 DIAGNOSIS — G89.29 CHRONIC PAIN OF MULTIPLE SITES: Chronic | ICD-10-CM

## 2022-08-11 DIAGNOSIS — I10 ESSENTIAL HYPERTENSION: Primary | Chronic | ICD-10-CM

## 2022-08-11 DIAGNOSIS — G89.29 CHRONIC ABDOMINAL PAIN: ICD-10-CM

## 2022-08-11 DIAGNOSIS — R42 DIZZINESS AND GIDDINESS: ICD-10-CM

## 2022-08-11 DIAGNOSIS — F11.20 CHRONIC NARCOTIC DEPENDENCE: Chronic | ICD-10-CM

## 2022-08-11 PROCEDURE — 3075F SYST BP GE 130 - 139MM HG: CPT | Mod: CPTII,S$GLB,, | Performed by: FAMILY MEDICINE

## 2022-08-11 PROCEDURE — 1157F PR ADVANCE CARE PLAN OR EQUIV PRESENT IN MEDICAL RECORD: ICD-10-PCS | Mod: CPTII,S$GLB,, | Performed by: FAMILY MEDICINE

## 2022-08-11 PROCEDURE — 1160F RVW MEDS BY RX/DR IN RCRD: CPT | Mod: CPTII,S$GLB,, | Performed by: FAMILY MEDICINE

## 2022-08-11 PROCEDURE — 99999 PR PBB SHADOW E&M-EST. PATIENT-LVL V: ICD-10-PCS | Mod: PBBFAC,,, | Performed by: FAMILY MEDICINE

## 2022-08-11 PROCEDURE — 1101F PT FALLS ASSESS-DOCD LE1/YR: CPT | Mod: CPTII,S$GLB,, | Performed by: FAMILY MEDICINE

## 2022-08-11 PROCEDURE — 1157F ADVNC CARE PLAN IN RCRD: CPT | Mod: CPTII,S$GLB,, | Performed by: FAMILY MEDICINE

## 2022-08-11 PROCEDURE — 3078F PR MOST RECENT DIASTOLIC BLOOD PRESSURE < 80 MM HG: ICD-10-PCS | Mod: CPTII,S$GLB,, | Performed by: FAMILY MEDICINE

## 2022-08-11 PROCEDURE — 99215 OFFICE O/P EST HI 40 MIN: CPT | Mod: S$GLB,,, | Performed by: FAMILY MEDICINE

## 2022-08-11 PROCEDURE — 1126F AMNT PAIN NOTED NONE PRSNT: CPT | Mod: CPTII,S$GLB,, | Performed by: FAMILY MEDICINE

## 2022-08-11 PROCEDURE — 1159F PR MEDICATION LIST DOCUMENTED IN MEDICAL RECORD: ICD-10-PCS | Mod: CPTII,S$GLB,, | Performed by: FAMILY MEDICINE

## 2022-08-11 PROCEDURE — 1159F MED LIST DOCD IN RCRD: CPT | Mod: CPTII,S$GLB,, | Performed by: FAMILY MEDICINE

## 2022-08-11 PROCEDURE — 99999 PR PBB SHADOW E&M-EST. PATIENT-LVL V: CPT | Mod: PBBFAC,,, | Performed by: FAMILY MEDICINE

## 2022-08-11 PROCEDURE — 3288F FALL RISK ASSESSMENT DOCD: CPT | Mod: CPTII,S$GLB,, | Performed by: FAMILY MEDICINE

## 2022-08-11 PROCEDURE — 3288F PR FALLS RISK ASSESSMENT DOCUMENTED: ICD-10-PCS | Mod: CPTII,S$GLB,, | Performed by: FAMILY MEDICINE

## 2022-08-11 PROCEDURE — 1160F PR REVIEW ALL MEDS BY PRESCRIBER/CLIN PHARMACIST DOCUMENTED: ICD-10-PCS | Mod: CPTII,S$GLB,, | Performed by: FAMILY MEDICINE

## 2022-08-11 PROCEDURE — 1126F PR PAIN SEVERITY QUANTIFIED, NO PAIN PRESENT: ICD-10-PCS | Mod: CPTII,S$GLB,, | Performed by: FAMILY MEDICINE

## 2022-08-11 PROCEDURE — 3075F PR MOST RECENT SYSTOLIC BLOOD PRESS GE 130-139MM HG: ICD-10-PCS | Mod: CPTII,S$GLB,, | Performed by: FAMILY MEDICINE

## 2022-08-11 PROCEDURE — 1101F PR PT FALLS ASSESS DOC 0-1 FALLS W/OUT INJ PAST YR: ICD-10-PCS | Mod: CPTII,S$GLB,, | Performed by: FAMILY MEDICINE

## 2022-08-11 PROCEDURE — 3078F DIAST BP <80 MM HG: CPT | Mod: CPTII,S$GLB,, | Performed by: FAMILY MEDICINE

## 2022-08-11 PROCEDURE — 99215 PR OFFICE/OUTPT VISIT, EST, LEVL V, 40-54 MIN: ICD-10-PCS | Mod: S$GLB,,, | Performed by: FAMILY MEDICINE

## 2022-08-11 RX ORDER — OLOPATADINE HYDROCHLORIDE 1 MG/ML
1 SOLUTION/ DROPS OPHTHALMIC 2 TIMES DAILY
Qty: 5 ML | Refills: 2 | Status: SHIPPED | OUTPATIENT
Start: 2022-08-11 | End: 2023-05-04 | Stop reason: SDUPTHER

## 2022-08-11 RX ORDER — NITROGLYCERIN 0.4 MG/1
0.4 TABLET SUBLINGUAL EVERY 5 MIN PRN
Qty: 20 TABLET | Refills: 3 | Status: SHIPPED | OUTPATIENT
Start: 2022-08-11 | End: 2023-09-11

## 2022-08-11 NOTE — TELEPHONE ENCOUNTER
Please call her son or daughter with lab results.  Potassium is very low.  We need her to start potassium immediately.    If the patient's condition is worsening or if she is not able to take the potassium immediately then she should she should go to the emergency room to consider intravenous replacement    If feeling well then:  I sent in a prescription for 10 mEq.  She should take 2 right away this morning, then 1 pill every 8 hours for 3 days.  Then twice a day for 3 days.    She should come in Monday or Tuesday for a BMP.  This can be done in [place] it more convenient.    Note that this test was collected yesterday afternoon but not reported to us until this morning.   Stage 4: Number Of Blocks?: 1

## 2022-08-11 NOTE — PROGRESS NOTES
THIS DOCUMENT WAS MADE IN PART WITH VOICE RECOGNITION SOFTWARE.  OCCASIONALLY THIS SOFTWARE WILL MISINTERPRET WORDS OR PHRASES.      Primary Care Provider Appointment   [facility] ([facility])  [address], [place] [address]   Ph: [phone]  Fax: [phone]      Patient ID: Summer Inman is a 86 y.o. female.    ASSESSMENT/PLAN by Problem List:  Problem List Items Addressed This Visit     Essential hypertension - Primary (Chronic)     Stable, satisfactory           DDD (degenerative disc disease), lumbar (Chronic)    Chronic pain of multiple sites (Chronic)     She continues on hydrocodone for chronic pain.  We did discuss that this could be exacerbating her GI symptoms and she might consider trying to reduce the dose should some if tolerable.           Chronic narcotic dependence (Chronic)    Chronic abdominal pain     Patient has chronic abdominal pain that is likely multifactorial.  She has gastroparesis, she has a component of narcotic induced constipation.  Possibly IBS as well.  She has had GI consult in the past, CT scans in the past, colonoscopies.  Last colonoscopy was 2-3 years ago, last CT scan was one year ago.  Last GI consult was one year ago.  At that time they did recommend updating her EGD but she declined.    Her symptoms appear chronic and this does not appear to be worsening however certainly her chronic pain could mask development of a new acute problem but there is no sign of vomiting, no fever.  I strongly recommend referral back to gastroenterology but she states she does not want to go because they are going to continue to tell her the same thing and she is too old for medicines, etc..  I offered referral to an alternative GI clinic for evaluation but she declined.    What I can try to help her with is may be focusing on the constipation.  She she could benefit from something like Linzess however given age, other issues I worried this could  cause pain, cramping, ischemia of the gut.  I recommended that she take her MiraLax every day but she complains this causes diarrhea.  I recommend trying a lower doses for maintenance and she will consider this daily to avoid fluctuating between constipation and diarrhea which I think is happening.  Would also recommend a trial for two days of Dulcolax suppositories.  My thinking behind this is if we can stimulate movement from below and that gives her relief without any severe cramping or other sign of obstruction I could cautiously consider trying Linzess.  I am willing to work with her since she refuses see Gastroenterology however if anything changes or she does improve I would want her to follow back with Gastroenterology.           Gastroparesis      Other Visit Diagnoses     Dizziness and giddiness        Relevant Orders    CT Head With Contrast    Basic Metabolic Panel   Worsening disease with headache.  Recommend CT scan as a precaution.  Symptoms began a week ago so feel comfortable scheduling outpatient CT however if symptoms worsen she should go straight to the emergency room.     Also I refilled her nitroglycerin.  Previous prescription was outdated.  She is not having any active angina at the moment      Follow Up:  Three months    Fifty-eight minutes of total time spent on the encounter, which includes face to face time and non-face to face time preparing to see the patient (eg, review of tests), Obtaining and/or reviewing separately obtained history, Documenting clinical information in the electronic or other health record, Independently interpreting results (not separately reported) and communicating results to the patient/family/caregiver, or Care coordination (not separately reported).    Health Maintenance       Date Due Completion Date    Sign Pain Contract Never done ---    Aspirin/Antiplatelet Therapy Never done ---    Shingles Vaccine (1 of 2) Never done ---    COVID-19 Vaccine (4 - Booster  for Moderna series) 04/16/2022 12/16/2021    Lipid Panel 07/16/2022 7/16/2021    Influenza Vaccine (1) 09/01/2022 10/13/2021    Override on 11/11/2019: Done    TETANUS VACCINE 08/25/2024 8/25/2014    DEXA Scan 04/28/2025 4/28/2022            Subjective:     Chief Complaint   Patient presents with    Chronic Pain     3 month f/u visit     Dizziness     Feels very dizzy today, this has gotten worse over the past 2 days.      I have reviewed the information entered by the ancillary staff regarding the chief complaint as well as the related history.    HPI    Patient is a/an 86 y.o.  female   RISK of ADMIT/ED: 35    She returns for her regular follow-up visit for pain management but also review of chronic problems.  She has two primary concerns today  Her chronic abdominal pain and discomfort.  Extensive workup in the past, GI consult in the past, have not led to any relief.  See above for my more detailed thoughts    She also complains of dizziness.  She has intermittent dizziness a few times a week.  Although this last week it has been worse.  It has subsided some today.  Symptoms seem to be exacerbated by movement of her head but also by standing.  She does get intermittent headaches, posteriorly.  She has had no change in her speech, no trouble swallowing, no facial asymmetry, no weakness or change in strength balance or coordination.  She did try over-the-counter Dramamine which may have helped but did make her very tired.    For complete problem list, past medical history, surgical history, social history, etc., see appropriate section in the electronic medical record    Review of Systems   Constitutional: Positive for fatigue. Negative for appetite change and fever.   Respiratory: Negative.    Cardiovascular: Negative.    Gastrointestinal: Positive for abdominal pain, constipation and diarrhea. Negative for nausea and vomiting.   Genitourinary: Negative.    Musculoskeletal: Positive for arthralgias, back pain  and gait problem.   Neurological: Positive for dizziness, weakness and headaches. Negative for speech difficulty.       Objective     Physical Exam  Vitals reviewed.   Constitutional:       General: She is not in acute distress.     Appearance: She is not toxic-appearing.      Comments: Elderly  female.    HENT:      Head: Normocephalic and atraumatic.      Right Ear: Tympanic membrane, ear canal and external ear normal. There is no impacted cerumen.      Left Ear: Tympanic membrane, ear canal and external ear normal. There is no impacted cerumen.      Mouth/Throat:      Pharynx: Oropharynx is clear. No oropharyngeal exudate.   Eyes:      General: No scleral icterus.     Conjunctiva/sclera: Conjunctivae normal.   Neck:      Thyroid: No thyromegaly.   Cardiovascular:      Rate and Rhythm: Normal rate and regular rhythm.      Chest Wall: PMI is not displaced.      Heart sounds: Murmur heard.     No friction rub. No gallop.   Pulmonary:      Effort: Pulmonary effort is normal. No respiratory distress.      Comments: Mildly diminished breath sounds bilaterally.  No wheezes and no crackles  Abdominal:      Comments: Bowel sounds are present but somewhat hypoactive.  Abdomen is soft there is nonspecific mild tenderness.  There is no guarding or rebound tenderness.  No obvious masses although abdominal obesity reduces the sensitivity of the exam   Musculoskeletal:      Cervical back: Normal range of motion and neck supple.   Lymphadenopathy:      Cervical: No cervical adenopathy.   Skin:     Findings: No lesion or rash.   Neurological:      General: No focal deficit present.      Mental Status: She is alert and oriented to person, place, and time. Mental status is at baseline.      Cranial Nerves: No cranial nerve deficit.      Motor: No abnormal muscle tone.      Gait: Gait abnormal (Antalgic).      Deep Tendon Reflexes: Reflexes normal.      Comments: Patient was brought back in a wheelchair today.  She can  ambulate but is slow with antalgic gait       Vitals:    08/11/22 1517 08/11/22 1706   BP: (!) 144/72 130/70   BP Location: Right arm    Patient Position: Sitting    BP Method: Medium (Manual)    Pulse: 74    Resp: 18    SpO2: 96%    Weight: 69.8 kg (153 lb 14.1 oz)    Height: 5' (1.524 m)        RECENT LABS:    Lab Results   Component Value Date    WBC 8.02 03/25/2022    HGB 11.6 (L) 03/25/2022    HCT 39.2 03/25/2022     03/25/2022    CHOL 227 (H) 07/16/2021    TRIG 105 07/16/2021    HDL 59 07/16/2021    ALT 10 07/16/2021    AST 15 07/16/2021     07/16/2021    K 4.4 07/16/2021     07/16/2021    CREATININE 0.9 07/16/2021    BUN 17 07/16/2021    CO2 21 (L) 07/16/2021    TSH 1.216 07/16/2021    GLUF 110 02/15/2006    HGBA1C 5.7 (H) 03/25/2022       Results for orders placed or performed in visit on 03/25/22   CBC Auto Differential   Result Value Ref Range    WBC 8.02 3.90 - 12.70 K/uL    RBC 4.48 4.00 - 5.40 M/uL    Hemoglobin 11.6 (L) 12.0 - 16.0 g/dL    Hematocrit 39.2 37.0 - 48.5 %    MCV 88 82 - 98 fL    MCH 25.9 (L) 27.0 - 31.0 pg    MCHC 29.6 (L) 32.0 - 36.0 g/dL    RDW 16.3 (H) 11.5 - 14.5 %    Platelets 252 150 - 450 K/uL    MPV 11.4 9.2 - 12.9 fL    Immature Granulocytes 0.2 0.0 - 0.5 %    Gran # (ANC) 5.6 1.8 - 7.7 K/uL    Immature Grans (Abs) 0.02 0.00 - 0.04 K/uL    Lymph # 1.5 1.0 - 4.8 K/uL    Mono # 0.6 0.3 - 1.0 K/uL    Eos # 0.2 0.0 - 0.5 K/uL    Baso # 0.04 0.00 - 0.20 K/uL    nRBC 0 0 /100 WBC    Gran % 70.3 38.0 - 73.0 %    Lymph % 19.1 18.0 - 48.0 %    Mono % 8.0 4.0 - 15.0 %    Eosinophil % 1.9 0.0 - 8.0 %    Basophil % 0.5 0.0 - 1.9 %    Differential Method Automated    Hemoglobin A1C   Result Value Ref Range    Hemoglobin A1C 5.7 (H) 4.0 - 5.6 %    Estimated Avg Glucose 117 68 - 131 mg/dL

## 2022-08-11 NOTE — ASSESSMENT & PLAN NOTE
She continues on hydrocodone for chronic pain.  We did discuss that this could be exacerbating her GI symptoms and she might consider trying to reduce the dose should some if tolerable.

## 2022-08-11 NOTE — ASSESSMENT & PLAN NOTE
Patient has chronic abdominal pain that is likely multifactorial.  She has gastroparesis, she has a component of narcotic induced constipation.  Possibly IBS as well.  She has had GI consult in the past, CT scans in the past, colonoscopies.  Last colonoscopy was 2-3 years ago, last CT scan was one year ago.  Last GI consult was one year ago.  At that time they did recommend updating her EGD but she declined.    Her symptoms appear chronic and this does not appear to be worsening however certainly her chronic pain could mask development of a new acute problem but there is no sign of vomiting, no fever.  I strongly recommend referral back to gastroenterology but she states she does not want to go because they are going to continue to tell her the same thing and she is too old for medicines, etc..  I offered referral to an alternative GI clinic for evaluation but she declined.    What I can try to help her with is may be focusing on the constipation.  She she could benefit from something like Linzess however given age, other issues I worried this could cause pain, cramping, ischemia of the gut.  I recommended that she take her MiraLax every day but she complains this causes diarrhea.  I recommend trying a lower doses for maintenance and she will consider this daily to avoid fluctuating between constipation and diarrhea which I think is happening.  Would also recommend a trial for two days of Dulcolax suppositories.  My thinking behind this is if we can stimulate movement from below and that gives her relief without any severe cramping or other sign of obstruction I could cautiously consider trying Linzess.  I am willing to work with her since she refuses see Gastroenterology however if anything changes or she does improve I would want her to follow back with Gastroenterology.

## 2022-08-15 RX ORDER — HYDROCODONE BITARTRATE AND ACETAMINOPHEN 10; 325 MG/1; MG/1
1 TABLET ORAL EVERY 6 HOURS PRN
Qty: 90 TABLET | Refills: 0 | Status: SHIPPED | OUTPATIENT
Start: 2022-08-15 | End: 2022-09-15 | Stop reason: SDUPTHER

## 2022-08-15 NOTE — TELEPHONE ENCOUNTER
----- Message from Mike Encarnacion, Patient Care Assistant sent at 8/15/2022  1:40 PM CDT -----  Contact: Pt Family  Type:  RX Refill Request    Who Called:  Pt Family  Refill or New Rx:  Refill  RX Name and Strength:  HYDROcodone-acetaminophen (NORCO)  mg per tablet  How is the patient currently taking it? (ex. 1XDay):  As Directed  Is this a 30 day or 90 day RX:  30  Preferred Pharmacy with phone number:    [facility] - [place], [place] - [address] [address]  [address] [address] [address]  Phone: [phone] Fax: [phone]  Local or Mail Order:  local  Ordering Provider:  Steve Flores Call Back Number:  [phone]  Additional Information:  Please contact pt upon completion-Thank you~

## 2022-08-18 ENCOUNTER — LAB VISIT (OUTPATIENT)
Dept: LAB | Facility: HOSPITAL | Age: 87
End: 2022-08-18
Attending: FAMILY MEDICINE
Payer: MEDICARE

## 2022-08-18 DIAGNOSIS — R42 DIZZINESS AND GIDDINESS: ICD-10-CM

## 2022-08-18 LAB
ANION GAP SERPL CALC-SCNC: 10 MMOL/L (ref 8–16)
BUN SERPL-MCNC: 23 MG/DL (ref 8–23)
CALCIUM SERPL-MCNC: 9.5 MG/DL (ref 8.7–10.5)
CHLORIDE SERPL-SCNC: 105 MMOL/L (ref 95–110)
CO2 SERPL-SCNC: 23 MMOL/L (ref 23–29)
CREAT SERPL-MCNC: 1.1 MG/DL (ref 0.5–1.4)
EST. GFR  (NO RACE VARIABLE): 49 ML/MIN/1.73 M^2
GLUCOSE SERPL-MCNC: 113 MG/DL (ref 70–110)
POTASSIUM SERPL-SCNC: 5.3 MMOL/L (ref 3.5–5.1)
SODIUM SERPL-SCNC: 138 MMOL/L (ref 136–145)

## 2022-08-18 PROCEDURE — 36415 COLL VENOUS BLD VENIPUNCTURE: CPT | Mod: PO | Performed by: FAMILY MEDICINE

## 2022-08-18 PROCEDURE — 80048 BASIC METABOLIC PNL TOTAL CA: CPT | Mod: PO | Performed by: FAMILY MEDICINE

## 2022-08-19 ENCOUNTER — TELEPHONE (OUTPATIENT)
Dept: PRIMARY CARE CLINIC | Facility: CLINIC | Age: 87
End: 2022-08-19
Payer: MEDICARE

## 2022-08-19 ENCOUNTER — PATIENT MESSAGE (OUTPATIENT)
Dept: PRIMARY CARE CLINIC | Facility: CLINIC | Age: 87
End: 2022-08-19
Payer: MEDICARE

## 2022-08-19 NOTE — TELEPHONE ENCOUNTER
PA from [facility] for Olopatadine  HCL 0.1% eye drops was DENIED. Reason for denial was that the patient has not tried and failed one of the following: Azelastine, Zerviate, or Cromolyn drops.

## 2022-08-26 ENCOUNTER — TELEPHONE (OUTPATIENT)
Dept: PRIMARY CARE CLINIC | Facility: CLINIC | Age: 87
End: 2022-08-26
Payer: MEDICARE

## 2022-08-26 NOTE — TELEPHONE ENCOUNTER
----- Message from Vasquez Mazariegos sent at 8/26/2022 12:15 PM CDT -----   Name of Who is Calling:     What is the request in detail:  request call back in reference to discuss  lab/test results  Please contact to further discuss and advise      Can the clinic reply by [facility]:     What Number to Call Back [facility]:  [phone] / nathaly / mitch

## 2022-08-26 NOTE — TELEPHONE ENCOUNTER
"Spoke to patient's daughter, Kindra. States that patient is feeling better and not complaining of dizziness. Daughter states she knows that patient is going to ask her, "well what caused the dizziness?" Kindra wanted to be able to give patient a truthful answer. Please advise. Thanks. Kindra stated that she saw in the CT report something about chronic sinusitis.   "

## 2022-08-26 NOTE — TELEPHONE ENCOUNTER
We do not always know what causes dizziness.  It could be related to the sinuses/inner ear.  The scan did suggest some mucosal thickening in the sinuses.  But many of us have this simply from allergies.  But she could try some Flonase.  If symptoms return I would consider consulting with ENT.

## 2022-09-15 RX ORDER — HYDROCODONE BITARTRATE AND ACETAMINOPHEN 10; 325 MG/1; MG/1
1 TABLET ORAL EVERY 6 HOURS PRN
Qty: 90 TABLET | Refills: 0 | Status: SHIPPED | OUTPATIENT
Start: 2022-09-15 | End: 2022-10-14 | Stop reason: SDUPTHER

## 2022-09-15 NOTE — TELEPHONE ENCOUNTER
----- Message from Ramonita Hanson sent at 9/15/2022  1:40 PM CDT -----  Contact: Self  Type:  RX Refill Request    Who Called:  Patient  Refill or New Rx:  New Rx  RX Name and Strength:  HYDROcodone-acetaminophen (NORCO)  mg per tablet  How is the patient currently taking it? (ex. 1XDay):  As directed  Is this a 30 day or 90 day RX:  30  Preferred Pharmacy with phone number:    [facility] - [place] - [address] [address]  [address] [address] [place] [address]  Phone: [phone] Fax: [phone]  Local or Mail Order:  local  Ordering Provider:  Dr Wilson  Best Call Back Number:  [phone]  Additional Information:  Thank You

## 2022-09-22 ENCOUNTER — PATIENT MESSAGE (OUTPATIENT)
Dept: PRIMARY CARE CLINIC | Facility: CLINIC | Age: 87
End: 2022-09-22
Payer: MEDICARE

## 2022-09-22 NOTE — TELEPHONE ENCOUNTER
Spoke with Kindra. She stated that her mom has complained of low back pain, she is moaning, she will take a pain med.  Pt is able to ambulate. Pt is using walker or cane right now because she is in pain.    Kindra is asking of a chiropractor would help her mom.

## 2022-09-22 NOTE — TELEPHONE ENCOUNTER
Called and spoke with patients daughterKindra. Advised of information given by Dr. Wilson at this time. Per MD he is willing to order PT, which daughter states her mom has refused, and will refuse because she is stubborn. Daughter denies any recent falls or injury and states her mother is not even aware she reached out to the MD for chiropractor orders. Daughter advised if she needs further assistance to reach out to the clinic, she verbalized understanding at this time.

## 2022-09-22 NOTE — TELEPHONE ENCOUNTER
I generally prefer physical therapy especially in the elderly.  Because I know that is covered by insurance and I know they are a bit more gentle and careful.      If there was a fall or injury we should consider an x-ray to rule out a compression fracture.  She does have chronic low back pain so this is an exacerbation of her chronic pain I would consider PT and referral to pain management.  But like I said if there is an injury I would want to make sure there is nothing acute.

## 2022-10-14 RX ORDER — HYDROCODONE BITARTRATE AND ACETAMINOPHEN 10; 325 MG/1; MG/1
1 TABLET ORAL EVERY 6 HOURS PRN
Qty: 90 TABLET | Refills: 0 | Status: SHIPPED | OUTPATIENT
Start: 2022-10-14 | End: 2022-10-20 | Stop reason: SDUPTHER

## 2022-10-14 NOTE — TELEPHONE ENCOUNTER
----- Message from Mark Anthony Corona sent at 10/14/2022  1:53 PM CDT -----  Type:  RX Refill Request    Who Called:  Pt's Daughter Elizabeth  Refill or New Rx:  refill  RX Name and Strength:  HYDROcodone-acetaminophen (NORCO)  mg per tablet    How is the patient currently taking it? (ex. 1XDay):  as directed  Is this a 30 day or 90 day RX:  30  Preferred Pharmacy with phone number:      [facility] [place] - [address] [address]  [address] [address]  [place] [address]  Phone: [phone] Fax: [phone]      Local or Mail Order:  local  Ordering Provider:  Steve Flores Call Back Number:  [phone]  Additional Information:  Please advise -- thank you

## 2022-10-20 RX ORDER — HYDROCODONE BITARTRATE AND ACETAMINOPHEN 10; 325 MG/1; MG/1
1 TABLET ORAL EVERY 6 HOURS PRN
Qty: 90 TABLET | Refills: 0 | Status: SHIPPED | OUTPATIENT
Start: 2022-10-20 | End: 2022-11-17 | Stop reason: SDUPTHER
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
## 2022-10-20 NOTE — TELEPHONE ENCOUNTER
----- Message from Bambi Nash sent at 10/20/2022  3:25 PM CDT -----  Regarding: prescription refill  [phone] - call back . Hydrocodone refill ;  [facility] in [facility] .    Daughter getting upset not being refilled.    Thanks.    Bambi

## 2022-11-09 ENCOUNTER — PES CALL (OUTPATIENT)
Dept: ADMINISTRATIVE | Facility: CLINIC | Age: 87
End: 2022-11-09
Payer: MEDICARE

## 2022-11-10 ENCOUNTER — OFFICE VISIT (OUTPATIENT)
Dept: PRIMARY CARE CLINIC | Facility: CLINIC | Age: 87
End: 2022-11-10
Payer: MEDICARE

## 2022-11-10 VITALS — HEART RATE: 94 BPM | OXYGEN SATURATION: 94 % | DIASTOLIC BLOOD PRESSURE: 58 MMHG | SYSTOLIC BLOOD PRESSURE: 112 MMHG

## 2022-11-10 DIAGNOSIS — M51.36 DDD (DEGENERATIVE DISC DISEASE), LUMBAR: Chronic | ICD-10-CM

## 2022-11-10 DIAGNOSIS — J44.9 CHRONIC OBSTRUCTIVE PULMONARY DISEASE, UNSPECIFIED COPD TYPE: Chronic | ICD-10-CM

## 2022-11-10 DIAGNOSIS — G89.29 CHRONIC PAIN OF MULTIPLE SITES: Chronic | ICD-10-CM

## 2022-11-10 DIAGNOSIS — I27.20 PULMONARY HYPERTENSION: Chronic | ICD-10-CM

## 2022-11-10 DIAGNOSIS — R52 CHRONIC PAIN OF MULTIPLE SITES: Chronic | ICD-10-CM

## 2022-11-10 DIAGNOSIS — M54.17 LUMBOSACRAL RADICULOPATHY: Chronic | ICD-10-CM

## 2022-11-10 DIAGNOSIS — N18.32 STAGE 3B CHRONIC KIDNEY DISEASE: Primary | Chronic | ICD-10-CM

## 2022-11-10 DIAGNOSIS — F11.20 CHRONIC NARCOTIC DEPENDENCE: Chronic | ICD-10-CM

## 2022-11-10 DIAGNOSIS — I10 ESSENTIAL HYPERTENSION: Chronic | ICD-10-CM

## 2022-11-10 DIAGNOSIS — E78.5 HYPERLIPIDEMIA, UNSPECIFIED HYPERLIPIDEMIA TYPE: Chronic | ICD-10-CM

## 2022-11-10 DIAGNOSIS — I25.10 CORONARY ARTERY DISEASE INVOLVING NATIVE CORONARY ARTERY OF NATIVE HEART WITHOUT ANGINA PECTORIS: Chronic | ICD-10-CM

## 2022-11-10 PROCEDURE — 1160F PR REVIEW ALL MEDS BY PRESCRIBER/CLIN PHARMACIST DOCUMENTED: ICD-10-PCS | Mod: CPTII,S$GLB,, | Performed by: FAMILY MEDICINE

## 2022-11-10 PROCEDURE — 99999 PR PBB SHADOW E&M-EST. PATIENT-LVL II: ICD-10-PCS | Mod: PBBFAC,,, | Performed by: FAMILY MEDICINE

## 2022-11-10 PROCEDURE — 1157F PR ADVANCE CARE PLAN OR EQUIV PRESENT IN MEDICAL RECORD: ICD-10-PCS | Mod: CPTII,S$GLB,, | Performed by: FAMILY MEDICINE

## 2022-11-10 PROCEDURE — 3078F DIAST BP <80 MM HG: CPT | Mod: CPTII,S$GLB,, | Performed by: FAMILY MEDICINE

## 2022-11-10 PROCEDURE — 1157F ADVNC CARE PLAN IN RCRD: CPT | Mod: CPTII,S$GLB,, | Performed by: FAMILY MEDICINE

## 2022-11-10 PROCEDURE — 3074F SYST BP LT 130 MM HG: CPT | Mod: CPTII,S$GLB,, | Performed by: FAMILY MEDICINE

## 2022-11-10 PROCEDURE — 1160F RVW MEDS BY RX/DR IN RCRD: CPT | Mod: CPTII,S$GLB,, | Performed by: FAMILY MEDICINE

## 2022-11-10 PROCEDURE — 1159F MED LIST DOCD IN RCRD: CPT | Mod: CPTII,S$GLB,, | Performed by: FAMILY MEDICINE

## 2022-11-10 PROCEDURE — 99999 PR PBB SHADOW E&M-EST. PATIENT-LVL II: CPT | Mod: PBBFAC,,, | Performed by: FAMILY MEDICINE

## 2022-11-10 PROCEDURE — 3074F PR MOST RECENT SYSTOLIC BLOOD PRESSURE < 130 MM HG: ICD-10-PCS | Mod: CPTII,S$GLB,, | Performed by: FAMILY MEDICINE

## 2022-11-10 PROCEDURE — 3078F PR MOST RECENT DIASTOLIC BLOOD PRESSURE < 80 MM HG: ICD-10-PCS | Mod: CPTII,S$GLB,, | Performed by: FAMILY MEDICINE

## 2022-11-10 PROCEDURE — 1159F PR MEDICATION LIST DOCUMENTED IN MEDICAL RECORD: ICD-10-PCS | Mod: CPTII,S$GLB,, | Performed by: FAMILY MEDICINE

## 2022-11-10 PROCEDURE — 99213 OFFICE O/P EST LOW 20 MIN: CPT | Mod: S$GLB,,, | Performed by: FAMILY MEDICINE

## 2022-11-10 PROCEDURE — 99213 PR OFFICE/OUTPT VISIT, EST, LEVL III, 20-29 MIN: ICD-10-PCS | Mod: S$GLB,,, | Performed by: FAMILY MEDICINE

## 2022-11-11 ENCOUNTER — TELEPHONE (OUTPATIENT)
Dept: GASTROENTEROLOGY | Facility: CLINIC | Age: 87
End: 2022-11-11
Payer: MEDICARE

## 2022-11-11 NOTE — TELEPHONE ENCOUNTER
Called and spoke with patient's daughter who answered the phone, c/o her mom is having difficulty swallowing and is having abdominal pain, was seen in the ER recently, an appointment was offered for 11/14/2022 at 9 am and the daughter refused stating he mom is a night owl and will not come in that early, another appointment was offered to her and daughter states that she wants to call Dr. Wilson to see what he wants them to do, advised daughter that we could set the appointment and if they change her mind she can always cancel the appointment or if they will like to keep it then they can do so as well.  Daughter agreed to set up appointment for 12/09/2022.

## 2022-11-11 NOTE — TELEPHONE ENCOUNTER
Called patient in regards to sooner appointment on 11/14 at 9 am. Spoke with pt daughter and she stated she can not bring her for an appt that day due to work. Pt scheduled on 12/9 with Ramonita Koehler NP.

## 2022-11-14 ENCOUNTER — TELEPHONE (OUTPATIENT)
Dept: PRIMARY CARE CLINIC | Facility: CLINIC | Age: 87
End: 2022-11-14
Payer: MEDICARE

## 2022-11-14 ENCOUNTER — TELEPHONE (OUTPATIENT)
Dept: GASTROENTEROLOGY | Facility: CLINIC | Age: 87
End: 2022-11-14
Payer: MEDICARE

## 2022-11-14 DIAGNOSIS — K21.9 GASTROESOPHAGEAL REFLUX DISEASE, UNSPECIFIED WHETHER ESOPHAGITIS PRESENT: Primary | ICD-10-CM

## 2022-11-14 DIAGNOSIS — R13.10 DYSPHAGIA, UNSPECIFIED TYPE: ICD-10-CM

## 2022-11-14 RX ORDER — SUCRALFATE 1 G/10ML
1 SUSPENSION ORAL 4 TIMES DAILY
Qty: 560 ML | Refills: 0 | Status: SHIPPED | OUTPATIENT
Start: 2022-11-14 | End: 2022-11-28

## 2022-11-14 NOTE — TELEPHONE ENCOUNTER
She was having severe difficulty swallowing and throwing everything back up which is why I sent her to the emergency room so I would like her seen as soon as possible.  If however she is doing better then it might be reasonable to wait

## 2022-11-14 NOTE — TELEPHONE ENCOUNTER
Received message that pt called. Was not able to access the phone call. Called pt number, spoke with Kindra, pt dtr.     Kindra stated that pt went to ER. They really did not do anything and said everything was ok.  She asked about the medicine that would relax her esophagus. She would like that med called in.    Pharmacy confirmed. [facility], in [place], Kindra will pick this up and this is closer to her home.     Please advise.

## 2022-11-14 NOTE — TELEPHONE ENCOUNTER
"Requested Prescriptions   Pending Prescriptions Disp Refills     atenolol (TENORMIN) 100 MG tablet [Pharmacy Med Name: ATENOLOL 100MG      TAB] 90 tablet 2     Sig: TAKE 1 TABLET BY MOUTH ONCE DAILY    Beta-Blockers Protocol Passed - 3/13/2019  7:32 AM       Passed - Blood pressure under 140/90 in past 12 months    BP Readings from Last 3 Encounters:   01/29/19 104/70   12/10/18 128/68   11/01/18 98/54                Passed - Patient is age 6 or older       Passed - Recent (12 mo) or future (30 days) visit within the authorizing provider's specialty    Patient had office visit in the last 12 months or has a visit in the next 30 days with authorizing provider or within the authorizing provider's specialty.  See \"Patient Info\" tab in inbasket, or \"Choose Columns\" in Meds & Orders section of the refill encounter.             Passed - Medication is active on med list          " ----- Message from Serena Munroe MA sent at 11/14/2022  9:33 AM CST -----  Regarding: FW: medication    ----- Message -----  From: Whit Travis  Sent: 11/14/2022   9:10 AM CST  To: [facility] Staff  Subject: medication                                       MRN: [id number] her daughter called in the clinic, and said that her mom (our patient) can't eat or drink bc her esophagus is swelling. She was told by Dr. Wilson that he can send a script for it but as of now they haven't got anything yet. Please call back @ [phone]. Thank youuu

## 2022-11-14 NOTE — TELEPHONE ENCOUNTER
----- Message from Wilber Barraza sent at 11/14/2022  3:23 PM CST -----  Contact: Kevin at [phone]  Type:  Sooner Appointment Request    Caller is requesting a sooner appointment.  Caller declined first available appointment listed below.  Caller will not accept being placed on the waitlist and is requesting a message be sent to doctor.    Name of Caller:  pt's daughter  When is the first available appointment?  12/9/22  Symptoms:  abd pain dysphagia  Best Call Back Number:  [phone]    Additional Information:  Please call back and advise.

## 2022-11-14 NOTE — TELEPHONE ENCOUNTER
I will send in a prescription for sucralfate which may help to sooth the esophagus.  I will also place a referral to Gastroenterology, please help her schedule ASAP and let me know if there is a significant delay    In the meantime it is important that she focus on getting in enough water and stick to a soft diet, things that are easy for her to swallow and eat slowly.

## 2022-11-14 NOTE — TELEPHONE ENCOUNTER
Spoke with Kindra, discussed your message. Pt has appt on 12/9/2022 and pt is on a wait list.     Do you feel she needs to be seen prior to 12/9/2022?

## 2022-11-17 RX ORDER — HYDROCODONE BITARTRATE AND ACETAMINOPHEN 10; 325 MG/1; MG/1
1 TABLET ORAL EVERY 6 HOURS PRN
Qty: 90 TABLET | Refills: 0 | Status: SHIPPED | OUTPATIENT
Start: 2022-11-17 | End: 2022-11-17 | Stop reason: SDUPTHER

## 2022-11-17 RX ORDER — HYDROCODONE BITARTRATE AND ACETAMINOPHEN 10; 325 MG/1; MG/1
1 TABLET ORAL EVERY 6 HOURS PRN
Qty: 90 TABLET | Refills: 0 | Status: SHIPPED | OUTPATIENT
Start: 2022-11-17 | End: 2022-11-21 | Stop reason: SDUPTHER

## 2022-11-17 NOTE — TELEPHONE ENCOUNTER
----- Message from Aure Russell sent at 11/17/2022  9:27 AM CST -----  Regarding: Refill  Contact: Patient  Type:  RX Refill Request    Who Called:  Patient  Refill or New Rx:  Refill  RX Name and Strength:  NORCO)  mg   How is the patient currently taking it? (ex. 1XDay):  3 times a day  Is this a 30 day or 90 day RX:  30day  Preferred Pharmacy with phone number:      [facility] - [place], [place] - [address] [address]  [address] [address] [facility] [address]  Phone: [phone] Fax: [phone]    Local or Mail Order:  Local  Ordering Provider:  Dr Wilson  Best Call Back Number:  [phone] (home) [phone] (work)

## 2022-11-21 ENCOUNTER — TELEPHONE (OUTPATIENT)
Dept: PRIMARY CARE CLINIC | Facility: CLINIC | Age: 87
End: 2022-11-21
Payer: MEDICARE

## 2022-11-21 RX ORDER — HYDROCODONE BITARTRATE AND ACETAMINOPHEN 10; 325 MG/1; MG/1
1 TABLET ORAL EVERY 6 HOURS PRN
Qty: 90 TABLET | Refills: 0 | Status: SHIPPED | OUTPATIENT
Start: 2022-11-21 | End: 2022-12-19 | Stop reason: SDUPTHER

## 2022-11-21 NOTE — TELEPHONE ENCOUNTER
Called and spoke with patients daughter. States hydrocodone was sent to [facility] and neexds it sent to [facility] pharmacy. Phone number for [facility] [phone]. States the RX at [facility] was already cancelled.

## 2022-12-09 ENCOUNTER — OFFICE VISIT (OUTPATIENT)
Dept: GASTROENTEROLOGY | Facility: CLINIC | Age: 87
End: 2022-12-09
Payer: MEDICARE

## 2022-12-09 VITALS — WEIGHT: 149.25 LBS | HEIGHT: 60 IN | BODY MASS INDEX: 29.3 KG/M2

## 2022-12-09 DIAGNOSIS — R05.9 COUGH, UNSPECIFIED TYPE: ICD-10-CM

## 2022-12-09 DIAGNOSIS — R13.14 PHARYNGOESOPHAGEAL DYSPHAGIA: ICD-10-CM

## 2022-12-09 DIAGNOSIS — K31.84 GASTROPARESIS: ICD-10-CM

## 2022-12-09 DIAGNOSIS — R09.A2 GLOBUS SENSATION: ICD-10-CM

## 2022-12-09 DIAGNOSIS — R49.0 HOARSENESS OF VOICE: ICD-10-CM

## 2022-12-09 DIAGNOSIS — R11.2 NON-INTRACTABLE VOMITING WITH NAUSEA: ICD-10-CM

## 2022-12-09 DIAGNOSIS — R10.84 GENERALIZED ABDOMINAL PAIN: ICD-10-CM

## 2022-12-09 DIAGNOSIS — R63.4 WEIGHT LOSS: ICD-10-CM

## 2022-12-09 DIAGNOSIS — Z87.19 HISTORY OF CHRONIC CONSTIPATION: ICD-10-CM

## 2022-12-09 DIAGNOSIS — K92.1 BLACK STOOL: ICD-10-CM

## 2022-12-09 DIAGNOSIS — J02.9 SORE THROAT: ICD-10-CM

## 2022-12-09 DIAGNOSIS — R10.13 EPIGASTRIC PAIN: ICD-10-CM

## 2022-12-09 DIAGNOSIS — Z87.19 HISTORY OF GASTROESOPHAGEAL REFLUX (GERD): Primary | ICD-10-CM

## 2022-12-09 DIAGNOSIS — R19.7 INTERMITTENT DIARRHEA: ICD-10-CM

## 2022-12-09 PROCEDURE — 3288F PR FALLS RISK ASSESSMENT DOCUMENTED: ICD-10-PCS | Mod: CPTII,S$GLB,, | Performed by: NURSE PRACTITIONER

## 2022-12-09 PROCEDURE — 1160F PR REVIEW ALL MEDS BY PRESCRIBER/CLIN PHARMACIST DOCUMENTED: ICD-10-PCS | Mod: CPTII,S$GLB,, | Performed by: NURSE PRACTITIONER

## 2022-12-09 PROCEDURE — 1126F PR PAIN SEVERITY QUANTIFIED, NO PAIN PRESENT: ICD-10-PCS | Mod: CPTII,S$GLB,, | Performed by: NURSE PRACTITIONER

## 2022-12-09 PROCEDURE — 3288F FALL RISK ASSESSMENT DOCD: CPT | Mod: CPTII,S$GLB,, | Performed by: NURSE PRACTITIONER

## 2022-12-09 PROCEDURE — 99999 PR PBB SHADOW E&M-EST. PATIENT-LVL IV: ICD-10-PCS | Mod: PBBFAC,,, | Performed by: NURSE PRACTITIONER

## 2022-12-09 PROCEDURE — 1160F RVW MEDS BY RX/DR IN RCRD: CPT | Mod: CPTII,S$GLB,, | Performed by: NURSE PRACTITIONER

## 2022-12-09 PROCEDURE — 1101F PR PT FALLS ASSESS DOC 0-1 FALLS W/OUT INJ PAST YR: ICD-10-PCS | Mod: CPTII,S$GLB,, | Performed by: NURSE PRACTITIONER

## 2022-12-09 PROCEDURE — 1157F PR ADVANCE CARE PLAN OR EQUIV PRESENT IN MEDICAL RECORD: ICD-10-PCS | Mod: CPTII,S$GLB,, | Performed by: NURSE PRACTITIONER

## 2022-12-09 PROCEDURE — 1126F AMNT PAIN NOTED NONE PRSNT: CPT | Mod: CPTII,S$GLB,, | Performed by: NURSE PRACTITIONER

## 2022-12-09 PROCEDURE — 99999 PR PBB SHADOW E&M-EST. PATIENT-LVL IV: CPT | Mod: PBBFAC,,, | Performed by: NURSE PRACTITIONER

## 2022-12-09 PROCEDURE — 1101F PT FALLS ASSESS-DOCD LE1/YR: CPT | Mod: CPTII,S$GLB,, | Performed by: NURSE PRACTITIONER

## 2022-12-09 PROCEDURE — 1159F MED LIST DOCD IN RCRD: CPT | Mod: CPTII,S$GLB,, | Performed by: NURSE PRACTITIONER

## 2022-12-09 PROCEDURE — 99214 OFFICE O/P EST MOD 30 MIN: CPT | Mod: S$GLB,,, | Performed by: NURSE PRACTITIONER

## 2022-12-09 PROCEDURE — 99214 PR OFFICE/OUTPT VISIT, EST, LEVL IV, 30-39 MIN: ICD-10-PCS | Mod: S$GLB,,, | Performed by: NURSE PRACTITIONER

## 2022-12-09 PROCEDURE — 1159F PR MEDICATION LIST DOCUMENTED IN MEDICAL RECORD: ICD-10-PCS | Mod: CPTII,S$GLB,, | Performed by: NURSE PRACTITIONER

## 2022-12-09 PROCEDURE — 1157F ADVNC CARE PLAN IN RCRD: CPT | Mod: CPTII,S$GLB,, | Performed by: NURSE PRACTITIONER

## 2022-12-09 NOTE — PROGRESS NOTES
Subjective:       Patient ID: Summer Inman is a 87 y.o. female Body mass index is 29.15 kg/m².    Chief Complaint: Dysphagia      Established patient of Dr. Nguyen & myself.    Patient is here with a daughter, whom assisted with history. Patient wants to schedule EGD & colonoscopy.    Gastroesophageal Reflux  She complains of abdominal pain (chronic for several years; epigastric pain radiates to generalized abdomen; occurs daily; described as aching, went to the ER for it a few weeks ago, has improved), choking (sometimes; denies ever needing heimlich manuever or having to seek medical assistant to relieve symptom), coughing (occurs with eating mostly; productive of mucus at times), dysphagia (CHIEF COMPLAINT: with food, pills & liquids; reports had some improvement after egd with dilation but since neck surgery has had trouble swallowing), globus sensation (occasional), heartburn (daily, reports she forgets to take omeprazole), a hoarse voice (occasional), nausea (occasional, improving; zofran prn helps), a sore throat (occasional burning) and water brash. She reports no belching, no chest pain or no early satiety. This is a chronic problem. Episode onset: started several years ago. The heartburn does not wake her from sleep. The symptoms are aggravated by certain foods (tomatoes). Associated symptoms include melena (black stool; denies pepto or iron use) and weight loss (lost about 10 lbs since ~5/2022). Pertinent negatives include no fatigue. Risk factors include obesity (denies NSAID use). She has tried a PPI, a histamine-2 antagonist and head elevation (prilosec 40 mg once daily PRN- takes most days; PAST: zantac) for the symptoms. Past procedures include an EGD.     Review of Systems   Constitutional:  Positive for weight loss (lost about 10 lbs since ~5/2022). Negative for appetite change, chills, fatigue and fever.        Takes a half tablet of norco daily   HENT:  Positive for hoarse voice (occasional),  sore throat (occasional burning) and trouble swallowing (eating mostly soft diet & food pureed, liquids, and seeing speech therapist).    Respiratory:  Positive for cough (occurs with eating mostly; productive of mucus at times) and choking (sometimes; denies ever needing heimlich manuever or having to seek medical assistant to relieve symptom). Negative for chest tightness and shortness of breath.    Cardiovascular:  Negative for chest pain and palpitations.   Gastrointestinal:  Positive for abdominal pain (chronic for several years; epigastric pain radiates to generalized abdomen; occurs daily; described as aching, went to the ER for it a few weeks ago, has improved), constipation (history of constipation, Bowel movements once every other day; controlled with diet and taking glycolax 17 grams PRN- about once every 2 days), diarrhea (loose stools at times), dysphagia (CHIEF COMPLAINT: with food, pills & liquids; reports had some improvement after egd with dilation but since neck surgery has had trouble swallowing), heartburn (daily, reports she forgets to take omeprazole), melena (black stool; denies pepto or iron use), nausea (occasional, improving; zofran prn helps) and vomiting (nightly; emesis of liquid & food; denies bright red blood or coffee ground emesis). Negative for abdominal distention, anal bleeding, blood in stool and rectal pain.        Bowel movements daily of formed stool; taking glycolax 17 grams PRN- about once a week   Genitourinary:  Negative for difficulty urinating, dysuria, flank pain, frequency, hematuria and pelvic pain.   Musculoskeletal:  Negative for back pain.   Neurological:  Negative for weakness.       No LMP recorded. Patient has had a hysterectomy.    Past Medical History:   Diagnosis Date    Anticoagulant long-term use     ASA 81mg    Arthritis     Asthma     Atrial premature beats     Benign neoplasm of adrenal gland     Cholelithiasis     CKD (chronic kidney disease)     Colon  polyp     COPD (chronic obstructive pulmonary disease)     Coronary artery disease     nonocclusive disease, no prior intervention    Depression     Dizziness     and balance issues    First degree AV block     Gastroesophageal reflux disease     General anesthetics causing adverse effect in therapeutic use     hard to wake up    Hepatomegaly     Hyperlipidemia     Hypertension     Impaired mobility     uses walker or cane    Neck pain     into chest and arms with numbness in arms    Schatzki's ring     Seasonal allergies     Seizures     Last one recently    Stroke     tia    Trouble in sleeping     Wears dentures     upper    Wears glasses      Past Surgical History:   Procedure Laterality Date    APPENDECTOMY      CATARACT EXTRACTION      CERVICAL FUSION  3/2016 Dr. Cuevas    CHOLECYSTECTOMY  09/18/2019    COLONOSCOPY  2008 Dr. Nguyen    internal hemorrhoids otherwise normal findings; repeat in 4 years    COLONOSCOPY N/A 10/29/2020    Dr. Nguyen;  Location: [facility]; Multiple 5 to 10 mm polyps in the descending colon, in the transverse colon & in the ascending colon removed, diverticulosis, hemorrhoids; Repeat colonoscopy is not recommended for surveillance. biopsy: tubular adenomas & TUBULOVILLOUS ADENOMA    ESOPHAGEAL DILATION N/A 10/29/2020    Procedure: DILATION, ESOPHAGUS;  Surgeon: Lucas Nguyen MD;  Location: [facility];  Service: Endoscopy;  Laterality: N/A;    ESOPHAGOGASTRODUODENOSCOPY      ESOPHAGOGASTRODUODENOSCOPY N/A 10/29/2020    Procedure: EGD (ESOPHAGOGASTRODUODENOSCOPY);  Surgeon: Lucas Nguyen MD;  Location: [facility];  Service: Endoscopy;  Laterality: N/A; Normal esophagus. Dilated. gastritis; biopsy: stomach- reactive gastropathy, h pylori negative    EYE SURGERY      phaco bilateral    HYSTERECTOMY      partial hysterectomy    LAPAROSCOPIC CHOLECYSTECTOMY N/A 09/18/2019    Procedure: CHOLECYSTECTOMY, LAPAROSCOPIC;  Surgeon: Eric Mathias MD;  Location: [facility];  Service:  General;  Laterality: N/A;    mesh implant      TONSILLECTOMY      UPPER GASTROINTESTINAL ENDOSCOPY   Dr. Nguyen    UPPER GASTROINTESTINAL ENDOSCOPY  2016    Dr. Nguyen    UPPER GASTROINTESTINAL ENDOSCOPY  2018    Dr. Nguyen: small hiatal hernia, gastritis, esophageal dilation performed; biopsy: stomach and esophagus- unremarkable, negative for nicholson's, h pylori negative     Family History   Problem Relation Age of Onset    Heart disease Mother     Colon cancer Mother 74    Hypertension Daughter     Heart disease Father     Hypertension Son     Diabetes Son     Arthritis Son     Rheum arthritis Daughter     Cancer Daughter         Thyroid and Breast    Glaucoma Neg Hx     Crohn's disease Neg Hx     Ulcerative colitis Neg Hx     Stomach cancer Neg Hx     Esophageal cancer Neg Hx      Social History     Tobacco Use    Smoking status: Former     Packs/day: 0.50     Years: 19.00     Pack years: 9.50     Types: Cigarettes     Start date: 3/12/1984     Quit date: 10/8/2013     Years since quittin.2    Smokeless tobacco: Never   Substance Use Topics    Alcohol use: No    Drug use: Yes     Types: Hydrocodone     Wt Readings from Last 10 Encounters:   22 67.7 kg (149 lb 4 oz)   11/10/22 69 kg (152 lb 1.9 oz)   22 69.8 kg (153 lb 14.1 oz)   22 72 kg (158 lb 9.9 oz)   22 71 kg (156 lb 8.4 oz)   22 71.4 kg (157 lb 6.5 oz)   22 71.7 kg (158 lb 1.1 oz)   21 71.9 kg (158 lb 8.2 oz)   21 70.7 kg (155 lb 13.8 oz)   21 70.7 kg (155 lb 13.8 oz)       Lab Results   Component Value Date    WBC 5.39 11/10/2022    HGB 12.6 11/10/2022    HCT 41.0 11/10/2022    MCV 85 11/10/2022     11/10/2022     CMP  Sodium   Date Value Ref Range Status   11/10/2022 134 (L) 136 - 145 mmol/L Final     Potassium   Date Value Ref Range Status   11/10/2022 4.3 3.5 - 5.1 mmol/L Final     Chloride   Date Value Ref Range Status   11/10/2022 101 95 - 110 mmol/L Final     CO2    Date Value Ref Range Status   11/10/2022 24 22 - 31 mmol/L Final     Glucose   Date Value Ref Range Status   11/10/2022 118 (H) 70 - 110 mg/dL Final     Comment:     The ADA recommends the following guidelines for fasting glucose:    Normal:       less than 100 mg/dL    Prediabetes:  100 mg/dL to 125 mg/dL    Diabetes:     126 mg/dL or higher       BUN   Date Value Ref Range Status   11/10/2022 16 7 - 18 mg/dL Final     Creatinine   Date Value Ref Range Status   11/10/2022 0.96 0.50 - 1.40 mg/dL Final     Calcium   Date Value Ref Range Status   11/10/2022 9.2 8.4 - 10.2 mg/dL Final     Total Protein   Date Value Ref Range Status   11/10/2022 7.5 6.0 - 8.4 g/dL Final     Albumin   Date Value Ref Range Status   11/10/2022 4.0 3.5 - 5.2 g/dL Final     Total Bilirubin   Date Value Ref Range Status   11/10/2022 0.4 0.2 - 1.3 mg/dL Final     Alkaline Phosphatase   Date Value Ref Range Status   11/10/2022 143 38 - 145 U/L Final     AST ([place])   Date Value Ref Range Status   01/31/2016 24 14 - 36 U/L Final     AST   Date Value Ref Range Status   11/10/2022 28 14 - 36 U/L Final     ALT   Date Value Ref Range Status   11/10/2022 15 0 - 35 U/L Final     Anion Gap   Date Value Ref Range Status   11/10/2022 9 8 - 16 mmol/L Final     eGFR if    Date Value Ref Range Status   07/16/2021 >60.0 >60 mL/min/1.73 m^2 Final     eGFR if non    Date Value Ref Range Status   07/16/2021 58.9 (A) >60 mL/min/1.73 m^2 Final     Comment:     Calculation used to obtain the estimated glomerular filtration  rate (eGFR) is the CKD-EPI equation.        Lab Results   Component Value Date    LIPASE 20 02/26/2018     Lab Results   Component Value Date    TSH 1.216 07/16/2021     Lab Results   Component Value Date    ZTHLEAAK69 320 09/14/2009     10/16/2020 serum magnesium level WNL    Reviewed prior medical records including radiology report of 11/10/2022 CT abdomen pelvis; 5/3/2021 MRI pelvis; 4/20/2021  "pelvic ultrasound; 4/15/2021 gastric emptying study "significant delay"; 7/30/2019 abdominal ultrasound; & endoscopy history (see surgical history).    Objective:      Physical Exam  Vitals and nursing note reviewed.   Constitutional:       General: She is not in acute distress.     Appearance: Normal appearance. She is well-developed. She is not diaphoretic.      Comments: Patient using a walker for assistance with ambulation.   HENT:      Mouth/Throat:      Comments: Patient is wearing a face mask, which covers patient's mouth and nose, due to COVID 19 concerns.  Eyes:      General: No scleral icterus.     Conjunctiva/sclera: Conjunctivae normal.      Pupils: Pupils are equal, round, and reactive to light.   Pulmonary:      Effort: Pulmonary effort is normal. No respiratory distress.      Breath sounds: Normal breath sounds. No wheezing.   Abdominal:      General: Bowel sounds are normal. There is no distension or abdominal bruit.      Palpations: Abdomen is soft. Abdomen is not rigid. There is no mass.      Tenderness: There is no abdominal tenderness. There is no guarding or rebound. Negative signs include Marroquin's sign and McBurney's sign.   Skin:     General: Skin is warm and dry.      Coloration: Skin is not pale.      Findings: No erythema or rash.      Comments: Non-jaundiced   Neurological:      Mental Status: She is alert and oriented to person, place, and time.   Psychiatric:         Behavior: Behavior normal.         Thought Content: Thought content normal.         Judgment: Judgment normal.       Assessment:       1. History of gastroesophageal reflux (GERD)    2. Pharyngoesophageal dysphagia    3. History of gastroparesis    4. Non-intractable vomiting with nausea    5. History of chronic constipation    6. Intermittent diarrhea    7. Weight loss    8. Black stool    9. Epigastric pain    10. Generalized abdominal pain    11. Cough, unspecified type    12. Globus sensation    13. Hoarseness of voice  "   14. Sore throat        Plan:       History of gastroesophageal reflux (GERD)  - schedule EGD, discussed procedure with patient, including risks and benefits, patient verbalized understanding  - CONTINUE PRILOSEC 40 MG ONCE DAILY AS DIRECTED  - avoid/minimize use of NSAIDs- since they can cause GI upset, bleeding and/or ulcers. If NSAID must be taken, recommend take with food.    Pharyngoesophageal dysphagia  - schedule EGD, discussed procedure with patient and possible esophageal dilation may be performed during procedure if indicated, patient verbalized understanding  - educated patient to eat smaller more frequent meals and to eat slowly and advised to eat a soft diet.  - possible UGI/esophagram/esophageal manometry if symptoms persist    History of gastroparesis  - schedule EGD, discussed procedure with patient, including risks and benefits, patient verbalized understanding  - Recommend small frequent meals instead of 3 big meals a day, low fat meals & low residue diet.  Avoid: high fiber (insoluble fiber), red meats, dairy, and non-digestible solids (peels, fruit pulp, etc). Avoid NSAIDs and narcotics.  -Discussed about possible medical therapy and discussed that this would be managed by Dr. Nguyen, lastly surgical options are available at [facility] (Dr. Juliano Ann), patient verbalized understanding  - Previously discussed case with Dr. Nguyen; Dr. Nguyen agrees with below management plan and reports patient is not a candidate for most of the medications. Dr. Nguyen reports if her symptoms persist, possible trial of Cytotec. Recommend EGD to reassess for this condition.    Non-intractable vomiting with nausea  - schedule EGD, discussed procedure with patient, including risks and benefits, patient verbalized understanding  - CONTINUE ZOFRAN PRN AS DIRECTED  - discussed with patient that a side effect of narcotic pain medications is NAUSEA, advised patient to talk to provider who manages pain  medication, patient verbalized understanding    History of chronic constipation & Intermittent diarrhea  - CONTINUE OTC MIRALAX (aka glycolax) 17 GRAMS DAILY PRN constipation  - schedule Colonoscopy, discussed procedure with the patient, including risks and benefits, patient verbalized understanding  - Recommend daily exercise as tolerated & adequate water intake (six 8-oz glasses of water daily).  -Recommend taking an OTC stool softener such as Colace as directed to avoid hard stools and straining with bowel movements PRN  -Recommend trying OTC MiraLax once daily (17g PO) as directed  - If no improvement with above recommendations, try intermittently dosed Dulcolax OTC as directed (every 3-4  days) PRN to facilitate bowel movements  -If still no improvement with these measures, call/follow-up  - discussed with patient that a side effect of narcotic pain medications is constipation, advised patient to talk to provider who manages pain medication, patient verbalized understanding  - discussed with patient that certain medications, such as magnesium, may be contributing to symptoms. I recommend follow-up with provider who manages medication to discuss about possible alternative therapy, patient verbalized understanding    Weight loss  - schedule EGD, discussed procedure with patient, including risks and benefits, patient verbalized understanding  - schedule Colonoscopy, discussed procedure with the patient, including risks and benefits, patient verbalized understanding  - encouraged PO intake and daily calorie counts to ensure adequate nutrition is taken in, recommend at least 2,000 calories a day  - recommend nutritional drinks, such as Boost, Ensure or Glucerna, to supplement nutrition needs    Black stool & Epigastric pain  - schedule EGD, discussed procedure with patient, including risks and benefits, patient verbalized understanding  - avoid/minimize use of NSAIDs- since they can cause GI upset, bleeding and/or  ulcers. If NSAID must be taken, recommend take with food.    Generalized abdominal pain  - schedule EGD, discussed procedure with patient, including risks and benefits, patient verbalized understanding  - schedule Colonoscopy, discussed procedure with the patient, including risks and benefits, patient verbalized understanding    Globus sensation  - schedule EGD, discussed procedure with patient, including risks and benefits, patient verbalized understanding    Cough, unspecified type, Hoarseness of voice & Sore throat  Recommend follow-up with Primary Care Provider/ENT for continued evaluation and management.    Follow up in about 1 month (around 1/9/2023), or if symptoms worsen or fail to improve.    If no improvement in symptoms or symptoms worsen, call/follow-up at clinic or go to ER.        36 minutes of total time spent on the encounter, which includes face to face time and non-face to face time preparing to see the patient (e.g., review of tests), Obtaining and/or reviewing separately obtained history, Documenting clinical information in the electronic or other health record, Independently interpreting results (not separately reported) and communicating results to the patient/family/caregiver, or Care coordination (not separately reported).

## 2022-12-09 NOTE — PATIENT INSTRUCTIONS
"GERD (Adult)    The esophagus is a tube that carries food from the mouth to the stomach. A valve at the lower end of the esophagus prevents stomach acid from flowing upward. When this valve doesn't work properly, stomach contents may repeatedly flow back up (reflux) into the esophagus. This is called gastroesophageal reflux disease (GERD). GERD can irritate the esophagus. It can cause problems with swallowing or breathing. In severe cases, GERD can cause recurrent pneumonia or other serious problems.  Symptoms of reflux include burning, pressure or sharp pain in the upper abdomen or mid to lower chest. The pain can spread to the neck, back, or shoulder. There may be belching, an acid taste in the back of the throat, chronic cough, or sore throat or hoarseness. GERD symptoms often occur during the day after a big meal. They can also occur at night when lying down.   Home care  Lifestyle changes can help reduce symptoms. If needed, medicines may be prescribed. Symptoms often improve with treatment, but if treatment is stopped, the symptoms often return after a few months. So most persons with GERD will need to continue treatment.  Lifestyle changes  Limit or avoid fatty, fried, and spicy foods, as well as coffee, chocolate, mint, and foods with high acid content such as tomatoes and citrus fruit and juices (orange, grapefruit, lemon).  Dont eat large meals, especially at night. Frequent, smaller meals are best. Do not lie down right after eating. And dont eat anything 3 hours before going to bed.  Avoid drinking alcohol and smoking. As much as possible, stay away from second hand smoke.  If you are overweight, losing weight will reduce symptoms.   Avoid wearing tight clothing around your stomach area.  If your symptoms occur during sleep, use a foam wedge to elevate your upper body (not just your head.) Or, place 4" blocks under the head of your bed.  Medicines  If needed, medicines can help relieve the symptoms of " GERD and prevent damage to the esophagus. Discuss a medicine plan with your healthcare provider. This may include one or more of the following medicines:  Antacids to help neutralize the normal acids in your stomach.  Acid blockers (H2 blockers) to decrease acid production.  Acid inhibitors (PPIs) to decrease acid production in a different way than the blockers. They may work better, but can take a little longer to take effect.  Take an antacid 30-60 minutes after eating and at bedtime, but not at the same time as an acid blocker.  Try not to take medicines such as ibuprofen and aspirin. If you are taking aspirin for your heart or other medical reasons, talk to your healthcare provider about stopping it.  Follow-up care  Follow up with your healthcare provider or as advised by our staff.  When to seek medical advice  Call your healthcare provider if any of the following occur:  Stomach pain gets worse or moves to the lower right abdomen (appendix area)  Chest pain appears or gets worse, or spreads to the back, neck, shoulder, or arm  Frequent vomiting (cant keep down liquids)  Blood in the stool or vomit (red or black in color)  Feeling weak or dizzy  Fever of 100.4ºF (38ºC) or higher, or as directed by your healthcare provider  Date Last Reviewed: 6/23/2015  © 8547-1837 [employer]. [address] [place] [address], [place] [address]. All rights reserved. This information is not intended as a substitute for professional medical care. Always follow your healthcare professional's instructions.         Discharge Instructions: Eating a Soft Diet  You have been prescribed a soft diet (also called gastrointestinal soft diet or bland diet). This reduces the amount of work your digestive tract has to do. It also reduces the chance that your digestive tract will be irritated by the food you eat. A soft diet is prescribed for people with digestive problems. The diet consists of foods that are tender, mildly seasoned,  and easy to digest. While on this diet, you should not eat fried or spicy foods, or raw fruits and vegetables. Also avoid alcoholic beverages.  General guidelines  Eat in a calm, relaxed atmosphere. How you eat may be as important as what you eat. Dont rush while eating. Chew your food slowly and thoroughly, and swallow slowly.  Eat small frequent meals throughout the day, but dont eat within 2 hours of bedtime.  Avoid any foods that cause discomfort.  Dont use NSAIDs (nonsteroidal anti-inflammatory drugs), such as aspirin, and ibuprofen. Also avoid medicine that contain aspirin. NSAIDs can cause ulcers and delay or prevent ulcer healing.  Use antacids as needed, but keep in mind that magnesium-containing antacids may cause diarrhea.  Foods to eat  Cream of wheat and cream of rice  Cooked white rice  Mashed potatoes, and boiled potatoes without skin  Plain pasta and noodles  Plain white crackers (such as no-salt soda crackers)  White bread  Applesauce  Cooked fruits without skins or seeds  Mild juices, such as apple and grape  Bananas  Cooked or mashed vegetables without stems and seeds  Carrots  Summer squash (zucchini, yellow squash)  Winter squash (acorn, butternut, spaghetti squash)  Cottage cheese  Mild hard or soft cheeses  Custard  Yogurt without seeds or nuts  Milk (you may need lactose-free milk)  Ice cream without seeds or nuts  Smooth peanut butter  Eggs  Fish, turkey, chicken, or other meat that is not tough or stringy  Tofu  Foods to avoid  Nuts and seeds  Snack foods, such as the following:  Chocolate-containing snacks, candy, pastries, or cakes.  Potato chips (plain, barbecued, or other flavors)  Taco chips or nachos  Corn chips  Popcorn, popcorn cakes, or rice cakes  Crackers with nuts, seeds, or spicy seasonings  French fries  Fried or greasy foods  Whole-grain breads, rolls, and crackers  Breads and rolls with nuts, seeds, or bran  Bran and granola cereals  Berries with seeds, such as  strawberries, raspberries, and blackberries  Acidic fruits, such as oranges, grapefruits, lily, limes, and pineapples  Raw vegetables  Mild or hot peppers  Sauerkraut and pickled vegetables  Tomatoes or tomato products, such as tomato paste, tomato sauce, and tomato juice  Barbecue sauce  Spicy or flavored cheeses, such as jalapeño and black pepper cheese  Crunchy peanut butter  Dried cooked beans, such as mays, kidney, or navy beans  The following meats:  Fried or greasy meats  Processed, spicy meats, such as sausage, stout, ham, and lunch meats  Ribs and other meats with barbecue sauce  Tough or stringy meats, such as corned beef or beef jerky  Fluids to avoid  Alcoholic beverages  Coffee and regular teas  Pankaj and other drinks with caffeine  Cranberry, orange, pineapple, and grapefruit juice  Lemonade  Vegetable juice  Whole milk, if you are lactose intolerant  Follow-up  Make a follow-up appointment with a dietitian as directed by our staff.  Date Last Reviewed: 6/21/2015  © 8489-1913 [employer]. [address] [place] [address]. All rights reserved. This information is not intended as a substitute for professional medical care. Always follow your healthcare professional's instructions.           Gastroparesis     Gastroparesis means that food and fluids move too slowly out of the stomach into the duodenum.   Gastroparesis (also called delayed gastric emptying) happens when the stomach takes longer than normal to empty of food. This is due to a problem with motility (the movement of the muscles in the digestive tract). For many people, gastroparesis is a lifelong condition. But treatment can help relieve symptoms and prevent complications. Read on to learn more about gastroparesis and how it can be managed.  How gastroparesis develops  With normal motility, signals from nerves tell the stomach muscles when to contract. These muscles move food from the stomach into the duodenum (the  first part of the small bowel). With gastroparesis, the nerves or muscles are damaged. This causes motility to slow down or stop completely. As a result, food cannot move from the stomach properly. This delayed emptying can cause nausea, vomiting, and other symptoms. Malnutrition can result. Bezoars (hardened lumps of food) can form in the stomach and cause other complications as well.  Causes of gastroparesis  Gastroparesis can be caused by any of the following:  Diabetes  Surgery involving any of the digestive organs, such as the stomach and bowels  Certain medicines, such as strong pain medicines (narcotics)  Certain conditions, such as systemic scleroderma, Parkinson disease, and thyroid disease  After a viral illness  In many cases, the cause of gastroparesis cannot be found.  Signs and symptoms of gastroparesis  These can include:  Nausea and vomiting  Feeling full quickly when eating  Belly pain  Heartburn  Belly bloating  Weight loss  Loss of appetite  High and low blood sugar levels (in people with diabetes)  Diagnosing gastroparesis  Your healthcare provider will ask about your symptoms and health history. Youll also be examined. In addition, blood tests and X-rays are often done to check your health and rule out other problems. To confirm the problem, you may need other tests as well. These can include:  Upper endoscopy. This is done to see inside the stomach and duodenum. For the test, an endoscope is used. This is a thin, flexible tube with a tiny camera on the end. Its inserted through the mouth and down into the stomach and duodenum.  Upper gastrointestinal (GI) series. This is done to take X-rays of the upper GI tract from the mouth to the small bowel. For the test, a substance called barium is used. The barium coats the upper GI tract so that it will show up clearly on X-rays.  Gastric emptying scan. This is done to measure how quickly food leaves the stomach. For the test, a meal containing a  harmless radioactive substance (tracer) is eaten. Then scans of the stomach are done. The tracer shows up clearly on the scans and shows the movement of the food through the stomach.  Antroduodenal manometry. This test gives pressure measurements of the stomach and small intestine to check how the contractions are working.  Newer tests. These are being created and include breath tests and wireless capsule studies   Treating gastroparesis  The goal of treatment is to help you manage your condition. Treatment may include one or more of the following:  Dietary changes. You may need to make changes to your eating habits and daily diet. For instance, your healthcare provider may instruct you to eat small meals throughout the day. Doing this can keep you from feeling full too quickly. You may be placed on a liquid or soft diet. This means youll eat liquid foods or foods that are mashed or put through a . In addition, you may need to avoid foods high in fats and fiber. These can slow digestion. For more help with your diet, your healthcare provider can refer you to a dietitian. In severe cases, you may need a feeding tube. This sends liquid food or medicine directly to your small bowel, bypassing the stomach.  Treating diabetes. If you are diabetic, it is important to control your blood sugar. High sugar levels worsen gastroparesis.   Medicines. These can help manage symptoms, such as nausea and vomiting. They can also improve motility. Each medicine has specific risks and side effects. Your doctor can tell you more about any medicine that is prescribed for you.  Surgery. You may need to have a tube surgically inserted into the stomach. The tube removes excess air and fluid. This can relieve severe symptoms of nausea and vomiting. In rare cases, other surgery may be needed on the stomach or small bowel. This is to create a new passageway for food to be emptied from the stomach.  Gastric electrical stimulation.  This treatment is done less often and may not be available. Your healthcare provider can tell you more about this treatment if it is a choice for you.  Diabetes and gastroparesis  If you have diabetes, gastroparesis can make it harder to manage your blood sugar level. Youll need to take extra steps in your treatment to prevent complications. Work with your healthcare provider to learn what you can do to protect your health. For more information, contact the American Diabetes Association, www.diabetes.org.   Long-term concerns   With treatment, most people can manage their symptoms and maintain their usual routines. If your symptoms are moderate to severe, you may need to see your healthcare provider more often for checkups. Also, other treatments will likely be needed.  Date Last Reviewed: 7/14/2016 © 2000-2017 [employer]. [address] [place] [address], [place] [address]. All rights reserved. This information is not intended as a substitute for professional medical care. Always follow your healthcare professional's instructions.

## 2022-12-19 RX ORDER — HYDROCODONE BITARTRATE AND ACETAMINOPHEN 10; 325 MG/1; MG/1
1 TABLET ORAL EVERY 6 HOURS PRN
Qty: 90 TABLET | Refills: 0 | Status: SHIPPED | OUTPATIENT
Start: 2022-12-19 | End: 2023-01-17 | Stop reason: SDUPTHER

## 2022-12-19 NOTE — TELEPHONE ENCOUNTER
----- Message from April Thaddeus sent at 12/19/2022  9:03 AM CST -----  Regarding: med refill  Her daughter called, and stated that her mom needs a refill for HYDROcodone-acetaminophen (NORCO)  mg per tablet, please send the script to her pharmacy. Thank you

## 2022-12-29 RX ORDER — NIRMATRELVIR AND RITONAVIR 300-100 MG
KIT ORAL
Qty: 30 TABLET | Refills: 0 | Status: SHIPPED | OUTPATIENT
Start: 2022-12-29 | End: 2023-02-02

## 2022-12-29 RX ORDER — BENZONATATE 100 MG/1
100 CAPSULE ORAL 3 TIMES DAILY PRN
Qty: 30 CAPSULE | Refills: 1 | Status: SHIPPED | OUTPATIENT
Start: 2022-12-29 | End: 2023-01-08

## 2022-12-29 NOTE — TELEPHONE ENCOUNTER
----- Message from Bambi Nash sent at 12/29/2022  4:42 PM CST -----  Regarding: covid 19 positive  [phone]- call back; Daughter needing advise if they need to send her mom to the hospital. She tested positive for Covid 15 minutes ago ( 2 days ago was negative) was exposed from her grandson; no fever but she is coughing and having diarrhea.    Carlyn     No

## 2022-12-29 NOTE — TELEPHONE ENCOUNTER
With positive test and within few days and Paxlovid is reasonable.  She will need to double check with the pharmacist to make sure there are not any drug interactions that did not show up in our system.

## 2022-12-29 NOTE — TELEPHONE ENCOUNTER
Spoke with Kindra, pt dtr. Pt tested + for covid. 2 days ago she was negative.     Denies temp  Pt has a cough and diarrhea.     Pharmacy confirmed, [facility] in [place],   Tessalon perles pended    Family is asking about Paxlovid.    Explained to make sure pt is urinating every 4-6 hrs.   Pt may not want to eat, that is ok, pt must drink, suggested smart water and gatorade, she can freeze small amounts and break up for ice chips.     Explained to call back tomorrow if pt needed anything else.   Kindra verbalized understanding.

## 2023-01-17 NOTE — TELEPHONE ENCOUNTER
----- Message from Nanda Evans sent at 1/17/2023 11:13 AM CST -----  Contact: [phone]  Type:  RX Refill Request    Who Called: Pt  Refill or New Rx:Refill  RX Name and Strength:HYDROcodone-acetaminophen (NORCO)  mg per tablet  How is the patient currently taking it? (ex. 1XDay):3xDay as needed  Is this a 30 day or 90 day RX:30 day  Preferred Pharmacy with phone number:    [facility] - [place], [place] - [address] [facility] [address] [facility]  [address] [facility] [address] [facility] [address]  Phone: [phone] Fax: [phone]     Local or Mail Order:Local  Ordering Provider:Ludwig Wilson  Would the patient rather a call back or a response via [facility]? call  Best Call Back Number:[phone]/ Pt's daughter Kindra  Additional Information: Please call refill in to pt's pharmacy.         auscultated w/ stethoscope

## 2023-01-18 RX ORDER — HYDROCODONE BITARTRATE AND ACETAMINOPHEN 10; 325 MG/1; MG/1
1 TABLET ORAL EVERY 6 HOURS PRN
Qty: 90 TABLET | Refills: 0 | Status: SHIPPED | OUTPATIENT
Start: 2023-01-18 | End: 2023-01-20 | Stop reason: SDUPTHER

## 2023-01-20 RX ORDER — HYDROCODONE BITARTRATE AND ACETAMINOPHEN 10; 325 MG/1; MG/1
1 TABLET ORAL EVERY 6 HOURS PRN
Qty: 90 TABLET | Refills: 0 | Status: SHIPPED | OUTPATIENT
Start: 2023-01-20 | End: 2023-02-20 | Stop reason: SDUPTHER

## 2023-01-23 RX ORDER — IBANDRONATE SODIUM 150 MG/1
150 TABLET, FILM COATED ORAL
Qty: 3 TABLET | Refills: 3 | Status: SHIPPED | OUTPATIENT
Start: 2023-01-23 | End: 2023-05-04

## 2023-02-02 ENCOUNTER — OFFICE VISIT (OUTPATIENT)
Dept: PRIMARY CARE CLINIC | Facility: CLINIC | Age: 88
End: 2023-02-02
Payer: MEDICARE

## 2023-02-02 VITALS
HEART RATE: 84 BPM | BODY MASS INDEX: 29.19 KG/M2 | OXYGEN SATURATION: 95 % | DIASTOLIC BLOOD PRESSURE: 64 MMHG | RESPIRATION RATE: 18 BRPM | HEIGHT: 60 IN | WEIGHT: 148.69 LBS | SYSTOLIC BLOOD PRESSURE: 122 MMHG

## 2023-02-02 DIAGNOSIS — I25.10 CORONARY ARTERY DISEASE INVOLVING NATIVE CORONARY ARTERY OF NATIVE HEART WITHOUT ANGINA PECTORIS: Chronic | ICD-10-CM

## 2023-02-02 DIAGNOSIS — F11.20 CHRONIC NARCOTIC DEPENDENCE: Chronic | ICD-10-CM

## 2023-02-02 DIAGNOSIS — N39.0 URINARY TRACT INFECTION WITHOUT HEMATURIA, SITE UNSPECIFIED: ICD-10-CM

## 2023-02-02 DIAGNOSIS — I70.0 ATHEROSCLEROSIS OF AORTA: Chronic | ICD-10-CM

## 2023-02-02 DIAGNOSIS — F33.0 DEPRESSION, MAJOR, RECURRENT, MILD: ICD-10-CM

## 2023-02-02 DIAGNOSIS — I47.10 SVT (SUPRAVENTRICULAR TACHYCARDIA): ICD-10-CM

## 2023-02-02 DIAGNOSIS — M50.30 DDD (DEGENERATIVE DISC DISEASE), CERVICAL: ICD-10-CM

## 2023-02-02 DIAGNOSIS — G40.909 SEIZURE DISORDER: ICD-10-CM

## 2023-02-02 DIAGNOSIS — R52 CHRONIC PAIN OF MULTIPLE SITES: Chronic | ICD-10-CM

## 2023-02-02 DIAGNOSIS — G89.29 CHRONIC ABDOMINAL PAIN: ICD-10-CM

## 2023-02-02 DIAGNOSIS — R10.9 CHRONIC ABDOMINAL PAIN: ICD-10-CM

## 2023-02-02 DIAGNOSIS — I10 ESSENTIAL HYPERTENSION: Chronic | ICD-10-CM

## 2023-02-02 DIAGNOSIS — E78.5 HYPERLIPIDEMIA, UNSPECIFIED HYPERLIPIDEMIA TYPE: Chronic | ICD-10-CM

## 2023-02-02 DIAGNOSIS — M54.17 LUMBOSACRAL RADICULOPATHY: Chronic | ICD-10-CM

## 2023-02-02 DIAGNOSIS — J44.9 CHRONIC OBSTRUCTIVE PULMONARY DISEASE, UNSPECIFIED COPD TYPE: Chronic | ICD-10-CM

## 2023-02-02 DIAGNOSIS — N18.32 STAGE 3B CHRONIC KIDNEY DISEASE: Primary | Chronic | ICD-10-CM

## 2023-02-02 DIAGNOSIS — I27.20 PULMONARY HYPERTENSION: Chronic | ICD-10-CM

## 2023-02-02 DIAGNOSIS — G89.29 CHRONIC PAIN OF MULTIPLE SITES: Chronic | ICD-10-CM

## 2023-02-02 DIAGNOSIS — M51.36 DDD (DEGENERATIVE DISC DISEASE), LUMBAR: Chronic | ICD-10-CM

## 2023-02-02 PROCEDURE — 1125F PR PAIN SEVERITY QUANTIFIED, PAIN PRESENT: ICD-10-PCS | Mod: HCNC,CPTII,S$GLB, | Performed by: FAMILY MEDICINE

## 2023-02-02 PROCEDURE — 1125F AMNT PAIN NOTED PAIN PRSNT: CPT | Mod: HCNC,CPTII,S$GLB, | Performed by: FAMILY MEDICINE

## 2023-02-02 PROCEDURE — 1100F PTFALLS ASSESS-DOCD GE2>/YR: CPT | Mod: HCNC,CPTII,S$GLB, | Performed by: FAMILY MEDICINE

## 2023-02-02 PROCEDURE — 1160F PR REVIEW ALL MEDS BY PRESCRIBER/CLIN PHARMACIST DOCUMENTED: ICD-10-PCS | Mod: HCNC,CPTII,S$GLB, | Performed by: FAMILY MEDICINE

## 2023-02-02 PROCEDURE — 3288F PR FALLS RISK ASSESSMENT DOCUMENTED: ICD-10-PCS | Mod: HCNC,CPTII,S$GLB, | Performed by: FAMILY MEDICINE

## 2023-02-02 PROCEDURE — 99999 PR PBB SHADOW E&M-EST. PATIENT-LVL V: CPT | Mod: PBBFAC,HCNC,, | Performed by: FAMILY MEDICINE

## 2023-02-02 PROCEDURE — 99999 PR PBB SHADOW E&M-EST. PATIENT-LVL V: ICD-10-PCS | Mod: PBBFAC,HCNC,, | Performed by: FAMILY MEDICINE

## 2023-02-02 PROCEDURE — 99214 OFFICE O/P EST MOD 30 MIN: CPT | Mod: HCNC,S$GLB,, | Performed by: FAMILY MEDICINE

## 2023-02-02 PROCEDURE — 99499 RISK ADDL DX/OHS AUDIT: ICD-10-PCS | Mod: HCNC,S$GLB,, | Performed by: FAMILY MEDICINE

## 2023-02-02 PROCEDURE — 99214 PR OFFICE/OUTPT VISIT, EST, LEVL IV, 30-39 MIN: ICD-10-PCS | Mod: HCNC,S$GLB,, | Performed by: FAMILY MEDICINE

## 2023-02-02 PROCEDURE — 1160F RVW MEDS BY RX/DR IN RCRD: CPT | Mod: HCNC,CPTII,S$GLB, | Performed by: FAMILY MEDICINE

## 2023-02-02 PROCEDURE — 3288F FALL RISK ASSESSMENT DOCD: CPT | Mod: HCNC,CPTII,S$GLB, | Performed by: FAMILY MEDICINE

## 2023-02-02 PROCEDURE — 99499 UNLISTED E&M SERVICE: CPT | Mod: HCNC,S$GLB,, | Performed by: FAMILY MEDICINE

## 2023-02-02 PROCEDURE — 1100F PR PT FALLS ASSESS DOC 2+ FALLS/FALL W/INJURY/YR: ICD-10-PCS | Mod: HCNC,CPTII,S$GLB, | Performed by: FAMILY MEDICINE

## 2023-02-02 PROCEDURE — 1157F PR ADVANCE CARE PLAN OR EQUIV PRESENT IN MEDICAL RECORD: ICD-10-PCS | Mod: HCNC,CPTII,S$GLB, | Performed by: FAMILY MEDICINE

## 2023-02-02 PROCEDURE — 1159F MED LIST DOCD IN RCRD: CPT | Mod: HCNC,CPTII,S$GLB, | Performed by: FAMILY MEDICINE

## 2023-02-02 PROCEDURE — 1157F ADVNC CARE PLAN IN RCRD: CPT | Mod: HCNC,CPTII,S$GLB, | Performed by: FAMILY MEDICINE

## 2023-02-02 PROCEDURE — 1159F PR MEDICATION LIST DOCUMENTED IN MEDICAL RECORD: ICD-10-PCS | Mod: HCNC,CPTII,S$GLB, | Performed by: FAMILY MEDICINE

## 2023-02-02 NOTE — PROGRESS NOTES
THIS DOCUMENT WAS MADE IN PART WITH VOICE RECOGNITION SOFTWARE.  OCCASIONALLY THIS SOFTWARE WILL MISINTERPRET WORDS OR PHRASES.      Primary Care Provider Appointment   [facility] ([facility])  [address], [place] [address]   Ph: [phone]  Fax: [phone]      Patient ID: Summer Inman is a 87 y.o. female.    ASSESSMENT/PLAN by Problem List:  Problem List Items Addressed This Visit       Essential hypertension (Chronic)     Stable, continue to monitor.  Maintain low-sodium diet.         Chronic kidney disease, stage III (moderate) - Primary (Chronic)     Chronic condition, stable, avoid nephrotoxic drugs including NSAIDs.  Will monitor         Hyperlipidemia (Chronic)     Stable, continue to monitor         Coronary artery disease involving native coronary artery of native heart without angina pectoris (Chronic)    Atherosclerosis of aorta (Chronic)     Stable, she will continue to follow with Cardiology.  No she has been intolerant to multiple statins so must continue to focus on other risk factor reduction.         Chronic obstructive pulmonary disease (Chronic)     Stable without recent exacerbation continue current medications and report any changes         Pulmonary hypertension (Chronic)     Currently stable without sign of progression.  She has mild dyspnea with exertion which is chronic         DDD (degenerative disc disease), lumbar (Chronic)    Lumbosacral radiculopathy (Chronic)    Chronic pain of multiple sites (Chronic)    Chronic narcotic dependence (Chronic)     She has chronic pain related to degenerative disc disease as well as osteoarthritis in other areas.  She remains on hydrocodone under supervision and follows regularly.  Current condition is stable.         Chronic abdominal pain     I discussed my thoughts in detail at my last progress note.  Since then she did consult with Gastroenterology.  Reviewed their notes.  They recommended a number  "of test including upper and lower endoscopy but patient has not proceeded with those.      I recommendation was to focus on treating the constipation...         Seizure disorder (Chronic)     Stable, no recent seizures.  She continues to follow with Neurology         Depression, major, recurrent, mild     Overall stable but mild persistent symptoms.  No suicidal thoughts.  Continue sertraline, she has good family support and they will report any changes         SVT (supraventricular tachycardia)     History of SVT.  No signs or symptoms of recent exacerbation          Other Visit Diagnoses       DDD (degenerative disc disease), cervical        Relevant Orders    X-Ray Cervical Spine 2 or 3 Views (Completed)    Urinary tract infection without hematuria, site unspecified        Relevant Orders    Urinalysis Microscopic (Completed)    Urinalysis (Completed)    Urine Culture High Risk (Completed)            Follow Up:  Three months      Advance Care Planning     Date: 02/02/2023  Living will on file.  Discussed, recommend reviewing make sure everything is appropriate.  Also would recommend completing HPA/healthcare representative.  Paperwork offered.             Subjective:     Chief Complaint   Patient presents with    Hypertension     F/u visit     Hospital Follow Up     Was seen in the hospital for dizziness on 01/16/23, still having some trouble with that.    Armpit Pain     Bilateral arm pit pain x " a while " the Rt side is worse.      I have reviewed the information entered by the ancillary staff regarding the chief complaint as well as the related history.    HPI    Patient is a/an 87 y.o.  female   RISK of ADMIT/ED: 70    Recent ER, weak, falling, found UTI  Still recurring abd pain, but mildly better, but states did  pass several kidney stones, and has been some what better since    Some improvement in constipation, recommend taking miralax daily    Continuing back pain as well as some bilateral shoulder pain. "  Not completely relieved by medications.  Therapy and referrals offered, at this time declined.    See above for other details addressed today    For complete problem list, past medical history, surgical history, social history, etc., see appropriate section in the electronic medical record    Review of Systems   Constitutional:  Negative for appetite change and fever.   Respiratory: Negative.     Cardiovascular: Negative.    Gastrointestinal:  Positive for abdominal pain, constipation and diarrhea. Negative for nausea and vomiting.   Genitourinary: Negative.    Musculoskeletal:  Positive for arthralgias, back pain and gait problem.   Neurological:  Positive for headaches. Negative for speech difficulty.     Objective     Physical Exam  Constitutional:       General: She is not in acute distress.     Appearance: She is not toxic-appearing.      Comments: Difficulty walking back to the exam room, very weak and lightheaded symptoms.   HENT:      Head: Normocephalic and atraumatic.   Cardiovascular:      Rate and Rhythm: Normal rate and regular rhythm.      Heart sounds: Murmur heard.   Pulmonary:      Effort: No respiratory distress.      Breath sounds: No wheezing.      Comments: Diminished breath sounds  Abdominal:      Comments: She has mild diffuse tenderness, at baseline   Skin:     Comments: Decreased skin turgor   Neurological:      Comments: She was transported back in a wheelchair, but appears at baseline without focal deficits.     Vitals:    02/02/23 1344   BP: 122/64   BP Location: Right arm   Patient Position: Sitting   BP Method: Large (Manual)   Pulse: 84   Resp: 18   SpO2: 95%   Weight: 67.4 kg (148 lb 11.2 oz)   Height: 5' (1.524 m)       RECENT LABS:    Lab Results   Component Value Date    WBC 13.13 (H) 01/16/2023    HGB 11.4 (L) 01/16/2023    HCT 35.6 (L) 01/16/2023     01/16/2023    CHOL 227 (H) 07/16/2021    TRIG 105 07/16/2021    HDL 59 07/16/2021    ALT 26 01/16/2023    AST 30 01/16/2023      (L) 01/16/2023    K 4.0 01/16/2023    CL 98 01/16/2023    CREATININE 1.31 01/16/2023    BUN 20 (H) 01/16/2023    CO2 29 01/16/2023    TSH 1.216 07/16/2021    GLUF 110 02/15/2006    HGBA1C 5.7 (H) 03/25/2022       Results for orders placed or performed during the hospital encounter of 01/16/23   Urine culture    Specimen: Urine   Result Value Ref Range    Urine Culture, Routine STAPHYLOCOCCUS LUGDUNENSIS  > 100,000 cfu/ml   (A)        Susceptibility    Staphylococcus lugdunensis - CULTURE, URINE     Amp/Sulbactam <=8/4 Sensitive mcg/mL     Ampicillin <=2 Resistant mcg/mL     Amox/K Clav'ate <=4/2 Sensitive mcg/mL     Cefazolin <=4 Sensitive mcg/mL     Ciprofloxacin <=1 Sensitive mcg/mL     Nitrofurantoin <=32 Sensitive mcg/mL     Gentamicin <=4 Sensitive mcg/mL     Levofloxacin <=1 Sensitive mcg/mL     Oxacillin <=0.25 Sensitive mcg/mL     Penicillin 0.25 Resistant mcg/mL     Rifampin <=1 Sensitive mcg/mL     Trimeth/Sulfa <=0.5/9.5 Sensitive mcg/mL     Tetracycline <=4 Sensitive mcg/mL     Vancomycin 1 Sensitive mcg/mL   CBC auto differential   Result Value Ref Range    WBC 13.13 (H) 3.90 - 12.70 K/uL    RBC 4.30 4.00 - 5.40 M/uL    Hemoglobin 11.4 (L) 12.0 - 16.0 g/dL    Hematocrit 35.6 (L) 37.0 - 48.5 %    MCV 83 82 - 98 fL    MCH 26.5 (L) 27.0 - 31.0 pg    MCHC 32.0 32.0 - 36.0 g/dL    RDW 15.5 (H) 11.5 - 14.5 %    Platelets 228 150 - 450 K/uL    MPV 10.4 9.2 - 12.9 fL    Immature Granulocytes 0.3 0.0 - 0.5 %    Gran # (ANC) 11.6 (H) 1.8 - 7.7 K/uL    Immature Grans (Abs) 0.04 0.00 - 0.04 K/uL    Lymph # 0.9 (L) 1.0 - 4.8 K/uL    Mono # 0.6 0.3 - 1.0 K/uL    Eos # 0.0 0.0 - 0.5 K/uL    Baso # 0.03 0.00 - 0.20 K/uL    nRBC 0 0 /100 WBC    Gran % 88.3 (H) 38.0 - 73.0 %    Lymph % 6.7 (L) 18.0 - 48.0 %    Mono % 4.3 4.0 - 15.0 %    Eosinophil % 0.2 0.0 - 8.0 %    Basophil % 0.2 0.0 - 1.9 %    Differential Method Automated    Comprehensive metabolic panel (CMP)   Result Value Ref Range    Sodium 133 (L) 136  - 145 mmol/L    Potassium 4.0 3.5 - 5.1 mmol/L    Chloride 98 95 - 110 mmol/L    CO2 29 22 - 31 mmol/L    Glucose 120 (H) 70 - 110 mg/dL    BUN 20 (H) 7 - 18 mg/dL    Creatinine 1.31 0.50 - 1.40 mg/dL    Calcium 8.9 8.4 - 10.2 mg/dL    Total Protein 7.5 6.0 - 8.4 g/dL    Albumin 4.1 3.5 - 5.2 g/dL    Total Bilirubin 0.8 0.2 - 1.3 mg/dL    Alkaline Phosphatase 127 38 - 145 U/L    AST 30 14 - 36 U/L    ALT 26 0 - 35 U/L    Anion Gap 6 (L) 8 - 16 mmol/L    eGFR 40 (A) >60 mL/min/1.73 m^2   Urinalysis, Reflex to Urine Culture Urine, Clean Catch    Specimen: Urine   Result Value Ref Range    Specimen UA Urine, Clean Catch     Color, UA Yellow Yellow, Straw, Josefina    Appearance, UA Clear Clear    pH, UA 7.0 5.0 - 8.0    Specific Gravity, UA 1.015 1.005 - 1.030    Protein, UA 1+ (A) Negative    Glucose, UA Negative Negative    Ketones, UA Negative Negative    Bilirubin (UA) Negative Negative    Occult Blood UA Negative Negative    Nitrite, UA Negative Negative    Urobilinogen, UA 0.2 <2.0 EU/dL    Leukocytes, UA 3+ (A) Negative   Troponin I   Result Value Ref Range    Troponin I 0.018 0.012 - 0.034 ng/mL   RBC, UA   Result Value Ref Range    RBC, UA 1 0 - 4 /hpf   WBC, UA   Result Value Ref Range    WBC, UA 26 (H) 0 - 5 /hpf   Squamous Epithelial, UA   Result Value Ref Range    Squam Epithel, UA 3 /hpf   Hyaline Casts, UA   Result Value Ref Range    Hyaline Casts, UA 1 0 - 1 /lpf   Bacteria, UA   Result Value Ref Range    Bacteria Few (A) Negative /hpf   Urinalysis Microscopic   Result Value Ref Range    RBC, UA 1 0 - 4 /hpf    WBC, UA 26 (H) 0 - 5 /hpf    Bacteria Few (A) Negative /hpf    Squam Epithel, UA 3 /hpf    Hyaline Casts, UA 1 0 - 1 /lpf    Microscopic Comment SEE COMMENT

## 2023-02-02 NOTE — ASSESSMENT & PLAN NOTE
Stable, she will continue to follow with Cardiology.  No she has been intolerant to multiple statins so must continue to focus on other risk factor reduction.

## 2023-02-02 NOTE — ASSESSMENT & PLAN NOTE
I discussed my thoughts in detail at my last progress note.  Since then she did consult with Gastroenterology.  Reviewed their notes.  They recommended a number of test including upper and lower endoscopy but patient has not proceeded with those.      I recommendation was to focus on treating the constipation...

## 2023-02-07 ENCOUNTER — TELEPHONE (OUTPATIENT)
Dept: PRIMARY CARE CLINIC | Facility: CLINIC | Age: 88
End: 2023-02-07
Payer: MEDICARE

## 2023-02-07 ENCOUNTER — HOSPITAL ENCOUNTER (OUTPATIENT)
Dept: RADIOLOGY | Facility: HOSPITAL | Age: 88
Discharge: HOME | End: 2023-02-07
Attending: FAMILY MEDICINE
Payer: MEDICARE

## 2023-02-07 DIAGNOSIS — M50.30 DDD (DEGENERATIVE DISC DISEASE), CERVICAL: ICD-10-CM

## 2023-02-07 DIAGNOSIS — Z00.00 ENCOUNTER FOR MEDICARE ANNUAL WELLNESS EXAM: ICD-10-CM

## 2023-02-07 DIAGNOSIS — N39.0 URINARY TRACT INFECTION WITHOUT HEMATURIA, SITE UNSPECIFIED: Primary | ICD-10-CM

## 2023-02-07 PROCEDURE — 72040 XR CERVICAL SPINE 2 OR 3 VIEWS: ICD-10-PCS | Mod: 26,HCNC,, | Performed by: RADIOLOGY

## 2023-02-07 PROCEDURE — 72040 X-RAY EXAM NECK SPINE 2-3 VW: CPT | Mod: TC,HCNC,FY,PO

## 2023-02-07 PROCEDURE — 72040 X-RAY EXAM NECK SPINE 2-3 VW: CPT | Mod: 26,HCNC,, | Performed by: RADIOLOGY

## 2023-02-07 RX ORDER — SULFAMETHOXAZOLE AND TRIMETHOPRIM 800; 160 MG/1; MG/1
1 TABLET ORAL 2 TIMES DAILY
Qty: 14 TABLET | Refills: 0 | Status: SHIPPED | OUTPATIENT
Start: 2023-02-07 | End: 2023-02-14

## 2023-02-08 NOTE — TELEPHONE ENCOUNTER
Please call regarding urinalysis.  It does suggest infection.  I sent in a prescription for an antibiotic (Bactrim) pending final culture results.  I would recommend that she begin the Bactrim as soon as possible.

## 2023-02-09 DIAGNOSIS — Z00.00 ENCOUNTER FOR MEDICARE ANNUAL WELLNESS EXAM: ICD-10-CM

## 2023-02-10 ENCOUNTER — TELEPHONE (OUTPATIENT)
Dept: PRIMARY CARE CLINIC | Facility: CLINIC | Age: 88
End: 2023-02-10
Payer: MEDICARE

## 2023-02-11 NOTE — ASSESSMENT & PLAN NOTE
She has chronic pain related to degenerative disc disease as well as osteoarthritis in other areas.  She remains on hydrocodone under supervision and follows regularly.  Current condition is stable.

## 2023-02-11 NOTE — ASSESSMENT & PLAN NOTE
Overall stable but mild persistent symptoms.  No suicidal thoughts.  Continue sertraline, she has good family support and they will report any changes

## 2023-02-11 NOTE — ASSESSMENT & PLAN NOTE
Currently stable without sign of progression.  She has mild dyspnea with exertion which is chronic

## 2023-02-13 ENCOUNTER — TELEPHONE (OUTPATIENT)
Dept: GASTROENTEROLOGY | Facility: CLINIC | Age: 88
End: 2023-02-13
Payer: MEDICARE

## 2023-02-13 NOTE — TELEPHONE ENCOUNTER
----- Message from Candy Sterling sent at 2/13/2023  8:08 AM CST -----  Regarding: patient request  Contact: PATIENT  Name of Who is Calling: AARON WING [[id number]] Kindra (Daughter)       What is the request in detail: Patient's daughter called on behalf of the patient to cancel her procedure tomorrow on 2/14/2023. Please advise!        Can the clinic reply by [facility]: NO        What Number [facility] Back [facility]: [phone]

## 2023-02-13 NOTE — TELEPHONE ENCOUNTER
Left message for pt that her procedure tomorrow has been canceled. Number provided to call back to reschedule.

## 2023-02-20 RX ORDER — HYDROCODONE BITARTRATE AND ACETAMINOPHEN 10; 325 MG/1; MG/1
1 TABLET ORAL EVERY 6 HOURS PRN
Qty: 90 TABLET | Refills: 0 | Status: SHIPPED | OUTPATIENT
Start: 2023-02-20 | End: 2023-03-20 | Stop reason: SDUPTHER

## 2023-02-20 NOTE — TELEPHONE ENCOUNTER
No new care gaps identified.  [facility] Embedded Care Gaps. Reference number: [id number]. 2/20/2023   11:53:17 AM CST

## 2023-02-20 NOTE — TELEPHONE ENCOUNTER
----- Message from Bambi Nash sent at 2/20/2023 10:43 AM CST -----  Regarding: refill  [phone] - call back ;  need refill for   HYDROcodone-acetaminophen (NORCO)  mg per tablet     Send to :      [facility] [place] - [address]  [address] [place] [address]  Phone: [phone] Fax: [phone]  Hours: Not open 24 hours    Bambi

## 2023-03-15 ENCOUNTER — TELEPHONE (OUTPATIENT)
Dept: PRIMARY CARE CLINIC | Facility: CLINIC | Age: 88
End: 2023-03-15
Payer: MEDICARE

## 2023-03-15 DIAGNOSIS — N39.0 URINARY TRACT INFECTION WITHOUT HEMATURIA, SITE UNSPECIFIED: Primary | ICD-10-CM

## 2023-03-15 RX ORDER — SULFAMETHOXAZOLE AND TRIMETHOPRIM 800; 160 MG/1; MG/1
1 TABLET ORAL 2 TIMES DAILY
Qty: 14 TABLET | Refills: 0 | Status: SHIPPED | OUTPATIENT
Start: 2023-03-15 | End: 2023-05-04 | Stop reason: ALTCHOICE

## 2023-03-15 NOTE — TELEPHONE ENCOUNTER
Spoke with pt's daughter, Kindra, discussed your message, she verbalized understanding. Kindra will check with her mom to see how severe her symptoms are will base her decision off of that. Orders pended for your review in the event that she brings her mom in her a specimen.

## 2023-03-15 NOTE — TELEPHONE ENCOUNTER
If patient's symptoms are mild then would be best to wait on antibiotic and check urine 1st.  If her symptoms are severe and she does not feel she can wait then start the Bactrim today but they be no point in checking urine culture tomorrow.  I sent in Rx for bactrm

## 2023-03-15 NOTE — TELEPHONE ENCOUNTER
----- Message from Bambi Nash sent at 3/15/2023 10:45 AM CDT -----  Regarding: [facility]  [phone] - call back daughter Kindra; need medical advise if her mom needs another set of antibiotics, she feels her UTI is coming back or need to see Dr.Fitzpatrick Lacy

## 2023-03-15 NOTE — TELEPHONE ENCOUNTER
Spoke with pt's daughter, Kindra. Pt is having a lot of pressure, pain in back and on R side, cyst on R kidney, burning with urination, and has been going on a couple of months. Daughter suspects that pt has a UTI as she had one in January and she feels like it never fully cleared up. Daughter reports that she can take her for a urine specimen tomorrow.     Preferred pharmacy: [facility]    Please advise.

## 2023-03-20 RX ORDER — HYDROCODONE BITARTRATE AND ACETAMINOPHEN 10; 325 MG/1; MG/1
1 TABLET ORAL EVERY 6 HOURS PRN
Qty: 90 TABLET | Refills: 0 | Status: SHIPPED | OUTPATIENT
Start: 2023-03-20 | End: 2023-04-13 | Stop reason: SDUPTHER

## 2023-03-20 NOTE — TELEPHONE ENCOUNTER
No new care gaps identified.  [facility] Embedded Care Gaps. Reference number: [id number]. 3/20/2023   10:51:41 AM DORENET

## 2023-04-19 ENCOUNTER — OFFICE VISIT (OUTPATIENT)
Dept: PRIMARY CARE CLINIC | Facility: CLINIC | Age: 88
End: 2023-04-19
Payer: MEDICARE

## 2023-04-19 ENCOUNTER — HOSPITAL ENCOUNTER (OUTPATIENT)
Dept: RADIOLOGY | Facility: HOSPITAL | Age: 88
Discharge: HOME | End: 2023-04-19
Attending: FAMILY MEDICINE
Payer: MEDICARE

## 2023-04-19 VITALS
DIASTOLIC BLOOD PRESSURE: 76 MMHG | HEART RATE: 79 BPM | BODY MASS INDEX: 29.78 KG/M2 | WEIGHT: 151.69 LBS | OXYGEN SATURATION: 97 % | RESPIRATION RATE: 20 BRPM | HEIGHT: 60 IN | SYSTOLIC BLOOD PRESSURE: 128 MMHG

## 2023-04-19 DIAGNOSIS — R73.03 PREDIABETES: ICD-10-CM

## 2023-04-19 DIAGNOSIS — R10.9 ABDOMINAL PAIN, UNSPECIFIED ABDOMINAL LOCATION: ICD-10-CM

## 2023-04-19 DIAGNOSIS — G89.29 CHRONIC LEFT-SIDED THORACIC BACK PAIN: ICD-10-CM

## 2023-04-19 DIAGNOSIS — M54.6 CHRONIC LEFT-SIDED THORACIC BACK PAIN: ICD-10-CM

## 2023-04-19 DIAGNOSIS — M54.6 CHRONIC LEFT-SIDED THORACIC BACK PAIN: Primary | ICD-10-CM

## 2023-04-19 DIAGNOSIS — I10 ESSENTIAL HYPERTENSION: ICD-10-CM

## 2023-04-19 DIAGNOSIS — G89.29 CHRONIC LEFT-SIDED THORACIC BACK PAIN: Primary | ICD-10-CM

## 2023-04-19 PROCEDURE — 1157F ADVNC CARE PLAN IN RCRD: CPT | Mod: HCNC,CPTII,S$GLB, | Performed by: FAMILY MEDICINE

## 2023-04-19 PROCEDURE — 3288F PR FALLS RISK ASSESSMENT DOCUMENTED: ICD-10-PCS | Mod: HCNC,CPTII,S$GLB, | Performed by: FAMILY MEDICINE

## 2023-04-19 PROCEDURE — 1101F PR PT FALLS ASSESS DOC 0-1 FALLS W/OUT INJ PAST YR: ICD-10-PCS | Mod: HCNC,CPTII,S$GLB, | Performed by: FAMILY MEDICINE

## 2023-04-19 PROCEDURE — 99999 PR PBB SHADOW E&M-EST. PATIENT-LVL III: ICD-10-PCS | Mod: PBBFAC,HCNC,, | Performed by: FAMILY MEDICINE

## 2023-04-19 PROCEDURE — 99999 PR PBB SHADOW E&M-EST. PATIENT-LVL III: CPT | Mod: PBBFAC,HCNC,, | Performed by: FAMILY MEDICINE

## 2023-04-19 PROCEDURE — 1160F PR REVIEW ALL MEDS BY PRESCRIBER/CLIN PHARMACIST DOCUMENTED: ICD-10-PCS | Mod: HCNC,CPTII,S$GLB, | Performed by: FAMILY MEDICINE

## 2023-04-19 PROCEDURE — 1160F RVW MEDS BY RX/DR IN RCRD: CPT | Mod: HCNC,CPTII,S$GLB, | Performed by: FAMILY MEDICINE

## 2023-04-19 PROCEDURE — 3288F FALL RISK ASSESSMENT DOCD: CPT | Mod: HCNC,CPTII,S$GLB, | Performed by: FAMILY MEDICINE

## 2023-04-19 PROCEDURE — 1101F PT FALLS ASSESS-DOCD LE1/YR: CPT | Mod: HCNC,CPTII,S$GLB, | Performed by: FAMILY MEDICINE

## 2023-04-19 PROCEDURE — 99214 PR OFFICE/OUTPT VISIT, EST, LEVL IV, 30-39 MIN: ICD-10-PCS | Mod: HCNC,S$GLB,, | Performed by: FAMILY MEDICINE

## 2023-04-19 PROCEDURE — 72070 X-RAY EXAM THORAC SPINE 2VWS: CPT | Mod: 26,HCNC,, | Performed by: RADIOLOGY

## 2023-04-19 PROCEDURE — 72070 X-RAY EXAM THORAC SPINE 2VWS: CPT | Mod: TC,HCNC,FY,PO

## 2023-04-19 PROCEDURE — 99214 OFFICE O/P EST MOD 30 MIN: CPT | Mod: HCNC,S$GLB,, | Performed by: FAMILY MEDICINE

## 2023-04-19 PROCEDURE — 1125F PR PAIN SEVERITY QUANTIFIED, PAIN PRESENT: ICD-10-PCS | Mod: HCNC,CPTII,S$GLB, | Performed by: FAMILY MEDICINE

## 2023-04-19 PROCEDURE — 1125F AMNT PAIN NOTED PAIN PRSNT: CPT | Mod: HCNC,CPTII,S$GLB, | Performed by: FAMILY MEDICINE

## 2023-04-19 PROCEDURE — 1159F MED LIST DOCD IN RCRD: CPT | Mod: HCNC,CPTII,S$GLB, | Performed by: FAMILY MEDICINE

## 2023-04-19 PROCEDURE — 1159F PR MEDICATION LIST DOCUMENTED IN MEDICAL RECORD: ICD-10-PCS | Mod: HCNC,CPTII,S$GLB, | Performed by: FAMILY MEDICINE

## 2023-04-19 PROCEDURE — 1157F PR ADVANCE CARE PLAN OR EQUIV PRESENT IN MEDICAL RECORD: ICD-10-PCS | Mod: HCNC,CPTII,S$GLB, | Performed by: FAMILY MEDICINE

## 2023-04-19 PROCEDURE — 72070 XR THORACIC SPINE AP LATERAL: ICD-10-PCS | Mod: 26,HCNC,, | Performed by: RADIOLOGY

## 2023-04-19 NOTE — PROGRESS NOTES
THIS DOCUMENT WAS MADE IN PART WITH VOICE RECOGNITION SOFTWARE.  OCCASIONALLY THIS SOFTWARE WILL MISINTERPRET WORDS OR PHRASES.      Primary Care Provider Appointment   [facility]       Patient ID: Summer Inman is a 87 y.o. female.    ASSESSMENT/PLAN by Problem List:    1. Chronic left-sided thoracic back pain  -     X-Ray Thoracic Spine AP Lateral; Future; Expected date: 04/19/2023    2. Abdominal pain, unspecified abdominal location  Assessment & Plan:  Recent ER visit for severe different abdominal pain on the left side.  Pain is subsiding.  Workup did not reveal any acute intra-abdominal process.  pain left flank to abd to inguial/hip area, no dysuria.  No obvious hernia on exam or on imaging.  Possible referred pain from chronic back problems, will check imaging of the thoracic region.  May need an MRI if persisting.  Also no visible rash but should a rash develop she should notify us right away.    Orders:  -     Urinalysis Microscopic; Future  -     Urinalysis; Future; Expected date: 04/19/2023  -     Urine culture; Future; Expected date: 04/19/2023    3. Essential hypertension  -     Lipid Panel; Future; Expected date: 04/19/2023    4. Prediabetes  Overview:  hbg a1c I 8/2020 - 6    Orders:  -     Hemoglobin A1C; Future; Expected date: 04/19/2023  -     Lipid Panel; Future; Expected date: 04/19/2023         She also complained of right foot pain, x few months, mid foot  No injury, offered imaging but she declined for now and will let me know if symptoms worsen or do not subside or if she prefers further evaluation    Follow Up:  2-3 months    Subjective:     Chief Complaint   Patient presents with    Follow-up     hospital     I have reviewed the information entered by the ancillary staff regarding the chief complaint as well as the related history.    HPI    Patient is a/an 87 y.o.  female     ER follow-up abdominal pain.  Patient does have chronic intermittent abdominal  pain however developed more severe left-sided abdominal pain.  She went to the emergency room, workup did not reveal any acute abdominal process.  Symptoms are improving.  But still some discomfort.    For complete problem list, past medical history, surgical history, social history, etc., see appropriate section in the electronic medical record    Review of Systems   Constitutional:  Positive for fatigue.   HENT:  Negative for trouble swallowing.    Respiratory: Negative.     Cardiovascular: Negative.    Gastrointestinal:  Positive for abdominal distention and constipation. Negative for blood in stool, diarrhea, nausea and vomiting.   Genitourinary: Negative.    Musculoskeletal:  Positive for arthralgias, back pain and gait problem.   Skin:  Negative for rash.   Neurological:  Positive for weakness.     Objective     Physical Exam  Constitutional:       General: She is not in acute distress.     Appearance: She is not toxic-appearing.      Comments: Difficulty walking back to the exam room, very weak and lightheaded symptoms.   HENT:      Head: Normocephalic and atraumatic.   Cardiovascular:      Rate and Rhythm: Normal rate and regular rhythm.      Heart sounds: Murmur heard.   Pulmonary:      Effort: No respiratory distress.      Breath sounds: No wheezing.      Comments: Diminished breath sounds bilaterally, baseline, no crackles or wheezes  Abdominal:      Comments: She has mild diffuse tenderness, no guarding or rebound or signs of acute abdomen.  She does have chronic abdominal tenderness at baseline   Skin:     Findings: No rash.   Neurological:      Comments: She was transported back in a wheelchair, but appears at baseline without focal deficits.     Vitals:    04/19/23 1508   BP: 128/76   BP Location: Right arm   Patient Position: Sitting   BP Method: Medium (Manual)   Pulse: 79   Resp: 20   SpO2: 97%   Weight: 68.8 kg (151 lb 10.8 oz)   Height: 5' (1.524 m)

## 2023-04-23 PROBLEM — G89.29 CHRONIC LEFT-SIDED THORACIC BACK PAIN: Status: ACTIVE | Noted: 2023-04-23

## 2023-04-23 PROBLEM — R10.9 ABDOMINAL PAIN: Status: ACTIVE | Noted: 2023-04-23

## 2023-04-23 PROBLEM — M54.6 CHRONIC LEFT-SIDED THORACIC BACK PAIN: Status: ACTIVE | Noted: 2023-04-23

## 2023-04-23 NOTE — ASSESSMENT & PLAN NOTE
Recent ER visit for severe different abdominal pain on the left side.  Pain is subsiding.  Workup did not reveal any acute intra-abdominal process.  pain left flank to abd to inguial/hip area, no dysuria.  No obvious hernia on exam or on imaging.  Possible referred pain from chronic back problems, will check imaging of the thoracic region.  May need an MRI if persisting.  Also no visible rash but should a rash develop she should notify us right away.

## 2023-05-03 ENCOUNTER — TELEPHONE (OUTPATIENT)
Dept: PRIMARY CARE CLINIC | Facility: CLINIC | Age: 88
End: 2023-05-03
Payer: MEDICARE

## 2023-05-03 NOTE — PROGRESS NOTES
5/22/2018:  SUMMARY:   attempted to follow up with patient.  She reported that Malia from the [facility] was at her home at the time.  Will try again next week.    INTERVENTION:  1.  None at this time    PLAN:  Follow up call scheduled for 5/29     1. Did she call [facility] office to be placed on list for ramp?- completed by  on 4/10  2. Did she receive mailings?-YES 4/10/2018  3. Answer questions  4. Encourage her to call resources  5. Did [facility] come to her home in order to complete evaluation for additional services?-5/22     Pt transferred via wheelchair to Med/Surg3 room #313 with all belongings.  Report given to receiving RN by writer, all questions answered.

## 2023-05-03 NOTE — TELEPHONE ENCOUNTER
Contacted pt to inform appt reminder, pt states she will attend appt tomorrow @ 3:20 and if pt needs to cancel/reschedule she will give us a call.

## 2023-05-04 ENCOUNTER — OFFICE VISIT (OUTPATIENT)
Dept: PRIMARY CARE CLINIC | Facility: CLINIC | Age: 88
End: 2023-05-04
Payer: MEDICARE

## 2023-05-04 VITALS
OXYGEN SATURATION: 88 % | SYSTOLIC BLOOD PRESSURE: 100 MMHG | BODY MASS INDEX: 30.12 KG/M2 | HEART RATE: 89 BPM | DIASTOLIC BLOOD PRESSURE: 50 MMHG | WEIGHT: 153.44 LBS | TEMPERATURE: 99 F | HEIGHT: 60 IN

## 2023-05-04 DIAGNOSIS — M51.36 DDD (DEGENERATIVE DISC DISEASE), LUMBAR: Primary | Chronic | ICD-10-CM

## 2023-05-04 DIAGNOSIS — J44.9 CHRONIC OBSTRUCTIVE PULMONARY DISEASE, UNSPECIFIED COPD TYPE: Chronic | ICD-10-CM

## 2023-05-04 DIAGNOSIS — J44.1 COPD WITH ACUTE EXACERBATION: ICD-10-CM

## 2023-05-04 DIAGNOSIS — M85.80 OSTEOPENIA, UNSPECIFIED LOCATION: ICD-10-CM

## 2023-05-04 DIAGNOSIS — G89.29 CHRONIC PAIN OF MULTIPLE SITES: Chronic | ICD-10-CM

## 2023-05-04 DIAGNOSIS — R52 CHRONIC PAIN OF MULTIPLE SITES: Chronic | ICD-10-CM

## 2023-05-04 PROCEDURE — 1157F ADVNC CARE PLAN IN RCRD: CPT | Mod: HCNC,CPTII,S$GLB, | Performed by: FAMILY MEDICINE

## 2023-05-04 PROCEDURE — 1159F MED LIST DOCD IN RCRD: CPT | Mod: HCNC,CPTII,S$GLB, | Performed by: FAMILY MEDICINE

## 2023-05-04 PROCEDURE — 1101F PR PT FALLS ASSESS DOC 0-1 FALLS W/OUT INJ PAST YR: ICD-10-PCS | Mod: HCNC,CPTII,S$GLB, | Performed by: FAMILY MEDICINE

## 2023-05-04 PROCEDURE — 1157F PR ADVANCE CARE PLAN OR EQUIV PRESENT IN MEDICAL RECORD: ICD-10-PCS | Mod: HCNC,CPTII,S$GLB, | Performed by: FAMILY MEDICINE

## 2023-05-04 PROCEDURE — 3288F PR FALLS RISK ASSESSMENT DOCUMENTED: ICD-10-PCS | Mod: HCNC,CPTII,S$GLB, | Performed by: FAMILY MEDICINE

## 2023-05-04 PROCEDURE — 1125F PR PAIN SEVERITY QUANTIFIED, PAIN PRESENT: ICD-10-PCS | Mod: HCNC,CPTII,S$GLB, | Performed by: FAMILY MEDICINE

## 2023-05-04 PROCEDURE — 1160F RVW MEDS BY RX/DR IN RCRD: CPT | Mod: HCNC,CPTII,S$GLB, | Performed by: FAMILY MEDICINE

## 2023-05-04 PROCEDURE — 99499 UNLISTED E&M SERVICE: CPT | Mod: HCNC,S$GLB,, | Performed by: FAMILY MEDICINE

## 2023-05-04 PROCEDURE — 99499 RISK ADDL DX/OHS AUDIT: ICD-10-PCS | Mod: HCNC,S$GLB,, | Performed by: FAMILY MEDICINE

## 2023-05-04 PROCEDURE — 1101F PT FALLS ASSESS-DOCD LE1/YR: CPT | Mod: HCNC,CPTII,S$GLB, | Performed by: FAMILY MEDICINE

## 2023-05-04 PROCEDURE — 99215 OFFICE O/P EST HI 40 MIN: CPT | Mod: HCNC,S$GLB,, | Performed by: FAMILY MEDICINE

## 2023-05-04 PROCEDURE — 3288F FALL RISK ASSESSMENT DOCD: CPT | Mod: HCNC,CPTII,S$GLB, | Performed by: FAMILY MEDICINE

## 2023-05-04 PROCEDURE — 99999 PR PBB SHADOW E&M-EST. PATIENT-LVL IV: CPT | Mod: PBBFAC,HCNC,, | Performed by: FAMILY MEDICINE

## 2023-05-04 PROCEDURE — 1160F PR REVIEW ALL MEDS BY PRESCRIBER/CLIN PHARMACIST DOCUMENTED: ICD-10-PCS | Mod: HCNC,CPTII,S$GLB, | Performed by: FAMILY MEDICINE

## 2023-05-04 PROCEDURE — 1159F PR MEDICATION LIST DOCUMENTED IN MEDICAL RECORD: ICD-10-PCS | Mod: HCNC,CPTII,S$GLB, | Performed by: FAMILY MEDICINE

## 2023-05-04 PROCEDURE — 1125F AMNT PAIN NOTED PAIN PRSNT: CPT | Mod: HCNC,CPTII,S$GLB, | Performed by: FAMILY MEDICINE

## 2023-05-04 PROCEDURE — 99215 PR OFFICE/OUTPT VISIT, EST, LEVL V, 40-54 MIN: ICD-10-PCS | Mod: HCNC,S$GLB,, | Performed by: FAMILY MEDICINE

## 2023-05-04 PROCEDURE — 99999 PR PBB SHADOW E&M-EST. PATIENT-LVL IV: ICD-10-PCS | Mod: PBBFAC,HCNC,, | Performed by: FAMILY MEDICINE

## 2023-05-04 RX ORDER — DOXYCYCLINE 100 MG/1
100 CAPSULE ORAL 2 TIMES DAILY
Qty: 20 CAPSULE | Refills: 0 | Status: SHIPPED | OUTPATIENT
Start: 2023-05-04 | End: 2024-02-07

## 2023-05-04 RX ORDER — OLOPATADINE HYDROCHLORIDE 1 MG/ML
1 SOLUTION/ DROPS OPHTHALMIC 2 TIMES DAILY
Qty: 5 ML | Refills: 2 | Status: SHIPPED | OUTPATIENT
Start: 2023-05-04 | End: 2023-05-18

## 2023-05-04 RX ORDER — RISEDRONATE SODIUM 150 MG/1
150 TABLET, FILM COATED ORAL
Qty: 3 TABLET | Refills: 3 | Status: SHIPPED | OUTPATIENT
Start: 2023-05-04 | End: 2023-05-18

## 2023-05-04 NOTE — PROGRESS NOTES
THIS DOCUMENT WAS MADE IN PART WITH VOICE RECOGNITION SOFTWARE.  OCCASIONALLY THIS SOFTWARE WILL MISINTERPRET WORDS OR PHRASES.      Primary Care Provider Appointment   [facility]       Patient ID: Summer Inman is a 87 y.o. female.    ASSESSMENT/PLAN by Problem List:    1. DDD (degenerative disc disease), lumbar  Assessment & Plan:  Patient has chronic back pain but recent increase.  She had an increase in lower thoracic region pain, without obvious injury or explanation so I checked an x-ray to rule out acute compression fracture.  No evidence of acute compression fracture.  I offered referral to pain management but she is against this and wants to continue to work on conservative measures and try to control this with oral pain medications.  Discussed options for pain control and would consider beginning something like MS Contin for better baseline control however she informs me that rarely does she even take the hydrocodone as prescribed, typically taking half a tablet maybe two to 3 times a day.  and so this did raise some questions about why she is feeling the medicine regularly every 4 weeks, etc. so will monitor this but I instructed her to take the medicine as prescribed up to 5 mg hydrocodone 3 times a day.  If this significantly improves her pain control we can continue that or as I stated consider a long-acting medication.  I am hesitant to increase the gabapentin further given the fact that she is already having some increased risk for falls, forgetfulness, etc..  Will return in 4-6 weeks.      I also recommended considering palliative care consultation if we can not get her chronic symptoms under better control.  I think she would be an excellent candidate for this.      2. Chronic obstructive pulmonary disease, unspecified COPD type    3. Chronic pain of multiple sites    4. Osteopenia, unspecified location  Assessment & Plan:  Experiencing increased pain for several  days after taking Boniva.  So will switch and try Actonel.  Though if not covered by insurance the other option would be switching to weekly Fosamax.  She does not meet criteria at least at this time to consider Prolia based on bone density.      5. COPD with acute exacerbation  Assessment & Plan:  Mild exacerbation.  Begin doxycycline, no significant wheezing at this time so will hold off on steroids unless worsening.  She does not require any regular usage of bronchodilators but if wheezing should develop we can begin this.  So they will send me an update if not improving.      Other orders  -     olopatadine (PATANOL) 0.1 % ophthalmic solution; Place 1 drop into both eyes 2 (two) times daily.  Dispense: 5 mL; Refill: 2  -     risedronate (ACTONEL) 150 MG Tab; Take 1 tablet (150 mg total) by mouth every 30 days.  Dispense: 3 tablet; Refill: 3  -     doxycycline (MONODOX) 100 MG capsule; Take 1 capsule (100 mg total) by mouth 2 (two) times daily.  Dispense: 20 capsule; Refill: 0         Follow Up:  Four weeks    Forty-nine minutes of total time spent on the encounter, time includes face to face time, and some or all of the following: review of chart, lab, imaging, consultant notes, ER, hospital, documentation, care coordination, etc.    Health Maintenance         Date Due Completion Date    Sign Pain Contract Never done ---    Aspirin/Antiplatelet Therapy Never done ---    Shingles Vaccine (1 of 2) Never done ---    COVID-19 Vaccine (4 - Booster for Moderna series) 02/10/2022 12/16/2021    Influenza Vaccine (Season Ended) 09/01/2023 10/13/2021    Override on 11/11/2019: Done    Hemoglobin A1c (Prediabetes) 04/19/2024 4/19/2023    Lipid Panel 04/19/2024 4/19/2023    TETANUS VACCINE 08/25/2024 8/25/2014            Subjective:     Chief Complaint   Patient presents with    Follow-up     I have reviewed the information entered by the ancillary staff regarding the chief complaint as well as the related  history.    HPI    Patient is a/an 87 y.o.  female       Achy hurting all over, seems to be worse after Boniva    Episodes of weakness  Forgetful at times  Possible auditory, and visual hallucination  Episodes of confusion    need to improve pain  Not taking full tid  Discussed MScontin?  Declines pain management    For complete problem list, past medical history, surgical history, social history, etc., see appropriate section in the electronic medical record    Review of Systems   Constitutional:  Positive for fatigue. Negative for unexpected weight change.   Gastrointestinal:  Positive for abdominal pain (Chronic abdominal pain, improved since last seen).   Musculoskeletal:  Positive for arthralgias, back pain and myalgias.   Neurological:  Positive for weakness.   Psychiatric/Behavioral:          Possible auditory and visual hallucinations, hypnagogic hallucinations, we will continue to monitor.     Objective     Physical Exam  Constitutional:       General: She is not in acute distress.     Appearance: She is not toxic-appearing.      Comments: Difficulty walking back to the exam room, very weak and lightheaded symptoms.   HENT:      Head: Normocephalic and atraumatic.   Cardiovascular:      Rate and Rhythm: Normal rate and regular rhythm.      Heart sounds: Murmur heard.   Pulmonary:      Effort: No respiratory distress.      Breath sounds: No wheezing.      Comments: Mildly diminished breath sounds bilaterally.  No wheezing or crackles.   Abdominal:      Comments: She has mild diffuse tenderness, no guarding or rebound or signs of acute abdomen.  She does have chronic abdominal tenderness at baseline   Skin:     Findings: No rash.   Neurological:      Comments: She was transported back in a wheelchair, but appears at baseline without focal deficits.     Vitals:    05/04/23 1532   BP: (!) 100/50   Pulse: 89   Temp: 98.7 °F (37.1 °C)   SpO2: (!) 88%   Weight: 69.6 kg (153 lb 7 oz)   Height: 5' (1.524 m)

## 2023-05-05 NOTE — ASSESSMENT & PLAN NOTE
Mild exacerbation.  Begin doxycycline, no significant wheezing at this time so will hold off on steroids unless worsening.  She does not require any regular usage of bronchodilators but if wheezing should develop we can begin this.  So they will send me an update if not improving.

## 2023-05-05 NOTE — ASSESSMENT & PLAN NOTE
Experiencing increased pain for several days after taking Boniva.  So will switch and try Actonel.  Though if not covered by insurance the other option would be switching to weekly Fosamax.  She does not meet criteria at least at this time to consider Prolia based on bone density.

## 2023-05-05 NOTE — ASSESSMENT & PLAN NOTE
Patient has chronic back pain but recent increase.  She had an increase in lower thoracic region pain, without obvious injury or explanation so I checked an x-ray to rule out acute compression fracture.  No evidence of acute compression fracture.  I offered referral to pain management but she is against this and wants to continue to work on conservative measures and try to control this with oral pain medications.  Discussed options for pain control and would consider beginning something like MS Contin for better baseline control however she informs me that rarely does she even take the hydrocodone as prescribed, typically taking half a tablet maybe two to 3 times a day.  and so this did raise some questions about why she is feeling the medicine regularly every 4 weeks, etc. so will monitor this but I instructed her to take the medicine as prescribed up to 5 mg hydrocodone 3 times a day.  If this significantly improves her pain control we can continue that or as I stated consider a long-acting medication.  I am hesitant to increase the gabapentin further given the fact that she is already having some increased risk for falls, forgetfulness, etc..  Will return in 4-6 weeks.      I also recommended considering palliative care consultation if we can not get her chronic symptoms under better control.  I think she would be an excellent candidate for this.

## 2023-05-08 ENCOUNTER — TELEPHONE (OUTPATIENT)
Dept: PRIMARY CARE CLINIC | Facility: CLINIC | Age: 88
End: 2023-05-08
Payer: MEDICARE

## 2023-05-08 NOTE — TELEPHONE ENCOUNTER
----- Message from Bambi Nash sent at 5/8/2023  2:42 PM CDT -----  Regarding: alternative medicine  [phone] - call back Daughter Kindra Hurst ; needs new script for a different medicine for her mom's osteoporosis called risedronate (ACTONEL) 150 MG Tab(it replaces her boneva bec she have a reaction) but the risedronate is too expensive can not afford it. If there is any other medication that is cheaper.    Bambi

## 2023-05-08 NOTE — TELEPHONE ENCOUNTER
Spoke with pt's daughter and she reports that her mom does not need new medication until 6/3/23. She will call back next week.

## 2023-05-17 NOTE — TELEPHONE ENCOUNTER
No care due was identified.  [facility] Embedded Care Due Messages. Reference number: [id number].   5/17/2023 6:07:55 PM CDT

## 2023-05-18 ENCOUNTER — TELEPHONE (OUTPATIENT)
Dept: PRIMARY CARE CLINIC | Facility: CLINIC | Age: 88
End: 2023-05-18
Payer: MEDICARE

## 2023-05-18 RX ORDER — AZELASTINE HYDROCHLORIDE 0.5 MG/ML
1 SOLUTION/ DROPS OPHTHALMIC 2 TIMES DAILY
Qty: 6 ML | Refills: 3 | Status: SHIPPED | OUTPATIENT
Start: 2023-05-18

## 2023-05-18 RX ORDER — ALENDRONATE SODIUM 70 MG/1
70 TABLET ORAL
Qty: 12 TABLET | Refills: 1 | Status: SHIPPED | OUTPATIENT
Start: 2023-05-18 | End: 2023-09-08

## 2023-05-18 RX ORDER — HYDROCODONE BITARTRATE AND ACETAMINOPHEN 10; 325 MG/1; MG/1
1 TABLET ORAL EVERY 6 HOURS PRN
Qty: 90 TABLET | Refills: 0 | Status: SHIPPED | OUTPATIENT
Start: 2023-05-18 | End: 2023-06-20 | Stop reason: SDUPTHER

## 2023-05-18 NOTE — TELEPHONE ENCOUNTER
----- Message from Bambi Nash sent at 5/18/2023  3:03 PM CDT -----  Regarding: refill  [phone] - Kindra Hurst daughter call back info - stating [employer] wont approve the following medication and need to be change she do not have the name she only knows it's for the bone medication and eye drops      [facility].    Bambi

## 2023-05-18 NOTE — TELEPHONE ENCOUNTER
Spoke with pt's daughter, Kindra, and she is requesting a more cost effective alternative to Actonel. Fosamax pended. Additionally, [employer] has still not made a determination to pay for Olopatadine and pt is completely out of eye drops. Recommended that she use [facility] in the meantime.    Preferred pharmacy: [facility]    Please advise.

## 2023-05-19 ENCOUNTER — HOSPITAL ENCOUNTER (OUTPATIENT)
Dept: RADIOLOGY | Facility: HOSPITAL | Age: 88
Discharge: HOME | End: 2023-05-19
Attending: INTERNAL MEDICINE
Payer: MEDICARE

## 2023-05-19 DIAGNOSIS — M79.672 FOOT PAIN, LEFT: ICD-10-CM

## 2023-05-19 PROCEDURE — 73630 X-RAY EXAM OF FOOT: CPT | Mod: TC,FY,PO,LT

## 2023-05-19 PROCEDURE — 73630 XR FOOT COMPLETE 3 VIEW LEFT: ICD-10-PCS | Mod: 26,LT,, | Performed by: RADIOLOGY

## 2023-05-19 PROCEDURE — 73630 X-RAY EXAM OF FOOT: CPT | Mod: 26,LT,, | Performed by: RADIOLOGY

## 2023-05-25 ENCOUNTER — TELEPHONE (OUTPATIENT)
Dept: PRIMARY CARE CLINIC | Facility: CLINIC | Age: 88
End: 2023-05-25
Payer: MEDICARE

## 2023-05-25 NOTE — TELEPHONE ENCOUNTER
----- Message from Bambi Nash sent at 5/25/2023  2:21 PM CDT -----  Regarding: change of medication  [phone] - [facility];  need new script for a cheaper meds patient can not afford the prescribed medicine for the eye.    Bambi

## 2023-05-25 NOTE — TELEPHONE ENCOUNTER
Spoke with Kelechi, stated that pt is able to afford the azelastine eye drops, this is also generic.     Kelechi stated pt has a high deductable, her insurance is not paying for any of this med.    Pt cost is 43.56, pt has a deductible to meet.   There are no other meds that can be substituted.    Kelechi stated maybe something over the counter may help her.

## 2023-05-26 NOTE — TELEPHONE ENCOUNTER
Spoke with pt's daughter, Kindra, discussed all messages in this thread about cost, high deductible, Pataday being OTC and following up with eye doctor. Kindra verbalized understanding.

## 2023-05-26 NOTE — TELEPHONE ENCOUNTER
It looks like Pataday may now be OTC and that could very well be why the insurance company stopped paying for the prescription version.  If her eye symptoms are not improving though she may want to follow with her eye doctor as well.

## 2023-06-08 ENCOUNTER — OFFICE VISIT (OUTPATIENT)
Dept: PRIMARY CARE CLINIC | Facility: CLINIC | Age: 88
End: 2023-06-08
Payer: MEDICARE

## 2023-06-08 VITALS
TEMPERATURE: 100 F | DIASTOLIC BLOOD PRESSURE: 74 MMHG | WEIGHT: 151.25 LBS | RESPIRATION RATE: 18 BRPM | OXYGEN SATURATION: 94 % | HEIGHT: 59 IN | SYSTOLIC BLOOD PRESSURE: 132 MMHG | BODY MASS INDEX: 30.49 KG/M2 | HEART RATE: 71 BPM

## 2023-06-08 DIAGNOSIS — I10 ESSENTIAL HYPERTENSION: ICD-10-CM

## 2023-06-08 DIAGNOSIS — R30.0 DYSURIA: Primary | ICD-10-CM

## 2023-06-08 PROCEDURE — 99999 PR PBB SHADOW E&M-EST. PATIENT-LVL V: CPT | Mod: PBBFAC,,, | Performed by: FAMILY MEDICINE

## 2023-06-08 PROCEDURE — 1160F RVW MEDS BY RX/DR IN RCRD: CPT | Mod: CPTII,S$GLB,, | Performed by: FAMILY MEDICINE

## 2023-06-08 PROCEDURE — 1100F PR PT FALLS ASSESS DOC 2+ FALLS/FALL W/INJURY/YR: ICD-10-PCS | Mod: CPTII,S$GLB,, | Performed by: FAMILY MEDICINE

## 2023-06-08 PROCEDURE — 1159F PR MEDICATION LIST DOCUMENTED IN MEDICAL RECORD: ICD-10-PCS | Mod: CPTII,S$GLB,, | Performed by: FAMILY MEDICINE

## 2023-06-08 PROCEDURE — 3288F PR FALLS RISK ASSESSMENT DOCUMENTED: ICD-10-PCS | Mod: CPTII,S$GLB,, | Performed by: FAMILY MEDICINE

## 2023-06-08 PROCEDURE — 1100F PTFALLS ASSESS-DOCD GE2>/YR: CPT | Mod: CPTII,S$GLB,, | Performed by: FAMILY MEDICINE

## 2023-06-08 PROCEDURE — 1125F PR PAIN SEVERITY QUANTIFIED, PAIN PRESENT: ICD-10-PCS | Mod: CPTII,S$GLB,, | Performed by: FAMILY MEDICINE

## 2023-06-08 PROCEDURE — 99214 OFFICE O/P EST MOD 30 MIN: CPT | Mod: S$GLB,,, | Performed by: FAMILY MEDICINE

## 2023-06-08 PROCEDURE — 1123F ACP DISCUSS/DSCN MKR DOCD: CPT | Mod: CPTII,S$GLB,, | Performed by: FAMILY MEDICINE

## 2023-06-08 PROCEDURE — 1160F PR REVIEW ALL MEDS BY PRESCRIBER/CLIN PHARMACIST DOCUMENTED: ICD-10-PCS | Mod: CPTII,S$GLB,, | Performed by: FAMILY MEDICINE

## 2023-06-08 PROCEDURE — 99214 PR OFFICE/OUTPT VISIT, EST, LEVL IV, 30-39 MIN: ICD-10-PCS | Mod: S$GLB,,, | Performed by: FAMILY MEDICINE

## 2023-06-08 PROCEDURE — 1125F AMNT PAIN NOTED PAIN PRSNT: CPT | Mod: CPTII,S$GLB,, | Performed by: FAMILY MEDICINE

## 2023-06-08 PROCEDURE — 1123F PR ADV CARE PLAN DISCUSSED, PLAN OR SURROGATE DOCUMENTED: ICD-10-PCS | Mod: CPTII,S$GLB,, | Performed by: FAMILY MEDICINE

## 2023-06-08 PROCEDURE — 99999 PR PBB SHADOW E&M-EST. PATIENT-LVL V: ICD-10-PCS | Mod: PBBFAC,,, | Performed by: FAMILY MEDICINE

## 2023-06-08 PROCEDURE — 1159F MED LIST DOCD IN RCRD: CPT | Mod: CPTII,S$GLB,, | Performed by: FAMILY MEDICINE

## 2023-06-08 PROCEDURE — 3288F FALL RISK ASSESSMENT DOCD: CPT | Mod: CPTII,S$GLB,, | Performed by: FAMILY MEDICINE

## 2023-06-08 NOTE — PROGRESS NOTES
THIS DOCUMENT WAS MADE IN PART WITH VOICE RECOGNITION SOFTWARE.  OCCASIONALLY THIS SOFTWARE WILL MISINTERPRET WORDS OR PHRASES.      Primary Care Provider Appointment   [facility] [place]       Patient ID: Summer Inman is a 87 y.o. female.    ASSESSMENT/PLAN by Problem List:    1. Dysuria  -     Cancel: Urinalysis; Future; Expected date: 06/08/2023  -     Cancel: Urine culture; Future; Expected date: 06/08/2023  -     Cancel: Urinalysis Microscopic; Future  -     Urinalysis Microscopic; Future  -     Urine culture; Future; Expected date: 06/08/2023  -     Urinalysis; Future; Expected date: 06/08/2023    2. Essential hypertension  -     Comprehensive Metabolic Panel; Future; Expected date: 06/08/2023  -     CBC Auto Differential; Future; Expected date: 06/08/2023         Follow Up:  Three months      Advance Care Planning     Date: 06/08/2023    Living Will  Power of   Reviewed documents on file.  See ACP section and media section for details            Subjective:     Chief Complaint   Patient presents with    Follow-up     I have reviewed the information entered by the ancillary staff regarding the chief complaint as well as the related history.    HPI    Patient is a/an 87 y.o.  female       'few falls since last seen'  One leaning over in washing machine  Other fell out chair  No syncope, all leaning forward  Declines PT, will consider and let me know if she changes her mind.  Both these falls appear Proventil if she uses her walker and avoid bending over.  Otherwise fairly stable.  See above for details      For complete problem list, past medical history, surgical history, social history, etc., see appropriate section in the electronic medical record    Review of Systems   Constitutional:  Positive for fatigue. Negative for unexpected weight change.   Respiratory: Negative.     Cardiovascular:  Negative for chest pain.   Gastrointestinal:  Positive for abdominal pain  "(Chronic abdominal pain, no acute changes.).   Genitourinary: Negative.    Musculoskeletal:  Positive for arthralgias, back pain and myalgias.   Neurological:  Positive for weakness.   Psychiatric/Behavioral:          Possible auditory and visual hallucinations, hypnagogic hallucinations, we will continue to monitor.     Objective     Physical Exam  Constitutional:       General: She is not in acute distress.     Appearance: She is not toxic-appearing.   HENT:      Head: Normocephalic and atraumatic.   Cardiovascular:      Rate and Rhythm: Normal rate and regular rhythm.      Heart sounds: Murmur heard.   Pulmonary:      Effort: No respiratory distress.      Breath sounds: No wheezing.      Comments: Diminished breath sounds bilaterally  Abdominal:      Comments: She has mild diffuse tenderness, no guarding or rebound or signs of acute abdomen.  Appears at baseline   Skin:     Findings: No rash.     Vitals:    06/08/23 1527   BP: 132/74   Pulse: 71   Resp: 18   Temp: 99.6 °F (37.6 °C)   TempSrc: Oral   SpO2: (!) 94%   Weight: 68.6 kg (151 lb 3.8 oz)   Height: 4' 11" (1.499 m)       "

## 2023-06-13 ENCOUNTER — LAB VISIT (OUTPATIENT)
Dept: LAB | Facility: HOSPITAL | Age: 88
End: 2023-06-13
Attending: FAMILY MEDICINE
Payer: MEDICARE

## 2023-06-13 DIAGNOSIS — R30.0 DYSURIA: ICD-10-CM

## 2023-06-13 PROCEDURE — 87088 URINE BACTERIA CULTURE: CPT | Performed by: FAMILY MEDICINE

## 2023-06-13 PROCEDURE — 87086 URINE CULTURE/COLONY COUNT: CPT | Performed by: FAMILY MEDICINE

## 2023-06-13 PROCEDURE — 87077 CULTURE AEROBIC IDENTIFY: CPT | Performed by: FAMILY MEDICINE

## 2023-06-13 PROCEDURE — 87186 SC STD MICRODIL/AGAR DIL: CPT | Performed by: FAMILY MEDICINE

## 2023-06-13 PROCEDURE — 81003 URINALYSIS AUTO W/O SCOPE: CPT | Mod: PO | Performed by: FAMILY MEDICINE

## 2023-06-20 RX ORDER — HYDROCODONE BITARTRATE AND ACETAMINOPHEN 10; 325 MG/1; MG/1
1 TABLET ORAL EVERY 6 HOURS PRN
Qty: 90 TABLET | Refills: 0 | Status: SHIPPED | OUTPATIENT
Start: 2023-06-20 | End: 2023-07-19 | Stop reason: SDUPTHER

## 2023-06-20 NOTE — TELEPHONE ENCOUNTER
No care due was identified.  [facility] Embedded Care Due Messages. Reference number: [id number].   6/20/2023 9:30:00 AM CDT

## 2023-06-20 NOTE — TELEPHONE ENCOUNTER
----- Message from Hawa Nuñez sent at 6/20/2023  9:32 AM CDT -----  Regarding: refill  Daughter called in stating that she has sent several messages over the portal and needs her mother's hydrocodone refilled.  Please send it to [facility] #[phone]    Thank you   Hawa

## 2023-06-23 LAB
BILIRUB UR QL STRIP: NEGATIVE
CLARITY UR: CLEAR
COLOR UR: YELLOW
GLUCOSE UR QL STRIP: NEGATIVE
HGB UR QL STRIP: NEGATIVE
KETONES UR QL STRIP: NEGATIVE
LEUKOCYTE ESTERASE UR QL STRIP: NEGATIVE
NITRITE UR QL STRIP: NEGATIVE
PH UR STRIP: 6 [PH] (ref 5–8)
PROT UR QL STRIP: NEGATIVE
SP GR UR STRIP: 1.02 (ref 1–1.03)
URN SPEC COLLECT METH UR: NORMAL

## 2023-06-26 ENCOUNTER — TELEPHONE (OUTPATIENT)
Dept: PRIMARY CARE CLINIC | Facility: CLINIC | Age: 88
End: 2023-06-26
Payer: MEDICARE

## 2023-06-26 LAB — BACTERIA UR CULT: ABNORMAL

## 2023-06-26 NOTE — TELEPHONE ENCOUNTER
Please call regarding urine culture.  The urinalysis was normal did not suggest active infection.  However the culture was positive for Enterococcus.  This may simply be a contaminant or colonization.  If she is having persistent symptoms to strongly suggest a UTI I can send in an antibiotic.  If she is feeling well I would recommend monitoring.

## 2023-06-27 RX ORDER — AMOXICILLIN 875 MG/1
875 TABLET, FILM COATED ORAL 2 TIMES DAILY
Qty: 20 TABLET | Refills: 0 | Status: SHIPPED | OUTPATIENT
Start: 2023-06-27 | End: 2023-07-07

## 2023-06-27 NOTE — TELEPHONE ENCOUNTER
----- Message from Bambi Nash sent at 6/27/2023  9:20 AM CDT -----  Regarding: antibiotics  [phone] - daughter Kindra Hurst; returning a call from the message she received on the [facility] portal. No need to call her back just send medication.    Her mom is still not feeling well agreeing to have antibiotics and send to :    [facility] [place] - [address]  [address] [address] [address] [place] [address]  Phone: [phone] Fax: [phone]  Hours: Not open 24 hours    Bambi

## 2023-06-27 NOTE — TELEPHONE ENCOUNTER
LOV: 6/8/23  NOV: 9/8/23    Spoke with pt's daughter, discussed your message, she verbalized understanding.     Pt's daughter, Kindra, reports that pt is having pelvic pressure and burning with urination.     Pt seems to believe that the cyst on her kidney is what is causing these symptoms. Requesting antibiotic.     Preferred pharmacy: [facility]

## 2023-06-29 DIAGNOSIS — Z87.19 HISTORY OF GASTRITIS: ICD-10-CM

## 2023-06-29 DIAGNOSIS — R10.13 EPIGASTRIC PAIN: ICD-10-CM

## 2023-06-29 DIAGNOSIS — K44.9 HIATAL HERNIA: ICD-10-CM

## 2023-06-29 DIAGNOSIS — F33.0 DEPRESSION, MAJOR, RECURRENT, MILD: ICD-10-CM

## 2023-06-29 DIAGNOSIS — K21.9 GASTROESOPHAGEAL REFLUX DISEASE WITHOUT ESOPHAGITIS: ICD-10-CM

## 2023-06-29 RX ORDER — ONDANSETRON 4 MG/1
TABLET, ORALLY DISINTEGRATING ORAL
Qty: 30 TABLET | Refills: 1 | Status: SHIPPED | OUTPATIENT
Start: 2023-06-29 | End: 2023-07-24

## 2023-06-29 RX ORDER — OMEPRAZOLE 40 MG/1
CAPSULE, DELAYED RELEASE ORAL
Qty: 90 CAPSULE | Refills: 3 | Status: SHIPPED | OUTPATIENT
Start: 2023-06-29

## 2023-06-29 RX ORDER — SERTRALINE HYDROCHLORIDE 100 MG/1
TABLET, FILM COATED ORAL
Qty: 135 TABLET | Refills: 3 | Status: SHIPPED | OUTPATIENT
Start: 2023-06-29

## 2023-06-29 NOTE — TELEPHONE ENCOUNTER
No care due was identified.  [facility] Embedded Care Due Messages. Reference number: [id number].   6/29/2023 4:48:26 PM CDT

## 2023-06-29 NOTE — TELEPHONE ENCOUNTER
Refill Routing Note   Medication(s) are not appropriate for processing by [facility] for the following reason(s):      - Outside of protocol    [facility] action(s):  Route  Approve       Medication Therapy Plan: route ondansetron; approve sertraline and omeprazole- previous order matches  Medication reconciliation completed: No     Appointments  past 12m or future 3m with PCP    Date Provider   Last Visit   6/8/2023 Ludwig Wilson MD   Next Visit   9/8/2023 Ludwig Wilson MD   ED visits in past 90 days: 1        Note composed:5:28 PM 06/29/2023

## 2023-07-08 DIAGNOSIS — R41.3 MEMORY LOSS: ICD-10-CM

## 2023-07-08 DIAGNOSIS — G40.909 SEIZURE DISORDER: ICD-10-CM

## 2023-07-08 DIAGNOSIS — M51.36 DDD (DEGENERATIVE DISC DISEASE), LUMBAR: ICD-10-CM

## 2023-07-10 ENCOUNTER — TELEPHONE (OUTPATIENT)
Dept: PRIMARY CARE CLINIC | Facility: CLINIC | Age: 88
End: 2023-07-10
Payer: MEDICARE

## 2023-07-10 ENCOUNTER — TELEPHONE (OUTPATIENT)
Dept: NEUROLOGY | Facility: CLINIC | Age: 88
End: 2023-07-10
Payer: MEDICARE

## 2023-07-10 DIAGNOSIS — N39.0 CHRONIC URINARY TRACT INFECTION: Primary | ICD-10-CM

## 2023-07-10 RX ORDER — LAMOTRIGINE 100 MG/1
TABLET ORAL
Qty: 450 TABLET | Refills: 0 | Status: SHIPPED | OUTPATIENT
Start: 2023-07-10 | End: 2023-10-12 | Stop reason: SDUPTHER

## 2023-07-10 RX ORDER — DONEPEZIL HYDROCHLORIDE 10 MG/1
10 TABLET, FILM COATED ORAL EVERY MORNING
Qty: 90 TABLET | Refills: 0 | Status: SHIPPED | OUTPATIENT
Start: 2023-07-10 | End: 2024-01-03

## 2023-07-10 RX ORDER — TIZANIDINE 2 MG/1
TABLET ORAL
Qty: 120 TABLET | Refills: 1 | Status: SHIPPED | OUTPATIENT
Start: 2023-07-10 | End: 2023-09-13

## 2023-07-10 NOTE — TELEPHONE ENCOUNTER
----- Message from Arsh Rock sent at 7/10/2023  3:09 PM CDT -----  Regarding: sooner appt  Contact: daughter at [phone]  Type:  Patient Returning Call    Who Called:  daughter // Summer    Who Left Message for Patient:  Hayde    Does the patient know what this is regarding?:  yes    Best Call Back Number:  [phone]    Additional Information:  pt needs appt to get next refill of Lamictal. I tried but could not get appt through end of year. Daughter aware refill send to [facility] today, but trying to schedule next appt for next refill

## 2023-07-10 NOTE — TELEPHONE ENCOUNTER
Spoke with pt's daughter, discussed your message, she verbalized understanding.  She will discuss this with her mom and call back if need be.  She does report that pt has difficulty with starting her stream frequently and that she does not produce but a few drops of urine.     Please advise.

## 2023-07-10 NOTE — TELEPHONE ENCOUNTER
----- Message from Bambi Nash sent at 7/10/2023  3:02 PM CDT -----  Regarding: urine order  [phone] - call back daughter Kindra Hurst her mom just finished the antibiotics asking for a urine test order.        Bambi

## 2023-07-10 NOTE — TELEPHONE ENCOUNTER
I do not recommend repeating the urine culture unless she has symptoms.  Most people her age have some degree of colonization within the urinary tract so there is likely to be an abnormal urine culture regardless.  We only treat if there are worrisome symptoms.      She does have a few small benign-appearing renal cyst.  None of these need any follow-up.  The other cyst mentioned by Radiology appears to be a cyst in the area of the left vaginal cough.  If this is swollen or red or tender then she would need to see a gynecologist, not a urologist.    If she is feeling fine then there is no urgency here and we can discuss these findings at her next appointment.

## 2023-07-10 NOTE — TELEPHONE ENCOUNTER
"Spoke with pt's daughter, Kindra, and pt is requesting a repeat urinalysis to make sure that her UTI is resolved.  Additionally, pt "is fixated" that the renal cyst could be causing problems for her and would like to see a urologist.     Please advise.   "

## 2023-07-10 NOTE — TELEPHONE ENCOUNTER
Spoke to the pt in regards to scheduling a f/u apt with Dr. Inman.  Pt asked, that I call her daughter. Message left for the pt's daughter, to call and schedule a f/u appt.

## 2023-07-13 ENCOUNTER — OFFICE VISIT (OUTPATIENT)
Dept: FAMILY MEDICINE | Facility: CLINIC | Age: 88
End: 2023-07-13
Payer: MEDICARE

## 2023-07-13 VITALS
HEIGHT: 59 IN | DIASTOLIC BLOOD PRESSURE: 80 MMHG | WEIGHT: 152.75 LBS | BODY MASS INDEX: 30.8 KG/M2 | OXYGEN SATURATION: 95 % | SYSTOLIC BLOOD PRESSURE: 132 MMHG | HEART RATE: 74 BPM

## 2023-07-13 DIAGNOSIS — F11.20 CHRONIC NARCOTIC DEPENDENCE: Chronic | ICD-10-CM

## 2023-07-13 DIAGNOSIS — I70.0 ATHEROSCLEROSIS OF AORTA: Chronic | ICD-10-CM

## 2023-07-13 DIAGNOSIS — Z00.00 ENCOUNTER FOR PREVENTIVE HEALTH EXAMINATION: Primary | ICD-10-CM

## 2023-07-13 DIAGNOSIS — E78.5 HYPERLIPIDEMIA, UNSPECIFIED HYPERLIPIDEMIA TYPE: Chronic | ICD-10-CM

## 2023-07-13 DIAGNOSIS — I10 ESSENTIAL HYPERTENSION: Chronic | ICD-10-CM

## 2023-07-13 DIAGNOSIS — Z00.00 ENCOUNTER FOR MEDICARE ANNUAL WELLNESS EXAM: ICD-10-CM

## 2023-07-13 DIAGNOSIS — I27.20 PULMONARY HYPERTENSION: Chronic | ICD-10-CM

## 2023-07-13 DIAGNOSIS — I25.10 CORONARY ARTERY DISEASE INVOLVING NATIVE CORONARY ARTERY OF NATIVE HEART WITHOUT ANGINA PECTORIS: Chronic | ICD-10-CM

## 2023-07-13 DIAGNOSIS — F33.0 DEPRESSION, MAJOR, RECURRENT, MILD: ICD-10-CM

## 2023-07-13 DIAGNOSIS — G40.909 SEIZURE DISORDER: Chronic | ICD-10-CM

## 2023-07-13 DIAGNOSIS — I47.10 SVT (SUPRAVENTRICULAR TACHYCARDIA): ICD-10-CM

## 2023-07-13 DIAGNOSIS — N18.32 STAGE 3B CHRONIC KIDNEY DISEASE: Chronic | ICD-10-CM

## 2023-07-13 DIAGNOSIS — J44.9 CHRONIC OBSTRUCTIVE PULMONARY DISEASE, UNSPECIFIED COPD TYPE: ICD-10-CM

## 2023-07-13 PROBLEM — R10.9 ABDOMINAL PAIN: Status: RESOLVED | Noted: 2023-04-23 | Resolved: 2023-07-13

## 2023-07-13 PROBLEM — E87.1 HYPONATREMIA: Status: RESOLVED | Noted: 2020-10-16 | Resolved: 2023-07-13

## 2023-07-13 PROBLEM — J44.1 COPD WITH ACUTE EXACERBATION: Status: RESOLVED | Noted: 2023-05-04 | Resolved: 2023-07-13

## 2023-07-13 PROBLEM — R79.89 ELEVATED TROPONIN: Status: RESOLVED | Noted: 2020-10-16 | Resolved: 2023-07-13

## 2023-07-13 PROCEDURE — 1170F PR FUNCTIONAL STATUS ASSESSED: ICD-10-PCS | Mod: HCNC,CPTII,S$GLB, | Performed by: NURSE PRACTITIONER

## 2023-07-13 PROCEDURE — 3288F PR FALLS RISK ASSESSMENT DOCUMENTED: ICD-10-PCS | Mod: HCNC,CPTII,S$GLB, | Performed by: NURSE PRACTITIONER

## 2023-07-13 PROCEDURE — 99999 PR PBB SHADOW E&M-EST. PATIENT-LVL V: ICD-10-PCS | Mod: PBBFAC,HCNC,, | Performed by: NURSE PRACTITIONER

## 2023-07-13 PROCEDURE — 1125F PR PAIN SEVERITY QUANTIFIED, PAIN PRESENT: ICD-10-PCS | Mod: HCNC,CPTII,S$GLB, | Performed by: NURSE PRACTITIONER

## 2023-07-13 PROCEDURE — 1170F FXNL STATUS ASSESSED: CPT | Mod: HCNC,CPTII,S$GLB, | Performed by: NURSE PRACTITIONER

## 2023-07-13 PROCEDURE — 1159F PR MEDICATION LIST DOCUMENTED IN MEDICAL RECORD: ICD-10-PCS | Mod: HCNC,CPTII,S$GLB, | Performed by: NURSE PRACTITIONER

## 2023-07-13 PROCEDURE — G0439 PR MEDICARE ANNUAL WELLNESS SUBSEQUENT VISIT: ICD-10-PCS | Mod: HCNC,S$GLB,, | Performed by: NURSE PRACTITIONER

## 2023-07-13 PROCEDURE — 1159F MED LIST DOCD IN RCRD: CPT | Mod: HCNC,CPTII,S$GLB, | Performed by: NURSE PRACTITIONER

## 2023-07-13 PROCEDURE — 1100F PTFALLS ASSESS-DOCD GE2>/YR: CPT | Mod: HCNC,CPTII,S$GLB, | Performed by: NURSE PRACTITIONER

## 2023-07-13 PROCEDURE — 1157F ADVNC CARE PLAN IN RCRD: CPT | Mod: HCNC,CPTII,S$GLB, | Performed by: NURSE PRACTITIONER

## 2023-07-13 PROCEDURE — 1160F RVW MEDS BY RX/DR IN RCRD: CPT | Mod: HCNC,CPTII,S$GLB, | Performed by: NURSE PRACTITIONER

## 2023-07-13 PROCEDURE — 99999 PR PBB SHADOW E&M-EST. PATIENT-LVL V: CPT | Mod: PBBFAC,HCNC,, | Performed by: NURSE PRACTITIONER

## 2023-07-13 PROCEDURE — 3288F FALL RISK ASSESSMENT DOCD: CPT | Mod: HCNC,CPTII,S$GLB, | Performed by: NURSE PRACTITIONER

## 2023-07-13 PROCEDURE — 1160F PR REVIEW ALL MEDS BY PRESCRIBER/CLIN PHARMACIST DOCUMENTED: ICD-10-PCS | Mod: HCNC,CPTII,S$GLB, | Performed by: NURSE PRACTITIONER

## 2023-07-13 PROCEDURE — G0439 PPPS, SUBSEQ VISIT: HCPCS | Mod: HCNC,S$GLB,, | Performed by: NURSE PRACTITIONER

## 2023-07-13 PROCEDURE — 1157F PR ADVANCE CARE PLAN OR EQUIV PRESENT IN MEDICAL RECORD: ICD-10-PCS | Mod: HCNC,CPTII,S$GLB, | Performed by: NURSE PRACTITIONER

## 2023-07-13 PROCEDURE — 1100F PR PT FALLS ASSESS DOC 2+ FALLS/FALL W/INJURY/YR: ICD-10-PCS | Mod: HCNC,CPTII,S$GLB, | Performed by: NURSE PRACTITIONER

## 2023-07-13 PROCEDURE — 1125F AMNT PAIN NOTED PAIN PRSNT: CPT | Mod: HCNC,CPTII,S$GLB, | Performed by: NURSE PRACTITIONER

## 2023-07-13 NOTE — PATIENT INSTRUCTIONS
Counseling and Referral of Other Preventative  (Italic type indicates deductible and co-insurance are waived)    Patient Name: Summer Inman  Today's Date: 7/13/2023    Health Maintenance       Date Due Completion Date    Sign Pain Contract Never done ---    Aspirin/Antiplatelet Therapy Never done ---    Shingles Vaccine (1 of 2) Never done ---    COVID-19 Vaccine (4 - Moderna series) 02/10/2022 12/16/2021    Influenza Vaccine (1) 09/01/2023 10/13/2021    Override on 11/11/2019: Done    Hemoglobin A1c (Prediabetes) 04/19/2024 4/19/2023    Lipid Panel 04/19/2024 4/19/2023    TETANUS VACCINE 08/25/2024 8/25/2014        No orders of the defined types were placed in this encounter.    The following information is provided to all patients.  This information is to help you find resources for any of the problems found today that may be affecting your health:                Living healthy guide: www.[facility].[place].[facility]      Understanding Diabetes: www.diabetes.org      Eating healthy: www.cdc.gov/healthyweight      CDC home safety checklist: www.cdc.gov/steadi/patient.html      Agency on Aging: www.goea.[place].[facility]      Alcoholics anonymous (AA): www.aa.org      Physical Activity: www.carlo.nih.gov/ur6kkco      Tobacco use: www.quitwithusla.org

## 2023-07-13 NOTE — PROGRESS NOTES
"  Summer Inman presented for a  Medicare AWV and comprehensive Health Risk Assessment today. The following components were reviewed and updated:    Medical history  Family History  Social history  Allergies and Current Medications  Health Risk Assessment  Health Maintenance  Care Team     ** See Completed Assessments for Annual Wellness Visit within the encounter summary.**     The following assessments were completed:  Living Situation  CAGE  Depression Screening  Timed Get Up and Go  Whisper Test  Cognitive Function Screening-N/A followed by neurology (Sherry)  Nutrition Screening  ADL Screening  PAQ Screening    Review for Opioid Screening: Pt does have a rx of Norco 10/325mg every 6 hours prn pain. Followed by pcp ()  Review for Substance Use Disorders: Patient does not use substance      Vitals:    07/13/23 1435   BP: 132/80   BP Location: Left arm   Patient Position: Sitting   BP Method: Medium (Manual)   Pulse: 74   SpO2: 95%   Weight: 69.3 kg (152 lb 12.5 oz)   Height: 4' 11" (1.499 m)     Body mass index is 30.86 kg/m².  Physical Exam  Vitals reviewed.   Cardiovascular:      Rate and Rhythm: Normal rate and regular rhythm.      Pulses: Normal pulses.      Heart sounds: Normal heart sounds.   Pulmonary:      Effort: Pulmonary effort is normal. No respiratory distress.      Breath sounds: Normal breath sounds.   Skin:     General: Skin is warm and dry.   Neurological:      Mental Status: She is alert and oriented to person, place, and time.   Psychiatric:         Mood and Affect: Mood normal.         Behavior: Behavior normal.         Thought Content: Thought content normal.         Judgment: Judgment normal.     Diagnoses and health risks identified today and associated recommendations/orders:    1. Encounter for preventive health examination  Reviewed and discussed health maintenance.      2. Encounter for Medicare annual wellness exam  - Ambulatory Referral/Consult to Enhanced Annual Wellness " Visit (eAWV)    3. Depression, major, recurrent, mild  Stable- continue current treatment and follow up routinely with PCP     4. Chronic narcotic dependence  Stable- continue current treatment and follow up routinely with PCP     5. Pulmonary hypertension  Stable- continue current treatment and follow up routinely with PCP     6. Chronic obstructive pulmonary disease, unspecified COPD type  Stable- continue current treatment and follow up routinely with PCP     7. Atherosclerosis of aorta  Stable- continue current treatment and follow up routinely with PCP     8. Coronary artery disease involving native coronary artery of native heart without angina pectoris  Stable- continue current treatment and follow up routinely with PCP     9. SVT (supraventricular tachycardia)  Stable- continue current treatment and follow up routinely with PCP     10. Stage 3b chronic kidney disease  Stable- continue current treatment and follow up routinely with PCP   Avoid NSAIDs and increase fluids    11. Seizure disorder  Stable- continue current treatment and follow up routinely with PCP     12. Hyperlipidemia, unspecified hyperlipidemia type  Stable- continue current treatment and follow up routinely with PCP     13. Essential hypertension  Stable- continue current treatment and follow up routinely with PCP     Provided Summer with a 5-10 year written screening schedule and personal prevention plan. Recommendations were developed using the USPSTF age appropriate recommendations. Education, counseling, and referrals were provided as needed. After Visit Summary printed and given to patient which includes a list of additional screenings\tests needed.    I offered to discuss advanced care planning, including how to pick a person who would make decisions for you if you were unable to make them for yourself, called a health care power of , and what kind of decisions you might make such as use of life sustaining treatments such as  ventilators and tube feeding when faced with a life limiting illness recorded on a living will that they will need to know. (How you want to be cared for as you near the end of your natural life)   X  Patient has advanced directives on file, which we reviewed, and they do not wish to make changes.  Stephanie Amos, NP

## 2023-07-19 NOTE — TELEPHONE ENCOUNTER
No care due was identified.  [facility] Embedded Care Due Messages. Reference number: [id number].   7/19/2023 5:05:56 PM CDT

## 2023-07-20 RX ORDER — HYDROCODONE BITARTRATE AND ACETAMINOPHEN 10; 325 MG/1; MG/1
1 TABLET ORAL EVERY 6 HOURS PRN
Qty: 90 TABLET | Refills: 0 | Status: SHIPPED | OUTPATIENT
Start: 2023-07-20 | End: 2023-08-15 | Stop reason: SDUPTHER

## 2023-07-24 RX ORDER — ONDANSETRON 4 MG/1
TABLET, ORALLY DISINTEGRATING ORAL
Qty: 30 TABLET | Refills: 1 | Status: SHIPPED | OUTPATIENT
Start: 2023-07-24 | End: 2023-08-25

## 2023-07-24 NOTE — TELEPHONE ENCOUNTER
No care due was identified.  [facility] Embedded Care Due Messages. Reference number: [id number].   7/24/2023 11:06:03 AM CDT

## 2023-07-28 ENCOUNTER — LAB VISIT (OUTPATIENT)
Dept: LAB | Facility: HOSPITAL | Age: 88
End: 2023-07-28
Attending: FAMILY MEDICINE
Payer: MEDICARE

## 2023-07-28 ENCOUNTER — TELEPHONE (OUTPATIENT)
Dept: PRIMARY CARE CLINIC | Facility: CLINIC | Age: 88
End: 2023-07-28

## 2023-07-28 DIAGNOSIS — N39.0 CHRONIC URINARY TRACT INFECTION: ICD-10-CM

## 2023-07-28 LAB
BACTERIA #/AREA URNS HPF: ABNORMAL /HPF
BILIRUB UR QL STRIP: NEGATIVE
CLARITY UR: CLEAR
COLOR UR: YELLOW
GLUCOSE UR QL STRIP: NEGATIVE
HGB UR QL STRIP: NEGATIVE
KETONES UR QL STRIP: NEGATIVE
LEUKOCYTE ESTERASE UR QL STRIP: ABNORMAL
MICROSCOPIC COMMENT: ABNORMAL
NITRITE UR QL STRIP: NEGATIVE
PH UR STRIP: 7 [PH] (ref 5–8)
PROT UR QL STRIP: NEGATIVE
SP GR UR STRIP: 1.01 (ref 1–1.03)
SQUAMOUS #/AREA URNS HPF: 3 /HPF
URN SPEC COLLECT METH UR: ABNORMAL
WBC #/AREA URNS HPF: 5 /HPF (ref 0–5)

## 2023-07-28 PROCEDURE — 81003 URINALYSIS AUTO W/O SCOPE: CPT | Mod: HCNC,PO | Performed by: FAMILY MEDICINE

## 2023-07-28 PROCEDURE — 87077 CULTURE AEROBIC IDENTIFY: CPT | Mod: HCNC | Performed by: FAMILY MEDICINE

## 2023-07-28 PROCEDURE — 87086 URINE CULTURE/COLONY COUNT: CPT | Mod: HCNC | Performed by: FAMILY MEDICINE

## 2023-07-28 PROCEDURE — 87088 URINE BACTERIA CULTURE: CPT | Mod: HCNC | Performed by: FAMILY MEDICINE

## 2023-07-28 PROCEDURE — 81000 URINALYSIS NONAUTO W/SCOPE: CPT | Mod: HCNC,PO | Performed by: FAMILY MEDICINE

## 2023-07-28 PROCEDURE — 87186 SC STD MICRODIL/AGAR DIL: CPT | Mod: HCNC | Performed by: FAMILY MEDICINE

## 2023-07-28 NOTE — TELEPHONE ENCOUNTER
Called and lvm asked pt to return phone call. I received the results of a urine microscopic today but there is not a urinalysis and no culture.  There were few bacteria but otherwise unremarkable.  I am not certain if this was ordered because of active symptoms, if so we need to see if the lab can add the remainder of the urinalysis as well as a culture.

## 2023-07-28 NOTE — TELEPHONE ENCOUNTER
Spoke with pt's daughter, Kindra, and confirmed that a urine specimen was dropped off at [facility] lab today.    Spoke with Tosin at the lab and she will add the additional urinalysis and urine culture onto the existing urine specimen.

## 2023-07-31 ENCOUNTER — PATIENT MESSAGE (OUTPATIENT)
Dept: PRIMARY CARE CLINIC | Facility: CLINIC | Age: 88
End: 2023-07-31
Payer: MEDICARE

## 2023-07-31 DIAGNOSIS — N39.0 URINARY TRACT INFECTION WITHOUT HEMATURIA, SITE UNSPECIFIED: Primary | ICD-10-CM

## 2023-07-31 LAB — BACTERIA UR CULT: ABNORMAL

## 2023-07-31 RX ORDER — AMOXICILLIN 875 MG/1
875 TABLET, FILM COATED ORAL 2 TIMES DAILY
Qty: 20 TABLET | Refills: 0 | Status: SHIPPED | OUTPATIENT
Start: 2023-07-31 | End: 2023-08-10

## 2023-07-31 NOTE — TELEPHONE ENCOUNTER
Spoke with Kindra, pt dtr, discussed your message. Verbalized understanding.    Kindra stated that pt does not like water, she only wants to drink diet coke.  Explained to try to have pt drink 4 oz water every hour in addition to her coke.   Kindra verbalized understanding.

## 2023-07-31 NOTE — TELEPHONE ENCOUNTER
Urine culture is weakly positive.  If she is symptomatic she can start a prescription for amoxicllin.  If not symptomatic then maybe monitor and possibly repeat culture.  I did send in a prescription in case it is needed, please let her know

## 2023-08-15 RX ORDER — HYDROCODONE BITARTRATE AND ACETAMINOPHEN 10; 325 MG/1; MG/1
1 TABLET ORAL EVERY 6 HOURS PRN
Qty: 90 TABLET | Refills: 0 | Status: SHIPPED | OUTPATIENT
Start: 2023-08-15 | End: 2023-09-08 | Stop reason: SDUPTHER

## 2023-08-15 NOTE — TELEPHONE ENCOUNTER
No care due was identified.  [facility] Embedded Care Due Messages. Reference number: [id number].   8/15/2023 11:10:31 AM CDT

## 2023-08-25 ENCOUNTER — TELEPHONE (OUTPATIENT)
Dept: NEUROLOGY | Facility: CLINIC | Age: 88
End: 2023-08-25
Payer: MEDICARE

## 2023-08-25 RX ORDER — ONDANSETRON 4 MG/1
TABLET, ORALLY DISINTEGRATING ORAL
Qty: 30 TABLET | Refills: 1 | Status: SHIPPED | OUTPATIENT
Start: 2023-08-25

## 2023-08-25 NOTE — TELEPHONE ENCOUNTER
No care due was identified.  [facility] Embedded Care Due Messages. Reference number: [id number].   8/25/2023 12:42:47 PM CDT

## 2023-08-25 NOTE — TELEPHONE ENCOUNTER
----- Message from Sherron Trevizo sent at 8/25/2023 11:55 AM CDT -----  Regarding: reschedule appointment  Contact: daughterKindra  Type:  Sooner Appointment Request    Caller is requesting a sooner appointment.  Caller declined first available appointment listed below.  Caller will not accept being placed on the waitlist and is requesting a message be sent to doctor.    Name of Caller:  daughterKindra  When is the first available appointment?  09/11/23  Symptoms:  seizures  Best Call Back Number:  [phone]  Additional Information:  Daughter is asking if the appointment can be change to a Thursday or Friday after 2:00. She is not able to bring patient before then due to work. Please call patient's daughter to advise.Thanks!

## 2023-08-31 ENCOUNTER — LAB VISIT (OUTPATIENT)
Dept: LAB | Facility: HOSPITAL | Age: 88
End: 2023-08-31
Attending: FAMILY MEDICINE
Payer: MEDICARE

## 2023-08-31 DIAGNOSIS — I10 ESSENTIAL HYPERTENSION: ICD-10-CM

## 2023-08-31 PROCEDURE — 85025 COMPLETE CBC W/AUTO DIFF WBC: CPT | Mod: HCNC | Performed by: FAMILY MEDICINE

## 2023-08-31 PROCEDURE — 80053 COMPREHEN METABOLIC PANEL: CPT | Mod: HCNC | Performed by: FAMILY MEDICINE

## 2023-08-31 PROCEDURE — 36415 COLL VENOUS BLD VENIPUNCTURE: CPT | Mod: HCNC,PO | Performed by: FAMILY MEDICINE

## 2023-09-01 LAB
ALBUMIN SERPL BCP-MCNC: 3.7 G/DL (ref 3.5–5.2)
ALP SERPL-CCNC: 104 U/L (ref 55–135)
ALT SERPL W/O P-5'-P-CCNC: 11 U/L (ref 10–44)
ANION GAP SERPL CALC-SCNC: 10 MMOL/L (ref 8–16)
AST SERPL-CCNC: 15 U/L (ref 10–40)
BASOPHILS # BLD AUTO: 0.03 K/UL (ref 0–0.2)
BASOPHILS NFR BLD: 0.4 % (ref 0–1.9)
BILIRUB SERPL-MCNC: 0.2 MG/DL (ref 0.1–1)
BUN SERPL-MCNC: 24 MG/DL (ref 8–23)
CALCIUM SERPL-MCNC: 9.2 MG/DL (ref 8.7–10.5)
CHLORIDE SERPL-SCNC: 110 MMOL/L (ref 95–110)
CO2 SERPL-SCNC: 21 MMOL/L (ref 23–29)
CREAT SERPL-MCNC: 1.2 MG/DL (ref 0.5–1.4)
DIFFERENTIAL METHOD: ABNORMAL
EOSINOPHIL # BLD AUTO: 0.1 K/UL (ref 0–0.5)
EOSINOPHIL NFR BLD: 1.9 % (ref 0–8)
ERYTHROCYTE [DISTWIDTH] IN BLOOD BY AUTOMATED COUNT: 16.1 % (ref 11.5–14.5)
EST. GFR  (NO RACE VARIABLE): 44.1 ML/MIN/1.73 M^2
GLUCOSE SERPL-MCNC: 100 MG/DL (ref 70–110)
HCT VFR BLD AUTO: 38.6 % (ref 37–48.5)
HGB BLD-MCNC: 11.8 G/DL (ref 12–16)
IMM GRANULOCYTES # BLD AUTO: 0.01 K/UL (ref 0–0.04)
IMM GRANULOCYTES NFR BLD AUTO: 0.1 % (ref 0–0.5)
LYMPHOCYTES # BLD AUTO: 1.2 K/UL (ref 1–4.8)
LYMPHOCYTES NFR BLD: 18.2 % (ref 18–48)
MCH RBC QN AUTO: 26.3 PG (ref 27–31)
MCHC RBC AUTO-ENTMCNC: 30.6 G/DL (ref 32–36)
MCV RBC AUTO: 86 FL (ref 82–98)
MONOCYTES # BLD AUTO: 0.6 K/UL (ref 0.3–1)
MONOCYTES NFR BLD: 8.2 % (ref 4–15)
NEUTROPHILS # BLD AUTO: 4.8 K/UL (ref 1.8–7.7)
NEUTROPHILS NFR BLD: 71.2 % (ref 38–73)
NRBC BLD-RTO: 0 /100 WBC
PLATELET # BLD AUTO: 244 K/UL (ref 150–450)
PMV BLD AUTO: 11.2 FL (ref 9.2–12.9)
POTASSIUM SERPL-SCNC: 4.3 MMOL/L (ref 3.5–5.1)
PROT SERPL-MCNC: 6.9 G/DL (ref 6–8.4)
RBC # BLD AUTO: 4.48 M/UL (ref 4–5.4)
SODIUM SERPL-SCNC: 141 MMOL/L (ref 136–145)
WBC # BLD AUTO: 6.74 K/UL (ref 3.9–12.7)

## 2023-09-08 ENCOUNTER — OFFICE VISIT (OUTPATIENT)
Dept: PRIMARY CARE CLINIC | Facility: CLINIC | Age: 88
End: 2023-09-08
Payer: MEDICARE

## 2023-09-08 VITALS
HEART RATE: 83 BPM | BODY MASS INDEX: 30.14 KG/M2 | DIASTOLIC BLOOD PRESSURE: 72 MMHG | SYSTOLIC BLOOD PRESSURE: 118 MMHG | WEIGHT: 149.5 LBS | HEIGHT: 59 IN | OXYGEN SATURATION: 96 %

## 2023-09-08 DIAGNOSIS — Z80.9 FAMILY HISTORY OF CANCER: ICD-10-CM

## 2023-09-08 DIAGNOSIS — I10 ESSENTIAL HYPERTENSION: Chronic | ICD-10-CM

## 2023-09-08 DIAGNOSIS — Z12.31 ENCOUNTER FOR SCREENING MAMMOGRAM FOR MALIGNANT NEOPLASM OF BREAST: Primary | ICD-10-CM

## 2023-09-08 DIAGNOSIS — B37.0 THRUSH OF MOUTH AND ESOPHAGUS: ICD-10-CM

## 2023-09-08 DIAGNOSIS — J44.9 CHRONIC OBSTRUCTIVE PULMONARY DISEASE, UNSPECIFIED COPD TYPE: ICD-10-CM

## 2023-09-08 DIAGNOSIS — R52 CHRONIC PAIN OF MULTIPLE SITES: Chronic | ICD-10-CM

## 2023-09-08 DIAGNOSIS — B37.81 THRUSH OF MOUTH AND ESOPHAGUS: ICD-10-CM

## 2023-09-08 DIAGNOSIS — G89.29 CHRONIC PAIN OF MULTIPLE SITES: Chronic | ICD-10-CM

## 2023-09-08 PROCEDURE — 3288F FALL RISK ASSESSMENT DOCD: CPT | Mod: HCNC,CPTII,S$GLB, | Performed by: FAMILY MEDICINE

## 2023-09-08 PROCEDURE — 1125F AMNT PAIN NOTED PAIN PRSNT: CPT | Mod: HCNC,CPTII,S$GLB, | Performed by: FAMILY MEDICINE

## 2023-09-08 PROCEDURE — 99214 PR OFFICE/OUTPT VISIT, EST, LEVL IV, 30-39 MIN: ICD-10-PCS | Mod: HCNC,S$GLB,, | Performed by: FAMILY MEDICINE

## 2023-09-08 PROCEDURE — 1100F PR PT FALLS ASSESS DOC 2+ FALLS/FALL W/INJURY/YR: ICD-10-PCS | Mod: HCNC,CPTII,S$GLB, | Performed by: FAMILY MEDICINE

## 2023-09-08 PROCEDURE — 1159F PR MEDICATION LIST DOCUMENTED IN MEDICAL RECORD: ICD-10-PCS | Mod: HCNC,CPTII,S$GLB, | Performed by: FAMILY MEDICINE

## 2023-09-08 PROCEDURE — 99999 PR PBB SHADOW E&M-EST. PATIENT-LVL V: CPT | Mod: PBBFAC,HCNC,, | Performed by: FAMILY MEDICINE

## 2023-09-08 PROCEDURE — 1123F ACP DISCUSS/DSCN MKR DOCD: CPT | Mod: HCNC,CPTII,S$GLB, | Performed by: FAMILY MEDICINE

## 2023-09-08 PROCEDURE — 3288F PR FALLS RISK ASSESSMENT DOCUMENTED: ICD-10-PCS | Mod: HCNC,CPTII,S$GLB, | Performed by: FAMILY MEDICINE

## 2023-09-08 PROCEDURE — 1160F PR REVIEW ALL MEDS BY PRESCRIBER/CLIN PHARMACIST DOCUMENTED: ICD-10-PCS | Mod: HCNC,CPTII,S$GLB, | Performed by: FAMILY MEDICINE

## 2023-09-08 PROCEDURE — 1125F PR PAIN SEVERITY QUANTIFIED, PAIN PRESENT: ICD-10-PCS | Mod: HCNC,CPTII,S$GLB, | Performed by: FAMILY MEDICINE

## 2023-09-08 PROCEDURE — 1160F RVW MEDS BY RX/DR IN RCRD: CPT | Mod: HCNC,CPTII,S$GLB, | Performed by: FAMILY MEDICINE

## 2023-09-08 PROCEDURE — 99999 PR PBB SHADOW E&M-EST. PATIENT-LVL V: ICD-10-PCS | Mod: PBBFAC,HCNC,, | Performed by: FAMILY MEDICINE

## 2023-09-08 PROCEDURE — 1100F PTFALLS ASSESS-DOCD GE2>/YR: CPT | Mod: HCNC,CPTII,S$GLB, | Performed by: FAMILY MEDICINE

## 2023-09-08 PROCEDURE — 1159F MED LIST DOCD IN RCRD: CPT | Mod: HCNC,CPTII,S$GLB, | Performed by: FAMILY MEDICINE

## 2023-09-08 PROCEDURE — 99214 OFFICE O/P EST MOD 30 MIN: CPT | Mod: HCNC,S$GLB,, | Performed by: FAMILY MEDICINE

## 2023-09-08 PROCEDURE — 1123F PR ADV CARE PLAN DISCUSSED, PLAN OR SURROGATE DOCUMENTED: ICD-10-PCS | Mod: HCNC,CPTII,S$GLB, | Performed by: FAMILY MEDICINE

## 2023-09-08 RX ORDER — FLUCONAZOLE 100 MG/1
TABLET ORAL
Qty: 8 TABLET | Refills: 0 | Status: SHIPPED | OUTPATIENT
Start: 2023-09-08 | End: 2024-02-07

## 2023-09-08 RX ORDER — HYDROCODONE BITARTRATE AND ACETAMINOPHEN 10; 325 MG/1; MG/1
1 TABLET ORAL EVERY 6 HOURS PRN
Qty: 90 TABLET | Refills: 0 | Status: SHIPPED | OUTPATIENT
Start: 2023-09-14 | End: 2023-10-10 | Stop reason: SDUPTHER

## 2023-09-08 NOTE — PROGRESS NOTES
THIS DOCUMENT WAS MADE IN PART WITH VOICE RECOGNITION SOFTWARE.  OCCASIONALLY THIS SOFTWARE WILL MISINTERPRET WORDS OR PHRASES.      Primary Care Provider Appointment   [facility]       Patient ID: Summer Inman is a 87 y.o. female.    ASSESSMENT/PLAN by Problem List:    1. Encounter for screening mammogram for malignant neoplasm of breast  -     Mammo Digital Screening Bilat w/ Dl; Future; Expected date: 09/08/2023    2. Essential hypertension  Assessment & Plan:  Stable and satisfactory.  Continue current medication      3. Chronic obstructive pulmonary disease, unspecified COPD type  Assessment & Plan:  Stable.  No recent exacerbation.  She will report any changes      4. Chronic pain of multiple sites  Assessment & Plan:  She does have chronic musculoskeletal pain, back and other.  She has been stable on her hydrocodone.  Will continue and follow every three months.      5. Thrush of mouth and esophagus  Assessment & Plan:  She does have signs of thrush in the mouth but also does report some difficulty swallowing.  Can not rule out Candida esophagitis.  Will place her on Diflucan.  Although if symptoms do not resolve will need to consult with Gastroenterology.  Note the interaction between Diflucan and Aricept.  Discussed alternatives but she does not want to take a liquid multiple times they so she will discontinue the Aricept until she completes the Diflucan.      6. Family history of cancer  Assessment & Plan:  Family history of multiple cancer, including breast cancer and other.  Somebody had recommended that she undergo genetic testing.  I am not certain of the value genetic testing at age 87, if she is BRCA positive she certainly is not interested in prophylactic mastectomies.  If she was positive for something like Ramirez syndrome, she still does not want to do other aggressive interventions..  Although if desired I can place a referral although it does not appear  that findings would change her care or strategy at this point.  But she will think about it let me know.  I do recommend that she proceed with breast cancer screening and mammography.      Other orders  -     HYDROcodone-acetaminophen (NORCO)  mg per tablet; Take 1 tablet by mouth every 6 (six) hours as needed (pain).  Dispense: 90 tablet; Refill: 0  -     fluconazole (DIFLUCAN) 100 MG tablet; Take 2 tablets on day one, then one tablet daily days 2-7  Dispense: 8 tablet; Refill: 0         Follow Up:  Three months    Advance Care Planning     Date: 09/08/2023    Living Will    Power of   I initiated the process of voluntary advance care planning today and explained the importance of this process to the patient.  I introduced the concept of advance directives to the patient, as well. Then the patient received detailed information about the importance of designating a Health Care Power of  (HCPOA). She was also instructed to communicate with this person about their wishes for future healthcare, should she become sick and lose decision-making capacity. The patient has previously appointed a HCPOA. After our discussion, the patient has decided to complete a HCPOA and has appointed her daughter and   , health care agent:  Stefani Inman, and Kindra Estrada               Subjective:     Chief Complaint   Patient presents with    Follow-up     I have reviewed the information entered by the ancillary staff regarding the chief complaint as well as the related history.    HPI    Patient is a/an 87 y.o.  female     Routine follow-up.  Fairly stable.  No recent accidents or falls or hospitalizations.  She continues to have difficulty with chronic musculoskeletal pain but no significant changes.  They do have some questions about cancer risk as multiple family members have had cancer particularly breast cancer.    For complete problem list, past medical history, surgical history, social history, etc., see  "appropriate section in the electronic medical record    Review of Systems   Constitutional:  Positive for fatigue. Negative for chills, fever and unexpected weight change.   Respiratory: Negative.     Cardiovascular:  Negative for chest pain.   Gastrointestinal:  Positive for abdominal pain (Chronic abdominal pain, no acute changes.).   Genitourinary: Negative.    Musculoskeletal:  Positive for arthralgias, back pain and myalgias.   Neurological:  Positive for weakness.       Objective     Physical Exam  Constitutional:       General: She is not in acute distress.     Appearance: She is not toxic-appearing.   HENT:      Head: Normocephalic and atraumatic.   Eyes:      General: No scleral icterus.     Conjunctiva/sclera: Conjunctivae normal.   Cardiovascular:      Rate and Rhythm: Normal rate and regular rhythm.      Heart sounds: Murmur heard.   Pulmonary:      Effort: No respiratory distress.      Breath sounds: No wheezing.      Comments: Diminished breath sounds bilaterally  Abdominal:      Comments: She has mild diffuse tenderness, no guarding or rebound or signs of acute abdomen.  Appears at baseline   Skin:     Findings: No rash.       Vitals:    09/08/23 1525   BP: 118/72   BP Location: Right arm   Patient Position: Sitting   BP Method: Medium (Manual)   Pulse: 83   SpO2: 96%   Weight: 67.8 kg (149 lb 7.6 oz)   Height: 4' 11" (1.499 m)     "

## 2023-09-10 PROBLEM — Z80.9 FAMILY HISTORY OF CANCER: Status: ACTIVE | Noted: 2023-09-10

## 2023-09-10 PROBLEM — B37.0 THRUSH OF MOUTH AND ESOPHAGUS: Status: ACTIVE | Noted: 2023-09-10

## 2023-09-10 PROBLEM — B37.81 THRUSH OF MOUTH AND ESOPHAGUS: Status: ACTIVE | Noted: 2023-09-10

## 2023-09-10 NOTE — ASSESSMENT & PLAN NOTE
Family history of multiple cancer, including breast cancer and other.  Somebody had recommended that she undergo genetic testing.  I am not certain of the value genetic testing at age 87, if she is BRCA positive she certainly is not interested in prophylactic mastectomies.  If she was positive for something like Ramirez syndrome, she still does not want to do other aggressive interventions..  Although if desired I can place a referral although it does not appear that findings would change her care or strategy at this point.  But she will think about it let me know.  I do recommend that she proceed with breast cancer screening and mammography.

## 2023-09-10 NOTE — ASSESSMENT & PLAN NOTE
She does have signs of thrush in the mouth but also does report some difficulty swallowing.  Can not rule out Candida esophagitis.  Will place her on Diflucan.  Although if symptoms do not resolve will need to consult with Gastroenterology.  Note the interaction between Diflucan and Aricept.  Discussed alternatives but she does not want to take a liquid multiple times they so she will discontinue the Aricept until she completes the Diflucan.

## 2023-09-10 NOTE — ASSESSMENT & PLAN NOTE
She does have chronic musculoskeletal pain, back and other.  She has been stable on her hydrocodone.  Will continue and follow every three months.

## 2023-09-11 RX ORDER — NITROGLYCERIN 0.4 MG/1
TABLET SUBLINGUAL
Qty: 25 TABLET | Refills: 11 | Status: SHIPPED | OUTPATIENT
Start: 2023-09-11

## 2023-09-11 NOTE — TELEPHONE ENCOUNTER
Refill Decision Note   Summer Inman  is requesting a refill authorization.  Brief Assessment and Rationale for Refill:  Approve     Medication Therapy Plan:         Pharmacist review requested: Yes   Extended chart review required: Yes   Comments:     Note composed:2:56 PM 09/11/2023

## 2023-09-11 NOTE — TELEPHONE ENCOUNTER
No care due was identified.  [facility] Embedded Care Due Messages. Reference number: [id number].   9/11/2023 12:55:35 PM CDT

## 2023-09-13 DIAGNOSIS — M51.36 DDD (DEGENERATIVE DISC DISEASE), LUMBAR: ICD-10-CM

## 2023-09-13 DIAGNOSIS — G89.29 CHRONIC PAIN OF MULTIPLE SITES: Chronic | ICD-10-CM

## 2023-09-13 DIAGNOSIS — R52 CHRONIC PAIN OF MULTIPLE SITES: Chronic | ICD-10-CM

## 2023-09-13 DIAGNOSIS — M54.17 LUMBOSACRAL RADICULOPATHY: Chronic | ICD-10-CM

## 2023-09-13 RX ORDER — TIZANIDINE 2 MG/1
TABLET ORAL
Qty: 120 TABLET | Refills: 1 | Status: SHIPPED | OUTPATIENT
Start: 2023-09-13 | End: 2024-01-03

## 2023-09-13 RX ORDER — GABAPENTIN 300 MG/1
CAPSULE ORAL
Qty: 360 CAPSULE | Refills: 3 | Status: SHIPPED | OUTPATIENT
Start: 2023-09-13

## 2023-09-13 RX ORDER — MEMANTINE HYDROCHLORIDE 5 MG/1
TABLET ORAL
Qty: 180 TABLET | Refills: 3 | Status: SHIPPED | OUTPATIENT
Start: 2023-09-13

## 2023-10-05 ENCOUNTER — PATIENT MESSAGE (OUTPATIENT)
Dept: PRIMARY CARE CLINIC | Facility: CLINIC | Age: 88
End: 2023-10-05
Payer: MEDICARE

## 2023-10-05 ENCOUNTER — OFFICE VISIT (OUTPATIENT)
Dept: OPTOMETRY | Facility: CLINIC | Age: 88
End: 2023-10-05
Payer: MEDICARE

## 2023-10-05 DIAGNOSIS — H50.34 INTERMITTENT ALTERNATING EXOTROPIA: ICD-10-CM

## 2023-10-05 DIAGNOSIS — Z96.1 BILATERAL PSEUDOPHAKIA: ICD-10-CM

## 2023-10-05 DIAGNOSIS — H43.813 POSTERIOR VITREOUS DETACHMENT, BILATERAL: Primary | ICD-10-CM

## 2023-10-05 DIAGNOSIS — H02.9 BENIGN LESION OF EYELID: ICD-10-CM

## 2023-10-05 DIAGNOSIS — Z13.5 GLAUCOMA SCREENING: ICD-10-CM

## 2023-10-05 DIAGNOSIS — H10.503 BLEPHAROCONJUNCTIVITIS OF BOTH EYES, UNSPECIFIED BLEPHAROCONJUNCTIVITIS TYPE: ICD-10-CM

## 2023-10-05 PROCEDURE — 1101F PR PT FALLS ASSESS DOC 0-1 FALLS W/OUT INJ PAST YR: ICD-10-PCS | Mod: HCNC,CPTII,S$GLB, | Performed by: OPTOMETRIST

## 2023-10-05 PROCEDURE — 1157F ADVNC CARE PLAN IN RCRD: CPT | Mod: HCNC,CPTII,S$GLB, | Performed by: OPTOMETRIST

## 2023-10-05 PROCEDURE — 92014 PR EYE EXAM, EST PATIENT,COMPREHESV: ICD-10-PCS | Mod: HCNC,S$GLB,, | Performed by: OPTOMETRIST

## 2023-10-05 PROCEDURE — 92014 COMPRE OPH EXAM EST PT 1/>: CPT | Mod: HCNC,S$GLB,, | Performed by: OPTOMETRIST

## 2023-10-05 PROCEDURE — 1126F PR PAIN SEVERITY QUANTIFIED, NO PAIN PRESENT: ICD-10-PCS | Mod: HCNC,CPTII,S$GLB, | Performed by: OPTOMETRIST

## 2023-10-05 PROCEDURE — 1126F AMNT PAIN NOTED NONE PRSNT: CPT | Mod: HCNC,CPTII,S$GLB, | Performed by: OPTOMETRIST

## 2023-10-05 PROCEDURE — 1159F PR MEDICATION LIST DOCUMENTED IN MEDICAL RECORD: ICD-10-PCS | Mod: HCNC,CPTII,S$GLB, | Performed by: OPTOMETRIST

## 2023-10-05 PROCEDURE — 99999 PR PBB SHADOW E&M-EST. PATIENT-LVL III: CPT | Mod: PBBFAC,HCNC,, | Performed by: OPTOMETRIST

## 2023-10-05 PROCEDURE — 1101F PT FALLS ASSESS-DOCD LE1/YR: CPT | Mod: HCNC,CPTII,S$GLB, | Performed by: OPTOMETRIST

## 2023-10-05 PROCEDURE — 3288F FALL RISK ASSESSMENT DOCD: CPT | Mod: HCNC,CPTII,S$GLB, | Performed by: OPTOMETRIST

## 2023-10-05 PROCEDURE — 3288F PR FALLS RISK ASSESSMENT DOCUMENTED: ICD-10-PCS | Mod: HCNC,CPTII,S$GLB, | Performed by: OPTOMETRIST

## 2023-10-05 PROCEDURE — 99999 PR PBB SHADOW E&M-EST. PATIENT-LVL III: ICD-10-PCS | Mod: PBBFAC,HCNC,, | Performed by: OPTOMETRIST

## 2023-10-05 PROCEDURE — 1159F MED LIST DOCD IN RCRD: CPT | Mod: HCNC,CPTII,S$GLB, | Performed by: OPTOMETRIST

## 2023-10-05 PROCEDURE — 1157F PR ADVANCE CARE PLAN OR EQUIV PRESENT IN MEDICAL RECORD: ICD-10-PCS | Mod: HCNC,CPTII,S$GLB, | Performed by: OPTOMETRIST

## 2023-10-05 RX ORDER — NYSTATIN 100000 [USP'U]/ML
SUSPENSION ORAL
Qty: 200 ML | Refills: 0 | Status: SHIPPED | OUTPATIENT
Start: 2023-10-05 | End: 2023-11-06 | Stop reason: SDUPTHER

## 2023-10-05 NOTE — Clinical Note
Hello, FYI --I examined Ms Inman on 10/5, she has an appt with you 10/12---She appears to have recent (6 + months) transient diplopia / intermittent exotropia. She doesn't exhibit a akash 4th for 6th nerve palsy in either eye, but may elicit symptoms with fatigue.  She refused neuro -ophthal consult, unwilling to travel.   Vision / IOP/ retina all otherwise healthy   Discussed with daughter and I can f/u if any worsening vision symptoms in 4-6 months.

## 2023-10-06 ENCOUNTER — HOSPITAL ENCOUNTER (OUTPATIENT)
Dept: RADIOLOGY | Facility: HOSPITAL | Age: 88
Discharge: HOME | End: 2023-10-06
Attending: FAMILY MEDICINE
Payer: MEDICARE

## 2023-10-06 DIAGNOSIS — Z12.31 ENCOUNTER FOR SCREENING MAMMOGRAM FOR MALIGNANT NEOPLASM OF BREAST: ICD-10-CM

## 2023-10-06 PROCEDURE — 77067 MAMMO DIGITAL SCREENING BILAT WITH TOMO: ICD-10-PCS | Mod: 26,HCNC,, | Performed by: RADIOLOGY

## 2023-10-06 PROCEDURE — 77063 BREAST TOMOSYNTHESIS BI: CPT | Mod: 26,HCNC,, | Performed by: RADIOLOGY

## 2023-10-06 PROCEDURE — 77067 SCR MAMMO BI INCL CAD: CPT | Mod: TC,HCNC,PO

## 2023-10-06 PROCEDURE — 77063 MAMMO DIGITAL SCREENING BILAT WITH TOMO: ICD-10-PCS | Mod: 26,HCNC,, | Performed by: RADIOLOGY

## 2023-10-06 PROCEDURE — 77067 SCR MAMMO BI INCL CAD: CPT | Mod: 26,HCNC,, | Performed by: RADIOLOGY

## 2023-10-06 NOTE — PROGRESS NOTES
HPI    Routine eye exam-dle-4/22    Pt complains of blurred va at distance and near. States she has glasses   but doesn't always wear them. Complains of diplopia x 6 months. Complains   of occasional flashes and many floaters OU. Using allergy gtts prn.  Last edited by Georgette Kidd on 10/5/2023  1:27 PM.            Assessment /Plan     For exam results, see Encounter Report.    Posterior vitreous detachment, bilateral    Blepharoconjunctivitis of both eyes, unspecified blepharoconjunctivitis type    Benign lesion of eyelid    Intermittent alternating exotropia    Glaucoma screening    Bilateral pseudophakia          Longstanding OU --RD precautions given and reviewed. Patient knows to call/ message if any further changes in symptoms occur.  2,3. Transient / seasonal s/s   Use ATs and otc allergy gtts as previous, gave suggestions   Longstanding / chronic complaint of discomfort at OD nasal canthus / caruncle after lesion removed  No gross inflammation noted, no scales, no scabbing    4.   Complaint of 6 months----Transient diplopia w/ fatigue, especially at near  ---poor historian today, and difficulty maintaining fixation for motility testing    Does appear as intermittent exotropia, which would contribute to near vision diplopia  Does not exhibit gross 4th nerve or 6th nerve palsy on EOM and cover test --apparent full range OU   Pt refused consult with neuro ophthalmology, unwilling to travel      4. Not suspect  5. Stable OU     Excellent retina / macular health for age      No specs needed , use otc for near prn --long discussion about trying higher power readers for close near vision and lower power for table top or lap top distances     Note to neuro---patient has appt 10/12/23 already scheduled      Discussed and educated patient on current findings /plan.  RTC 1 year, prn if any changes / issues

## 2023-10-09 ENCOUNTER — TELEPHONE (OUTPATIENT)
Dept: RADIOLOGY | Facility: HOSPITAL | Age: 88
End: 2023-10-09
Payer: MEDICARE

## 2023-10-11 ENCOUNTER — TELEPHONE (OUTPATIENT)
Dept: NEUROLOGY | Facility: CLINIC | Age: 88
End: 2023-10-11
Payer: MEDICARE

## 2023-10-11 RX ORDER — HYDROCODONE BITARTRATE AND ACETAMINOPHEN 10; 325 MG/1; MG/1
1 TABLET ORAL EVERY 6 HOURS PRN
Qty: 90 TABLET | Refills: 0 | Status: SHIPPED | OUTPATIENT
Start: 2023-10-11 | End: 2023-11-09 | Stop reason: SDUPTHER

## 2023-10-11 NOTE — TELEPHONE ENCOUNTER
Called Patient to confirm appointment. Spoke with patient's daughter and confirmed appointment date and time. Clinic location was given. Patient's daughter v/u.

## 2023-10-12 ENCOUNTER — OFFICE VISIT (OUTPATIENT)
Dept: NEUROLOGY | Facility: CLINIC | Age: 88
End: 2023-10-12
Payer: MEDICARE

## 2023-10-12 VITALS
BODY MASS INDEX: 30.78 KG/M2 | WEIGHT: 152.69 LBS | DIASTOLIC BLOOD PRESSURE: 68 MMHG | SYSTOLIC BLOOD PRESSURE: 149 MMHG | HEIGHT: 59 IN | HEART RATE: 69 BPM

## 2023-10-12 DIAGNOSIS — G58.9 NERVE PALSY: ICD-10-CM

## 2023-10-12 DIAGNOSIS — G40.909 SEIZURE DISORDER: ICD-10-CM

## 2023-10-12 PROCEDURE — 1160F PR REVIEW ALL MEDS BY PRESCRIBER/CLIN PHARMACIST DOCUMENTED: ICD-10-PCS | Mod: HCNC,CPTII,S$GLB, | Performed by: NURSE PRACTITIONER

## 2023-10-12 PROCEDURE — 99999 PR PBB SHADOW E&M-EST. PATIENT-LVL IV: CPT | Mod: PBBFAC,HCNC,, | Performed by: NURSE PRACTITIONER

## 2023-10-12 PROCEDURE — 99214 OFFICE O/P EST MOD 30 MIN: CPT | Mod: HCNC,S$GLB,, | Performed by: NURSE PRACTITIONER

## 2023-10-12 PROCEDURE — 99999 PR PBB SHADOW E&M-EST. PATIENT-LVL IV: ICD-10-PCS | Mod: PBBFAC,HCNC,, | Performed by: NURSE PRACTITIONER

## 2023-10-12 PROCEDURE — 3288F PR FALLS RISK ASSESSMENT DOCUMENTED: ICD-10-PCS | Mod: HCNC,CPTII,S$GLB, | Performed by: NURSE PRACTITIONER

## 2023-10-12 PROCEDURE — 1100F PTFALLS ASSESS-DOCD GE2>/YR: CPT | Mod: HCNC,CPTII,S$GLB, | Performed by: NURSE PRACTITIONER

## 2023-10-12 PROCEDURE — 99214 PR OFFICE/OUTPT VISIT, EST, LEVL IV, 30-39 MIN: ICD-10-PCS | Mod: HCNC,S$GLB,, | Performed by: NURSE PRACTITIONER

## 2023-10-12 PROCEDURE — 3288F FALL RISK ASSESSMENT DOCD: CPT | Mod: HCNC,CPTII,S$GLB, | Performed by: NURSE PRACTITIONER

## 2023-10-12 PROCEDURE — 1159F MED LIST DOCD IN RCRD: CPT | Mod: HCNC,CPTII,S$GLB, | Performed by: NURSE PRACTITIONER

## 2023-10-12 PROCEDURE — 1126F PR PAIN SEVERITY QUANTIFIED, NO PAIN PRESENT: ICD-10-PCS | Mod: HCNC,CPTII,S$GLB, | Performed by: NURSE PRACTITIONER

## 2023-10-12 PROCEDURE — 1126F AMNT PAIN NOTED NONE PRSNT: CPT | Mod: HCNC,CPTII,S$GLB, | Performed by: NURSE PRACTITIONER

## 2023-10-12 PROCEDURE — 1157F PR ADVANCE CARE PLAN OR EQUIV PRESENT IN MEDICAL RECORD: ICD-10-PCS | Mod: HCNC,CPTII,S$GLB, | Performed by: NURSE PRACTITIONER

## 2023-10-12 PROCEDURE — 1160F RVW MEDS BY RX/DR IN RCRD: CPT | Mod: HCNC,CPTII,S$GLB, | Performed by: NURSE PRACTITIONER

## 2023-10-12 PROCEDURE — 1157F ADVNC CARE PLAN IN RCRD: CPT | Mod: HCNC,CPTII,S$GLB, | Performed by: NURSE PRACTITIONER

## 2023-10-12 PROCEDURE — 1100F PR PT FALLS ASSESS DOC 2+ FALLS/FALL W/INJURY/YR: ICD-10-PCS | Mod: HCNC,CPTII,S$GLB, | Performed by: NURSE PRACTITIONER

## 2023-10-12 PROCEDURE — 1159F PR MEDICATION LIST DOCUMENTED IN MEDICAL RECORD: ICD-10-PCS | Mod: HCNC,CPTII,S$GLB, | Performed by: NURSE PRACTITIONER

## 2023-10-12 RX ORDER — LAMOTRIGINE 100 MG/1
TABLET ORAL
Qty: 450 TABLET | Refills: 3 | Status: SHIPPED | OUTPATIENT
Start: 2023-10-12

## 2023-10-12 NOTE — ASSESSMENT & PLAN NOTE
ophthalmology reported pt to have akash 4th or 6 th nerve palsy   - not very noticeable on exam today. Pt did complain of headache when asked to assess eye movement  Recommend MRI Brain scan and neuro-ophtho referral but pt decline on both.   She denies any other associated symptoms

## 2023-10-12 NOTE — PROGRESS NOTES
"  NEUROLOGY  Outpatient Follow Up    [facility]  [address], [place] [address]  [phone] (office) / [phone] (fax)    Patient Name:  Summer Inman  :  1935  MR #:  [id number]  Acct #:  [id number]    Date of Neurology Visit: 10/12/2023  Name of Provider: SILVANO Gorman    Other Physicians:  Ludwig Wilson MD (Primary Care Physician); No ref. provider found (Referring)      Chief Complaint: Seizures      History of Present Illness (HPI):  As per Epic chart review:  (2018)  Ms. Inman is a 82 y.o. female who presents for follow up of seizures. The patient was accompanied by her son who also contributed to the following history. The history is difficult to obtain.      Since our last visit, we obtained MRI brain that did not show a definite cause for the seizures. 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 I recommended aspirin 81 mg daily be started and she has been taking that daily. The lamotrigine level was normal.      About 3 weeks ago, she had an unusual episode. She had a 9 hour long episode where she was very confused/delirious. She was yelling at people. She recalls that the TV was "rolling". No dizziness.  Her son says that [facility] was [facility] She was unable to stand as well. She kept sliding out of the bed and her grandson kept trying to pick her up. She is unclear if she had any seizure like activity. When she came out of it, she was totally fine. She denies that she took medication incorrectly.      No other episodes since that time. She has passed out in the past before but this was not a passing out episode. No seizures that her and her son.      She states she is taking lamictal 200 mg in the morning and 300 mg in the evening (previously reported that she was on 200 mg once daily). It is unclear what she is actually taking. Per my review of Dr. Souza's records he prescribed " "200mg/300mg. She has had a normal EEG with him.              Interval Hx 1/25/2021:   Patient is new to me  Patient is here today for follow up. She is accompanied by her daughter who supplies some information. Patient has been maintained on Aricept 10 mg daily and doesn't recall who or when she was started on this medication. Her daughter states she just started helping out with her medications about 6 mths ago.   Patient states she sleeps well at night. Patient denies hallucinations. Daughter states patient does not have any mood or behavioral issues. Patient denies depression. Daughter states somedays her memory is better than others. She seems to fluctuate. Patient states she gets frustrated often because she forgets items or forgets what she is talking about.   Patient states she does not eat much healthy foods but still has a good appetite. Patient is able to perform ADLs without assistance. She does use a cane for stability purposes.     Patient and daughter both deny any seizure activity. She has been maintained on Lamictal 200 mg in AM and 300 mg nightly.        Interval history 3/2022 (Dr. Inman): "She is on aricept 10 mg daily for memory started by Dr. Souza. She has forgetfulness but felt to be stable. MMSE is Jan 2021 was 26/30. She is also on namenda 5 mg BID started by Dr. Wilson.      She continues on lamictal 200mg in the morning and 300 mg at night for seizures. No seizure activity noted.      She used to have bad migraines in the past. She has ice pick stabbing headaches lately. She will get it on either side."      Interval Hx 10/12/2023:  Patient presents today for seizure follow up. She and daughter deny any recent seizures. A tytpical seizure consistd of freezing up and not being able to follow commands. Her last seizure was "years ago". She is maintained on Lamictal 200 mg in AM and 300 mg at night and tolerating it well.     She also reports intermittent diplopia that started about 6+ " months ago. 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 She is not interested in further workup for this at the present.         Past Medical, Surgical, Family & Social History:   Reviewed and updated.    Home Medications:     Current Outpatient Medications:     azelastine (OPTIVAR) 0.05 % ophthalmic solution, Place 1 drop into both eyes 2 (two) times daily., Disp: 6 mL, Rfl: 3    diclofenac sodium (VOLTAREN) 1 % Gel, Apply 2 g topically once daily., Disp: 300 g, Rfl: 1    donepeziL (ARICEPT) 10 MG tablet, Take 1 tablet (10 mg total) by mouth every morning., Disp: 90 tablet, Rfl: 0    doxycycline (MONODOX) 100 MG capsule, Take 1 capsule (100 mg total) by mouth 2 (two) times daily., Disp: 20 capsule, Rfl: 0    ergocalciferol, vitamin D2, (VITAMIN D ORAL), Take 2,000 Units by mouth once daily at 6am., Disp: , Rfl:     fluconazole (DIFLUCAN) 100 MG tablet, Take 2 tablets on day one, then one tablet daily days 2-7, Disp: 8 tablet, Rfl: 0    gabapentin (NEURONTIN) 300 MG capsule, TAKE 1 CAPSULE IN THE MORNING AND TAKE 1 CAPSULE WITH LUNCH AND TAKE 1 TO 2 CAPSULES IN THE EVENING, Disp: 360 capsule, Rfl: 3    HYDROcodone-acetaminophen (NORCO)  mg per tablet, Take 1 tablet by mouth every 6 (six) hours as needed (pain)., Disp: 90 tablet, Rfl: 0    MAGNESIUM ORAL, Take 1 tablet by mouth once daily at 6am., Disp: , Rfl:     memantine (NAMENDA) 5 MG Tab, TAKE 1 TABLET TWICE DAILY, Disp: 180 tablet, Rfl: 3    nitroGLYCERIN (NITROSTAT) 0.4 MG SL tablet, PLACE 1 TAB UNDER THE TONGUE EVERY 5 MIN (UP TO 3 DOSES) AS NEEDED FOR CHEST PAIN. CALL 911 AT 3RD TABLET, Disp: 25 tablet, Rfl: 11    nystatin (MYCOSTATIN) 100,000 unit/mL suspension, 1 teaspoon by mouth 4 times daily.  Swish around and gargle for 15 to 20 seconds then swallow., Disp: 200 mL, Rfl: 0    omeprazole (PRILOSEC) 40 MG capsule, TAKE 1 CAPSULE EVERY DAY, Disp: 90 capsule, Rfl: 3    ondansetron (ZOFRAN-ODT) 4 MG TbDL, DISSOLVE  "1 TABLET ON THE TONGUE EVERY 8 HOURS AS NEEDED, Disp: 30 tablet, Rfl: 1    polyethylene glycol (GLYCOLAX) 17 gram/dose powder, Take 17 g by mouth daily as needed., Disp: , Rfl:     potassium chloride (K-TAB) 20 mEq, TAKE 1 TABLET (20 MEQ TOTAL) BY MOUTH  DAILY., Disp: 90 tablet, Rfl: 4    sertraline (ZOLOFT) 100 MG tablet, TAKE 1 AND 1/2 TABLETS (150 MG TOTAL) BY MOUTH ONCE DAILY., Disp: 135 tablet, Rfl: 3    tiZANidine (ZANAFLEX) 2 MG tablet, TAKE 1 TO 2 TABLETS THREE TIMES DAILY AS NEEDED FOR MUSCLE SPASM(S), Disp: 120 tablet, Rfl: 1    fluticasone propionate (FLONASE) 50 mcg/actuation nasal spray, 2 sprays (100 mcg total) by Each Nostril route once daily. (Patient not taking: Reported on 9/8/2023), Disp: 16 g, Rfl: 3    fluticasone-salmeterol diskus inhaler 250-50 mcg, Inhale 1 puff into the lungs 2 (two) times daily. Controller, Disp: 180 each, Rfl: 3    lamoTRIgine (LAMICTAL) 100 MG tablet, TAKE 2 TABLETS IN THE MORNING  AND TAKE 3 TABLETS AT NIGHT, Disp: 450 tablet, Rfl: 3    Physical Examination:  BP (!) 149/68 (BP Location: Right arm, Patient Position: Sitting, BP Method: Medium (Automatic))   Pulse 69   Ht 4' 11" (1.499 m)   Wt 69.3 kg (152 lb 10.7 oz)   BMI 30.84 kg/m²       GENERAL:  General appearance: Well, non-toxic appearing.  No apparent distress.  Neck: supple.      MENTAL STATUS:  Alertness, attention span & concentration: normal.  Language: normal.  Orientation to self, place & time:  normal.  Memory, recent & remote: somewhat limited  Fund of knowledge: normal.  MMSE: 28/30 (2022)  26/30 (2021)    SPEECH:  Clear and fluent.  Follows complex commands.    CRANIAL NERVES:  Cranial Nerves II-XII were examined.  II - Visual fields: normal.  III, IV, VI: PERRL, EOMI, No ptosis, No nystagmus.  V - Facial sensation: normal.  VII - Face symmetry & mobility: normal.  VIII - Hearing: normal.  IX, X - Palate: mobile & midline.  XI - Shoulder shrug: normal.  XII - Tongue protrusion: normal.    GROSS " MOTOR:  Gait & station: slow, balance checks at times; uses cane/walker  Tone: normal.  Abnormal movements: none.  Finger-nose: normal.  Rapid alternating movements: normal.  Pronator drift: normal      MUSCLE STRENGTH:   5/5 throughout except 4/5 to bilateral hams and quads    REFLEXES:    RIGHT Reflex   LEFT   2+ Biceps 2+   2+ Brachiorad. 2+   2+ Triceps 2+          SENSORY:  Light touch: Normal throughout.                 Diagnostic Data Reviewed:          Lab Results   Component Value Date    WBC 6.74 08/31/2023    HGB 11.8 (L) 08/31/2023    HCT 38.6 08/31/2023    MCV 86 08/31/2023     08/31/2023        CMP  Sodium   Date Value Ref Range Status   08/31/2023 141 136 - 145 mmol/L Final     Potassium   Date Value Ref Range Status   08/31/2023 4.3 3.5 - 5.1 mmol/L Final     Chloride   Date Value Ref Range Status   08/31/2023 110 95 - 110 mmol/L Final     CO2   Date Value Ref Range Status   08/31/2023 21 (L) 23 - 29 mmol/L Final     Glucose   Date Value Ref Range Status   08/31/2023 100 70 - 110 mg/dL Final     BUN   Date Value Ref Range Status   08/31/2023 24 (H) 8 - 23 mg/dL Final     Creatinine   Date Value Ref Range Status   08/31/2023 1.2 0.5 - 1.4 mg/dL Final     Calcium   Date Value Ref Range Status   08/31/2023 9.2 8.7 - 10.5 mg/dL Final     Total Protein   Date Value Ref Range Status   08/31/2023 6.9 6.0 - 8.4 g/dL Final     Albumin   Date Value Ref Range Status   08/31/2023 3.7 3.5 - 5.2 g/dL Final     Total Bilirubin   Date Value Ref Range Status   08/31/2023 0.2 0.1 - 1.0 mg/dL Final     Comment:     For infants and newborns, interpretation of results should be based  on gestational age, weight and in agreement with clinical  observations.    Premature Infant recommended reference ranges:  Up to 24 hours.............<8.0 mg/dL  Up to 48 hours............<12.0 mg/dL  3-5 days..................<15.0 mg/dL  6-29 days.................<15.0 mg/dL       Alkaline Phosphatase   Date Value Ref Range Status    08/31/2023 104 55 - 135 U/L Final     AST ([place])   Date Value Ref Range Status   01/31/2016 24 14 - 36 U/L Final     AST   Date Value Ref Range Status   08/31/2023 15 10 - 40 U/L Final     ALT   Date Value Ref Range Status   08/31/2023 11 10 - 44 U/L Final     Anion Gap   Date Value Ref Range Status   08/31/2023 10 8 - 16 mmol/L Final     eGFR   Date Value Ref Range Status   08/31/2023 44.1 (A) >60 mL/min/1.73 m^2 Final     EEG 8/2018:  This is an abnormal EEG   during wakefulness, drowsiness and sleep with overall degree of slowing.  For   her given age, it is suggestive of a mild encephalopathy, nonspecific to the   cause.  There is no evidence of an epileptic process on this recording.  No   seizures were recorded during this study                  Assessment and Plan:      Problem List Items Addressed This Visit          Neuro    Seizure disorder (Chronic)    Current Assessment & Plan     Patient is a 86 y/o female that presents for seizure follow up  Previous reported episodes consisting of blank stare and/or stiffening up.    - last known seizure many years ago  Pt maintained on Lamictal 200/300 and tolerating well   - continue!  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 Pt did complain of headache when asked to assess eye movement  Recommend MRI Brain scan and neuro-ophtho referral but pt decline on both.   She denies any other associated symptoms                             Important to note, also  has a past medical history of Anticoagulant long-term use, Arthritis, Asthma, Atrial premature beats, Benign neoplasm of adrenal gland, Cholelithiasis, CKD (chronic kidney disease), Colon polyp, COPD (chronic obstructive pulmonary disease), Coronary artery disease, Depression, Dizziness, First degree AV block, Gastroesophageal reflux disease,  General anesthetics causing adverse effect in therapeutic use, Hepatomegaly, Hyperlipidemia, Hypertension, Impaired mobility, Neck pain, Schatzki's ring, Seasonal allergies, Seizures, Stroke, Trouble in sleeping, Wears dentures, and Wears glasses.          The patient will return to clinic in 12 months.    All questions were answered and patient is comfortable with the plan.         Thank you very much for the opportunity to assist in this patient's care.    If you have any questions or concerns, please do not hesitate to contact me at any time.      Sincerely,     SILVANO Gorman  [facility] [place]

## 2023-10-12 NOTE — ASSESSMENT & PLAN NOTE
Patient is a 88 y/o female that presents for seizure follow up  Previous reported episodes consisting of blank stare and/or stiffening up.    - last known seizure many years ago  Pt maintained on Lamictal 200/300 and tolerating well   - continue!  Obtain trough  Recent serologies noted

## 2023-10-13 ENCOUNTER — TELEPHONE (OUTPATIENT)
Dept: RADIOLOGY | Facility: HOSPITAL | Age: 88
End: 2023-10-13
Payer: MEDICARE

## 2023-10-13 NOTE — TELEPHONE ENCOUNTER
Diagnostic breast imaging appointment rescheduled from 10-20-23 to 11-9-23 by patient's daughter,Kindra.

## 2023-11-06 ENCOUNTER — LAB VISIT (OUTPATIENT)
Dept: LAB | Facility: HOSPITAL | Age: 88
End: 2023-11-06
Attending: NURSE PRACTITIONER
Payer: MEDICARE

## 2023-11-06 DIAGNOSIS — G40.909 SEIZURE DISORDER: ICD-10-CM

## 2023-11-06 PROCEDURE — 36415 COLL VENOUS BLD VENIPUNCTURE: CPT | Mod: HCNC,PO | Performed by: NURSE PRACTITIONER

## 2023-11-06 PROCEDURE — 80175 DRUG SCREEN QUAN LAMOTRIGINE: CPT | Mod: HCNC | Performed by: NURSE PRACTITIONER

## 2023-11-06 RX ORDER — NYSTATIN 100000 [USP'U]/ML
SUSPENSION ORAL
Qty: 200 ML | Refills: 0 | Status: SHIPPED | OUTPATIENT
Start: 2023-11-06

## 2023-11-09 ENCOUNTER — HOSPITAL ENCOUNTER (OUTPATIENT)
Dept: RADIOLOGY | Facility: HOSPITAL | Age: 88
Discharge: HOME | End: 2023-11-09
Attending: FAMILY MEDICINE
Payer: MEDICARE

## 2023-11-09 DIAGNOSIS — R92.8 ABNORMAL MAMMOGRAM OF LEFT BREAST: ICD-10-CM

## 2023-11-09 LAB — LAMOTRIGINE SERPL-MCNC: 8.9 UG/ML (ref 2–15)

## 2023-11-09 PROCEDURE — 77065 MAMMO DIGITAL DIAGNOSTIC LEFT WITH TOMO: ICD-10-PCS | Mod: 26,HCNC,LT, | Performed by: RADIOLOGY

## 2023-11-09 PROCEDURE — 77061 MAMMO DIGITAL DIAGNOSTIC LEFT WITH TOMO: ICD-10-PCS | Mod: 26,HCNC,LT, | Performed by: RADIOLOGY

## 2023-11-09 PROCEDURE — 77061 BREAST TOMOSYNTHESIS UNI: CPT | Mod: 26,HCNC,LT, | Performed by: RADIOLOGY

## 2023-11-09 PROCEDURE — 77061 BREAST TOMOSYNTHESIS UNI: CPT | Mod: TC,HCNC,PO,LT

## 2023-11-09 PROCEDURE — 77065 DX MAMMO INCL CAD UNI: CPT | Mod: 26,HCNC,LT, | Performed by: RADIOLOGY

## 2023-11-09 RX ORDER — HYDROCODONE BITARTRATE AND ACETAMINOPHEN 10; 325 MG/1; MG/1
1 TABLET ORAL EVERY 6 HOURS PRN
Qty: 90 TABLET | Refills: 0 | Status: SHIPPED | OUTPATIENT
Start: 2023-11-09 | End: 2023-12-09 | Stop reason: SDUPTHER

## 2023-11-30 DIAGNOSIS — G89.29 CHRONIC PAIN OF MULTIPLE SITES: ICD-10-CM

## 2023-11-30 DIAGNOSIS — R52 CHRONIC PAIN OF MULTIPLE SITES: ICD-10-CM

## 2023-11-30 RX ORDER — DICLOFENAC SODIUM 10 MG/G
2 GEL TOPICAL DAILY
Qty: 300 G | Refills: 1 | Status: SHIPPED | OUTPATIENT
Start: 2023-11-30

## 2023-11-30 NOTE — TELEPHONE ENCOUNTER
No care due was identified.  [facility] Embedded Care Due Messages. Reference number: [id number].   11/30/2023 4:34:16 PM CST

## 2023-12-06 ENCOUNTER — TELEPHONE (OUTPATIENT)
Dept: GASTROENTEROLOGY | Facility: CLINIC | Age: 88
End: 2023-12-06
Payer: MEDICARE

## 2023-12-06 NOTE — TELEPHONE ENCOUNTER
Spoke with patients daughter and canceled her 12/15 appointment with JAMAL aRmos as she most likely needs to see a nephrologist. She will discuss at her upcoming appointment with Dr. Wilson.

## 2023-12-11 RX ORDER — HYDROCODONE BITARTRATE AND ACETAMINOPHEN 10; 325 MG/1; MG/1
1 TABLET ORAL EVERY 6 HOURS PRN
Qty: 90 TABLET | Refills: 0 | Status: SHIPPED | OUTPATIENT
Start: 2023-12-11 | End: 2024-01-08 | Stop reason: SDUPTHER

## 2023-12-15 ENCOUNTER — LAB VISIT (OUTPATIENT)
Dept: LAB | Facility: HOSPITAL | Age: 88
End: 2023-12-15
Attending: FAMILY MEDICINE
Payer: MEDICARE

## 2023-12-15 ENCOUNTER — OFFICE VISIT (OUTPATIENT)
Dept: PRIMARY CARE CLINIC | Facility: CLINIC | Age: 88
End: 2023-12-15
Payer: MEDICARE

## 2023-12-15 VITALS
HEART RATE: 72 BPM | RESPIRATION RATE: 18 BRPM | DIASTOLIC BLOOD PRESSURE: 74 MMHG | BODY MASS INDEX: 30.42 KG/M2 | HEIGHT: 59 IN | TEMPERATURE: 98 F | SYSTOLIC BLOOD PRESSURE: 136 MMHG | WEIGHT: 150.88 LBS | OXYGEN SATURATION: 96 %

## 2023-12-15 DIAGNOSIS — Z23 NEED FOR VACCINATION: ICD-10-CM

## 2023-12-15 DIAGNOSIS — R73.03 PREDIABETES: ICD-10-CM

## 2023-12-15 DIAGNOSIS — I10 ESSENTIAL HYPERTENSION: Primary | Chronic | ICD-10-CM

## 2023-12-15 DIAGNOSIS — E78.5 HYPERLIPIDEMIA, UNSPECIFIED HYPERLIPIDEMIA TYPE: Chronic | ICD-10-CM

## 2023-12-15 DIAGNOSIS — I10 ESSENTIAL HYPERTENSION: Chronic | ICD-10-CM

## 2023-12-15 LAB
ALBUMIN SERPL BCP-MCNC: 3.9 G/DL (ref 3.5–5.2)
ALP SERPL-CCNC: 111 U/L (ref 55–135)
ALT SERPL W/O P-5'-P-CCNC: 11 U/L (ref 10–44)
ANION GAP SERPL CALC-SCNC: 8 MMOL/L (ref 8–16)
AST SERPL-CCNC: 14 U/L (ref 10–40)
BASOPHILS # BLD AUTO: 0.04 K/UL (ref 0–0.2)
BASOPHILS NFR BLD: 0.6 % (ref 0–1.9)
BILIRUB SERPL-MCNC: 0.2 MG/DL (ref 0.1–1)
BUN SERPL-MCNC: 32 MG/DL (ref 8–23)
CALCIUM SERPL-MCNC: 9.9 MG/DL (ref 8.7–10.5)
CHLORIDE SERPL-SCNC: 105 MMOL/L (ref 95–110)
CHOLEST SERPL-MCNC: 249 MG/DL (ref 120–199)
CHOLEST/HDLC SERPL: 3.7 {RATIO} (ref 2–5)
CO2 SERPL-SCNC: 27 MMOL/L (ref 23–29)
CREAT SERPL-MCNC: 0.9 MG/DL (ref 0.5–1.4)
DIFFERENTIAL METHOD: ABNORMAL
EOSINOPHIL # BLD AUTO: 0.2 K/UL (ref 0–0.5)
EOSINOPHIL NFR BLD: 2.3 % (ref 0–8)
ERYTHROCYTE [DISTWIDTH] IN BLOOD BY AUTOMATED COUNT: 15.6 % (ref 11.5–14.5)
EST. GFR  (NO RACE VARIABLE): >60 ML/MIN/1.73 M^2
ESTIMATED AVG GLUCOSE: 111 MG/DL (ref 68–131)
GLUCOSE SERPL-MCNC: 107 MG/DL (ref 70–110)
HBA1C MFR BLD: 5.5 % (ref 4–5.6)
HCT VFR BLD AUTO: 39.3 % (ref 37–48.5)
HDLC SERPL-MCNC: 67 MG/DL (ref 40–75)
HDLC SERPL: 26.9 % (ref 20–50)
HGB BLD-MCNC: 12.3 G/DL (ref 12–16)
IMM GRANULOCYTES # BLD AUTO: 0.02 K/UL (ref 0–0.04)
IMM GRANULOCYTES NFR BLD AUTO: 0.3 % (ref 0–0.5)
LDLC SERPL CALC-MCNC: 159.4 MG/DL (ref 63–159)
LYMPHOCYTES # BLD AUTO: 1.2 K/UL (ref 1–4.8)
LYMPHOCYTES NFR BLD: 16.9 % (ref 18–48)
MCH RBC QN AUTO: 27.2 PG (ref 27–31)
MCHC RBC AUTO-ENTMCNC: 31.3 G/DL (ref 32–36)
MCV RBC AUTO: 87 FL (ref 82–98)
MONOCYTES # BLD AUTO: 0.6 K/UL (ref 0.3–1)
MONOCYTES NFR BLD: 7.8 % (ref 4–15)
NEUTROPHILS # BLD AUTO: 5.1 K/UL (ref 1.8–7.7)
NEUTROPHILS NFR BLD: 72.1 % (ref 38–73)
NONHDLC SERPL-MCNC: 182 MG/DL
NRBC BLD-RTO: 0 /100 WBC
PLATELET # BLD AUTO: 214 K/UL (ref 150–450)
PMV BLD AUTO: 10.9 FL (ref 9.2–12.9)
POTASSIUM SERPL-SCNC: 4.3 MMOL/L (ref 3.5–5.1)
PROT SERPL-MCNC: 7.3 G/DL (ref 6–8.4)
RBC # BLD AUTO: 4.53 M/UL (ref 4–5.4)
SODIUM SERPL-SCNC: 140 MMOL/L (ref 136–145)
TRIGL SERPL-MCNC: 113 MG/DL (ref 30–150)
TSH SERPL DL<=0.005 MIU/L-ACNC: 1.18 UIU/ML (ref 0.4–4)
WBC # BLD AUTO: 7.08 K/UL (ref 3.9–12.7)

## 2023-12-15 PROCEDURE — 85025 COMPLETE CBC W/AUTO DIFF WBC: CPT | Mod: HCNC | Performed by: FAMILY MEDICINE

## 2023-12-15 PROCEDURE — 1157F ADVNC CARE PLAN IN RCRD: CPT | Mod: HCNC,CPTII,S$GLB, | Performed by: FAMILY MEDICINE

## 2023-12-15 PROCEDURE — 90694 FLU VACCINE - QUADRIVALENT - ADJUVANTED: ICD-10-PCS | Mod: HCNC,S$GLB,, | Performed by: FAMILY MEDICINE

## 2023-12-15 PROCEDURE — 1160F PR REVIEW ALL MEDS BY PRESCRIBER/CLIN PHARMACIST DOCUMENTED: ICD-10-PCS | Mod: HCNC,CPTII,S$GLB, | Performed by: FAMILY MEDICINE

## 2023-12-15 PROCEDURE — 90694 VACC AIIV4 NO PRSRV 0.5ML IM: CPT | Mod: HCNC,S$GLB,, | Performed by: FAMILY MEDICINE

## 2023-12-15 PROCEDURE — 1126F PR PAIN SEVERITY QUANTIFIED, NO PAIN PRESENT: ICD-10-PCS | Mod: HCNC,CPTII,S$GLB, | Performed by: FAMILY MEDICINE

## 2023-12-15 PROCEDURE — 99999 PR PBB SHADOW E&M-EST. PATIENT-LVL V: CPT | Mod: PBBFAC,HCNC,, | Performed by: FAMILY MEDICINE

## 2023-12-15 PROCEDURE — 1157F PR ADVANCE CARE PLAN OR EQUIV PRESENT IN MEDICAL RECORD: ICD-10-PCS | Mod: HCNC,CPTII,S$GLB, | Performed by: FAMILY MEDICINE

## 2023-12-15 PROCEDURE — 80061 LIPID PANEL: CPT | Mod: HCNC | Performed by: FAMILY MEDICINE

## 2023-12-15 PROCEDURE — 1101F PR PT FALLS ASSESS DOC 0-1 FALLS W/OUT INJ PAST YR: ICD-10-PCS | Mod: HCNC,CPTII,S$GLB, | Performed by: FAMILY MEDICINE

## 2023-12-15 PROCEDURE — 80053 COMPREHEN METABOLIC PANEL: CPT | Mod: HCNC | Performed by: FAMILY MEDICINE

## 2023-12-15 PROCEDURE — 83036 HEMOGLOBIN GLYCOSYLATED A1C: CPT | Mod: HCNC | Performed by: FAMILY MEDICINE

## 2023-12-15 PROCEDURE — 1101F PT FALLS ASSESS-DOCD LE1/YR: CPT | Mod: HCNC,CPTII,S$GLB, | Performed by: FAMILY MEDICINE

## 2023-12-15 PROCEDURE — 1126F AMNT PAIN NOTED NONE PRSNT: CPT | Mod: HCNC,CPTII,S$GLB, | Performed by: FAMILY MEDICINE

## 2023-12-15 PROCEDURE — 99999 PR PBB SHADOW E&M-EST. PATIENT-LVL V: ICD-10-PCS | Mod: PBBFAC,HCNC,, | Performed by: FAMILY MEDICINE

## 2023-12-15 PROCEDURE — 99214 OFFICE O/P EST MOD 30 MIN: CPT | Mod: HCNC,S$GLB,, | Performed by: FAMILY MEDICINE

## 2023-12-15 PROCEDURE — 99214 PR OFFICE/OUTPT VISIT, EST, LEVL IV, 30-39 MIN: ICD-10-PCS | Mod: HCNC,S$GLB,, | Performed by: FAMILY MEDICINE

## 2023-12-15 PROCEDURE — 3288F PR FALLS RISK ASSESSMENT DOCUMENTED: ICD-10-PCS | Mod: HCNC,CPTII,S$GLB, | Performed by: FAMILY MEDICINE

## 2023-12-15 PROCEDURE — 36415 COLL VENOUS BLD VENIPUNCTURE: CPT | Mod: HCNC,PO | Performed by: FAMILY MEDICINE

## 2023-12-15 PROCEDURE — 1160F RVW MEDS BY RX/DR IN RCRD: CPT | Mod: HCNC,CPTII,S$GLB, | Performed by: FAMILY MEDICINE

## 2023-12-15 PROCEDURE — 3288F FALL RISK ASSESSMENT DOCD: CPT | Mod: HCNC,CPTII,S$GLB, | Performed by: FAMILY MEDICINE

## 2023-12-15 PROCEDURE — G0008 FLU VACCINE - QUADRIVALENT - ADJUVANTED: ICD-10-PCS | Mod: HCNC,S$GLB,, | Performed by: FAMILY MEDICINE

## 2023-12-15 PROCEDURE — 1159F MED LIST DOCD IN RCRD: CPT | Mod: HCNC,CPTII,S$GLB, | Performed by: FAMILY MEDICINE

## 2023-12-15 PROCEDURE — 84443 ASSAY THYROID STIM HORMONE: CPT | Mod: HCNC | Performed by: FAMILY MEDICINE

## 2023-12-15 PROCEDURE — 1159F PR MEDICATION LIST DOCUMENTED IN MEDICAL RECORD: ICD-10-PCS | Mod: HCNC,CPTII,S$GLB, | Performed by: FAMILY MEDICINE

## 2023-12-15 PROCEDURE — G0008 ADMIN INFLUENZA VIRUS VAC: HCPCS | Mod: HCNC,S$GLB,, | Performed by: FAMILY MEDICINE

## 2023-12-15 RX ORDER — SUCRALFATE 1 G/1
1 TABLET ORAL
Qty: 120 TABLET | Refills: 1 | Status: SHIPPED | OUTPATIENT
Start: 2023-12-15 | End: 2024-02-14

## 2023-12-15 NOTE — PROGRESS NOTES
Primary Care Provider Appointment   [facility]       Patient ID: Summer Inman is a 88 y.o. female.    ASSESSMENT/PLAN by Problem List:    1. Essential hypertension  -     Comprehensive Metabolic Panel; Future; Expected date: 12/15/2023  -     CBC Auto Differential; Future; Expected date: 12/15/2023  -     TSH; Future; Expected date: 12/15/2023    2. Hyperlipidemia, unspecified hyperlipidemia type  -     Comprehensive Metabolic Panel; Future; Expected date: 12/15/2023  -     Lipid Panel; Future; Expected date: 12/15/2023    3. Prediabetes  Overview:  hbg a1c I 8/2020 - 6    Orders:  -     Hemoglobin A1C; Future; Expected date: 12/15/2023  -     TSH; Future; Expected date: 12/15/2023    4. Need for vaccination  -     Influenza - Quadrivalent (Adjuvanted)    Other orders  -     sucralfate (CARAFATE) 1 gram tablet; Take 1 tablet (1 g total) by mouth 4 (four) times daily before meals and nightly.  Dispense: 120 tablet; Refill: 1     Above conditions including chronic pain or stable.  Continue current medications and follow every three months.  She does report intermittent indigestion and reflux will place back on Carafate but if not improving recommend GI    Follow Up:  Three months    Subjective:     Chief Complaint   Patient presents with    Follow-up     I have reviewed the information entered by the ancillary staff regarding the chief complaint as well as the related history.    HPI    Patient is a/an 88 y.o.  female     Routine follow-up.  Fairly stable, continues to have chronic musculoskeletal pain but really no different and current medication helps.  She is not interested in any referrals or therapy.  She does have occasional indigestion.  Some reflux and occasional nausea.  She does not have any exertional symptoms, associated shortness of breath, chest pain, etc..  She reports Carafate helped but she is out.    For complete problem list, past medical history, surgical  "history, social history, etc., see appropriate section in the electronic medical record    Review of Systems   Constitutional:  Positive for fatigue. Negative for chills, fever and unexpected weight change.   Respiratory: Negative.     Cardiovascular:  Negative for chest pain.   Gastrointestinal:  Positive for abdominal pain (Chronic abdominal pain, no acute changes.) and nausea.   Genitourinary: Negative.    Musculoskeletal:  Positive for arthralgias, back pain and myalgias.   Neurological:  Positive for weakness.       Objective     Physical Exam  Constitutional:       General: She is not in acute distress.     Appearance: She is not toxic-appearing.   HENT:      Head: Normocephalic and atraumatic.      Mouth/Throat:      Pharynx: Oropharynx is clear. No oropharyngeal exudate or posterior oropharyngeal erythema.   Eyes:      General: No scleral icterus.  Cardiovascular:      Rate and Rhythm: Normal rate and regular rhythm.      Heart sounds: Murmur heard.   Pulmonary:      Effort: No respiratory distress.      Breath sounds: No wheezing.      Comments: Diminished breath sounds bilaterally  Abdominal:      Comments: She has mild diffuse tenderness, no guarding or rebound or signs of acute abdomen.  Appears at baseline   Skin:     Findings: No rash.       Vitals:    12/15/23 1511   BP: 136/74   BP Location: Right arm   Patient Position: Sitting   BP Method: Large (Manual)   Pulse: 72   Resp: 18   Temp: 97.5 °F (36.4 °C)   TempSrc: Oral   SpO2: 96%   Weight: 68.5 kg (150 lb 14.5 oz)   Height: 4' 11" (1.499 m)           THIS DOCUMENT WAS MADE IN PART WITH VOICE RECOGNITION SOFTWARE.  OCCASIONALLY THIS SOFTWARE WILL MISINTERPRET WORDS OR PHRASES.    "

## 2024-01-02 DIAGNOSIS — M51.36 DDD (DEGENERATIVE DISC DISEASE), LUMBAR: ICD-10-CM

## 2024-01-02 DIAGNOSIS — R41.3 MEMORY LOSS: ICD-10-CM

## 2024-01-03 RX ORDER — TIZANIDINE 2 MG/1
TABLET ORAL
Qty: 120 TABLET | Refills: 3 | Status: SHIPPED | OUTPATIENT
Start: 2024-01-03

## 2024-01-03 RX ORDER — DONEPEZIL HYDROCHLORIDE 10 MG/1
10 TABLET, FILM COATED ORAL EVERY MORNING
Qty: 90 TABLET | Refills: 2 | Status: SHIPPED | OUTPATIENT
Start: 2024-01-03

## 2024-01-09 RX ORDER — HYDROCODONE BITARTRATE AND ACETAMINOPHEN 10; 325 MG/1; MG/1
1 TABLET ORAL EVERY 6 HOURS PRN
Qty: 90 TABLET | Refills: 0 | Status: SHIPPED | OUTPATIENT
Start: 2024-01-09 | End: 2024-02-07 | Stop reason: SDUPTHER

## 2024-01-26 NOTE — TELEPHONE ENCOUNTER
I spoke with patient regarding needing to reschedule appointment. Informed patient's daughter of no availability for the reminder of the year. Also informed patient's daughter that Next year's schedule is still closed.Patient's daughter stated to cancel appointment an will call at the end of next month to schedule an appointment for next year. Informed patient's daughter that she will need an appointment for future refills since she hasn't been seen in a little over a year. Patient's daughter v/u and will try to get refills from PCP in the meantime.    [facility]  INFECTIOUS DISEASES at [place] / [place] / [place]  =======================================================                               Raymond Dickinson MD#   Nat Ashraf MD*                             Holli Barba MD*   Wendi Rob MD*                              Professor Emeritus:  Dr Ranjith Alicia MD^            Diplomates American Board of Internal Medicine & Infectious Diseases                # [place] Office - Appt - Tel  [phone] Fax [phone]                * [place] Office - Appt - Tel [phone] Fax [phone]                      ^[place] Office - Tel  [phone] Fax [phone]                                  Hospital Consult line:  [phone]  =======================================================    NINFA BLANCHARD [id number]    Follow up: Staphylococcus aureus empyema     no complaints       Allergies:  No Known Allergies       REVIEW OF SYSTEMS:  CONSTITUTIONAL:  No Fever or chills  HEENT:  No diplopia or blurred vision.  No earache, sore throat or runny nose.  CARDIOVASCULAR:  No chest pain  RESPIRATORY:  No cough,. + shortness of breath  GASTROINTESTINAL:  No nausea, vomiting or diarrhea.  GENITOURINARY:  No dysuria, frequency or urgency. No Blood in urine  MUSCULOSKELETAL:  no joint aches, no muscle pain  SKIN:  No change in skin, hair or nails.  NEUROLOGIC:  No Headaches    Physical Exam:  GEN: NAD  HEENT: normocephalic and atraumatic. EOMI. PERRL.    NECK: Supple.   LUNGS: Decreased basal BS B/L   HEART: RRR  ABDOMEN: Soft, NT, ND.  +BS.    : No CVA tenderness  EXTREMITIES: Without  edema.  MSK: No joint swelling  NEUROLOGIC: No Focal Deficits   PSYCHIATRIC: Appropriate affect .  SKIN: No rash      Vitals:  T(F): 98.3 (26 Jan 2024 08:06), Max: 98.7 (25 Jan 2024 20:48)  HR: 105 (26 Jan 2024 08:06)  BP: 98/66 (26 Jan 2024 08:06)  RR: 17 (26 Jan 2024 08:06)  SpO2: 97% (26 Jan 2024 08:06) (95% - 100%)  temp max in last 48H T(F): , Max: 99.1 (01-24-24 @ 16:48)    Current Antibiotics:  nafcillin  IVPB 2 Gram(s) IV Intermittent every 4 hours    Other medications:  acetaminophen   IVPB .. 1000 milliGRAM(s) IV Intermittent every 6 hours  dextrose 5%. 1000 milliLiter(s) IV Continuous <Continuous>  dextrose 5%. 1000 milliLiter(s) IV Continuous <Continuous>  dextrose 50% Injectable 25 Gram(s) IV Push once  dextrose 50% Injectable 12.5 Gram(s) IV Push once  dextrose 50% Injectable 25 Gram(s) IV Push once  enoxaparin Injectable 40 milliGRAM(s) SubCutaneous every 24 hours  glucagon  Injectable 1 milliGRAM(s) IntraMuscular once  insulin glargine Injectable (LANTUS) 30 Unit(s) SubCutaneous at bedtime  insulin lispro (ADMELOG) corrective regimen sliding scale   SubCutaneous Before meals and at bedtime  insulin lispro Injectable (ADMELOG) 16 Unit(s) SubCutaneous three times a day before meals  lactated ringers. 1000 milliLiter(s) IV Continuous <Continuous>  pantoprazole    Tablet 40 milliGRAM(s) Oral before breakfast                            13.1   8.40  )-----------( 473      ( 25 Jan 2024 14:00 )             38.5     01-25    136  |  99  |  13.4  ----------------------------<  190<H>  4.6   |  25.0  |  0.79    Ca    8.7      25 Jan 2024 14:00  Mg     1.7     01-25    TPro  7.5  /  Alb  2.6<L>  /  TBili  0.4  /  DBili  x   /  AST  15  /  ALT  21  /  AlkPhos  94  01-25    RECENT CULTURES:  01-25 @ 13:01 .Tissue products of decortication     Moderate polymorphonuclear leukocytes seen per low power field  Numerous Gram positive cocci in pairs seen per oil power field    01-19 @ 16:23 Pleural Fl Pleural Fluid Staphylococcus aureus    Numerous Staphylococcus aureus  polymorphonuclear leukocytes seen  Gram positive cocci in pairs seen  by cytocentrifuge    01-19 @ 07:50 Clean Catch Clean Catch (Midstream)     <10,000 CFU/mL Normal Urogenital Louise    01-18 @ 18:15 .Blood Blood     No growth at 5 days    01-18 @ 18:00 .Blood Blood     No growth at 5 days    01-18 @ 17:50    RVP  Detected  SARS-CoV-2: Detected (01-18-24 @ 17:50)    WBC Count: 8.40 K/uL (01-25-24 @ 14:00)  WBC Count: 8.97 K/uL (01-25-24 @ 07:23)  WBC Count: 10.20 K/uL (01-24-24 @ 06:45)  WBC Count: 9.70 K/uL (01-23-24 @ 06:56)  WBC Count: 10.54 K/uL (01-22-24 @ 06:56)  WBC Count: 12.76 K/uL (01-21-24 @ 13:05)    Creatinine: 0.79 mg/dL (01-25-24 @ 14:00)  Creatinine: 0.77 mg/dL (01-25-24 @ 07:23)  Creatinine: 0.55 mg/dL (01-24-24 @ 06:45)  Creatinine: 0.77 mg/dL (01-23-24 @ 06:56)  Creatinine: 0.78 mg/dL (01-22-24 @ 06:56)  Creatinine: 0.67 mg/dL (01-21-24 @ 13:05)    Procalcitonin, Serum: 0.07 ng/mL (01-24-24 @ 06:45)         < from: Xray Chest 1 View- PORTABLE-Urgent (Xray Chest 1 View- PORTABLE-Urgent .) (01.26.24 @ 05:12) >  ACC: [id number] EXAM:  XR CHEST PORTABLE URGENT 1V   ORDERED BY: VALENTIN CHIN     ACC: [id number] EXAM:  XR CHEST AP OR PA 1V   ORDERED BY: GERMAN CR     PROCEDURE DATE:  01/25/2024      INTERPRETATION:  AP chest on January 25,2024 at 2:19 PM. Patient had   left chest VATS surgery.    Heart normal for projection.    On January 24 there is a catheter left chest tube. This is been removed   and presently there are 2 left chest tubes impression. There are some   procedural changes concentrated at left base. No pneumothorax.    Follow-up AP chest on January 26, 2024 4:29 AM.    Slight increase in left base effusion.    IMPRESSION: Left chest surgery with 2 left chest tubes inserted. There is   a mild increase in left base effusion on the latest film.    --- End of Report ---    < end of copied text >

## 2024-02-07 ENCOUNTER — OFFICE VISIT (OUTPATIENT)
Dept: GASTROENTEROLOGY | Facility: CLINIC | Age: 89
End: 2024-02-07
Payer: MEDICARE

## 2024-02-07 VITALS — HEIGHT: 59 IN | WEIGHT: 139.75 LBS | BODY MASS INDEX: 28.17 KG/M2

## 2024-02-07 DIAGNOSIS — R05.9 COUGH, UNSPECIFIED TYPE: ICD-10-CM

## 2024-02-07 DIAGNOSIS — R49.0 HOARSENESS OF VOICE: ICD-10-CM

## 2024-02-07 DIAGNOSIS — J02.9 SORE THROAT: ICD-10-CM

## 2024-02-07 DIAGNOSIS — R10.84 GENERALIZED ABDOMINAL PAIN: ICD-10-CM

## 2024-02-07 DIAGNOSIS — R63.4 WEIGHT LOSS: ICD-10-CM

## 2024-02-07 DIAGNOSIS — Z87.19 HISTORY OF CHRONIC CONSTIPATION: ICD-10-CM

## 2024-02-07 DIAGNOSIS — Z87.19 HISTORY OF GASTROESOPHAGEAL REFLUX (GERD): ICD-10-CM

## 2024-02-07 DIAGNOSIS — R11.2 NON-INTRACTABLE VOMITING WITH NAUSEA: ICD-10-CM

## 2024-02-07 DIAGNOSIS — R09.A2 GLOBUS SENSATION: ICD-10-CM

## 2024-02-07 DIAGNOSIS — R13.14 PHARYNGOESOPHAGEAL DYSPHAGIA: Primary | ICD-10-CM

## 2024-02-07 DIAGNOSIS — K92.1 BLACK STOOL: ICD-10-CM

## 2024-02-07 DIAGNOSIS — Z87.891 FORMER SMOKER: ICD-10-CM

## 2024-02-07 DIAGNOSIS — R10.13 EPIGASTRIC PAIN: ICD-10-CM

## 2024-02-07 DIAGNOSIS — K31.84 GASTROPARESIS: ICD-10-CM

## 2024-02-07 PROCEDURE — 1101F PT FALLS ASSESS-DOCD LE1/YR: CPT | Mod: HCNC,CPTII,S$GLB, | Performed by: NURSE PRACTITIONER

## 2024-02-07 PROCEDURE — 99214 OFFICE O/P EST MOD 30 MIN: CPT | Mod: HCNC,S$GLB,, | Performed by: NURSE PRACTITIONER

## 2024-02-07 PROCEDURE — 1157F ADVNC CARE PLAN IN RCRD: CPT | Mod: HCNC,CPTII,S$GLB, | Performed by: NURSE PRACTITIONER

## 2024-02-07 PROCEDURE — 99999 PR PBB SHADOW E&M-EST. PATIENT-LVL IV: CPT | Mod: PBBFAC,HCNC,, | Performed by: NURSE PRACTITIONER

## 2024-02-07 PROCEDURE — 3288F FALL RISK ASSESSMENT DOCD: CPT | Mod: HCNC,CPTII,S$GLB, | Performed by: NURSE PRACTITIONER

## 2024-02-07 PROCEDURE — 1126F AMNT PAIN NOTED NONE PRSNT: CPT | Mod: HCNC,CPTII,S$GLB, | Performed by: NURSE PRACTITIONER

## 2024-02-07 PROCEDURE — 1159F MED LIST DOCD IN RCRD: CPT | Mod: HCNC,CPTII,S$GLB, | Performed by: NURSE PRACTITIONER

## 2024-02-07 PROCEDURE — 1160F RVW MEDS BY RX/DR IN RCRD: CPT | Mod: HCNC,CPTII,S$GLB, | Performed by: NURSE PRACTITIONER

## 2024-02-07 RX ORDER — HYDROCODONE BITARTRATE AND ACETAMINOPHEN 10; 325 MG/1; MG/1
1 TABLET ORAL EVERY 6 HOURS PRN
Qty: 90 TABLET | Refills: 0 | Status: SHIPPED | OUTPATIENT
Start: 2024-02-07 | End: 2024-03-04 | Stop reason: SDUPTHER

## 2024-02-07 NOTE — PATIENT INSTRUCTIONS
Acid Reflux and GERD in Adults Discharge Instructions   About this topic   GERD stands for gastroesophageal reflux disease. It is sometimes called reflux or acid reflux. Acid reflux happens when your stomach acid backs up into your esophagus, the tube that carries your food from your mouth to your stomach. This can be uncomfortable. You may have stomach or chest pain (heartburn), trouble swallowing, or an upset stomach. Some people have a cough or sore throat.  Most of the time, you can use over-the-counter medicines to help with this problem.       What care is needed at home?   Ask your doctor what you need to do when you go home. Make sure you ask questions if you do not understand what the doctor says.  Raise the head of your bed by 6 to 8 inches (15 to 20 cm). Use wood or rubber blocks under 2 legs or try a foam wedge under your mattress. Just sleeping with your head raised on pillows is not enough.  Avoid beer, wine, and mixed drinks and avoid caffeine.  Keep a healthy weight. If you are too heavy, lose weight.  If you smoke, try to quit. Your doctor or nurse can help.  Keep a diary of your signs. Write down what you had to eat before you had reflux. This will help you learn which foods cause you problems. For some people, they need to avoid coffee, chocolate, alcohol, spicy or fatty foods, or peppermint.  Avoid eating for 2 to 3 hours before bedtime. Lying down after you eat can make reflux worse.  Avoid belts and clothes that are too tight.  What follow-up care is needed?   Your doctor may ask you to make visits to the office to check on your progress. Be sure to keep these visits.  What drugs may be needed?   The doctor may order drugs to:  Relieve heartburn  Prevent reflux  Lessen acid production  Heal the esophageal lining  Will physical activity be limited?   Your physical activities will not be limited.  What problems could happen?   Asthma  Precancerous changes in the food pipe  Long-term cough  Dental  problems  Higher risk of cancer of the food pipe. This is esophageal cancer.  Narrowing of the food pipe. This is a stricture.  Open sore in the food pipe. This is an ulcer.  When do I need to call the doctor?   You have signs of a heart attack, which may include:  Severe chest pain, pressure, or discomfort with:  Breathing trouble, sweating, upset stomach, or cold, clammy skin.  Pain in your arms, back, or jaw.  Worse pain with activity like walking up stairs.  Fast or irregular heartbeat.  Feeling dizzy, faint, or weak.  You have sudden, severe belly pain or the belly pain is constant.  You have blood in the undigested food and acid that comes up, or stool that looks red, black, or like tar.  You feel like your food gets stuck or you have pain when you swallow.  You lose weight when you are not trying to.  You choke when you are eating.  Your reflux is very bad, very frequent, or not helped by over-the-counter medicines.  You keep throwing up.  Teach Back: Helping You Understand   The Teach Back Method helps you understand the information we are giving you. After you talk with the staff, tell them in your own words what you learned. This helps to make sure the staff has described each thing clearly. It also helps to explain things that may have been confusing. Before going home, make sure you can do these:  I can tell you about my condition.  I can tell you what changes I need to make with my eating habits to ease the reflux.  I can tell you what I will do if I am throwing up fluid that looks like blood or coffee grounds.  Where can I learn more?   American Academy of [employer]icians  [email or web address]   [facility] Choices  https://www.nhs.uk/conditions/heartburn-and-acid-[email or web address]   Last Reviewed Date   2021-06-09  Consumer Information Use and Disclaimer   This information is not specific medical advice and does not replace information you receive from your health care provider. This  is only a brief summary of general information. It does NOT include all information about conditions, illnesses, injuries, tests, procedures, treatments, therapies, discharge instructions or life-style choices that may apply to you. You must talk with your health care provider for complete information about your health and treatment options. This information should not be used to decide whether or not to accept your health care providers advice, instructions or recommendations. Only your health care provider has the knowledge and training to provide advice that is right for you.  Copyright   Copyright © 2021 UpToDate, Inc. and its affiliates and/or licensors. All rights reserved.     Constipation in Adults   The Basics   Written by the doctors and editors at [employer]   What is constipation? -- Constipation is a common problem that makes it hard to have bowel movements. Your bowel movements might be:  Too hard  Too small  Hard to get out  Happening fewer than 3 times a week  What causes constipation? -- Constipation can be caused by:  Side effects of some medicines  Poor diet  Diseases of the digestive system (figure 1)  What other symptoms should I watch for? -- These symptoms could signal a more serious problem:  Blood in the toilet or on the toilet paper after having a bowel movement  Fever  Weight loss  Feeling weak  It could also be a sign of a problem if you have new constipation without a change in your medicines or diet, and have never had constipation in the past.   Is there anything I can do on my own to get rid of constipation? -- Yes. Try these steps:  Eat foods that have a lot of fiber. Good choices are fruits, vegetables, prune juice, and cereal (table 1).  Drink plenty of water and other fluids.  When you feel the need to go to the bathroom, go to the bathroom. Don't hold it.  Take laxatives. These are medicines that help make bowel movements easier to get out. Some are pills that you swallow. Others  "go into the rectum. These are called "suppositories."  Should I see a doctor or nurse? -- See your doctor or nurse if:   Your symptoms are new or not normal for you  You do not have a bowel movement for a few days  The problem comes and goes, but lasts for longer than 3 weeks  You are in a lot of pain  You have other symptoms that also worry you (for example, bleeding, weakness, weight loss, or fever)  Other people in your family have had colorectal cancer or inflammatory bowel disease  Are there tests I should have? -- Your doctor or nurse will decide which tests you should have based on your age, other symptoms, and individual situation. There are lots of tests, but you might not need any.  Here are the most common tests doctors use to find the cause of constipation:  Rectal exam - Your doctor will look at the outside of your anus. They will also use a finger to feel inside the opening.  Sigmoidoscopy or colonoscopy - For these tests, the doctor puts a thin tube into your anus. Then, they advance the tube into your large intestine. The large intestine is also called the colon. The tube has a camera attached to it, so the doctor can look inside your intestines. During these tests, the doctor can also take samples of tissue to look at under a microscope (figure 2).  X-rays, CT scan, or MRI - These create images of the inside of your body.  Manometry studies - Manometry allows the doctor to measure the pressure inside the rectum at various points. It can help the doctor find out if the muscles that control bowel movements are working right. The test also shows whether the person's rectum can feel normally.  How is constipation treated? -- That depends on what is causing your constipation. First, your doctor will want you to try eating more fiber and drinking more water. If that doesn't help, your doctor might suggest:  Medicines that you swallow or put in your rectum  Changing the medicines you are taking for other " "conditions  A treatment called an "enema" - For this treatment, a doctor or nurse will squirt water into your rectum. They might also use a thin tool to help break up bowel movements that are still inside you.  You might also be able to give yourself enema treatments at home, too. Enemas can be just water, or they can contain medicine to help with constipation.   Biofeedback - This is a technique that teaches you to relax your muscles so you can let go and push bowel movements out.  Can constipation be prevented? -- You can reduce your chances of getting constipation again by:  Eating a diet that is full of fiber (table 1)  Drinking water and other fluids during the day  Going to the bathroom at regular times every day  All topics are updated as new evidence becomes available and our peer review process is complete.  This topic retrieved from [employer] on: Sep 21, 2021.  Topic 06613 Version 8.0  Release: 29.4.2 - C29.263  © 2021 UpToDate, Inc. and/or its affiliates. All rights reserved.  figure 1: Digestive system     This drawing shows the organs in the body that process food. Together these organs are called "the digestive system," or "digestive tract." As food travels through this system, the body absorbs nutrients and water.  Graphic 82701 Version 4.0    table 1: Amount of fiber in different foods  Food  Serving  Grams of fiber    Fruits    Apple (with skin) 1 medium apple 4.4   Banana 1 medium banana 3.1   Oranges 1 orange 3.1   Prunes 1 cup, pitted 12.4   Juices    Apple, unsweetened, with added ascorbic acid 1 cup 0.5   Grapefruit, white, canned, sweetened 1 cup 0.2   Grape, unsweetened, with added ascorbic acid 1 cup 0.5   Orange 1 cup 0.7   Vegetables    Cooked   Green beans 1 cup 4.0   Carrots 1/2 cup sliced 2.3   Peas 1 cup 8.8   Potato (baked, with skin) 1 medium potato 3.8   Raw   Cucumber (with peel) 1 cucumber 1.5   Lettuce 1 cup shredded 0.5   Tomato 1 medium tomato 1.5   Spinach 1 cup 0.7   Legumes "   Baked beans, canned, no salt added 1 cup 13.9   Kidney beans, canned 1 cup 13.6   Lima beans, canned 1 cup 11.6   Lentils, boiled 1 cup 15.6   Breads, pastas, flours    Bran muffins 1 medium muffin 5.2   Oatmeal, cooked 1 cup 4.0   White bread 1 slice 0.6   Whole-wheat bread 1 slice 1.9   Pasta and rice, cooked   Macaroni 1 cup 2.5   Rice, brown 1 cup 3.5   Rice, white 1 cup 0.6   Spaghetti (regular) 1 cup 2.5   Nuts    Almonds 1/2 cup 8.7   Peanuts 1/2 cup 7.9   To learn how much fiber and other nutrients are in different foods, visit the [place] Department of Agriculture ([place]) FoodData Central website.  Graphic 12204 Version 6.0  figure 2: Colonoscopy     During a colonoscopy, you lie on your side and the doctor puts a thin tube with a camera into your anus (from behind). Then the doctor advances the tube into the rectum and colon. The camera sends pictures from inside your colon to a television screen.  Graphic 09553 Version 6.0    Consumer Information Use and Disclaimer   This information is not specific medical advice and does not replace information you receive from your health care provider. This is only a brief summary of general information. It does NOT include all information about conditions, illnesses, injuries, tests, procedures, treatments, therapies, discharge instructions or life-style choices that may apply to you. You must talk with your health care provider for complete information about your health and treatment options. This information should not be used to decide whether or not to accept your health care provider's advice, instructions or recommendations. Only your health care provider has the knowledge and training to provide advice that is right for you. The use of this information is governed by the [employer] End User License Agreement, available at https://www.[email or web address] You Can/en/solutions/[employer]/about/malena.The use of UpToDate content is governed by the [employer] Terms of Use.  ©2021 UpToDate, Inc. All rights reserved.  Copyright   © 2021 UpToDate, Inc. and/or its affiliates. All rights reserved.     Soft Diet   About this topic   Some people have problems chewing or swallowing. This might happen after a procedure or illness. A soft diet can give you good nutrition until you can chew and swallow normally.       What will the results be?   You can still get a balanced diet with these foods. You will have less trouble chewing or swallowing.  What changes to diet are needed?   You may need to replace some harder foods with softer foods. These will be easier to chew or swallow, but will have the same nutrients.  Who should use this diet?   Your doctor might suggest a soft diet if you:  Are having radiation therapy on the head, neck, or belly  Had stomach or gut surgery  Are too sick or weak to eat a normal meal  Have poor teeth  Have swallowing problems  Have trouble chewing regular food  What foods are good to eat?   You will be eating foods that are easy to chew and swallow on this diet. These foods are naturally soft. If not, you can cook, chop, or mash them to make them soft. These include:  Cream soups  Moist, tender, well-cooked meats  Fish  Soft chicken without skin  Turkey  Ground meats  Milk products  Yogurt (without nuts, seeds, or raw fruit)  Cottage cheese  Cooked or canned fruit  Soft, peeled fresh fruits without large seeds  Fruit or vegetable juice  Cooked or canned vegetables  Mashed, baked, steamed, or boiled vegetables  Cooked cereals  Rice, if tolerated  Soft breads and crackers  Soft, cooked pasta  Butter  Smooth nut and seed butters  Mayonnaise  Sour cream  Vegetable oil  Smooth ice cream  Sherbet  Custards  Puddings  Cakes  What foods should be limited or avoided?   You should stay away from foods that are hard to chew or swallow, such as:  Tough, dry meat  Yogurt with nuts or coconut  Raw fruits and vegetables  Cooked corn or peas  Dried fruits  Fruit snacks, such as  fruit leather  Uncooked, stringy fruits and vegetables, like celery  Fried vegetables  Chewy, dry breads  Crispy crackers or chips  Coarse, dry cereals  Nuts and seeds  [place] nut and seed butters  Fried foods  Sausage  [place]  Cold cuts  Strong, hard cheeses  Peanut brittle  Candy with dried fruit or chewy and sticky candy, like caramel  Will there be any other care needed?   Use a  or  to mash foods if needed.  Steam foods to keep the nutrients. If boiling potatoes, peel after cooking.  Bite-sized pieces are easier to swallow. Pieces should be less than one inch in size.  Foods should be moist. Add gravy, sauce, vegetable juice, fruit juice, broth, milk, or water to moisten foods.  Reheat foods carefully to avoid a tough crust forming on your food.  When do I need to call the doctor?   Call your doctor or dietitian to talk about a good meal plan for you or if you are having problems eating a soft diet. Talk to your doctor about what type of soft diet you need.  Last Reviewed Date   2021-06-23  Consumer Information Use and Disclaimer   This information is not specific medical advice and does not replace information you receive from your health care provider. This is only a brief summary of general information. It does NOT include all information about conditions, illnesses, injuries, tests, procedures, treatments, therapies, discharge instructions or life-style choices that may apply to you. You must talk with your health care provider for complete information about your health and treatment options. This information should not be used to decide whether or not to accept your health care providers advice, instructions or recommendations. Only your health care provider has the knowledge and training to provide advice that is right for you.  Copyright   Copyright © 2021 UpToDate, Inc. and its affiliates and/or licensors. All rights reserved.       Gastroparesis     Gastroparesis means that food  and fluids move too slowly out of the stomach into the duodenum.   Gastroparesis (also called delayed gastric emptying) happens when the stomach takes longer than normal to empty of food. This is due to a problem with motility (the movement of the muscles in the digestive tract). For many people, gastroparesis is a lifelong condition. But treatment can help relieve symptoms and prevent complications. Read on to learn more about gastroparesis and how it can be managed.  How gastroparesis develops  With normal motility, signals from nerves tell the stomach muscles when to contract. These muscles move food from the stomach into the duodenum (the first part of the small bowel). With gastroparesis, the nerves or muscles are damaged. This causes motility to slow down or stop completely. As a result, food cannot move from the stomach properly. This delayed emptying can cause nausea, vomiting, and other symptoms. Malnutrition can result. Bezoars (hardened lumps of food) can form in the stomach and cause other complications as well.  Causes of gastroparesis  Gastroparesis can be caused by any of the following:  Diabetes  Surgery involving any of the digestive organs, such as the stomach and bowels  Certain medicines, such as strong pain medicines (narcotics)  Certain conditions, such as systemic scleroderma, Parkinson disease, and thyroid disease  After a viral illness  In many cases, the cause of gastroparesis cannot be found.  Signs and symptoms of gastroparesis  These can include:  Nausea and vomiting  Feeling full quickly when eating  Belly pain  Heartburn  Belly bloating  Weight loss  Loss of appetite  High and low blood sugar levels (in people with diabetes)  Diagnosing gastroparesis  Your healthcare provider will ask about your symptoms and health history. Youll also be examined. In addition, blood tests and X-rays are often done to check your health and rule out other problems. To confirm the problem, you may need  other tests as well. These can include:  Upper endoscopy. This is done to see inside the stomach and duodenum. For the test, an endoscope is used. This is a thin, flexible tube with a tiny camera on the end. Its inserted through the mouth and down into the stomach and duodenum.  Upper gastrointestinal (GI) series. This is done to take X-rays of the upper GI tract from the mouth to the small bowel. For the test, a substance called barium is used. The barium coats the upper GI tract so that it will show up clearly on X-rays.  Gastric emptying scan. This is done to measure how quickly food leaves the stomach. For the test, a meal containing a harmless radioactive substance (tracer) is eaten. Then scans of the stomach are done. The tracer shows up clearly on the scans and shows the movement of the food through the stomach.  Antroduodenal manometry. This test gives pressure measurements of the stomach and small intestine to check how the contractions are working.  Newer tests. These are being created and include breath tests and wireless capsule studies   Treating gastroparesis  The goal of treatment is to help you manage your condition. Treatment may include one or more of the following:  Dietary changes. You may need to make changes to your eating habits and daily diet. For instance, your healthcare provider may instruct you to eat small meals throughout the day. Doing this can keep you from feeling full too quickly. You may be placed on a liquid or soft diet. This means youll eat liquid foods or foods that are mashed or put through a . In addition, you may need to avoid foods high in fats and fiber. These can slow digestion. For more help with your diet, your healthcare provider can refer you to a dietitian. In severe cases, you may need a feeding tube. This sends liquid food or medicine directly to your small bowel, bypassing the stomach.  Treating diabetes. If you are diabetic, it is important to control  "your blood sugar. High sugar levels worsen gastroparesis.   Medicines. These can help manage symptoms, such as nausea and vomiting. They can also improve motility. Each medicine has specific risks and side effects. Your doctor can tell you more about any medicine that is prescribed for you.  Surgery. You may need to have a tube surgically inserted into the stomach. The tube removes excess air and fluid. This can relieve severe symptoms of nausea and vomiting. In rare cases, other surgery may be needed on the stomach or small bowel. This is to create a new passageway for food to be emptied from the stomach.  Gastric electrical stimulation. This treatment is done less often and may not be available. Your healthcare provider can tell you more about this treatment if it is a choice for you.  Diabetes and gastroparesis  If you have diabetes, gastroparesis can make it harder to manage your blood sugar level. Youll need to take extra steps in your treatment to prevent complications. Work with your healthcare provider to learn what you can do to protect your health. For more information, contact the American Diabetes Association, www.diabetes.org.   Long-term concerns   With treatment, most people can manage their symptoms and maintain their usual routines. If your symptoms are moderate to severe, you may need to see your healthcare provider more often for checkups. Also, other treatments will likely be needed.  Date Last Reviewed: 7/14/2016 © 2000-2017 [employer]. [address] [place] [address], [place] [address]. All rights reserved. This information is not intended as a substitute for professional medical care. Always follow your healthcare professional's instructions.         Severe Abdominal Pain   The Basics   Written by the doctors and editors at [facility]   Are there different types of abdominal pain? -- Yes. "Abdominal pain" means pain in the abdomen (or belly), which is the part of the body between the " "chest and the genital area. This pain can happen for different reasons. It can be "chronic," which means it develops over time, or "acute," which means it starts suddenly. It can be mild or severe. A person might feel the pain all over their abdomen, or only in 1 part.   Abdominal pain can feel different for different people. It can feel sharp or crampy, or dull and steady. Some people feel better if they curl into a ball, while others need to lie flat and completely still. People often feel sick to their stomach and retch or vomit.  Doctors use the term "acute abdomen" to describe an episode of severe abdominal pain that starts suddenly and lasts for a few hours or longer. It can cause pain so severe that the person has a hard time moving or breathing and it makes them want to go to the hospital or see their doctor or nurse right away. A true acute abdomen is a medical emergency.  What causes abdominal pain? -- Lots of different things can cause abdominal pain. When pain is less severe, it can be due to something like a virus or a stomach inflammation (called "gastritis").  Acute pain that is more severe can be caused by problems with 1 or more organs in the abdomen. Organs in the abdomen can be part of the digestive, urinary, or reproductive systems (figure 1 and figure 2 and figure 3).  Conditions that affect organs in the chest or genital area can also cause pain. Even though these organs aren't in the abdomen, people might still have abdominal pain.  Common causes of acute abdominal pain in adults include:  Appendicitis - Appendicitis is the term for when the appendix (a long, thin pouch that hangs down from the large intestine) gets infected and inflamed.  Diverticulitis - Diverticulitis is an infection that develops in small pouches that can form in the intestine. This is common in older people.  Gallstones - Gallstones are small stones that form inside an organ called the gallbladder, which stores bile, a " fluid that helps the body break down fat.  Abscess - An abscess is a collection of pus. An abscess can form in the abdomen, typically near the intestine.  Kidney stones - Kidney stones can form when salts and minerals that are normally in the urine build up and harden. They can cause pain when they pass through the ureters, which are the tubes that carry urine from the kidney to the bladder.  Bowel perforation - This is a hole in the bowel wall.  Perforated ulcer - This is a hole in the wall of the stomach or intestine.  Pancreatitis - This is the term for when the pancreas gets inflamed.  Ruptured cyst in the ovary - Cysts in the ovary are fluid-filled sacs that can form in some women. They sometimes rupture, which means that they break open and spill out.  Ectopic pregnancy - An ectopic pregnancy is a pregnancy that develops outside the uterus.  Should I see a doctor or nurse? -- Yes. If you have sudden or severe abdominal pain, call your doctor or nurse or go to the hospital right away. Depending on the cause of your pain, you might need immediate treatment.  Will I need tests? -- Probably. The doctor or nurse will ask about your symptoms, including where your pain is and what it feels like. The location of the pain can be an important clue to the cause.  Your doctor will ask about your current and past medical conditions, and do a physical exam. They might do repeat exams over time to follow your symptoms.  Your doctor will decide which tests you should have based on your symptoms and individual situation. The tests might include:  Blood tests  Urine tests  X-rays  An ultrasound, CT scan, or other imaging test - Imaging tests create pictures of the inside of the body.  How is abdominal pain treated? -- Treatment depends on what's causing the pain. It might include 1 or more of the following:  Fluids given by IV  Pain medicines  Antibiotic medicines to treat an infection  Other medicines to treat other medical  conditions  Surgery  All topics are updated as new evidence becomes available and our peer review process is complete.  This topic retrieved from "[employer] on: Sep 21, 2021.  Topic 36742 Version 12.0  Release: 29.4.2 - C29.263  © 2021 UpToDate, Inc. and/or its affiliates. All rights reserved.  figure 1: Organs inside the abdomen (belly)     Graphic 27857 Version 6.0    figure 2: Anatomy of the urinary tract     Urine is made by the kidneys. It passes from the kidneys into the bladder through two tubes called the ureters. Then it leaves the bladder through another tube called the urethra.  Graphic 64122 Version 7.0    figure 3: Female reproductive anatomy     These are the internal organs that make up the female reproductive system.  Graphic 90153 Version 6.0    Consumer Information Use and Disclaimer   This information is not specific medical advice and does not replace information you receive from your health care provider. This is only a brief summary of general information. It does NOT include all information about conditions, illnesses, injuries, tests, procedures, treatments, therapies, discharge instructions or life-style choices that may apply to you. You must talk with your health care provider for complete information about your health and treatment options. This information should not be used to decide whether or not to accept your health care provider's advice, instructions or recommendations. Only your health care provider has the knowledge and training to provide advice that is right for you. The use of this information is governed by the [employer] End User License Agreement, available at [email or web address].The use of "[employer] content is governed by the "[employer] Terms of Use. ©2021 Up[employer]Date, Inc. All rights reserved.  Copyright   © 2021 Up[employer]Date, Inc. and/or its affiliates. All rights reserved.

## 2024-02-07 NOTE — PROGRESS NOTES
Subjective:       Patient ID: Summer Inman is a 88 y.o. female Body mass index is 28.23 kg/m².    Chief Complaint: Abdominal Pain and Dysphagia    Established patient of Dr. Nguyen & myself.    Patient is here with a daughter, whom assisted with history. Patient previously scheduled for EGD & colonoscopy in 2023 was canceled by the patient. Symptoms unchanged since our last visit. Patient would like to schedule EGD, but patient wants to hold off on colonoscopy at this time .    Gastroesophageal Reflux  She complains of abdominal pain (chronic for several years; epigastric pain radiates to generalized abdomen; occurs daily; described as aching; unchanged since our last visit), choking (sometimes; denies ever needing heimlich manuever or having to seek medical assistant to relieve symptom), coughing (occurs with eating mostly; productive of mucus at times), dysphagia (CHIEF COMPLAINT: with food, pills & liquids; reports had some improvement after egd with dilation but since neck surgery has had trouble swallowing), globus sensation (occasional), heartburn (controlled with omeprazole), a hoarse voice (occasional), nausea (occasional; zofran prn helps), a sore throat (occasional burning) and water brash. She reports no belching, no chest pain or no early satiety. This is a chronic problem. Episode onset: started several years ago. The problem has been unchanged. The heartburn does not wake her from sleep. The symptoms are aggravated by certain foods (tomatoes). Associated symptoms include melena (black stool; denies pepto or iron use) and weight loss (lost about ~10-12lbs since 12/2023, relates to dysphagia). Pertinent negatives include no fatigue. Risk factors include obesity (denies NSAID use). She has tried a PPI, a histamine-2 antagonist and head elevation (prilosec 40 mg once daily, carafate 1 gram QID PRN- helps; PAST: zantac) for the symptoms. Past procedures include an EGD.       Review of Systems    Constitutional:  Positive for appetite change (decreased- eating a small meal a day and a snack) and weight loss (lost about ~10-12lbs since 12/2023, relates to dysphagia). Negative for chills, fatigue and fever.        Takes a half tablet of norco daily   HENT:  Positive for hoarse voice (occasional), sore throat (occasional burning) and trouble swallowing (eating mostly soft diet & food pureed, liquids, and seeing speech therapist).    Respiratory:  Positive for cough (occurs with eating mostly; productive of mucus at times) and choking (sometimes; denies ever needing heimlich manuever or having to seek medical assistant to relieve symptom). Negative for chest tightness and shortness of breath.    Cardiovascular:  Negative for chest pain and palpitations.   Gastrointestinal:  Positive for abdominal pain (chronic for several years; epigastric pain radiates to generalized abdomen; occurs daily; described as aching; unchanged since our last visit), constipation (history of constipation, Bowel movements once every other day; controlled with diet and taking glycolax 17 grams PRN- about once every other day), dysphagia (CHIEF COMPLAINT: with food, pills & liquids; reports had some improvement after egd with dilation but since neck surgery has had trouble swallowing), heartburn (controlled with omeprazole), melena (black stool; denies pepto or iron use), nausea (occasional; zofran prn helps) and vomiting (occasional, occurs a few times a week, relates to dysphagia; emesis of liquid & food; denies bright red blood or coffee ground emesis). Negative for abdominal distention, anal bleeding, blood in stool, diarrhea and rectal pain.   Genitourinary:  Negative for difficulty urinating, dysuria, flank pain, frequency, hematuria and pelvic pain.   Musculoskeletal:  Negative for back pain.       No LMP recorded. Patient has had a hysterectomy.    Past Medical History:   Diagnosis Date    Anticoagulant long-term use     ASA  81mg    Arthritis     Asthma     Atrial premature beats     Benign neoplasm of adrenal gland     Cholelithiasis     CKD (chronic kidney disease)     Colon polyp     COPD (chronic obstructive pulmonary disease)     Coronary artery disease     nonocclusive disease, no prior intervention    Depression     Dizziness     and balance issues    First degree AV block     Gastroesophageal reflux disease     General anesthetics causing adverse effect in therapeutic use     hard to wake up    Hepatomegaly     Hyperlipidemia     Hypertension     Impaired mobility     uses walker or cane    Neck pain     into chest and arms with numbness in arms    Schatzki's ring     Seasonal allergies     Seizures     Last one recently    Stroke     tia    Trouble in sleeping     Wears dentures     upper    Wears glasses      Past Surgical History:   Procedure Laterality Date    APPENDECTOMY      CATARACT EXTRACTION      CERVICAL FUSION  3/2016 Dr. Cuevas    CHOLECYSTECTOMY  09/18/2019    COLONOSCOPY  2008 Dr. Nguyen    internal hemorrhoids otherwise normal findings; repeat in 4 years    COLONOSCOPY N/A 10/29/2020    Dr. Nguyen;  Location: [facility]; Multiple 5 to 10 mm polyps in the descending colon, in the transverse colon & in the ascending colon removed, diverticulosis, hemorrhoids; Repeat colonoscopy is not recommended for surveillance. biopsy: tubular adenomas & TUBULOVILLOUS ADENOMA    ESOPHAGEAL DILATION N/A 10/29/2020    Procedure: DILATION, ESOPHAGUS;  Surgeon: Lucas Nguyen MD;  Location: [facility];  Service: Endoscopy;  Laterality: N/A;    ESOPHAGOGASTRODUODENOSCOPY      ESOPHAGOGASTRODUODENOSCOPY N/A 10/29/2020    Procedure: EGD (ESOPHAGOGASTRODUODENOSCOPY);  Surgeon: Lucas Nguyen MD;  Location: [facility];  Service: Endoscopy;  Laterality: N/A; Normal esophagus. Dilated. gastritis; biopsy: stomach- reactive gastropathy, h pylori negative    EYE SURGERY      phaco bilateral    HYSTERECTOMY      partial hysterectomy     LAPAROSCOPIC CHOLECYSTECTOMY N/A 09/18/2019    Procedure: CHOLECYSTECTOMY, LAPAROSCOPIC;  Surgeon: Eric Mathias MD;  Location: [facility];  Service: General;  Laterality: N/A;    mesh implant      TONSILLECTOMY      UPPER GASTROINTESTINAL ENDOSCOPY  2014 Dr. Nguyen    UPPER GASTROINTESTINAL ENDOSCOPY  02/01/2016    Dr. Nguyen    UPPER GASTROINTESTINAL ENDOSCOPY  03/08/2018    Dr. Nguyen: small hiatal hernia, gastritis, esophageal dilation performed; biopsy: stomach and esophagus- unremarkable, negative for nicholson's, h pylori negative     Family History   Problem Relation Age of Onset    Heart disease Mother     Colon cancer Mother 74    Heart disease Father     Breast cancer Daughter     Hypertension Daughter     Rheum arthritis Daughter     Cancer Daughter         Thyroid and Breast    Hypertension Son     Diabetes Son     Arthritis Son     Glaucoma Neg Hx     Crohn's disease Neg Hx     Ulcerative colitis Neg Hx     Stomach cancer Neg Hx     Esophageal cancer Neg Hx      Social History     Tobacco Use    Smoking status: Former     Current packs/day: 0.00     Average packs/day: 0.5 packs/day for 29.6 years (14.8 ttl pk-yrs)     Types: Cigarettes     Start date: 3/12/1984     Quit date: 10/8/2013     Years since quitting: 10.3    Smokeless tobacco: Never   Substance Use Topics    Alcohol use: No    Drug use: Yes     Types: Hydrocodone     Wt Readings from Last 15 Encounters:   02/07/24 63.4 kg (139 lb 12.4 oz)   12/15/23 68.5 kg (150 lb 14.5 oz)   10/12/23 69.3 kg (152 lb 10.7 oz)   09/08/23 67.8 kg (149 lb 7.6 oz)   07/13/23 69.3 kg (152 lb 12.5 oz)   06/08/23 68.6 kg (151 lb 3.8 oz)   05/19/23 69.9 kg (154 lb 3.2 oz)   05/04/23 69.6 kg (153 lb 7 oz)   04/19/23 68.8 kg (151 lb 10.8 oz)   04/17/23 66.7 kg (147 lb)   02/02/23 67.4 kg (148 lb 11.2 oz)   01/16/23 68.9 kg (152 lb)   12/09/22 67.7 kg (149 lb 4 oz)   11/10/22 69 kg (152 lb 1.9 oz)   08/11/22 69.8 kg (153 lb 14.1 oz)     Lab Results   Component  Value Date    WBC 7.08 12/15/2023    HGB 12.3 12/15/2023    HCT 39.3 12/15/2023    MCV 87 12/15/2023     12/15/2023     CMP  Sodium   Date Value Ref Range Status   12/15/2023 140 136 - 145 mmol/L Final     Potassium   Date Value Ref Range Status   12/15/2023 4.3 3.5 - 5.1 mmol/L Final     Chloride   Date Value Ref Range Status   12/15/2023 105 95 - 110 mmol/L Final     CO2   Date Value Ref Range Status   12/15/2023 27 23 - 29 mmol/L Final     Glucose   Date Value Ref Range Status   12/15/2023 107 70 - 110 mg/dL Final     BUN   Date Value Ref Range Status   12/15/2023 32 (H) 8 - 23 mg/dL Final     Creatinine   Date Value Ref Range Status   12/15/2023 0.9 0.5 - 1.4 mg/dL Final     Calcium   Date Value Ref Range Status   12/15/2023 9.9 8.7 - 10.5 mg/dL Final     Total Protein   Date Value Ref Range Status   12/15/2023 7.3 6.0 - 8.4 g/dL Final     Albumin   Date Value Ref Range Status   12/15/2023 3.9 3.5 - 5.2 g/dL Final     Total Bilirubin   Date Value Ref Range Status   12/15/2023 0.2 0.1 - 1.0 mg/dL Final     Comment:     For infants and newborns, interpretation of results should be based  on gestational age, weight and in agreement with clinical  observations.    Premature Infant recommended reference ranges:  Up to 24 hours.............<8.0 mg/dL  Up to 48 hours............<12.0 mg/dL  3-5 days..................<15.0 mg/dL  6-29 days.................<15.0 mg/dL       Alkaline Phosphatase   Date Value Ref Range Status   12/15/2023 111 55 - 135 U/L Final     AST ([place])   Date Value Ref Range Status   01/31/2016 24 14 - 36 U/L Final     AST   Date Value Ref Range Status   12/15/2023 14 10 - 40 U/L Final     ALT   Date Value Ref Range Status   12/15/2023 11 10 - 44 U/L Final     Anion Gap   Date Value Ref Range Status   12/15/2023 8 8 - 16 mmol/L Final     eGFR if    Date Value Ref Range Status   07/16/2021 >60.0 >60 mL/min/1.73 m^2 Final     eGFR if non    Date Value  "Ref Range Status   07/16/2021 58.9 (A) >60 mL/min/1.73 m^2 Final     Comment:     Calculation used to obtain the estimated glomerular filtration  rate (eGFR) is the CKD-EPI equation.        Lab Results   Component Value Date    LIPASE 20 02/26/2018     Lab Results   Component Value Date    TSH 1.184 12/15/2023     Lab Results   Component Value Date    SOSGEKTW54 320 09/14/2009     10/16/2020 serum magnesium level WNL    Reviewed prior medical records including radiology report of 4/17/2023 CT abdomen pelvis; 5/3/2021 MRI pelvis; 4/20/2021 pelvic ultrasound; 4/15/2021 gastric emptying study "significant delay"; 7/30/2019 abdominal ultrasound; & endoscopy history (see surgical history).    Objective:      Physical Exam  Vitals and nursing note reviewed.   Constitutional:       General: She is not in acute distress.     Appearance: Normal appearance. She is well-developed. She is not diaphoretic.      Comments: Patient using a walker for assistance with ambulation.   Eyes:      General: No scleral icterus.     Conjunctiva/sclera: Conjunctivae normal.      Pupils: Pupils are equal, round, and reactive to light.   Pulmonary:      Effort: Pulmonary effort is normal. No respiratory distress.      Breath sounds: Normal breath sounds. No wheezing.   Abdominal:      General: Bowel sounds are normal. There is no distension or abdominal bruit.      Palpations: Abdomen is soft. Abdomen is not rigid. There is no mass.      Tenderness: There is abdominal tenderness (mild) in the right upper quadrant, epigastric area and left upper quadrant. There is no guarding or rebound.   Skin:     General: Skin is warm and dry.      Coloration: Skin is not jaundiced or pale.      Findings: No erythema or rash.   Neurological:      Mental Status: She is alert and oriented to person, place, and time.   Psychiatric:         Behavior: Behavior normal.         Thought Content: Thought content normal.         Judgment: Judgment normal.       "   Assessment:       1. Pharyngoesophageal dysphagia    2. Weight loss    3. Epigastric pain    4. Generalized abdominal pain    5. Black stool    6. Non-intractable vomiting with nausea    7. Cough, unspecified type    8. Hoarseness of voice    9. Sore throat    10. Globus sensation    11. History of chronic constipation    12. Former smoker    13. Gastroparesis    14. History of gastroesophageal reflux (GERD)          Plan:       Pharyngoesophageal dysphagia  -     CT Chest Abdomen Pelvis With IV Contrast (XPD) Routine Oral Contrast; Future; Expected date: 02/07/2024  - schedule EGD, discussed procedure with patient and possible esophageal dilation may be performed during procedure if indicated, patient verbalized understanding  - educated patient to eat smaller more frequent meals and to eat slowly and advised to eat a soft diet.  - possible UGI/esophagram/esophageal manometry if symptoms persist    Weight loss  -     CT Chest Abdomen Pelvis With IV Contrast (XPD) Routine Oral Contrast; Future; Expected date: 02/07/2024  - schedule EGD, discussed procedure with patient, including risks and benefits, patient verbalized understanding  - discussed about Colonoscopy, including the risks and benefits of colonoscopy, patient verbalized understanding but patient declined colonoscopy at this time.  - encouraged PO intake and daily calorie counts to ensure adequate nutrition is taken in, recommend at least 2,000 calories a day  - recommend nutritional drinks, such as Boost, Ensure or Glucerna, to supplement nutrition needs  - Possible colonoscopy pending results of testing and if symptoms persist    Epigastric pain  -     CT Chest Abdomen Pelvis With IV Contrast (XPD) Routine Oral Contrast; Future; Expected date: 02/07/2024  -     Creatinine, serum; Future; Expected date: 02/07/2024  - schedule EGD, discussed procedure with patient, including risks and benefits, patient verbalized understanding  - avoid/minimize use of  NSAIDs- since they can cause GI upset, bleeding and/or ulcers. If NSAID must be taken, recommend take with food.    Generalized abdominal pain  -     CT Chest Abdomen Pelvis With IV Contrast (XPD) Routine Oral Contrast; Future; Expected date: 02/07/2024  -     Creatinine, serum; Future; Expected date: 02/07/2024  - schedule EGD, discussed procedure with patient, including risks and benefits, patient verbalized understanding  - discussed about Colonoscopy, including the risks and benefits of colonoscopy, patient verbalized understanding but patient declined colonoscopy at this time.  - avoid/minimize use of NSAIDs- since they can cause GI upset, bleeding and/or ulcers. If NSAID must be taken, recommend take with food.    Black stool  -     CT Chest Abdomen Pelvis With IV Contrast (XPD) Routine Oral Contrast; Future; Expected date: 02/07/2024  - schedule EGD, discussed procedure with patient, including risks and benefits, patient verbalized understanding    Non-intractable vomiting with nausea  -     CT Chest Abdomen Pelvis With IV Contrast (XPD) Routine Oral Contrast; Future; Expected date: 02/07/2024  - schedule EGD, discussed procedure with patient, including risks and benefits, patient verbalized understanding  - CONTINUE ZOFRAN PRN AS DIRECTED  - discussed with patient that a side effect of narcotic pain medications is NAUSEA, advised patient to talk to provider who manages pain medication, patient verbalized understanding    History of gastroparesis  - schedule EGD, discussed procedure with patient, including risks and benefits, patient verbalized understanding  - Recommend small frequent meals instead of 3 big meals a day, low fat meals & low residue diet.  - Avoid: high fiber (insoluble fiber), red meats, dairy, and non-digestible solids (peels, fruit pulp, etc). Avoid NSAIDs and narcotics.  -Discussed about possible medical therapy and discussed that this would be managed by Dr. Nguyen, lastly surgical  options are available at [facility] (Dr. Juliano Ann), patient verbalized understanding  - Previously discussed case with Dr. Nguyen; Dr. Nguyen agrees with below management plan and reports patient is not a candidate for most of the medications. Dr. Nguyen reports if her symptoms persist, possible trial of Cytotec. Recommend EGD to reassess for this condition.    Cough, unspecified type & Former smoker  -     CT Chest Abdomen Pelvis With IV Contrast (XPD) Routine Oral Contrast; Future; Expected date: 02/07/2024  - Recommend follow-up with Primary Care Provider for continued evaluation and management.    Hoarseness of voice, Sore throat, & Globus sensation  Recommend follow-up with Primary Care Provider/ENT for continued evaluation and management.    History of chronic constipation  -     CT Chest Abdomen Pelvis With IV Contrast (XPD) Routine Oral Contrast; Future; Expected date: 02/07/2024  - CONTINUE OTC MIRALAX (aka glycolax) 17 GRAMS DAILY PRN constipation  - Recommend daily exercise as tolerated & adequate water intake (six 8-oz glasses of water daily).  -Recommend taking an OTC stool softener such as Colace as directed to avoid hard stools and straining with bowel movements PRN  - If no improvement with above recommendations, try intermittently dosed Dulcolax OTC as directed (every 3-4  days) PRN to facilitate bowel movements  -If still no improvement with these measures, call/follow-up  - discussed with patient that a side effect of narcotic pain medications is constipation, advised patient to talk to provider who manages pain medication, patient verbalized understanding  - discussed about Colonoscopy, including the risks and benefits of colonoscopy, patient verbalized understanding but patient declined colonoscopy at this time.    History of gastroesophageal reflux (GERD)  - schedule EGD, discussed procedure with patient, including risks and benefits, patient verbalized understanding  -  CONTINUE PRILOSEC 40 MG ONCE DAILY AS DIRECTED  - avoid/minimize use of NSAIDs- since they can cause GI upset, bleeding and/or ulcers. If NSAID must be taken, recommend take with food.    Follow up in about 1 month (around 3/7/2024), or if symptoms worsen or fail to improve.    If no improvement in symptoms or symptoms worsen, call/follow-up at clinic or go to ER.        38 minutes of total time spent on the encounter, which includes face to face time and non-face to face time preparing to see the patient (e.g., review of tests), Obtaining and/or reviewing separately obtained history, Documenting clinical information in the electronic or other health record, Independently interpreting results (not separately reported) and communicating results to the patient/family/caregiver, or Care coordination (not separately reported).

## 2024-02-14 RX ORDER — SUCRALFATE 1 G/1
1 TABLET ORAL
Qty: 120 TABLET | Refills: 1 | Status: SHIPPED | OUTPATIENT
Start: 2024-02-14

## 2024-02-22 ENCOUNTER — HOSPITAL ENCOUNTER (OUTPATIENT)
Dept: RADIOLOGY | Facility: HOSPITAL | Age: 89
Discharge: HOME | End: 2024-02-22
Attending: NURSE PRACTITIONER
Payer: MEDICARE

## 2024-02-22 DIAGNOSIS — R13.14 PHARYNGOESOPHAGEAL DYSPHAGIA: ICD-10-CM

## 2024-02-22 DIAGNOSIS — R05.9 COUGH, UNSPECIFIED TYPE: ICD-10-CM

## 2024-02-22 DIAGNOSIS — R11.2 NON-INTRACTABLE VOMITING WITH NAUSEA: ICD-10-CM

## 2024-02-22 DIAGNOSIS — R10.13 EPIGASTRIC PAIN: ICD-10-CM

## 2024-02-22 DIAGNOSIS — R63.4 WEIGHT LOSS: ICD-10-CM

## 2024-02-22 DIAGNOSIS — R10.84 GENERALIZED ABDOMINAL PAIN: ICD-10-CM

## 2024-02-22 DIAGNOSIS — K92.1 BLACK STOOL: ICD-10-CM

## 2024-02-22 DIAGNOSIS — Z87.891 FORMER SMOKER: ICD-10-CM

## 2024-02-22 DIAGNOSIS — Z87.19 HISTORY OF CHRONIC CONSTIPATION: ICD-10-CM

## 2024-02-22 PROCEDURE — 74176 CT ABD & PELVIS W/O CONTRAST: CPT | Mod: 26,HCNC,, | Performed by: RADIOLOGY

## 2024-02-22 PROCEDURE — 71250 CT THORAX DX C-: CPT | Mod: 26,HCNC,, | Performed by: RADIOLOGY

## 2024-02-22 PROCEDURE — A9698 NON-RAD CONTRAST MATERIALNOC: HCPCS | Mod: HCNC,PO | Performed by: NURSE PRACTITIONER

## 2024-02-22 PROCEDURE — 25500020 PHARM REV CODE 255: Mod: HCNC,PO | Performed by: NURSE PRACTITIONER

## 2024-02-22 PROCEDURE — 74176 CT ABD & PELVIS W/O CONTRAST: CPT | Mod: TC,HCNC,PO

## 2024-02-22 PROCEDURE — 71250 CT THORAX DX C-: CPT | Mod: TC,HCNC,PO

## 2024-02-22 RX ADMIN — IOHEXOL 500 ML: 9 SOLUTION ORAL at 04:02

## 2024-02-26 ENCOUNTER — TELEPHONE (OUTPATIENT)
Dept: GASTROENTEROLOGY | Facility: CLINIC | Age: 89
End: 2024-02-26
Payer: MEDICARE

## 2024-02-26 DIAGNOSIS — R93.89 ABNORMAL CT OF THE CHEST: Primary | ICD-10-CM

## 2024-02-26 NOTE — TELEPHONE ENCOUNTER
"Please call to inform & review the results with the patient- radiology report of the CT scan of chest, abdomen and pelvis showed no acute findings of the GI tract/GI organs. Small hiatal hernia noted: continue GERD recommendations.  Stool-filled colon which suggests constipation. Recommend taking glycolax (AKA miralax) 17 grams daily instead of every other day.  Diverticulosis noted without signs of diverticulitis (no infection/inflammation).    Other findings showed "Enlarged main pulmonary artery suggesting elevated pulmonary arterial pressures." & "Bilateral pulmonary nodules largest measuring 4 mm.": Recommend follow-up with Pulmonology for continued evaluation and management of these lung findings. Referral placed.  Otherwise, findings are unchanged/unremarkable.    Continue with previous recommendations. If no improvement in symptoms or symptoms worsen, call/follow-up at clinic or go to ER.    Thanks,      "

## 2024-03-04 RX ORDER — HYDROCODONE BITARTRATE AND ACETAMINOPHEN 10; 325 MG/1; MG/1
1 TABLET ORAL EVERY 6 HOURS PRN
Qty: 90 TABLET | Refills: 0 | Status: SHIPPED | OUTPATIENT
Start: 2024-03-04 | End: 2024-03-30 | Stop reason: SDUPTHER

## 2024-03-21 ENCOUNTER — OFFICE VISIT (OUTPATIENT)
Dept: PRIMARY CARE CLINIC | Facility: CLINIC | Age: 89
End: 2024-03-21
Payer: MEDICARE

## 2024-03-21 VITALS
BODY MASS INDEX: 31.26 KG/M2 | HEART RATE: 78 BPM | DIASTOLIC BLOOD PRESSURE: 72 MMHG | OXYGEN SATURATION: 95 % | WEIGHT: 148.94 LBS | SYSTOLIC BLOOD PRESSURE: 118 MMHG | HEIGHT: 58 IN

## 2024-03-21 DIAGNOSIS — G40.909 SEIZURE DISORDER: Chronic | ICD-10-CM

## 2024-03-21 DIAGNOSIS — R42 DIZZINESS: ICD-10-CM

## 2024-03-21 DIAGNOSIS — F11.20 CHRONIC NARCOTIC DEPENDENCE: ICD-10-CM

## 2024-03-21 DIAGNOSIS — I10 ESSENTIAL HYPERTENSION: Chronic | ICD-10-CM

## 2024-03-21 DIAGNOSIS — N18.32 STAGE 3B CHRONIC KIDNEY DISEASE: Chronic | ICD-10-CM

## 2024-03-21 DIAGNOSIS — I70.0 ATHEROSCLEROSIS OF AORTA: Chronic | ICD-10-CM

## 2024-03-21 DIAGNOSIS — F33.0 DEPRESSION, MAJOR, RECURRENT, MILD: ICD-10-CM

## 2024-03-21 DIAGNOSIS — I73.9 PVD (PERIPHERAL VASCULAR DISEASE): ICD-10-CM

## 2024-03-21 DIAGNOSIS — I25.10 CORONARY ARTERY DISEASE INVOLVING NATIVE CORONARY ARTERY OF NATIVE HEART WITHOUT ANGINA PECTORIS: Chronic | ICD-10-CM

## 2024-03-21 DIAGNOSIS — S32.010D COMPRESSION FRACTURE OF L1 VERTEBRA WITH ROUTINE HEALING: Primary | ICD-10-CM

## 2024-03-21 DIAGNOSIS — I25.10 ASCVD (ARTERIOSCLEROTIC CARDIOVASCULAR DISEASE): ICD-10-CM

## 2024-03-21 DIAGNOSIS — E78.5 HYPERLIPIDEMIA, UNSPECIFIED HYPERLIPIDEMIA TYPE: Chronic | ICD-10-CM

## 2024-03-21 DIAGNOSIS — J44.9 CHRONIC OBSTRUCTIVE PULMONARY DISEASE, UNSPECIFIED COPD TYPE: ICD-10-CM

## 2024-03-21 DIAGNOSIS — I27.20 PULMONARY HYPERTENSION: Chronic | ICD-10-CM

## 2024-03-21 PROCEDURE — 1160F RVW MEDS BY RX/DR IN RCRD: CPT | Mod: HCNC,CPTII,S$GLB, | Performed by: FAMILY MEDICINE

## 2024-03-21 PROCEDURE — 1125F AMNT PAIN NOTED PAIN PRSNT: CPT | Mod: HCNC,CPTII,S$GLB, | Performed by: FAMILY MEDICINE

## 2024-03-21 PROCEDURE — 99215 OFFICE O/P EST HI 40 MIN: CPT | Mod: HCNC,S$GLB,, | Performed by: FAMILY MEDICINE

## 2024-03-21 PROCEDURE — 1100F PTFALLS ASSESS-DOCD GE2>/YR: CPT | Mod: HCNC,CPTII,S$GLB, | Performed by: FAMILY MEDICINE

## 2024-03-21 PROCEDURE — 3288F FALL RISK ASSESSMENT DOCD: CPT | Mod: HCNC,CPTII,S$GLB, | Performed by: FAMILY MEDICINE

## 2024-03-21 PROCEDURE — 99999 PR PBB SHADOW E&M-EST. PATIENT-LVL III: CPT | Mod: PBBFAC,HCNC,, | Performed by: FAMILY MEDICINE

## 2024-03-21 PROCEDURE — 1159F MED LIST DOCD IN RCRD: CPT | Mod: HCNC,CPTII,S$GLB, | Performed by: FAMILY MEDICINE

## 2024-03-21 PROCEDURE — 1157F ADVNC CARE PLAN IN RCRD: CPT | Mod: HCNC,CPTII,S$GLB, | Performed by: FAMILY MEDICINE

## 2024-03-21 RX ORDER — CALCITONIN SALMON 200 [IU]/.09ML
1 SPRAY, METERED NASAL DAILY
Qty: 3.7 ML | Refills: 3 | Status: SHIPPED | OUTPATIENT
Start: 2024-03-21 | End: 2025-03-21

## 2024-03-21 RX ORDER — ROSUVASTATIN CALCIUM 20 MG/1
20 TABLET, COATED ORAL DAILY
Qty: 90 TABLET | Refills: 3 | Status: SHIPPED | OUTPATIENT
Start: 2024-03-21 | End: 2024-03-22 | Stop reason: SDUPTHER

## 2024-03-21 RX ORDER — CALCITONIN SALMON 200 [IU]/.09ML
1 SPRAY, METERED NASAL DAILY
Qty: 3.7 ML | Refills: 3 | Status: SHIPPED | OUTPATIENT
Start: 2024-03-21 | End: 2024-03-21 | Stop reason: SDUPTHER

## 2024-03-21 NOTE — ASSESSMENT & PLAN NOTE
She has had chronic dizziness off and on, she does have a follow-up with Neurology.  Given diffuse vascular disease will check carotid ultrasound and she will follow with a cardiologist.

## 2024-03-21 NOTE — ASSESSMENT & PLAN NOTE
Still having significant discomfort perhaps mild improvement since original fall.  We discussed referral to pain management for possible evaluation vertebroplasty.  She is hesitant to do so.  She does have hydrocodone which she takes regularly for chronic pain.  Will send in calcitonin nasal spray as this can reduce severity of pain from an acute compression fracture.  But she will need more definitive treatment for osteoporosis.  She has tried oral bisphosphonates but complain of side effects.  I would still recommend trying an alternative oral but if this is not tolerated then consider Prolia.  But will wait until she follows back in a few weeks to reexplore this as she was not very keen on pursuing this immediately

## 2024-03-21 NOTE — ASSESSMENT & PLAN NOTE
She has been relatively stable on sertraline 100 mg but is at risk for exacerbation if acute pain does not improve.  See above for details, will monitor closely and see her back in a few

## 2024-03-21 NOTE — PROGRESS NOTES
Primary Care Provider Appointment   [facility] [place]       Patient ID: Summer Inman is a 88 y.o. female.    ASSESSMENT/PLAN by Problem List:    1. Compression fracture of L1 vertebra with routine healing  Assessment & Plan:  Still having significant discomfort perhaps mild improvement since original fall.  We discussed referral to pain management for possible evaluation vertebroplasty.  She is hesitant to do so.  She does have hydrocodone which she takes regularly for chronic pain.  Will send in calcitonin nasal spray as this can reduce severity of pain from an acute compression fracture.  But she will need more definitive treatment for osteoporosis.  She has tried oral bisphosphonates but complain of side effects.  I would still recommend trying an alternative oral but if this is not tolerated then consider Prolia.  But will wait until she follows back in a few weeks to reexplore this as she was not very keen on pursuing this immediately    Orders:  -     calcitonin, salmon, (FORTICAL) 200 unit/actuation nasal spray; 1 spray by Nasal route once daily. Alternate nostrils daily.  This may help with pain from vertebral fractures but is not considered effective for osteoporosis  Dispense: 3.7 mL; Refill: 3    2. PVD (peripheral vascular disease)  Assessment & Plan:  Advised by ER CT that she has aortic and iliac calcifications that were extensive.  On exam today she has diminished pulses bilaterally but there is no cyanosis or significant decrease in capillary refill.  She does not have classic claudication but is fairly sedentary and may have mixed neurological symptoms due to degenerative disc disease but can not completely exclude some degree of vascular claudication.  Will start with ABIs and she will follow with her cardiologist.    Orders:  -     Ankle Brachial Indices (DANISH); Future    3. ASCVD (arteriosclerotic cardiovascular disease)  -     CV Ultrasound Bilateral Doppler  Carotid; Future    4. Dizziness  Assessment & Plan:  She has had chronic dizziness off and on, she does have a follow-up with Neurology.  Given diffuse vascular disease will check carotid ultrasound and she will follow with a cardiologist.        5. Essential hypertension  Assessment & Plan:  Stable and satisfactory continue current medication      6. Stage 3b chronic kidney disease  Assessment & Plan:  Most recent creatinine from ER was at baseline roughly at 1.16.  Avoid NSAIDs      7. Hyperlipidemia, unspecified hyperlipidemia type  Assessment & Plan:  Previous difficulty with statin medications.  Given extent of atherosclerotic vascular disease I would recommend trying another statin.  Arguable benefit at this age however I think it is relatively safe to try and see if tolerated, rosuvastatin 20 mg.      8. Coronary artery disease involving native coronary artery of native heart without angina pectoris  Overview:  [facility] 5/14:  1.  LM:  normal sized, normal contour; no evidence of disease.  2.  LAD:  normal sized, wraps around the apex; less than 30% stenosis seen in the proximal LAD.                          D1:     3.  LCx:  LCx and obtuse marginals are normal in appearance for patient's age.                          OM1,OM2:  4.  RCA:  normal sized; normal in appearance for patient's age.    Assessment & Plan:  No recent angina or unstable symptoms.  She will follow with Cardiology      9. Atherosclerosis of aorta  Assessment & Plan:  Continue to treat risk factors, blood pressure, statin      10. Chronic obstructive pulmonary disease, unspecified COPD type  Assessment & Plan:  Stable.  No recent exacerbation.  She will report any changes      11. Pulmonary hypertension  Assessment & Plan:  Currently stable without sign of progression.  She has mild dyspnea with exertion which is chronic      12. Seizure disorder  Assessment & Plan:  Stable without recent seizure activity.Continue antiepileptics and neurology  follow-up      13. Chronic narcotic dependence    14. Depression, major, recurrent, mild  Assessment & Plan:  She has been relatively stable on sertraline 100 mg but is at risk for exacerbation if acute pain does not improve.  See above for details, will monitor closely and see her back in a few      Other orders  -     Discontinue: calcitonin, salmon, (FORTICAL) 200 unit/actuation nasal spray; 1 spray by Nasal route once daily. Alternate nostrils daily.  This may help with pain from vertebral fractures but is not considered effective for osteoporosis  Dispense: 3.7 mL; Refill: 3  -     rosuvastatin (CRESTOR) 20 MG tablet; Take 1 tablet (20 mg total) by mouth once daily.  Dispense: 90 tablet; Refill: 3         Follow Up:  2-4 weeks    Fifty-two minutes of total time spent on the encounter, time includes face to face time, and some or all of the following: review of chart, lab, imaging, consultant notes, ER, hospital, documentation, care coordination, etc.      Subjective:     Chief Complaint   Patient presents with    Follow-up     [facility] follow up from 1 week ago for fall 4 weeks ago     I have reviewed the information entered by the ancillary staff regarding the chief complaint as well as the related history.    HPI    Patient is a/an 88 y.o.  female     Here with a daughter, ER follow-up.  Apparently fell four weeks ago but after several weeks pain did not improve and went to the emergency room rather than calling us diagnosed with L1 compression fracture, advised to follow with Neurosurgery but elected to wait and discuss with me.  Still having significant pain in the upper lumbar region perhaps slightly better but still significant.  She does continue  Hydrocodone daily which helps temporarily.    Ongoing dizziness.  Has a follow-up with Neurology she reports.    Concerns about aortic and iliac atherosclerosis seen on CT in the emergency room.  Has scheduled to follow with Dr. Irizarry.  Questionable  whether she really has claudication as she has mixed neurological symptoms.    CT of the chest did show small pulmonary nodules.  They have scheduled a follow-up with her pulmonologist.  Reassurance given likely recommend repeating at 12 months      For complete problem list, past medical history, surgical history, social history, etc., see appropriate section in the electronic medical record    Review of Systems   Constitutional:  Positive for activity change and fatigue. Negative for chills, fever and unexpected weight change.   Respiratory: Negative.     Cardiovascular:  Negative for chest pain.   Gastrointestinal:  Positive for abdominal pain (Chronic abdominal pain, no acute changes.) and nausea.   Genitourinary: Negative.    Musculoskeletal:  Positive for arthralgias, back pain and myalgias.   Neurological:  Positive for dizziness and weakness.       Objective     Physical Exam  Constitutional:       General: She is not in acute distress.     Appearance: She is not toxic-appearing.   HENT:      Head: Normocephalic and atraumatic.      Mouth/Throat:      Pharynx: Oropharynx is clear. No oropharyngeal exudate or posterior oropharyngeal erythema.   Eyes:      General: No scleral icterus.  Cardiovascular:      Rate and Rhythm: Normal rate and regular rhythm.      Heart sounds: Murmur heard.      Comments: Diminished pedal pulses bilaterally, no cyanosis and capillary refill is less than 2 seconds.  Pulmonary:      Effort: No respiratory distress.      Breath sounds: No wheezing.      Comments: Diminished breath sounds bilaterally  Abdominal:      Comments: She has mild diffuse tenderness, no guarding or rebound or signs of acute abdomen.  Appears at baseline   Musculoskeletal:      Comments: Lumbar muscle tightness and tenderness with some discomfort in the upper lumbar region centrally   Skin:     Findings: No rash.       Vitals:    03/21/24 1516   BP: 118/72   BP Location: Right arm   Patient Position: Sitting  "  BP Method: Medium (Manual)   Pulse: 78   SpO2: 95%   Weight: 67.5 kg (148 lb 14.7 oz)   Height: 4' 10" (1.473 m)           THIS DOCUMENT WAS MADE IN PART WITH VOICE RECOGNITION SOFTWARE.  OCCASIONALLY THIS SOFTWARE WILL MISINTERPRET WORDS OR PHRASES.    "

## 2024-03-21 NOTE — ASSESSMENT & PLAN NOTE
Advised by ER CT that she has aortic and iliac calcifications that were extensive.  On exam today she has diminished pulses bilaterally but there is no cyanosis or significant decrease in capillary refill.  She does not have classic claudication but is fairly sedentary and may have mixed neurological symptoms due to degenerative disc disease but can not completely exclude some degree of vascular claudication.  Will start with ABIs and she will follow with her cardiologist.

## 2024-03-21 NOTE — ASSESSMENT & PLAN NOTE
Previous difficulty with statin medications.  Given extent of atherosclerotic vascular disease I would recommend trying another statin.  Arguable benefit at this age however I think it is relatively safe to try and see if tolerated, rosuvastatin 20 mg.

## 2024-03-25 ENCOUNTER — TELEPHONE (OUTPATIENT)
Dept: GASTROENTEROLOGY | Facility: CLINIC | Age: 89
End: 2024-03-25
Payer: MEDICARE

## 2024-03-25 NOTE — TELEPHONE ENCOUNTER
Spoke with pt's daughter, Kindra. Kindra states pt fell & fractured her vertebrae. They will call back to reschedule when pt is feeling up to having procedure.

## 2024-03-25 NOTE — TELEPHONE ENCOUNTER
----- Message from Karli Ma sent at 3/25/2024  8:50 AM CDT -----  Contact: Kindra (daughter)  Type:  Sooner Apoointment Request    Caller is requesting a sooner appointment.  Caller declined first available appointment listed below.  Caller will not accept being placed on the waitlist and is requesting a message be sent to doctor.  Name of Caller:Kindra\    When is the first available appointment?current 4/5    Symptoms: reschedule    Would the patient rather a call back or a response via [facility]? Call back    Best Call Back Number: [phone]    Additional Information: pt fell and will need to reschedule procedure    Please call to reschedule  Thanks

## 2024-03-29 NOTE — TELEPHONE ENCOUNTER
Please call with labs:   1. Her cholesterol is high. She really needs to consider restarting a cholesterol medicine. Please let me know if she is agreeable     2. Potassium is mildly low. Recommend continuing current medications but increasing dietary potassium     everything else is reasonably stable. Follow up in three months   Your opinion matters!  Thank you for choosing the [facility].  You might receive a survey about your experience today to evaluate our clinic.  If you do receive one, please take a few minutes to complete it.    Have questions? Our preferred method for contact is a telephone call.  Please note that the number that shows up on caller ID will be [phone]. Telephone calls have a turn around time of 1 business day. Please be aware that messages left via the Patient Portal have a turn around of time of 3-5 business days.    Prescription Refills?  Please call your preferred pharmacy.    Appointment changes? If you need to cancel, change, or schedule an appointment, please call the appropriate clinic  listed below.     [place]       [phone]  [place]   [phone]  [place]            [phone]  [place]            [phone]    It was a pleasure to care for you today!      Constipation:  The goal is to have a well-formed bowel movement everyday. This means that your stool should be the consistency of firm toothpaste.  Please start taking some additional things to help with constipation:  1. Konsyl or Metamucil or generic psyllium husk fiber (this can be purchased at any health food store).  (If you have diabetes or pre-diabetes, make sure to get a version that is SUGAR-FREE.) Take as directed on the bottle, but add into your diet slowly.  The goal is to get a total of 25-35g of fiber total in a day (this includes fiber that you eat in your diet.) Start with one day on the first week, 2 days on the second week, 3 days on the 3rd week and so on until you increase to every day.  2. Try eating TWO whole kiwis EVERYDAY. Don't eat the skin! (You must commit to doing this everyday if this treatment is going to be effective.)  3. Miralax.  Start with one scoop once a day. You may mix it into any beverage that you choose. If you start to have diarrhea, you should  decrease the amount of Miralax that you are taking. If you are not having a daily bowel movement that is well-formed (meaning the consistency of firm-toothpaste) after 3-days, then increase the amount of Miralax by an additional scoop. You may continue to increase the dose of Miralax by one scoop every 3-days until you reach the desired stool frequency and consistency. If you have diarrhea, take less.  4. Magnesium supplementation (magnesium oxide or magnesium citrate tablets). Follow instructions on the box/bottle. Please confirm that this is a safe option for you with your primary care doctor prior to starting. This is not appropriate for certain patients who have kidney disease particularly.

## 2024-04-01 RX ORDER — HYDROCODONE BITARTRATE AND ACETAMINOPHEN 10; 325 MG/1; MG/1
1 TABLET ORAL EVERY 6 HOURS PRN
Qty: 90 TABLET | Refills: 0 | Status: SHIPPED | OUTPATIENT
Start: 2024-04-01 | End: 2024-04-24 | Stop reason: SDUPTHER

## 2024-04-12 ENCOUNTER — HOSPITAL ENCOUNTER (OUTPATIENT)
Dept: CARDIOLOGY | Facility: HOSPITAL | Age: 89
Discharge: HOME | End: 2024-04-12
Attending: FAMILY MEDICINE
Payer: MEDICARE

## 2024-04-12 DIAGNOSIS — I25.10 ASCVD (ARTERIOSCLEROTIC CARDIOVASCULAR DISEASE): ICD-10-CM

## 2024-04-12 PROCEDURE — 93880 EXTRACRANIAL BILAT STUDY: CPT | Mod: PO

## 2024-04-12 PROCEDURE — 93880 EXTRACRANIAL BILAT STUDY: CPT | Mod: 26,,, | Performed by: INTERNAL MEDICINE

## 2024-04-12 RX ORDER — ONDANSETRON 4 MG/1
TABLET, ORALLY DISINTEGRATING ORAL
Qty: 30 TABLET | Refills: 1 | Status: SHIPPED | OUTPATIENT
Start: 2024-04-12 | End: 2024-05-20

## 2024-04-15 LAB
LEFT ARM DIASTOLIC BLOOD PRESSURE: 68 MMHG
LEFT ARM SYSTOLIC BLOOD PRESSURE: 128 MMHG
LEFT CBA DIAS: 11 CM/S
LEFT CBA SYS: 86 CM/S
LEFT CCA DIST DIAS: 11 CM/S
LEFT CCA DIST SYS: 92 CM/S
LEFT CCA MID DIAS: 13 CM/S
LEFT CCA MID SYS: 105 CM/S
LEFT CCA PROX DIAS: 9 CM/S
LEFT CCA PROX SYS: 129 CM/S
LEFT ECA DIAS: 9 CM/S
LEFT ECA SYS: 132 CM/S
LEFT ICA DIST DIAS: 16 CM/S
LEFT ICA DIST SYS: 112 CM/S
LEFT ICA MID DIAS: 18 CM/S
LEFT ICA MID SYS: 88 CM/S
LEFT ICA PROX DIAS: 11 CM/S
LEFT ICA PROX SYS: 82 CM/S
LEFT VERTEBRAL DIAS: 11 CM/S
LEFT VERTEBRAL SYS: 77 CM/S
OHS CV CAROTID RIGHT ICA EDV HIGHEST: 16
OHS CV CAROTID ULTRASOUND LEFT ICA/CCA RATIO: 1.22
OHS CV CAROTID ULTRASOUND RIGHT ICA/CCA RATIO: 0.84
OHS CV PV CAROTID LEFT HIGHEST CCA: 129
OHS CV PV CAROTID LEFT HIGHEST ICA: 112
OHS CV PV CAROTID RIGHT HIGHEST CCA: 96
OHS CV PV CAROTID RIGHT HIGHEST ICA: 69
OHS CV US CAROTID LEFT HIGHEST EDV: 18
RIGHT ARM DIASTOLIC BLOOD PRESSURE: 68 MMHG
RIGHT ARM SYSTOLIC BLOOD PRESSURE: 128 MMHG
RIGHT CBA DIAS: 10 CM/S
RIGHT CBA SYS: 86 CM/S
RIGHT CCA DIST DIAS: 9 CM/S
RIGHT CCA DIST SYS: 82 CM/S
RIGHT CCA MID DIAS: 10 CM/S
RIGHT CCA MID SYS: 87 CM/S
RIGHT CCA PROX DIAS: 10 CM/S
RIGHT CCA PROX SYS: 96 CM/S
RIGHT ECA DIAS: 11 CM/S
RIGHT ECA SYS: 152 CM/S
RIGHT ICA DIST DIAS: 16 CM/S
RIGHT ICA DIST SYS: 68 CM/S
RIGHT ICA MID DIAS: 16 CM/S
RIGHT ICA MID SYS: 69 CM/S
RIGHT ICA PROX DIAS: 13 CM/S
RIGHT ICA PROX SYS: 66 CM/S
RIGHT VERTEBRAL DIAS: 8 CM/S
RIGHT VERTEBRAL SYS: 47 CM/S

## 2024-04-16 DIAGNOSIS — Z87.19 HISTORY OF GASTRITIS: ICD-10-CM

## 2024-04-16 DIAGNOSIS — K44.9 HIATAL HERNIA: ICD-10-CM

## 2024-04-16 DIAGNOSIS — R10.13 EPIGASTRIC PAIN: ICD-10-CM

## 2024-04-16 DIAGNOSIS — F33.0 DEPRESSION, MAJOR, RECURRENT, MILD: ICD-10-CM

## 2024-04-16 DIAGNOSIS — K21.9 GASTROESOPHAGEAL REFLUX DISEASE WITHOUT ESOPHAGITIS: ICD-10-CM

## 2024-04-16 DIAGNOSIS — M51.36 DDD (DEGENERATIVE DISC DISEASE), LUMBAR: ICD-10-CM

## 2024-04-16 NOTE — TELEPHONE ENCOUNTER
Care Due:                  Date            Visit Type   Department     Provider  --------------------------------------------------------------------------------                                hospitals                    Ludwig Cook  Last Visit: 03-      FOLLOW UP    None Found     [facility]                              ESTABLISHED                  Ludwig Joey  Next Visit: 04-      PATIENT      None Found     [facility]                                                            Last  Test          Frequency    Reason                     Performed    Due Date  --------------------------------------------------------------------------------    Mg Level....  12 months..  calcitonin,..............  Not Found    Overdue    Phosphate...  15 months..  calcitonin,..............  Not Found    Overdue    Vitamin D...  15 months..  calcitonin,..............  Not Found    Overdue    [employer] Embedded Care Due Messages. Reference number: [id number].   4/16/2024 6:31:32 PM CDT

## 2024-04-17 RX ORDER — SERTRALINE HYDROCHLORIDE 100 MG/1
TABLET, FILM COATED ORAL
Qty: 135 TABLET | Refills: 3 | Status: SHIPPED | OUTPATIENT
Start: 2024-04-17

## 2024-04-17 RX ORDER — OMEPRAZOLE 40 MG/1
CAPSULE, DELAYED RELEASE ORAL
Qty: 90 CAPSULE | Refills: 3 | Status: SHIPPED | OUTPATIENT
Start: 2024-04-17

## 2024-04-17 RX ORDER — SERTRALINE HYDROCHLORIDE 100 MG/1
TABLET, FILM COATED ORAL
Qty: 135 TABLET | Refills: 3 | Status: SHIPPED | OUTPATIENT
Start: 2024-04-17 | End: 2024-04-17 | Stop reason: SDUPTHER

## 2024-04-17 RX ORDER — TIZANIDINE 2 MG/1
TABLET ORAL
Qty: 120 TABLET | Refills: 3 | Status: SHIPPED | OUTPATIENT
Start: 2024-04-17

## 2024-04-17 NOTE — TELEPHONE ENCOUNTER
Refill Routing Note   Medication(s) are not appropriate for processing by [facility] for the following reason(s):        Non-participating provider  Outside of protocol    [facility] action(s):  Route  Quick Discontinue   Requires labs : Yes - Magnesium, Phosphate, Vitamin D outdated    Zoloft approved in error. Discontinued previous order and pended for provider's review.       Pharmacist review requested: Yes     Appointments  past 12m or future 3m with PCP    Date Provider   Last Visit   3/21/2024 Ludwig Wilson MD   Next Visit   4/19/2024 Ludwig Wilson MD   ED visits in past 90 days: 1        Note composed:4:39 PM 04/17/2024

## 2024-04-17 NOTE — TELEPHONE ENCOUNTER
Refill Routing Note   Medication(s) are not appropriate for processing by [facility] for the following reason(s):      Medication outside of protocol  Drug-drug interaction    [facility] action(s):  Defer  Route  Approve Care Due:  Labs due     Medication Therapy Plan: TIZANIDINE-OP; OMEPRAZOLE-sucralfate, omeprazole    Pharmacist review requested: Yes     Appointments  past 12m or future 3m with PCP    Date Provider   Last Visit   3/21/2024 Ludwig Wilson MD   Next Visit   4/19/2024 Ludwig Wilson MD   ED visits in past 90 days: 1        Note composed:4:26 PM 04/17/2024

## 2024-04-19 ENCOUNTER — OFFICE VISIT (OUTPATIENT)
Dept: PRIMARY CARE CLINIC | Facility: CLINIC | Age: 89
End: 2024-04-19
Payer: MEDICARE

## 2024-04-19 VITALS
SYSTOLIC BLOOD PRESSURE: 118 MMHG | DIASTOLIC BLOOD PRESSURE: 68 MMHG | BODY MASS INDEX: 30.57 KG/M2 | OXYGEN SATURATION: 95 % | WEIGHT: 145.63 LBS | HEIGHT: 58 IN | HEART RATE: 78 BPM

## 2024-04-19 DIAGNOSIS — F11.20 CHRONIC NARCOTIC DEPENDENCE: Chronic | ICD-10-CM

## 2024-04-19 DIAGNOSIS — G89.29 CHRONIC PAIN OF MULTIPLE SITES: Chronic | ICD-10-CM

## 2024-04-19 DIAGNOSIS — I10 ESSENTIAL HYPERTENSION: ICD-10-CM

## 2024-04-19 DIAGNOSIS — E78.5 HYPERLIPIDEMIA, UNSPECIFIED HYPERLIPIDEMIA TYPE: ICD-10-CM

## 2024-04-19 DIAGNOSIS — M51.36 DDD (DEGENERATIVE DISC DISEASE), LUMBAR: Chronic | ICD-10-CM

## 2024-04-19 DIAGNOSIS — I77.9 CAROTID ARTERY DISEASE, UNSPECIFIED LATERALITY, UNSPECIFIED TYPE: Primary | ICD-10-CM

## 2024-04-19 DIAGNOSIS — R52 CHRONIC PAIN OF MULTIPLE SITES: Chronic | ICD-10-CM

## 2024-04-19 PROCEDURE — 99214 OFFICE O/P EST MOD 30 MIN: CPT | Mod: S$GLB,,, | Performed by: FAMILY MEDICINE

## 2024-04-19 PROCEDURE — 1157F ADVNC CARE PLAN IN RCRD: CPT | Mod: CPTII,S$GLB,, | Performed by: FAMILY MEDICINE

## 2024-04-19 PROCEDURE — 1159F MED LIST DOCD IN RCRD: CPT | Mod: CPTII,S$GLB,, | Performed by: FAMILY MEDICINE

## 2024-04-19 PROCEDURE — 1160F RVW MEDS BY RX/DR IN RCRD: CPT | Mod: CPTII,S$GLB,, | Performed by: FAMILY MEDICINE

## 2024-04-19 PROCEDURE — 99999 PR PBB SHADOW E&M-EST. PATIENT-LVL III: CPT | Mod: PBBFAC,,, | Performed by: FAMILY MEDICINE

## 2024-04-19 PROCEDURE — 3288F FALL RISK ASSESSMENT DOCD: CPT | Mod: CPTII,S$GLB,, | Performed by: FAMILY MEDICINE

## 2024-04-19 PROCEDURE — 1100F PTFALLS ASSESS-DOCD GE2>/YR: CPT | Mod: CPTII,S$GLB,, | Performed by: FAMILY MEDICINE

## 2024-04-19 PROCEDURE — 1125F AMNT PAIN NOTED PAIN PRSNT: CPT | Mod: CPTII,S$GLB,, | Performed by: FAMILY MEDICINE

## 2024-04-19 NOTE — PROGRESS NOTES
Primary Care Provider Appointment   [facility]       Patient ID: Summer Inman is a 88 y.o. female.    ASSESSMENT/PLAN by Problem List:    1. Carotid artery disease, unspecified laterality, unspecified type  Overview:  Carotid US 4/24    Interpretation Summary      There is 20-39% right Internal Carotid Stenosis.    There is left Internal Carotid Artery occlusion    Normal antegrae vertebral flow bilaterally.    Assessment & Plan:  She appears to have a left carotid artery occlusion, 20-39% occlusion on the right side with antegrade flow in the vertebral arteries bilaterally.  Discussed with the patient and daughter today.  Discussed the importance of maximizing medical therapy.  She should resume the statin medication and we should titrate this as highest tolerable.  Continue 81 mg aspirin.  Improve nutrition and regular light exercise.  Will also forward results to her cardiologist to see if there are any additional recommendations or interventions recommended    Orders:  -     Comprehensive Metabolic Panel; Future; Expected date: 04/19/2024  -     Lipid Panel; Future; Expected date: 04/19/2024    2. Essential hypertension  Assessment & Plan:  Stable and satisfactory.  Continue current medications.      3. Hyperlipidemia, unspecified hyperlipidemia type  Assessment & Plan:  She previously reported difficulty with statin medications although she can not really remember why.  Her cardiologist recently recommended repeating a challenge with rosuvastatin at 5 mg.  I encouraged the patient and her daughter start this as soon as possible.  Certainly would want to titrate up toward 20 mg depending on tolerability    Orders:  -     Comprehensive Metabolic Panel; Future; Expected date: 04/19/2024  -     Lipid Panel; Future; Expected date: 04/19/2024    4. DDD (degenerative disc disease), lumbar    5. Chronic pain of multiple sites    6. Chronic narcotic dependence  Assessment &  Plan:  She continues to have chronic pain that is somewhat relieved by her current medication.  She is satisfied with the therapy, she has not want to referral to pain management or additional referrals at this time.  She will continue to see me every three months for this           Follow Up:  Three months    Subjective:     Chief Complaint   Patient presents with    Follow-up     I have reviewed the information entered by the ancillary staff regarding the chief complaint as well as the related history.    HPI    Patient is a/an 88 y.o.  female     Routine follow-up and discussion of recent carotid ultrasound.  She is here with a daughter.  Apparently she did bump her head against a wall last week there is some bruising present, no loss of consciousness and she states she did not really fall just sat down afterwards.  See above for all details addressed today.    For complete problem list, past medical history, surgical history, social history, etc., see appropriate section in the electronic medical record    Review of Systems   HENT: Negative.     Respiratory: Negative.  Negative for shortness of breath.    Cardiovascular: Negative.  Negative for chest pain and leg swelling.   Gastrointestinal:  Positive for constipation. Negative for blood in stool.        She does have chronic abdominal pain but no recent changes from baseline   Musculoskeletal:  Positive for arthralgias and back pain.   Neurological:  Positive for dizziness.       Objective     Physical Exam  Constitutional:       General: She is not in acute distress.     Appearance: She is not toxic-appearing.   HENT:      Head:        Right Ear: Tympanic membrane, ear canal and external ear normal. There is no impacted cerumen.      Left Ear: Tympanic membrane, ear canal and external ear normal.      Nose: Congestion present.      Mouth/Throat:      Pharynx: Oropharynx is clear. No oropharyngeal exudate or posterior oropharyngeal erythema.   Eyes:       "General: No scleral icterus.  Cardiovascular:      Rate and Rhythm: Normal rate and regular rhythm.      Heart sounds: Murmur heard.   Pulmonary:      Effort: No respiratory distress.      Breath sounds: No wheezing.      Comments: Diminished breath sounds bilaterally  Abdominal:      Comments: She has mild diffuse tenderness, no guarding or rebound or signs of acute abdomen.  Appears at baseline   Skin:     Findings: No rash.       Vitals:    04/19/24 1447   BP: 118/68   BP Location: Left arm   Patient Position: Sitting   BP Method: Medium (Manual)   Pulse: 78   SpO2: 95%   Weight: 66 kg (145 lb 9.8 oz)   Height: 4' 10" (1.473 m)           THIS DOCUMENT WAS MADE IN PART WITH VOICE RECOGNITION SOFTWARE.  OCCASIONALLY THIS SOFTWARE WILL MISINTERPRET WORDS OR PHRASES.    "

## 2024-04-19 NOTE — Clinical Note
Nandini Dos Santos.  I told this patient I would send you an update on her recent carotid ultrasound: ·  There is 20-39% right Internal Carotid Stenosis. ·  There is left Internal Carotid Artery occlusion ·  Normal antegrae vertebral flow bilaterally.  She does have some chronic dizziness, unclear to me whether this is vascular or neurologic.  I see that you started her on rosuvastatin and I encouraged her to start this as she had not done so yet and continue aspirin.  Let me know if there is anything else that you would recommend at this point, thanks.

## 2024-04-22 PROBLEM — I77.9 CAROTID ARTERY DISEASE: Status: ACTIVE | Noted: 2024-04-22

## 2024-04-22 NOTE — ASSESSMENT & PLAN NOTE
She continues to have chronic pain that is somewhat relieved by her current medication.  She is satisfied with the therapy, she has not want to referral to pain management or additional referrals at this time.  She will continue to see me every three months for this

## 2024-04-22 NOTE — ASSESSMENT & PLAN NOTE
She appears to have a left carotid artery occlusion, 20-39% occlusion on the right side with antegrade flow in the vertebral arteries bilaterally.  Discussed with the patient and daughter today.  Discussed the importance of maximizing medical therapy.  She should resume the statin medication and we should titrate this as highest tolerable.  Continue 81 mg aspirin.  Improve nutrition and regular light exercise.  Will also forward results to her cardiologist to see if there are any additional recommendations or interventions recommended

## 2024-04-22 NOTE — ASSESSMENT & PLAN NOTE
She previously reported difficulty with statin medications although she can not really remember why.  Her cardiologist recently recommended repeating a challenge with rosuvastatin at 5 mg.  I encouraged the patient and her daughter start this as soon as possible.  Certainly would want to titrate up toward 20 mg depending on tolerability

## 2024-04-24 RX ORDER — HYDROCODONE BITARTRATE AND ACETAMINOPHEN 10; 325 MG/1; MG/1
1 TABLET ORAL EVERY 6 HOURS PRN
Qty: 90 TABLET | Refills: 0 | Status: SHIPPED | OUTPATIENT
Start: 2024-04-24 | End: 2024-05-21 | Stop reason: SDUPTHER

## 2024-05-20 RX ORDER — ONDANSETRON 4 MG/1
TABLET, ORALLY DISINTEGRATING ORAL
Qty: 30 TABLET | Refills: 1 | Status: SHIPPED | OUTPATIENT
Start: 2024-05-20 | End: 2024-06-10

## 2024-05-22 RX ORDER — HYDROCODONE BITARTRATE AND ACETAMINOPHEN 10; 325 MG/1; MG/1
1 TABLET ORAL EVERY 6 HOURS PRN
Qty: 90 TABLET | Refills: 0 | Status: SHIPPED | OUTPATIENT
Start: 2024-05-22

## 2024-05-24 NOTE — TRANSFER OF CARE
Anesthesia Transfer of Care Note    Patient: Summer Inman    Procedure(s) Performed: Procedure(s) (LRB):  CHOLECYSTECTOMY, LAPAROSCOPIC (N/A)    Patient location: PACU    Anesthesia Type: general    Transport from OR: Transported from OR on room air with adequate spontaneous ventilation    Post pain: pain needs to be addressed    Post assessment: no apparent anesthetic complications and tolerated procedure well    Post vital signs: stable    Level of consciousness: awake and sedated    Nausea/Vomiting: no nausea/vomiting    Complications: none    Transfer of care protocol was followed      Last vitals:   Visit Vitals  /69 (BP Location: Right arm, Patient Position: Lying)   Pulse 69   Temp 36.2 °C (97.2 °F) (Skin)   Resp 20   Ht 5' (1.524 m)   Wt 67.6 kg (149 lb)   SpO2 96%   Breastfeeding? No   BMI 29.10 kg/m²      Opt out

## 2024-06-03 ENCOUNTER — TELEPHONE (OUTPATIENT)
Dept: PRIMARY CARE CLINIC | Facility: CLINIC | Age: 89
End: 2024-06-03
Payer: MEDICARE

## 2024-06-07 ENCOUNTER — OFFICE VISIT (OUTPATIENT)
Dept: PRIMARY CARE CLINIC | Facility: CLINIC | Age: 89
End: 2024-06-07
Payer: MEDICARE

## 2024-06-07 VITALS
DIASTOLIC BLOOD PRESSURE: 60 MMHG | WEIGHT: 146.38 LBS | RESPIRATION RATE: 18 BRPM | BODY MASS INDEX: 30.59 KG/M2 | HEART RATE: 88 BPM | OXYGEN SATURATION: 95 % | SYSTOLIC BLOOD PRESSURE: 112 MMHG | TEMPERATURE: 97 F

## 2024-06-07 DIAGNOSIS — G89.29 CHRONIC ABDOMINAL PAIN: Primary | ICD-10-CM

## 2024-06-07 DIAGNOSIS — R10.9 CHRONIC ABDOMINAL PAIN: Primary | ICD-10-CM

## 2024-06-07 PROCEDURE — 99214 OFFICE O/P EST MOD 30 MIN: CPT | Mod: HCNC,S$GLB,, | Performed by: PHYSICIAN ASSISTANT

## 2024-06-07 PROCEDURE — 99999 PR PBB SHADOW E&M-EST. PATIENT-LVL V: CPT | Mod: PBBFAC,HCNC,, | Performed by: PHYSICIAN ASSISTANT

## 2024-06-07 PROCEDURE — 1159F MED LIST DOCD IN RCRD: CPT | Mod: HCNC,CPTII,S$GLB, | Performed by: PHYSICIAN ASSISTANT

## 2024-06-07 PROCEDURE — 3288F FALL RISK ASSESSMENT DOCD: CPT | Mod: HCNC,CPTII,S$GLB, | Performed by: PHYSICIAN ASSISTANT

## 2024-06-07 PROCEDURE — 1101F PT FALLS ASSESS-DOCD LE1/YR: CPT | Mod: HCNC,CPTII,S$GLB, | Performed by: PHYSICIAN ASSISTANT

## 2024-06-07 PROCEDURE — 1157F ADVNC CARE PLAN IN RCRD: CPT | Mod: HCNC,CPTII,S$GLB, | Performed by: PHYSICIAN ASSISTANT

## 2024-06-07 PROCEDURE — 1126F AMNT PAIN NOTED NONE PRSNT: CPT | Mod: HCNC,CPTII,S$GLB, | Performed by: PHYSICIAN ASSISTANT

## 2024-06-07 NOTE — PROGRESS NOTES
Subjective:      Patient ID: Summer Inman is a 88 y.o. female.    Vitals:  weight is 66.4 kg (146 lb 6.2 oz). Her oral temperature is 97.4 °F (36.3 °C). Her blood pressure is 112/60 and her pulse is 88. Her respiration is 18 and oxygen saturation is 95%.     Chief Complaint: Follow-up (Hospital follow up visit)    88-year-old female presents for ED follow-up.  Emergency department visit on May 31st for lower abdominal pain.  In the ED she reported discomfort to her left abdomen and lower abdomen over the 10 days after a fall.  She reported dark stools with slight nausea and vomiting.  Although today she and daughter tell me that she has been diagnosed with gastro paresis and frequently has nausea and vomiting.  In the ER she denied any urinary symptoms.  Urinalysis revealed white blood cells.  Subsequent culture was negative.  She did have a CT of the abdomen which showed diverticulosis without acute diverticulitis.  There was abundant stool in the colon.  Patient states that she has a bowel movement every 1-3 days.  She sometimes takes MiraLax.  She does not eat much and diet does not include any fruits or vegetables.  She does not take any fiber supplements.  She is continued with intermittent abdominal pain since discharge.  She did finish prescription for Augmentin.        Gastrointestinal:  Positive for abdominal pain, nausea and constipation.   Genitourinary:  Negative for dysuria and hematuria.      Objective:     Physical Exam   Constitutional:  Non-toxic appearance. She does not appear ill. No distress.   HENT:   Head: Normocephalic and atraumatic.   Ears:   Right Ear: External ear normal.   Left Ear: External ear normal.   Cardiovascular: Normal rate and regular rhythm.   Pulmonary/Chest: Effort normal. No respiratory distress.   Abdominal: She exhibits no distension. Soft. There is no abdominal tenderness. There is no guarding.   Neurological: She is alert.   Psychiatric: Her behavior is normal. Mood,  judgment and thought content normal.   Nursing note and vitals reviewed.      Assessment:     1. Chronic abdominal pain        Plan:       Chronic abdominal pain    We did discuss that abdominal pain is likely due to her constipation. Her diet consists of mostly meat. She does not eat any fruits or vegetables. She did see Gastroenterology who recommended some tests, however other health issues have impeded her following up. Encouraged patient and daughter to schedule follow-up with GI. In the meantime, discussed that she would likely benefit from Metamucil or Citrucel. She should use this daily. She should also continue drinking water which she has improved upon recently. Goal would be to have her have a bowel movement daily. If regular Metamucil does not improve her symptoms it was okay to take MiraLax which is an osmotic laxative daily. Still encouraged follow-up with Gastroenterology.     My total time spent on this encounter was 37 minutes which included  the following activities: preparing to see the patient, performing a medically appropriate and/or evaluation, counseling and educating the patient and family/caregiver, ordering medications, tests, or procedures, referring and communicating with other healthcare providers, documenting clinical information in the electronic or other health record, and independently interpreting results. This time is independent and non-overlapping.

## 2024-06-07 NOTE — ASSESSMENT & PLAN NOTE
Detail Level: Zone We did discuss that abdominal pain is likely due to her constipation.  Her diet consists of mostly meat.  She does not eat any fruits or vegetables.  She did see Gastroenterology who recommended some tests, however other health issues have impeded her following up.  Encouraged patient and daughter to schedule follow-up with GI.  In the meantime, discussed that she would likely benefit from Metamucil or Citrucel.  She should use this daily.  She should also continue drinking water which she has improved upon recently.  Goal would be to have her have a bowel movement daily.  If regular Metamucil does not improve her symptoms it was okay to take MiraLax which is an osmotic laxative daily.  Still encouraged follow-up with Gastroenterology.

## 2024-06-10 RX ORDER — ONDANSETRON 4 MG/1
TABLET, ORALLY DISINTEGRATING ORAL
Qty: 30 TABLET | Refills: 1 | Status: SHIPPED | OUTPATIENT
Start: 2024-06-10

## 2024-06-21 ENCOUNTER — LAB VISIT (OUTPATIENT)
Dept: LAB | Facility: HOSPITAL | Age: 89
End: 2024-06-21
Attending: FAMILY MEDICINE
Payer: MEDICARE

## 2024-06-21 DIAGNOSIS — I77.9 CAROTID ARTERY DISEASE, UNSPECIFIED LATERALITY, UNSPECIFIED TYPE: ICD-10-CM

## 2024-06-21 DIAGNOSIS — E78.5 HYPERLIPIDEMIA, UNSPECIFIED HYPERLIPIDEMIA TYPE: ICD-10-CM

## 2024-06-21 LAB
ALBUMIN SERPL BCP-MCNC: 3.4 G/DL (ref 3.5–5.2)
ALP SERPL-CCNC: 122 U/L (ref 55–135)
ALT SERPL W/O P-5'-P-CCNC: 15 U/L (ref 10–44)
ANION GAP SERPL CALC-SCNC: 9 MMOL/L (ref 8–16)
AST SERPL-CCNC: 19 U/L (ref 10–40)
BILIRUB SERPL-MCNC: 0.2 MG/DL (ref 0.1–1)
BUN SERPL-MCNC: 29 MG/DL (ref 8–23)
CALCIUM SERPL-MCNC: 10.3 MG/DL (ref 8.7–10.5)
CHLORIDE SERPL-SCNC: 104 MMOL/L (ref 95–110)
CHOLEST SERPL-MCNC: 170 MG/DL (ref 120–199)
CHOLEST/HDLC SERPL: 2.8 {RATIO} (ref 2–5)
CO2 SERPL-SCNC: 27 MMOL/L (ref 23–29)
CREAT SERPL-MCNC: 1.2 MG/DL (ref 0.5–1.4)
EST. GFR  (NO RACE VARIABLE): 43.8 ML/MIN/1.73 M^2
GLUCOSE SERPL-MCNC: 103 MG/DL (ref 70–110)
HDLC SERPL-MCNC: 61 MG/DL (ref 40–75)
HDLC SERPL: 35.9 % (ref 20–50)
LDLC SERPL CALC-MCNC: 90.6 MG/DL (ref 63–159)
NONHDLC SERPL-MCNC: 109 MG/DL
POTASSIUM SERPL-SCNC: 4.6 MMOL/L (ref 3.5–5.1)
PROT SERPL-MCNC: 6.7 G/DL (ref 6–8.4)
SODIUM SERPL-SCNC: 140 MMOL/L (ref 136–145)
TRIGL SERPL-MCNC: 92 MG/DL (ref 30–150)

## 2024-06-21 PROCEDURE — 80061 LIPID PANEL: CPT | Mod: HCNC | Performed by: FAMILY MEDICINE

## 2024-06-21 PROCEDURE — 36415 COLL VENOUS BLD VENIPUNCTURE: CPT | Mod: HCNC,PO | Performed by: FAMILY MEDICINE

## 2024-06-21 PROCEDURE — 80053 COMPREHEN METABOLIC PANEL: CPT | Mod: HCNC | Performed by: FAMILY MEDICINE

## 2024-06-24 RX ORDER — HYDROCODONE BITARTRATE AND ACETAMINOPHEN 10; 325 MG/1; MG/1
1 TABLET ORAL EVERY 6 HOURS PRN
Qty: 90 TABLET | Refills: 0 | Status: SHIPPED | OUTPATIENT
Start: 2024-06-24

## 2024-07-01 ENCOUNTER — TELEPHONE (OUTPATIENT)
Dept: PAIN MEDICINE | Facility: CLINIC | Age: 89
End: 2024-07-01
Payer: MEDICARE

## 2024-07-01 ENCOUNTER — OFFICE VISIT (OUTPATIENT)
Dept: PRIMARY CARE CLINIC | Facility: CLINIC | Age: 89
End: 2024-07-01
Payer: MEDICARE

## 2024-07-01 VITALS
DIASTOLIC BLOOD PRESSURE: 74 MMHG | HEART RATE: 82 BPM | SYSTOLIC BLOOD PRESSURE: 152 MMHG | HEIGHT: 58 IN | WEIGHT: 148.06 LBS | RESPIRATION RATE: 16 BRPM | OXYGEN SATURATION: 95 % | BODY MASS INDEX: 31.08 KG/M2 | TEMPERATURE: 99 F

## 2024-07-01 DIAGNOSIS — M54.17 LUMBOSACRAL RADICULOPATHY: Chronic | ICD-10-CM

## 2024-07-01 DIAGNOSIS — M51.36 DDD (DEGENERATIVE DISC DISEASE), LUMBAR: ICD-10-CM

## 2024-07-01 DIAGNOSIS — R29.6 FREQUENT FALLS: ICD-10-CM

## 2024-07-01 DIAGNOSIS — R52 CHRONIC PAIN OF MULTIPLE SITES: Chronic | ICD-10-CM

## 2024-07-01 DIAGNOSIS — R41.3 MEMORY LOSS: ICD-10-CM

## 2024-07-01 DIAGNOSIS — R42 DIZZINESS: ICD-10-CM

## 2024-07-01 DIAGNOSIS — I10 ESSENTIAL HYPERTENSION: Primary | ICD-10-CM

## 2024-07-01 DIAGNOSIS — G89.29 CHRONIC PAIN OF MULTIPLE SITES: Chronic | ICD-10-CM

## 2024-07-01 PROCEDURE — 1100F PTFALLS ASSESS-DOCD GE2>/YR: CPT | Mod: HCNC,CPTII,S$GLB, | Performed by: FAMILY MEDICINE

## 2024-07-01 PROCEDURE — 99214 OFFICE O/P EST MOD 30 MIN: CPT | Mod: HCNC,S$GLB,, | Performed by: FAMILY MEDICINE

## 2024-07-01 PROCEDURE — 3288F FALL RISK ASSESSMENT DOCD: CPT | Mod: HCNC,CPTII,S$GLB, | Performed by: FAMILY MEDICINE

## 2024-07-01 PROCEDURE — 1123F ACP DISCUSS/DSCN MKR DOCD: CPT | Mod: HCNC,CPTII,S$GLB, | Performed by: FAMILY MEDICINE

## 2024-07-01 PROCEDURE — 1160F RVW MEDS BY RX/DR IN RCRD: CPT | Mod: HCNC,CPTII,S$GLB, | Performed by: FAMILY MEDICINE

## 2024-07-01 PROCEDURE — 1159F MED LIST DOCD IN RCRD: CPT | Mod: HCNC,CPTII,S$GLB, | Performed by: FAMILY MEDICINE

## 2024-07-01 PROCEDURE — 1125F AMNT PAIN NOTED PAIN PRSNT: CPT | Mod: HCNC,CPTII,S$GLB, | Performed by: FAMILY MEDICINE

## 2024-07-01 PROCEDURE — 99999 PR PBB SHADOW E&M-EST. PATIENT-LVL V: CPT | Mod: PBBFAC,HCNC,, | Performed by: FAMILY MEDICINE

## 2024-07-01 RX ORDER — SUCRALFATE 1 G/1
1 TABLET ORAL
Qty: 360 TABLET | Refills: 1 | Status: SHIPPED | OUTPATIENT
Start: 2024-07-01

## 2024-07-01 RX ORDER — DIPHENHYDRAMINE HCL 25 MG
25 CAPSULE ORAL EVERY 6 HOURS PRN
COMMUNITY

## 2024-07-01 NOTE — TELEPHONE ENCOUNTER
----- Message from Lizz Wells RN sent at 7/1/2024  9:16 AM CDT -----  Regarding: [facility] ER visit f/u 6/28/2024 Dx: L1 compression fx  Please call the patient's daughterKindra at [phone] to schedule patient's appointment. She has a L1 compression fx.     Thanks,    Lizz Wells RN, BSN  ED Navigator/Case Management  [phone]  '

## 2024-07-01 NOTE — PROGRESS NOTES
Primary Care Provider Appointment   [facility] [place]       Patient ID: Summer Inman is a 88 y.o. female.    ASSESSMENT/PLAN by Problem List:    1. Essential hypertension  -     Ambulatory referral/consult to Home Health; Future; Expected date: 07/02/2024    2. DDD (degenerative disc disease), lumbar  -     Ambulatory referral/consult to Home Health; Future; Expected date: 07/02/2024    3. Dizziness    4. Frequent falls  -     Ambulatory referral/consult to Home Health; Future; Expected date: 07/02/2024    Other orders  -     sucralfate (CARAFATE) 1 gram tablet; Take 1 tablet (1 g total) by mouth 4 (four) times daily before meals and nightly.  Dispense: 360 tablet; Refill: 1     Discussion: Patient has had several falls.  I do think that most of these are preventable if she uses her walker and is careful.  She appears to have chronic dizziness and may need to follow back with Neurology.  Will start with home health and PT consultation.  Medications reviewed.  We will continue to monitor and adjust as tolerated based on pain control, quality of life, etc..  Patient is on moderate dose of gabapentin.  Has not been using the tizanidine on a regular basis.    Follow Up:  Four weeks    Advance Care Planning     Date: 07/01/2024    Power of   I initiated the process of voluntary advance care planning today and explained the importance of this process to the patient.  I introduced the concept of advance directives to the patient, as well. Then the patient received detailed information about the importance of designating a Health Care Power of  (HCPOA). She was also instructed to communicate with this person about their wishes for future healthcare, should she become sick and lose decision-making capacity. The patient has previously appointed a HCPOA. After our discussion, the patient has decided to complete a HCPOA and has appointed her daughter, health care agent:   Stefani  Storm, and Kindra Hurst   & health care agent number:  see chart . I encouraged her to communicate with this person about their wishes for future healthcare, should she become sick and lose decision-making capacity.      Reviewed previous documents and info.  No changes.             Subjective:     Chief Complaint   Patient presents with    Fall     Had 2 falls within the past week    Dizziness     Reports dizziness daily - takes Dramamine    Chest Pain     L side of ribs - had CXR in the ED last week     I have reviewed the information entered by the ancillary staff regarding the chief complaint as well as the related history.    HPI    Patient is a/an 88 y.o.  female       Fell on Tuesday, went to ER on Friday  Was walking out of bedroom , right leg 'gave out'  Suffers some bruising and soreness of the chest which prompted an ER visit.    Other times still has dizziness, this is chronic, recurring.    For complete problem list, past medical history, surgical history, social history, etc., see appropriate section in the electronic medical record    Review of Systems   Respiratory:  Negative for shortness of breath and wheezing.    Cardiovascular:  Negative for chest pain and leg swelling.   Gastrointestinal: Negative.    Genitourinary: Negative.    Musculoskeletal:  Positive for arthralgias, back pain and gait problem.       Objective     Physical Exam  Constitutional:       General: She is not in acute distress.     Appearance: She is not toxic-appearing.   HENT:      Head:        Right Ear: Tympanic membrane, ear canal and external ear normal. There is no impacted cerumen.      Left Ear: Tympanic membrane, ear canal and external ear normal.      Nose: Congestion present.      Mouth/Throat:      Pharynx: Oropharynx is clear. No oropharyngeal exudate or posterior oropharyngeal erythema.   Eyes:      General: No scleral icterus.  Cardiovascular:      Rate and Rhythm: Normal rate and regular rhythm.      Heart  "sounds: Murmur heard.   Pulmonary:      Effort: No respiratory distress.      Breath sounds: No wheezing.      Comments: Diminished breath sounds bilaterally  Abdominal:      Comments: She has mild diffuse tenderness, no guarding or rebound or signs of acute abdomen.  Appears at baseline   Skin:     Findings: No rash.       Vitals:    07/01/24 1511   BP: (!) 152/74   BP Location: Right arm   Patient Position: Sitting   BP Method: Medium (Manual)   Pulse: 82   Resp: 16   Temp: 98.8 °F (37.1 °C)   TempSrc: Oral   SpO2: 95%   Weight: 67.1 kg (148 lb 0.6 oz)   Height: 4' 9.5" (1.461 m)           THIS DOCUMENT WAS MADE IN PART WITH VOICE RECOGNITION SOFTWARE.  OCCASIONALLY THIS SOFTWARE WILL MISINTERPRET WORDS OR PHRASES.    "

## 2024-07-02 RX ORDER — DONEPEZIL HYDROCHLORIDE 10 MG/1
10 TABLET, FILM COATED ORAL EVERY MORNING
Qty: 90 TABLET | Refills: 3 | Status: SHIPPED | OUTPATIENT
Start: 2024-07-02

## 2024-07-02 RX ORDER — GABAPENTIN 300 MG/1
CAPSULE ORAL
Qty: 360 CAPSULE | Refills: 3 | Status: SHIPPED | OUTPATIENT
Start: 2024-07-02

## 2024-07-09 PROCEDURE — G0180 MD CERTIFICATION HHA PATIENT: HCPCS | Mod: ,,, | Performed by: FAMILY MEDICINE

## 2024-07-12 DIAGNOSIS — M51.36 DDD (DEGENERATIVE DISC DISEASE), LUMBAR: ICD-10-CM

## 2024-07-12 DIAGNOSIS — G40.909 SEIZURE DISORDER: ICD-10-CM

## 2024-07-12 RX ORDER — ONDANSETRON 4 MG/1
TABLET, ORALLY DISINTEGRATING ORAL
Qty: 30 TABLET | Refills: 1 | Status: SHIPPED | OUTPATIENT
Start: 2024-07-12

## 2024-07-12 RX ORDER — LAMOTRIGINE 100 MG/1
TABLET ORAL
Qty: 450 TABLET | Refills: 0 | Status: SHIPPED | OUTPATIENT
Start: 2024-07-12

## 2024-07-12 RX ORDER — TIZANIDINE 2 MG/1
TABLET ORAL
Qty: 120 TABLET | Refills: 3 | Status: SHIPPED | OUTPATIENT
Start: 2024-07-12

## 2024-07-15 ENCOUNTER — TELEPHONE (OUTPATIENT)
Dept: NEUROLOGY | Facility: CLINIC | Age: 89
End: 2024-07-15
Payer: MEDICARE

## 2024-07-17 ENCOUNTER — TELEPHONE (OUTPATIENT)
Dept: PRIMARY CARE CLINIC | Facility: CLINIC | Age: 89
End: 2024-07-17
Payer: MEDICARE

## 2024-07-17 NOTE — TELEPHONE ENCOUNTER
Dr. Wilson had ordered an DANISH for this patient that got scheduled.That appt was cancelled by the testing facility because their equipment broke. They are calling back to get it rescheduled.

## 2024-07-18 NOTE — TELEPHONE ENCOUNTER
"      [facility]         Dr Renea Whaley  [unfilled]  Patient Care Team:  Maira Gama APRN as PCP - General (Family Medicine)  Rosalba Chiu MD as Gynecologist (Gynecology)    Chief Complaint:Initial sleep study AHI is 10.5 events per    hour.     Interval History: Patient here for compliance visit.  Last seen November 2021.  She is currently on auto CPAP 5 to 15 cm.  Compliance 77% with average daily use of 5 hours 37 minutes.  AHI and leak within normal limits.  She feels rested and benefits from CPAP.  Goes to bed 11 gets up 6 gets about 7+ hours of sleep more rested no tobacco no alcohol no caffeine abuse.  Currently has a nasal pillow that fits well.  Supplies have been adequate.    REVIEW OF SYSTEMS:   CARDIOVASCULAR: No chest pain, chest pressure or chest discomfort. No palpitations or edema.   RESPIRATORY: No shortness of breath, cough or sputum.   GASTROINTESTINAL: No anorexia, nausea, vomiting or diarrhea. No abdominal pain or blood.   HEMATOLOGIC: No bleeding or bruising.  Dietrich 10 out of 24 consistent with sleepiness  Positive for nasal congestion dry mouth acid reflux abdominal bloating and anxiety    Ventilator/Non-Invasive Ventilation Settings (From admission, onward)    None            Vital Signs  Heart Rate:  [83] 83  BP: (121)/(66) 121/66  [unfilled]  Flowsheet Rows    Flowsheet Row First Filed Value   Admission Height 172.7 cm (67.99\") Documented at 05/11/2023 1500   Admission Weight 89.7 kg (197 lb 11.2 oz) Documented at 05/11/2023 1500          Physical Exam:  Patient is examined using the personal protective equipment as per guidelines from infection control for this particular patient as enacted.  Hand hygiene was performed before and after patient interaction.   General Appearance:    Alert, cooperative, in no acute distress.  Following simple commands  ENT Mallampati between 3 and 4 no nasal congestion  Neck midline trachea, no thyromegaly   Lungs:     Clear " Spoke with pt's daughter, r/s'd DANISH testing to 8/8 at 1600.   to auscultation, respirations regular, even and                  unlabored    Heart:    Regular rhythm and normal rate, normal S1 and S2, no            murmur, no gallop, no rub, no click   Chest Wall:    No abnormalities observed   Abdomen:     Normal bowel sounds, no masses, no organomegaly, soft        nontender, nondistended, no guarding, no rebound                tenderness   Extremities:   Moves all extremities well, no edema, no cyanosis, no             redness  CNS no focal neurological deficits normal sensory exam  Skin no rashes no nodules  Musculoskeletal no cyanosis no clubbing normal range of motion     Results Review:                                          I reviewed the patient's new clinical results.  I personally viewed and interpreted the patient's chest x-ray.        Medication Review:       No current facility-administered medications for this visit.      ASSESSMent  1.  Obstructive sleep apnea.    2.  Fibromyalgia.    3.  Diabetes mellitus.  Allergic rhinitis      PLAN:  Reviewed compliance download with the patient  Compliance decent but needs to improve at least later than 80% discussed with patient  AHI and leak within normal limits  Treatment of underlying comorbidities  Continue current auto CPAP 5 to 15 cm  Supplies be renewed for 1 year  Sleep hygiene measures  Follow-up in 1 year      Renea Whaley MD  05/11/23  15:23 EDT

## 2024-07-24 RX ORDER — HYDROCODONE BITARTRATE AND ACETAMINOPHEN 10; 325 MG/1; MG/1
1 TABLET ORAL EVERY 6 HOURS PRN
Qty: 90 TABLET | Refills: 0 | Status: SHIPPED | OUTPATIENT
Start: 2024-07-24

## 2024-08-01 ENCOUNTER — EXTERNAL HOME HEALTH (OUTPATIENT)
Dept: HOME HEALTH SERVICES | Facility: HOSPITAL | Age: 89
End: 2024-08-01
Payer: MEDICARE

## 2024-08-08 ENCOUNTER — DOCUMENT SCAN (OUTPATIENT)
Dept: HOME HEALTH SERVICES | Facility: HOSPITAL | Age: 89
End: 2024-08-08
Payer: MEDICARE

## 2024-08-08 ENCOUNTER — HOSPITAL ENCOUNTER (OUTPATIENT)
Dept: CARDIOLOGY | Facility: HOSPITAL | Age: 89
Discharge: HOME | End: 2024-08-08
Attending: FAMILY MEDICINE
Payer: MEDICARE

## 2024-08-08 DIAGNOSIS — I73.9 PVD (PERIPHERAL VASCULAR DISEASE): ICD-10-CM

## 2024-08-08 LAB
LEFT ABI: 0.74
LEFT ARM BP: 120 MMHG
LEFT DORSALIS PEDIS: 82 MMHG
LEFT POSTERIOR TIBIAL: 89 MMHG
RIGHT ABI: 0.44
RIGHT ARM BP: 118 MMHG
RIGHT DORSALIS PEDIS: 51 MMHG
RIGHT POSTERIOR TIBIAL: 53 MMHG

## 2024-08-08 PROCEDURE — 93922 UPR/L XTREMITY ART 2 LEVELS: CPT | Mod: HCNC,PO

## 2024-08-12 ENCOUNTER — OFFICE VISIT (OUTPATIENT)
Dept: PRIMARY CARE CLINIC | Facility: CLINIC | Age: 89
End: 2024-08-12
Payer: MEDICARE

## 2024-08-12 VITALS
BODY MASS INDEX: 29.31 KG/M2 | DIASTOLIC BLOOD PRESSURE: 76 MMHG | SYSTOLIC BLOOD PRESSURE: 130 MMHG | HEART RATE: 80 BPM | WEIGHT: 145.38 LBS | OXYGEN SATURATION: 97 % | HEIGHT: 59 IN

## 2024-08-12 DIAGNOSIS — I73.9 PAD (PERIPHERAL ARTERY DISEASE): ICD-10-CM

## 2024-08-12 DIAGNOSIS — S06.0X0D CONCUSSION WITHOUT LOSS OF CONSCIOUSNESS, SUBSEQUENT ENCOUNTER: ICD-10-CM

## 2024-08-12 DIAGNOSIS — S01.01XD LACERATION OF OCCIPITAL REGION OF SCALP, SUBSEQUENT ENCOUNTER: Primary | ICD-10-CM

## 2024-08-12 DIAGNOSIS — S00.03XD CONTUSION OF LEFT TEMPOROFRONTAL SCALP, SUBSEQUENT ENCOUNTER: ICD-10-CM

## 2024-08-12 DIAGNOSIS — Z91.81 AT HIGH RISK FOR FALLS: ICD-10-CM

## 2024-08-12 PROCEDURE — 99999 PR PBB SHADOW E&M-EST. PATIENT-LVL V: CPT | Mod: PBBFAC,HCNC,, | Performed by: FAMILY MEDICINE

## 2024-08-12 PROCEDURE — 1159F MED LIST DOCD IN RCRD: CPT | Mod: HCNC,CPTII,S$GLB, | Performed by: FAMILY MEDICINE

## 2024-08-12 PROCEDURE — 1157F ADVNC CARE PLAN IN RCRD: CPT | Mod: HCNC,CPTII,S$GLB, | Performed by: FAMILY MEDICINE

## 2024-08-12 PROCEDURE — 1125F AMNT PAIN NOTED PAIN PRSNT: CPT | Mod: HCNC,CPTII,S$GLB, | Performed by: FAMILY MEDICINE

## 2024-08-12 PROCEDURE — 1160F RVW MEDS BY RX/DR IN RCRD: CPT | Mod: HCNC,CPTII,S$GLB, | Performed by: FAMILY MEDICINE

## 2024-08-12 PROCEDURE — 1100F PTFALLS ASSESS-DOCD GE2>/YR: CPT | Mod: HCNC,CPTII,S$GLB, | Performed by: FAMILY MEDICINE

## 2024-08-12 PROCEDURE — 99215 OFFICE O/P EST HI 40 MIN: CPT | Mod: HCNC,S$GLB,, | Performed by: FAMILY MEDICINE

## 2024-08-12 PROCEDURE — 3288F FALL RISK ASSESSMENT DOCD: CPT | Mod: HCNC,CPTII,S$GLB, | Performed by: FAMILY MEDICINE

## 2024-08-12 NOTE — Clinical Note
Nandini Dos Santos, hope you are doing well.  She finally had her ABIs done:  ·  Severely decreased right DANISH at 0.44, with monophasic waveforms. ·  Mildly decreased left DANISH at 0.74. She does appear symptomatic particularly on the right.  Difficult to say precisely as she is sedentary and does have chronic back and neurological problems but I think she is having some right-sided claudication.  There is no cyanosis or any resting symptoms.  Let me know if you would like me to order any additional imaging or whether you would want to take it from here.  Thanks  Aureliano

## 2024-08-12 NOTE — PROGRESS NOTES
Primary Care Provider Appointment   [facility] [place]       Patient ID: Summer Inman is a 88 y.o. female.    ASSESSMENT/PLAN by Problem List:    1. Laceration of occipital region of scalp, subsequent encounter    2. Contusion of left temporofrontal scalp, subsequent encounter    3. Concussion without loss of consciousness, subsequent encounter    4. At high risk for falls    5. PAD (peripheral artery disease)      Discussion:  Patient has been working with home health and PT.  She is using a walker regularly and even resided the specific instructions she was given to help reduce her risk of falls.  She will continue to work with this.  Also discussed additional fall precautions.      She does have symptoms of a mild concussion but that appears to be improving.  Discussed monitoring symptoms and limitations going forward    She does have evidence of PVD based on recent ABIs.  She does appear to be symptomatic bilaterally but more prominently on the right side.  I will review these results and symptoms with her cardiologist to determine next step as far as additional imaging versus considering interventions.  At this time there is no evidence of acute limb ischemia but we did discuss warning signs and went to contact us should anything change.    Note that her older daughter is very sick, in the ICU in critical care after an intracranial bleed.  Obviously this is stressful but she appears to be responding appropriately.  She does have good family support.  We will continue to monitor carefully.  Consider counseling.  Right now family resources or stretched and transportation will be difficult but we certainly can provide this if needed.    Follow Up:  She has a follow-up in a few weeks for her AWV .  I will see her again a few weeks after that.    Forty-three minutes of total time spent on the encounter, time includes face to face time, and some or all of the following: review of  chart, lab, imaging, consultant notes, ER, hospital, documentation, care coordination, etc.        Subjective:     Chief Complaint   Patient presents with    Follow-up     Hospital follow up visit     I have reviewed the information entered by the ancillary staff regarding the chief complaint as well as the related history.    HPI    Patient is a/an 88 y.o.  female     Hospital follow-up.  She presented to the emergency room with a head laceration and contusion.  She states she was turning quickly when she hit her head on the side of a counter then fell backwards.  She did require staples.  She does complain of some fogginess and concentration issues but that is improving.    Has home health and PT, using a walker consistently.    Some additional ongoing stress  Daughter in ICU after intracranial bleeding    Did recently have ABIs which are abnormal she does appear to have bilateral claudication more prominent on the right side    For complete problem list, past medical history, surgical history, social history, etc., see appropriate section in the electronic medical record    Review of Systems   HENT:  Negative for trouble swallowing.    Respiratory:  Negative for shortness of breath and wheezing.    Cardiovascular:  Negative for chest pain and leg swelling.   Gastrointestinal: Negative.    Genitourinary: Negative.    Musculoskeletal:  Positive for arthralgias, back pain and gait problem.   Skin:  Positive for wound.   Psychiatric/Behavioral:  Positive for decreased concentration and dysphoric mood. Negative for suicidal ideas.        Objective     Physical Exam  Constitutional:       General: She is not in acute distress.  HENT:      Head:        Mouth/Throat:      Pharynx: Oropharynx is clear. No oropharyngeal exudate or posterior oropharyngeal erythema.   Eyes:      General: No scleral icterus.  Cardiovascular:      Rate and Rhythm: Normal rate and regular rhythm.      Heart sounds: Murmur heard.      Comments:  "Pedal pulses remain diminished but palpable.  Color and capillary refill look good no cyanosis.  Pulmonary:      Effort: No respiratory distress.      Breath sounds: No wheezing.      Comments: Diminished breath sounds bilaterally  Musculoskeletal:      Comments: Lumbar muscle tightness and tenderness with some discomfort in the upper lumbar region centrally   Skin:     Findings: No rash.       Vitals:    08/12/24 1530   BP: 130/76   Pulse: 80   SpO2: 97%   Weight: 65.9 kg (145 lb 6.3 oz)   Height: 4' 11" (1.499 m)           THIS DOCUMENT WAS MADE IN PART WITH VOICE RECOGNITION SOFTWARE.  OCCASIONALLY THIS SOFTWARE WILL MISINTERPRET WORDS OR PHRASES.    "

## 2024-08-20 RX ORDER — HYDROCODONE BITARTRATE AND ACETAMINOPHEN 10; 325 MG/1; MG/1
1 TABLET ORAL EVERY 6 HOURS PRN
Qty: 90 TABLET | Refills: 0 | Status: SHIPPED | OUTPATIENT
Start: 2024-08-20

## 2024-08-22 ENCOUNTER — TELEPHONE (OUTPATIENT)
Dept: PRIMARY CARE CLINIC | Facility: CLINIC | Age: 89
End: 2024-08-22
Payer: MEDICARE

## 2024-08-22 DIAGNOSIS — I10 ESSENTIAL HYPERTENSION: ICD-10-CM

## 2024-08-22 DIAGNOSIS — R29.6 FREQUENT FALLS: Primary | ICD-10-CM

## 2024-08-22 DIAGNOSIS — M51.36 DDD (DEGENERATIVE DISC DISEASE), LUMBAR: ICD-10-CM

## 2024-08-22 NOTE — TELEPHONE ENCOUNTER
----- Message from Bambi Nash sent at 8/22/2024  2:52 PM CDT -----  Regarding: requesting order - Occupatiola therapy  [phone]; Nicol  - home health nurse; visited  for the first time today ; learned that  only been getting nursing from home health for the last 3 wks no physical therapy  ; had multiple falls over the course of couple months which is getting worst as per care giver. Need order for occupational therapy .      Bambi

## 2024-08-23 NOTE — TELEPHONE ENCOUNTER
Called Dr. Flannery's office and LM on the nurse's VM to call back to discuss potential further treatment options for pt r/t severe PVD that was seen with recent DANISH testing.

## 2024-08-23 NOTE — TELEPHONE ENCOUNTER
Spoke with Iván at [facility].  He states that he will pull the PT and OT orders from [facility].

## 2024-08-27 RX ORDER — ONDANSETRON 4 MG/1
TABLET, ORALLY DISINTEGRATING ORAL
Qty: 270 TABLET | Refills: 1 | Status: SHIPPED | OUTPATIENT
Start: 2024-08-27

## 2024-09-16 RX ORDER — HYDROCODONE BITARTRATE AND ACETAMINOPHEN 10; 325 MG/1; MG/1
1 TABLET ORAL EVERY 6 HOURS PRN
Qty: 90 TABLET | Refills: 0 | Status: SHIPPED | OUTPATIENT
Start: 2024-09-16

## 2024-09-19 ENCOUNTER — OFFICE VISIT (OUTPATIENT)
Dept: PRIMARY CARE CLINIC | Facility: CLINIC | Age: 89
End: 2024-09-19
Payer: MEDICARE

## 2024-09-19 ENCOUNTER — DOCUMENT SCAN (OUTPATIENT)
Dept: HOME HEALTH SERVICES | Facility: HOSPITAL | Age: 89
End: 2024-09-19
Payer: MEDICARE

## 2024-09-19 VITALS
HEIGHT: 59 IN | HEART RATE: 70 BPM | WEIGHT: 144.31 LBS | SYSTOLIC BLOOD PRESSURE: 124 MMHG | DIASTOLIC BLOOD PRESSURE: 64 MMHG | OXYGEN SATURATION: 96 % | RESPIRATION RATE: 17 BRPM | BODY MASS INDEX: 29.09 KG/M2 | TEMPERATURE: 98 F

## 2024-09-19 DIAGNOSIS — K31.84 GASTROPARESIS: ICD-10-CM

## 2024-09-19 DIAGNOSIS — M51.36 DDD (DEGENERATIVE DISC DISEASE), LUMBAR: Chronic | ICD-10-CM

## 2024-09-19 DIAGNOSIS — F11.20 CHRONIC NARCOTIC DEPENDENCE: Chronic | ICD-10-CM

## 2024-09-19 DIAGNOSIS — Z00.00 ENCOUNTER FOR PREVENTIVE HEALTH EXAMINATION: Primary | ICD-10-CM

## 2024-09-19 DIAGNOSIS — I25.10 CORONARY ARTERY DISEASE INVOLVING NATIVE CORONARY ARTERY OF NATIVE HEART WITHOUT ANGINA PECTORIS: Chronic | ICD-10-CM

## 2024-09-19 DIAGNOSIS — I73.9 PAD (PERIPHERAL ARTERY DISEASE): ICD-10-CM

## 2024-09-19 DIAGNOSIS — R52 CHRONIC PAIN OF MULTIPLE SITES: Chronic | ICD-10-CM

## 2024-09-19 DIAGNOSIS — J44.9 CHRONIC OBSTRUCTIVE PULMONARY DISEASE, UNSPECIFIED COPD TYPE: ICD-10-CM

## 2024-09-19 DIAGNOSIS — F33.0 DEPRESSION, MAJOR, RECURRENT, MILD: ICD-10-CM

## 2024-09-19 DIAGNOSIS — G89.29 CHRONIC PAIN OF MULTIPLE SITES: Chronic | ICD-10-CM

## 2024-09-19 DIAGNOSIS — I10 ESSENTIAL HYPERTENSION: Chronic | ICD-10-CM

## 2024-09-19 DIAGNOSIS — R13.10 DYSPHAGIA, UNSPECIFIED TYPE: ICD-10-CM

## 2024-09-19 DIAGNOSIS — N18.32 STAGE 3B CHRONIC KIDNEY DISEASE: Chronic | ICD-10-CM

## 2024-09-19 DIAGNOSIS — R26.9 ABNORMALITY OF GAIT AND MOBILITY: ICD-10-CM

## 2024-09-19 DIAGNOSIS — R29.6 FREQUENT FALLS: Primary | ICD-10-CM

## 2024-09-19 DIAGNOSIS — G40.909 SEIZURE DISORDER: Chronic | ICD-10-CM

## 2024-09-19 DIAGNOSIS — I47.10 SVT (SUPRAVENTRICULAR TACHYCARDIA): ICD-10-CM

## 2024-09-19 DIAGNOSIS — E78.5 HYPERLIPIDEMIA, UNSPECIFIED HYPERLIPIDEMIA TYPE: Chronic | ICD-10-CM

## 2024-09-19 DIAGNOSIS — I70.0 ATHEROSCLEROSIS OF AORTA: Chronic | ICD-10-CM

## 2024-09-19 PROCEDURE — 99999 PR PBB SHADOW E&M-EST. PATIENT-LVL V: CPT | Mod: PBBFAC,HCNC,, | Performed by: PHYSICIAN ASSISTANT

## 2024-09-19 PROCEDURE — 99999 PR PBB SHADOW E&M-EST. PATIENT-LVL III: CPT | Mod: PBBFAC,HCNC,, | Performed by: FAMILY MEDICINE

## 2024-09-19 RX ORDER — KETOTIFEN FUMARATE 0.35 MG/ML
1 SOLUTION/ DROPS OPHTHALMIC 2 TIMES DAILY
COMMUNITY

## 2024-09-19 NOTE — PROGRESS NOTES
Primary Care Provider Appointment   [facility]       Patient ID: Summer Inman is a 88 y.o. female.    ASSESSMENT/PLAN by Problem List:    1. Frequent falls  Assessment & Plan:  Another fall with injuries and ER visit.  Falls are multifactorial.  She does have some generalized weakness, lumbar degenerative disc disease.  She does have intermittent dizziness and lightheadedness as well.  Most falls that she have are preventable if she was being we are careful, not bending or leaning in an odd manner, and using her walker regularly.  Home health and or physical therapy remains available when she is ready and willing.  Otherwise we discussed precautions as most falls can be prevented.  No evidence that falls related to her seizures.  She does have a follow-up with Neurology scheduled      2. Essential hypertension  Assessment & Plan:  Blood pressure remains stable and satisfactory.  Continue current medications.      3. DDD (degenerative disc disease), lumbar    4. Chronic pain of multiple sites  Assessment & Plan:  Patient has chronic diffuse osteoarthritis as well as degenerative disc disease.  She has been maintain on hydrocodone which helps but does not completely alleviate pain.  We have offered consultation with pain management or other options in the past.  She is comfortable with the current level of pain care and current medications.  We will continue as well as continue to monitor.      5. PAD (peripheral artery disease)  Assessment & Plan:  Recent abnormal ABIs discussed again today.  She is not having emergent signs or symptoms, no symptoms at rest.  She does remain active.  There is no signs of cyanosis on exam.  Follow up with the cardiologist has been arranged           Follow Up:  2-3 months but contact us immediately if any changes or concerns      Subjective:     Chief complaint, follow-up for medication management      HPI    Patient is a/an 88 y.o.  female      Patient returns for her regular follow-up but also had a recent ER visit due to a fall.  See above for all details addressed today    For complete problem list, past medical history, surgical history, social history, etc., see appropriate section in the electronic medical record    Review of Systems   HENT: Negative.  Negative for trouble swallowing.    Respiratory:  Negative for shortness of breath and wheezing.    Cardiovascular:  Negative for chest pain and leg swelling.   Gastrointestinal: Negative.    Genitourinary: Negative.    Musculoskeletal:  Positive for arthralgias, back pain and gait problem.   Skin:  Positive for wound.   Hematological:  Bruises/bleeds easily.   Psychiatric/Behavioral:  Positive for dysphoric mood. Negative for decreased concentration and suicidal ideas. The patient is not nervous/anxious.        Objective     Physical Exam  Constitutional:       General: She is not in acute distress.     Appearance: She is not toxic-appearing.   Eyes:      General: No scleral icterus.  Cardiovascular:      Rate and Rhythm: Normal rate and regular rhythm.      Heart sounds: Murmur heard.      Comments: Pedal pulses remain diminished but palpable.  Color and capillary refill look good no cyanosis.  Pulmonary:      Effort: No respiratory distress.      Breath sounds: No wheezing.      Comments: Diminished breath sounds bilaterally  Skin:     Findings: No rash.   Neurological:      Mental Status: She is alert.       Vitals:    09/19/24 1645   BP: 128/60   Pulse: 72   Weight: 65.3 kg (144 lb)           THIS DOCUMENT WAS MADE IN PART WITH VOICE RECOGNITION SOFTWARE.  OCCASIONALLY THIS SOFTWARE WILL MISINTERPRET WORDS OR PHRASES.

## 2024-09-19 NOTE — PROGRESS NOTES
"  Summer Inman presented for a follow-up Medicare AW today. The following components were reviewed and updated:    Medical history  Family History  Social history  Allergies and Current Medications  Health Risk Assessment  Health Maintenance  Care Team    **See Completed Assessments for Annual Wellness visit with in the encounter summary    The following assessments were completed:  Depression Screening  Cognitive function Screening  Timed Get Up Test  Whisper Test        Opioid documentation:      Patient does have a current opioid prescription.      Patient accepted further discussion regarding opioid medication use.      Patient is currently taking hydrocodone narcotic for generalized pain.        Pain level today is 10/10.       In addition to narcotic pain medications, patient is also using topical analgesic for pain control.       Patient is not followed by a specialist currently for their pain and will not be referred today.       Patient's opioid risk potential based on ORT-OUD tool:       Reilly each box that applies   No   Yes     Family history of substance abuse   Alcohol [] [x]   Illegal drugs [] [x]   Rx drugs [] [x]     Personal history of substance abuse   Alcohol [x] []   Illegal drugs [x] []   Rx drugs [x] []     Age between 16-45 years   [x]   []     Patient with ADD, OCD, Bipolar disorder, schizoprenia   [x]   []     Patient with depression   [x]   []                         Scoring total                                                                 Non-opioid treatment options have been discussed today and added to the patient's after visit summary.          Vitals:    09/19/24 1358   BP: 124/64   BP Location: Left arm   Patient Position: Sitting   BP Method: Medium (Manual)   Pulse: 70   Resp: 17   Temp: 97.7 °F (36.5 °C)   TempSrc: Oral   SpO2: 96%   Weight: 65.5 kg (144 lb 4.7 oz)   Height: 4' 11" (1.499 m)     Body mass index is 29.14 kg/m².       Physical Exam  Vitals and nursing note " reviewed.   Constitutional:       Appearance: She is overweight. She is not toxic-appearing or diaphoretic.   HENT:      Head: Normocephalic and atraumatic.      Right Ear: External ear normal.      Left Ear: External ear normal.   Eyes:      General:         Right eye: No discharge.         Left eye: No discharge.      Extraocular Movements: Extraocular movements intact.      Conjunctiva/sclera: Conjunctivae normal.   Cardiovascular:      Rate and Rhythm: Normal rate.      Heart sounds: Murmur heard.   Pulmonary:      Effort: Pulmonary effort is normal. No respiratory distress.   Skin:     General: Skin is warm and dry.      Coloration: Skin is not jaundiced.      Findings: No rash.   Neurological:      Mental Status: She is alert.   Psychiatric:         Mood and Affect: Affect is angry.         Behavior: Behavior normal.         Thought Content: Thought content normal.         Judgment: Judgment normal.           Diagnoses and health risks identified today and associated recommendations/orders:  1. Encounter for preventive health examination  Annual wellness visit completed today  Reviewed healthcare maintenance including vaccinations    2. Abnormality of gait and mobility  Stable  Home health physical therapy ordered    3. Seizure disorder  Stable  Followed by Neurology    4. Chronic narcotic dependence  Stable with no signs of abuse  Followed by PCP    5. Depression, major, recurrent, mild  Stable  No suicidal ideations  Followed by PCP    6. Chronic obstructive pulmonary disease, unspecified COPD type  Stable  Followed by PCP    7. Hyperlipidemia, unspecified hyperlipidemia type  Stable  On statin  Followed by PCP and Cardiology    8. Coronary artery disease involving native coronary artery of native heart without angina pectoris  Stable  Followed by cardiology    9. Essential hypertension  Stable  Followed by cardiology    10. Atherosclerosis of aorta  Chronic  On statin  Followed by cardiology and PCP    11.  SVT (supraventricular tachycardia)  Stable  Followed by Cardiology    12. PAD (peripheral artery disease)  Chronic  Followed by cardiology and PCP    13. Stage 3b chronic kidney disease  Stable  Followed by PCP    14. Dysphagia, unspecified type  Chronic and stable  Relatively little change in weight over last couple of months  Followed by Gastroenterology    15. Gastroparesis  Chronic and stable  Followed by Gastroenterology      Provided Summer with a 5-10 year written screening schedule and personal prevention plan. Recommendations were developed using the USPSTF age appropriate recommendations. Education, counseling, and referrals were provided as needed.  After Visit Summary printed and given to patient which includes a list of additional screenings\tests needed.    No follow-ups on file.      BOONE Nevarez  I offered to discuss advanced care planning, including how to pick a person who would make decisions for you if you were unable to make them for yourself, called a health care power of , and what kind of decisions you might make such as use of life sustaining treatments such as ventilators and tube feeding when faced with a life limiting illness recorded on a living will that they will need to know. (How you want to be cared for as you near the end of your natural life)     X  Patient has advanced directives on file, which we reviewed, and they do not wish to make changes.

## 2024-09-20 NOTE — PATIENT INSTRUCTIONS
Counseling and Referral of Other Preventative  (Italic type indicates deductible and co-insurance are waived)    Patient Name: Summer Inman  Today's Date: 9/20/2024    Health Maintenance       Date Due Completion Date    Sign Pain Contract Never done ---    Shingles Vaccine (1 of 2) Never done ---    RSV Vaccine (Age 60+ and Pregnant patients) (1 - 1-dose 60+ series) Never done ---    Influenza Vaccine (1) 09/01/2024 12/15/2023    Override on 11/11/2019: Done    COVID-19 Vaccine (4 - 2023-24 season) 09/01/2024 12/16/2021    Hemoglobin A1c (Prediabetes) 12/15/2024 12/15/2023    Lipid Panel 06/21/2025 6/21/2024    Aspirin/Antiplatelet Therapy 09/19/2025 9/19/2024    TETANUS VACCINE 08/04/2034 8/4/2024        No orders of the defined types were placed in this encounter.    The following information is provided to all patients.  This information is to help you find resources for any of the problems found today that may be affecting your health:                  Living healthy guide: www.[facility].[place].gov      Understanding Diabetes: www.diabetes.org      Eating healthy: www.cdc.gov/healthyweight      CDC home safety checklist: www.cdc.gov/steadi/patient.html      Agency on Aging: www.goea.[place].[facility]      Alcoholics anonymous (AA): www.aa.org      Physical Activity: www.carlo.nih.gov/lb0kiab      Tobacco use: www.quitwithusla.org

## 2024-09-23 ENCOUNTER — PATIENT MESSAGE (OUTPATIENT)
Dept: PRIMARY CARE CLINIC | Facility: CLINIC | Age: 89
End: 2024-09-23
Payer: MEDICARE

## 2024-09-24 ENCOUNTER — DOCUMENT SCAN (OUTPATIENT)
Dept: HOME HEALTH SERVICES | Facility: HOSPITAL | Age: 89
End: 2024-09-24
Payer: MEDICARE

## 2024-09-27 ENCOUNTER — PATIENT MESSAGE (OUTPATIENT)
Dept: PRIMARY CARE CLINIC | Facility: CLINIC | Age: 89
End: 2024-09-27
Payer: MEDICARE

## 2024-09-27 VITALS
WEIGHT: 144 LBS | SYSTOLIC BLOOD PRESSURE: 128 MMHG | DIASTOLIC BLOOD PRESSURE: 60 MMHG | HEART RATE: 72 BPM | BODY MASS INDEX: 29.08 KG/M2

## 2024-09-27 PROBLEM — R29.6 FREQUENT FALLS: Status: ACTIVE | Noted: 2024-09-27

## 2024-09-27 NOTE — TELEPHONE ENCOUNTER
I did mention discuss with her cardiology, and suspect they would reach out to schedule when her cardiologist returns as was out for a few weeks, but let's keep checking on this

## 2024-09-27 NOTE — ASSESSMENT & PLAN NOTE
Recent abnormal ABIs discussed again today.  She is not having emergent signs or symptoms, no symptoms at rest.  She does remain active.  There is no signs of cyanosis on exam.  Follow up with the cardiologist has been arranged

## 2024-09-27 NOTE — ASSESSMENT & PLAN NOTE
Another fall with injuries and ER visit.  Falls are multifactorial.  She does have some generalized weakness, lumbar degenerative disc disease.  She does have intermittent dizziness and lightheadedness as well.  Most falls that she have are preventable if she was being we are careful, not bending or leaning in an odd manner, and using her walker regularly.  Home health and or physical therapy remains available when she is ready and willing.  Otherwise we discussed precautions as most falls can be prevented.  No evidence that falls related to her seizures.  She does have a follow-up with Neurology scheduled

## 2024-09-27 NOTE — ASSESSMENT & PLAN NOTE
Patient has chronic diffuse osteoarthritis as well as degenerative disc disease.  She has been maintain on hydrocodone which helps but does not completely alleviate pain.  We have offered consultation with pain management or other options in the past.  She is comfortable with the current level of pain care and current medications.  We will continue as well as continue to monitor.

## 2024-10-02 ENCOUNTER — OFFICE VISIT (OUTPATIENT)
Dept: NEUROLOGY | Facility: CLINIC | Age: 89
End: 2024-10-02
Payer: MEDICARE

## 2024-10-02 VITALS
HEIGHT: 59 IN | HEART RATE: 65 BPM | WEIGHT: 145.63 LBS | SYSTOLIC BLOOD PRESSURE: 161 MMHG | DIASTOLIC BLOOD PRESSURE: 75 MMHG | BODY MASS INDEX: 29.36 KG/M2 | RESPIRATION RATE: 16 BRPM

## 2024-10-02 DIAGNOSIS — R41.3 MEMORY LOSS: ICD-10-CM

## 2024-10-02 DIAGNOSIS — G40.909 SEIZURE DISORDER: Primary | ICD-10-CM

## 2024-10-02 DIAGNOSIS — T78.40XA ALLERGY, INITIAL ENCOUNTER: ICD-10-CM

## 2024-10-02 DIAGNOSIS — G58.9 NERVE PALSY: ICD-10-CM

## 2024-10-02 PROCEDURE — 1160F RVW MEDS BY RX/DR IN RCRD: CPT | Mod: HCNC,CPTII,S$GLB, | Performed by: NURSE PRACTITIONER

## 2024-10-02 PROCEDURE — 99214 OFFICE O/P EST MOD 30 MIN: CPT | Mod: HCNC,S$GLB,, | Performed by: NURSE PRACTITIONER

## 2024-10-02 PROCEDURE — 1159F MED LIST DOCD IN RCRD: CPT | Mod: HCNC,CPTII,S$GLB, | Performed by: NURSE PRACTITIONER

## 2024-10-02 PROCEDURE — 3288F FALL RISK ASSESSMENT DOCD: CPT | Mod: HCNC,CPTII,S$GLB, | Performed by: NURSE PRACTITIONER

## 2024-10-02 PROCEDURE — 99999 PR PBB SHADOW E&M-EST. PATIENT-LVL IV: CPT | Mod: PBBFAC,HCNC,, | Performed by: NURSE PRACTITIONER

## 2024-10-02 PROCEDURE — 1101F PT FALLS ASSESS-DOCD LE1/YR: CPT | Mod: HCNC,CPTII,S$GLB, | Performed by: NURSE PRACTITIONER

## 2024-10-02 PROCEDURE — 1157F ADVNC CARE PLAN IN RCRD: CPT | Mod: HCNC,CPTII,S$GLB, | Performed by: NURSE PRACTITIONER

## 2024-10-02 RX ORDER — LAMOTRIGINE 100 MG/1
TABLET ORAL
Qty: 450 TABLET | Refills: 3 | Status: SHIPPED | OUTPATIENT
Start: 2024-10-02

## 2024-10-02 RX ORDER — CETIRIZINE HYDROCHLORIDE 10 MG/1
10 TABLET ORAL DAILY
Qty: 90 TABLET | Refills: 3 | Status: SHIPPED | OUTPATIENT
Start: 2024-10-02 | End: 2025-10-02

## 2024-10-02 NOTE — PROGRESS NOTES
"  NEUROLOGY  Outpatient Follow Up    [facility]  [address] [facility], [place] [address]  [phone] (office) / [phone] (fax)    Patient Name:  Summer Inman  :  1935  MR #:  [id number]  Acct #:  [id number]    Date of Neurology Visit: 10/02/2024  Name of Provider: SILVANO Gorman    Other Physicians:  Ludwig Wilson MD (Primary Care Physician); No ref. provider found (Referring)      Chief Complaint: Follow-up (Yearly )      History of Present Illness (HPI):  As per Epic chart review:  (2018)  Ms. Inman is a 82 y.o. female who presents for follow up of seizures. The patient was accompanied by her son who also contributed to the following history. The history is difficult to obtain.      Since our last visit, we obtained MRI brain that did not show a definite cause for the seizures. 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 I recommended aspirin 81 mg daily be started and she has been taking that daily. The lamotrigine level was normal.      About 3 weeks ago, she had an unusual episode. She had a 9 hour long episode where she was very confused/delirious. She was yelling at people. She recalls that the TV was "rolling". No dizziness.  Her son says that she was confused and [facility]. She was unable to stand as well. She kept sliding out of the bed and her grandson kept trying to pick her up. She is unclear if she had any seizure like activity. When she came out of it, she was totally fine. She denies that she took medication incorrectly.      No other episodes since that time. She has passed out in the past before but this was not a passing out episode. No seizures that her and her son.      She states she is taking lamictal 200 mg in the morning and 300 mg in the evening (previously reported that she was on 200 mg once daily). It is unclear what she is actually taking. Per my review of Dr. Souza's records he " "prescribed 200mg/300mg. She has had a normal EEG with him.              Interval Hx 1/25/2021:   Patient is new to me  Patient is here today for follow up. She is accompanied by her daughter who supplies some information. Patient has been maintained on Aricept 10 mg daily and doesn't recall who or when she was started on this medication. Her daughter states she just started helping out with her medications about 6 mths ago.   Patient states she sleeps well at night. Patient denies hallucinations. Daughter states patient does not have any mood or behavioral issues. Patient denies depression. Daughter states somedays her memory is better than others. She seems to fluctuate. Patient states she gets frustrated often because she forgets items or forgets what she is talking about.   Patient states she does not eat much healthy foods but still has a good appetite. Patient is able to perform ADLs without assistance. She does use a cane for stability purposes.     Patient and daughter both deny any seizure activity. She has been maintained on Lamictal 200 mg in AM and 300 mg nightly.        Interval history 3/2022 (Dr. Inman): "She is on aricept 10 mg daily for memory started by Dr. Souza. She has forgetfulness but felt to be stable. MMSE is Jan 2021 was 26/30. She is also on namenda 5 mg BID started by Dr. Wilson.      She continues on lamictal 200mg in the morning and 300 mg at night for seizures. No seizure activity noted.      She used to have bad migraines in the past. She has ice pick stabbing headaches lately. She will get it on either side."      Interval Hx 10/12/2023:  Patient presents today for seizure follow up. She and daughter deny any recent seizures. A tytpical seizure consisted of freezing up and not being able to follow commands. Her last seizure was "years ago". She is maintained on Lamictal 200 mg in AM and 300 mg at night and tolerating it well.     She also reports intermittent diplopia that " started about 6+ months ago. 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 She is not interested in further workup for this at the present.     Interval Hx 10/2/2024:  Patient presents today for seizure follow up. She and daughter deny any recent seizures. Her last seizure was about 15 years ago. She is maintained on Lamictal 200 mg in AM and 300 mg at night and tolerating it well.     Patient reports forgetfulness that is not new. She takes Aricept and Namenda. She continues to have intermittent diplopia.  Patient states she sleeps well at night. Patient denies hallucinations. Daughter states patient does not have any mood or behavioral issues. Patient is grieving about the loss of her daughter.               Past Medical, Surgical, Family & Social History:   Reviewed and updated.    Home Medications:     Current Outpatient Medications:     aspirin (ECOTRIN) 81 MG EC tablet, Take 1 tablet (81 mg total) by mouth once daily., Disp: 30 tablet, Rfl: 11    diclofenac sodium (VOLTAREN) 1 % Gel, Apply 2 g topically once daily., Disp: 300 g, Rfl: 1    donepeziL (ARICEPT) 10 MG tablet, TAKE 1 TABLET EVERY MORNING, Disp: 90 tablet, Rfl: 3    ergocalciferol, vitamin D2, (VITAMIN D ORAL), Take 2,000 Units by mouth once daily at 6am., Disp: , Rfl:     ezetimibe (ZETIA) 10 mg tablet, Take 1 tablet (10 mg total) by mouth once daily., Disp: 90 tablet, Rfl: 3    gabapentin (NEURONTIN) 300 MG capsule, TAKE 1 CAPSULE IN THE MORNING, AND TAKE 1 CAPSULE WITH LUNCH, AND TAKE 1 TO 2 CAPSULES IN THE EVENING, Disp: 360 capsule, Rfl: 3    HYDROcodone-acetaminophen (NORCO)  mg per tablet, Take 1 tablet by mouth every 6 (six) hours as needed (pain)., Disp: 90 tablet, Rfl: 0    ketotifen (ZADITOR) 0.025 % (0.035 %) ophthalmic solution, Place 1 drop into both eyes 2 (two) times daily., Disp: , Rfl:     memantine (NAMENDA) 5 MG Tab, TAKE 1 TABLET TWICE DAILY, Disp: 180 tablet, Rfl: 3    nitroGLYCERIN  "(NITROSTAT) 0.4 MG SL tablet, PLACE 1 TAB UNDER THE TONGUE EVERY 5 MIN (UP TO 3 DOSES) AS NEEDED FOR CHEST PAIN. CALL 911 AT 3RD TABLET, Disp: 25 tablet, Rfl: 11    omeprazole (PRILOSEC) 40 MG capsule, TAKE 1 CAPSULE EVERY DAY, Disp: 90 capsule, Rfl: 3    ondansetron (ZOFRAN-ODT) 4 MG TbDL, DISSOLVE 1 TABLET ON THE TONGUE EVERY 8 HOURS AS NEEDED, Disp: 270 tablet, Rfl: 1    polyethylene glycol (GLYCOLAX) 17 gram/dose powder, Take 17 g by mouth daily as needed., Disp: , Rfl:     rosuvastatin (CRESTOR) 5 MG tablet, Take 1 tablet (5 mg total) by mouth once daily., Disp: 90 tablet, Rfl: 3    sertraline (ZOLOFT) 100 MG tablet, TAKE 1 AND 1/2 TABLETS ONE TIME DAILY, Disp: 135 tablet, Rfl: 3    sucralfate (CARAFATE) 1 gram tablet, Take 1 tablet (1 g total) by mouth 4 (four) times daily before meals and nightly., Disp: 360 tablet, Rfl: 1    tiZANidine (ZANAFLEX) 2 MG tablet, TAKE 1 TO 2 TABLETS THREE TIMES DAILY AS NEEDED FOR MUSCLE SPASM(S), Disp: 120 tablet, Rfl: 3    cetirizine (ZYRTEC) 10 MG tablet, Take 1 tablet (10 mg total) by mouth once daily., Disp: 90 tablet, Rfl: 3    lamoTRIgine (LAMICTAL) 100 MG tablet, TAKE 2 TABLETS EVERY MORNING AND TAKE 3 TABLETS EVERY NIGHT, Disp: 450 tablet, Rfl: 3    Physical Examination:  BP (!) 161/75 (BP Location: Right arm, Patient Position: Sitting)   Pulse 65   Resp 16   Ht 4' 11" (1.499 m)   Wt 66 kg (145 lb 9.8 oz)   BMI 29.41 kg/m²       GENERAL:  General appearance: Well, non-toxic appearing.  No apparent distress.  Neck: supple.      MENTAL STATUS:  Alertness, attention span & concentration: normal.  Language: normal.  Orientation to self, place & time:  normal.  Memory, recent & remote: somewhat limited  Fund of knowledge: normal.  MMSE: 28/30 (2022)  26/30 (2021)    SPEECH:  Tremulous voice  Follows complex commands.    CRANIAL NERVES:  Cranial Nerves II-XII were examined.  II - Visual fields: normal.  III, IV, VI: PERRL, EOMI, No ptosis, No nystagmus.  V - Facial " sensation: normal.  VII - Face symmetry & mobility: normal.  VIII - Hearing: normal.  IX, X - Palate: mobile & midline.  XI - Shoulder shrug: normal.  XII - Tongue protrusion: normal.    GROSS MOTOR:  Gait & station: slow, balance checks at times; uses cane/walker  Tone: normal.  Abnormal movements: none.  Finger-nose: normal.  Rapid alternating movements: normal.  Pronator drift: normal      MUSCLE STRENGTH:   4/5 throughout  Pain with ROM    REFLEXES:    RIGHT Reflex   LEFT   2+ Biceps 2+   2+ Brachiorad. 2+   2+ Triceps 2+          SENSORY:  Light touch: Normal throughout.                 Diagnostic Data Reviewed:          Lab Results   Component Value Date    WBC 8.75 06/28/2024    HGB 12.2 06/28/2024    HCT 38.7 06/28/2024    MCV 86 06/28/2024     06/28/2024        CMP  Sodium   Date Value Ref Range Status   06/28/2024 139 136 - 145 mmol/L Final     Potassium   Date Value Ref Range Status   06/28/2024 3.7 3.5 - 5.1 mmol/L Final     Comment:     Anion Gap reference range revised on 4/28/2023     Chloride   Date Value Ref Range Status   06/28/2024 109 95 - 110 mmol/L Final     CO2   Date Value Ref Range Status   06/28/2024 22 22 - 31 mmol/L Final     Glucose   Date Value Ref Range Status   06/28/2024 148 (H) 70 - 110 mg/dL Final     Comment:     The ADA recommends the following guidelines for fasting glucose:    Normal:       less than 100 mg/dL    Prediabetes:  100 mg/dL to 125 mg/dL    Diabetes:     126 mg/dL or higher       BUN   Date Value Ref Range Status   06/28/2024 18 7 - 18 mg/dL Final     Creatinine   Date Value Ref Range Status   06/28/2024 1.08 0.50 - 1.40 mg/dL Final     Calcium   Date Value Ref Range Status   06/28/2024 9.3 8.4 - 10.2 mg/dL Final     Total Protein   Date Value Ref Range Status   06/28/2024 7.2 6.0 - 8.4 g/dL Final     Albumin   Date Value Ref Range Status   06/28/2024 4.2 3.5 - 5.2 g/dL Final     Total Bilirubin   Date Value Ref Range Status   06/28/2024 0.1 (L) 0.2 - 1.3  mg/dL Final     Alkaline Phosphatase   Date Value Ref Range Status   06/28/2024 129 38 - 145 U/L Final     AST ([place])   Date Value Ref Range Status   01/31/2016 24 14 - 36 U/L Final     AST   Date Value Ref Range Status   06/28/2024 27 14 - 36 U/L Final     ALT   Date Value Ref Range Status   06/28/2024 25 0 - 35 U/L Final     Anion Gap   Date Value Ref Range Status   06/28/2024 8 5 - 12 mmol/L Final     Comment:     Anion Gap reference range revised on 4/28/2023     eGFR   Date Value Ref Range Status   06/28/2024 50 (A) >60 mL/min/1.73 m^2 Final     EEG 8/2018:  This is an abnormal EEG   during wakefulness, drowsiness and sleep with overall degree of slowing.  For   her given age, it is suggestive of a mild encephalopathy, nonspecific to the   cause.  There is no evidence of an epileptic process on this recording.  No   seizures were recorded during this study                  Assessment and Plan:    Problem List Items Addressed This Visit          Neuro    Seizure disorder - Primary (Chronic)    Current Assessment & Plan     Patient is a 89 y/o female that presents for seizure follow up  Previous reported episodes consisting of blank stare and/or stiffening up.    - last known seizure many years ago  Pt maintained on Lamictal 200/300 and tolerating well   - continue!    Recent serologies noted         Memory loss    Current Assessment & Plan     Patient previously diagnosed with memory loss by Dr. Souza and started on Aricept ~ 5 years ago.   Namenda started by PCP ~ 2021   - tolerating both well  Last MMSE 28/30   Daughter states patient has been stable.   No hallucinations, mood/behavioral issues, or sleep disturbances reported.  Recommend she not take Benadryl as this can worsen cognition.      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 Pt did complain of  headache when asked to assess eye movement  Recommend MRI Brain scan and neuro-ophtho referral but pt decline on both.   She denies any other associated symptoms             Other Visit Diagnoses       Allergy, initial encounter                                  Important to note, also  has a past medical history of Anticoagulant long-term use, Arthritis, Asthma, Atrial premature beats, Benign neoplasm of adrenal gland, Cholelithiasis, CKD (chronic kidney disease), Colon polyp, COPD (chronic obstructive pulmonary disease), Coronary artery disease, Depression, Dizziness, First degree AV block, Gastroesophageal reflux disease, General anesthetics causing adverse effect in therapeutic use, Hepatomegaly, Hyperlipidemia, Hypertension, Impaired mobility, Neck pain, Schatzki's ring, Seasonal allergies, Seizures, Stroke, Trouble in sleeping, Wears dentures, and Wears glasses.          The patient will return to clinic in 12 months.    All questions were answered and patient is comfortable with the plan.         Thank you very much for the opportunity to assist in this patient's care.    If you have any questions or concerns, please do not hesitate to contact me at any time.      Sincerely,     SILVANO Gorman  [facility] [place]

## 2024-10-02 NOTE — ASSESSMENT & PLAN NOTE
Patient is a 89 y/o female that presents for seizure follow up  Previous reported episodes consisting of blank stare and/or stiffening up.    - last known seizure many years ago  Pt maintained on Lamictal 200/300 and tolerating well   - continue!    Recent serologies noted

## 2024-10-02 NOTE — ASSESSMENT & PLAN NOTE
Patient previously diagnosed with memory loss by Dr. Souza and started on Aricept ~ 5 years ago.   Namenda started by PCP ~ 2021   - tolerating both well  Last MMSE 28/30   Daughter states patient has been stable.   No hallucinations, mood/behavioral issues, or sleep disturbances reported.  Recommend she not take Benadryl as this can worsen cognition.      Safety concerns discussed

## 2024-10-11 RX ORDER — HYDROCODONE BITARTRATE AND ACETAMINOPHEN 10; 325 MG/1; MG/1
1 TABLET ORAL EVERY 6 HOURS PRN
Qty: 90 TABLET | Refills: 0 | Status: SHIPPED | OUTPATIENT
Start: 2024-10-11

## 2024-10-31 DIAGNOSIS — G89.29 CHRONIC PAIN OF MULTIPLE SITES: Primary | Chronic | ICD-10-CM

## 2024-10-31 DIAGNOSIS — R52 CHRONIC PAIN OF MULTIPLE SITES: Primary | Chronic | ICD-10-CM

## 2024-11-01 RX ORDER — HYDROCODONE BITARTRATE AND ACETAMINOPHEN 10; 325 MG/1; MG/1
1 TABLET ORAL EVERY 6 HOURS PRN
Qty: 90 TABLET | Refills: 0 | Status: SHIPPED | OUTPATIENT
Start: 2024-11-01

## 2024-11-05 RX ORDER — MEMANTINE HYDROCHLORIDE 5 MG/1
TABLET ORAL
Qty: 180 TABLET | Refills: 3 | Status: SHIPPED | OUTPATIENT
Start: 2024-11-05

## 2024-11-13 ENCOUNTER — LAB VISIT (OUTPATIENT)
Dept: LAB | Facility: HOSPITAL | Age: 89
End: 2024-11-13
Attending: FAMILY MEDICINE
Payer: MEDICARE

## 2024-11-13 DIAGNOSIS — R30.0 DYSURIA: ICD-10-CM

## 2024-11-13 LAB
BACTERIA #/AREA URNS HPF: ABNORMAL /HPF
BILIRUB UR QL STRIP: NEGATIVE
CLARITY UR: ABNORMAL
COLOR UR: YELLOW
GLUCOSE UR QL STRIP: NEGATIVE
HGB UR QL STRIP: ABNORMAL
KETONES UR QL STRIP: NEGATIVE
LEUKOCYTE ESTERASE UR QL STRIP: ABNORMAL
MICROSCOPIC COMMENT: ABNORMAL
NITRITE UR QL STRIP: NEGATIVE
PH UR STRIP: 7 [PH] (ref 5–8)
PROT UR QL STRIP: NEGATIVE
RBC #/AREA URNS HPF: 5 /HPF (ref 0–4)
SP GR UR STRIP: 1.01 (ref 1–1.03)
SQUAMOUS #/AREA URNS HPF: 1 /HPF
URN SPEC COLLECT METH UR: ABNORMAL
WBC #/AREA URNS HPF: 55 /HPF (ref 0–5)

## 2024-11-13 PROCEDURE — 87186 SC STD MICRODIL/AGAR DIL: CPT | Mod: HCNC | Performed by: FAMILY MEDICINE

## 2024-11-13 PROCEDURE — 87088 URINE BACTERIA CULTURE: CPT | Mod: HCNC | Performed by: FAMILY MEDICINE

## 2024-11-13 PROCEDURE — 81000 URINALYSIS NONAUTO W/SCOPE: CPT | Mod: HCNC,PO | Performed by: FAMILY MEDICINE

## 2024-11-13 PROCEDURE — 87086 URINE CULTURE/COLONY COUNT: CPT | Mod: HCNC | Performed by: FAMILY MEDICINE

## 2024-11-13 RX ORDER — AMOXICILLIN 875 MG/1
875 TABLET, FILM COATED ORAL 2 TIMES DAILY
Qty: 10 TABLET | Refills: 0 | Status: SHIPPED | OUTPATIENT
Start: 2024-11-13 | End: 2024-11-18

## 2024-11-16 LAB — BACTERIA UR CULT: ABNORMAL

## 2024-11-18 ENCOUNTER — TELEPHONE (OUTPATIENT)
Dept: PRIMARY CARE CLINIC | Facility: CLINIC | Age: 89
End: 2024-11-18
Payer: MEDICARE

## 2024-11-18 RX ORDER — CEFDINIR 300 MG/1
300 CAPSULE ORAL 2 TIMES DAILY
Qty: 14 CAPSULE | Refills: 0 | Status: SHIPPED | OUTPATIENT
Start: 2024-11-18 | End: 2024-11-25

## 2024-11-18 NOTE — TELEPHONE ENCOUNTER
Spoke with pt's daughter, Kindra.  She reports that pt is still symptomatic and that she is agreeable to pt starting a different antibiotic.     [place]

## 2024-11-18 NOTE — TELEPHONE ENCOUNTER
Please call regarding urine culture.  Culture was weekly positive.  She appears to be on amoxicillin.  The organism that grew is generally not sensitive to Amoxil and it was not tested.  However the colony count was low.  If she is feeling better my recommendation would be to monitor if she still has worrisome symptoms then will need to change the antibiotic

## 2024-11-23 DIAGNOSIS — G89.29 CHRONIC PAIN OF MULTIPLE SITES: Chronic | ICD-10-CM

## 2024-11-23 DIAGNOSIS — R52 CHRONIC PAIN OF MULTIPLE SITES: Chronic | ICD-10-CM

## 2024-11-25 ENCOUNTER — OUTPATIENT CASE MANAGEMENT (OUTPATIENT)
Dept: ADMINISTRATIVE | Facility: OTHER | Age: 89
End: 2024-11-25
Payer: MEDICARE

## 2024-11-25 RX ORDER — HYDROCODONE BITARTRATE AND ACETAMINOPHEN 10; 325 MG/1; MG/1
1 TABLET ORAL EVERY 6 HOURS PRN
Qty: 90 TABLET | Refills: 0 | Status: SHIPPED | OUTPATIENT
Start: 2024-11-25

## 2024-11-25 RX ORDER — SUCRALFATE 1 G/1
TABLET ORAL
Qty: 360 TABLET | Refills: 1 | Status: SHIPPED | OUTPATIENT
Start: 2024-11-25

## 2024-11-25 NOTE — TELEPHONE ENCOUNTER
Spoke with pt's daughter, Kindra, and she refused a HFU appt to 65+ at this time.  Pt is scheduled to see NP in Cardiology tomorrow.  Daughter reports no changes in pt's medications upon discharge from the hospital.

## 2024-12-02 ENCOUNTER — OUTPATIENT CASE MANAGEMENT (OUTPATIENT)
Dept: ADMINISTRATIVE | Facility: OTHER | Age: 89
End: 2024-12-02
Payer: MEDICARE

## 2024-12-16 ENCOUNTER — OFFICE VISIT (OUTPATIENT)
Dept: PRIMARY CARE CLINIC | Facility: CLINIC | Age: 89
End: 2024-12-16
Payer: MEDICARE

## 2024-12-16 ENCOUNTER — HOSPITAL ENCOUNTER (OUTPATIENT)
Dept: RADIOLOGY | Facility: HOSPITAL | Age: 89
Discharge: HOME | End: 2024-12-16
Attending: PHYSICIAN ASSISTANT
Payer: MEDICARE

## 2024-12-16 VITALS
HEART RATE: 75 BPM | SYSTOLIC BLOOD PRESSURE: 152 MMHG | WEIGHT: 128.88 LBS | HEIGHT: 58 IN | DIASTOLIC BLOOD PRESSURE: 74 MMHG | OXYGEN SATURATION: 95 % | BODY MASS INDEX: 27.05 KG/M2 | TEMPERATURE: 98 F

## 2024-12-16 DIAGNOSIS — R05.9 COUGH, UNSPECIFIED TYPE: ICD-10-CM

## 2024-12-16 DIAGNOSIS — J44.1 COPD EXACERBATION: ICD-10-CM

## 2024-12-16 DIAGNOSIS — J44.9 CHRONIC OBSTRUCTIVE PULMONARY DISEASE, UNSPECIFIED COPD TYPE: ICD-10-CM

## 2024-12-16 DIAGNOSIS — R52 CHRONIC PAIN OF MULTIPLE SITES: Chronic | ICD-10-CM

## 2024-12-16 DIAGNOSIS — G89.29 CHRONIC PAIN OF MULTIPLE SITES: Chronic | ICD-10-CM

## 2024-12-16 DIAGNOSIS — I10 ESSENTIAL HYPERTENSION: Chronic | ICD-10-CM

## 2024-12-16 DIAGNOSIS — R05.9 COUGH, UNSPECIFIED TYPE: Primary | ICD-10-CM

## 2024-12-16 DIAGNOSIS — R63.4 WEIGHT LOSS: ICD-10-CM

## 2024-12-16 PROCEDURE — 1101F PT FALLS ASSESS-DOCD LE1/YR: CPT | Mod: HCNC,CPTII,S$GLB, | Performed by: PHYSICIAN ASSISTANT

## 2024-12-16 PROCEDURE — 1157F ADVNC CARE PLAN IN RCRD: CPT | Mod: HCNC,CPTII,S$GLB, | Performed by: PHYSICIAN ASSISTANT

## 2024-12-16 PROCEDURE — 3288F FALL RISK ASSESSMENT DOCD: CPT | Mod: HCNC,CPTII,S$GLB, | Performed by: PHYSICIAN ASSISTANT

## 2024-12-16 PROCEDURE — 71046 X-RAY EXAM CHEST 2 VIEWS: CPT | Mod: TC,HCNC,FY,PO

## 2024-12-16 PROCEDURE — 71046 X-RAY EXAM CHEST 2 VIEWS: CPT | Mod: 26,HCNC,, | Performed by: RADIOLOGY

## 2024-12-16 PROCEDURE — 1159F MED LIST DOCD IN RCRD: CPT | Mod: HCNC,CPTII,S$GLB, | Performed by: PHYSICIAN ASSISTANT

## 2024-12-16 PROCEDURE — 99999 PR PBB SHADOW E&M-EST. PATIENT-LVL V: CPT | Mod: PBBFAC,HCNC,, | Performed by: PHYSICIAN ASSISTANT

## 2024-12-16 PROCEDURE — 99213 OFFICE O/P EST LOW 20 MIN: CPT | Mod: HCNC,S$GLB,, | Performed by: PHYSICIAN ASSISTANT

## 2024-12-16 PROCEDURE — 1125F AMNT PAIN NOTED PAIN PRSNT: CPT | Mod: HCNC,CPTII,S$GLB, | Performed by: PHYSICIAN ASSISTANT

## 2024-12-16 NOTE — PROGRESS NOTES
Primary Care Provider Appointment   [facility] [place]       Patient ID: Summer Inman is a 89 y.o. female.    ASSESSMENT/PLAN by Problem List:    1. Cough, unspecified type  Comments:  X-ray negative.  Try to use products such as Coricidin HBP for cough management that do not raise blood pressure.  Likely viral.  Orders:  -     X-Ray Chest PA And Lateral; Future; Expected date: 12/16/2024    2. Chronic obstructive pulmonary disease, unspecified COPD type  Comments:  Could be exacerbation.  No wheezing during auscultation.  She does not take any regular medications for this.    3. COPD exacerbation    4. Essential hypertension  Assessment & Plan:  Blood pressure stable, but elevated. This also could be due to cough medications. Daughter can not tell me which cough medication they are using.  Discussed that certain cough medications can elevate the blood pressure.  We will continue to monitor.      5. Weight loss  Comments:  May be due to age and frailty.  Increase caloric intake.  We will continue to follow.    On 10/2/24 patient weighed 145..61, by the time of ER visit she had already lost 9.61 lbs (136), she has since lost another 7.14 lbs. Does have gastroparesis in problem list. If still losing weight at next visit will refer back to Gastro    Follow Up:  3 months or sooner if needed    My total time spent on this encounter was 20 minutes which included  the following activities: preparing to see the patient, performing a medically appropriate and/or evaluation, counseling and educating the patient and family/caregiver, ordering medications, tests, or procedures, referring and communicating with other healthcare providers, documenting clinical information in the electronic or other health record, and independently interpreting results. This time is independent and non-overlapping.       Subjective:     Chief Complaint   Patient presents with    Follow-up     Patient presents for 3  month follow up appointment.    Cough     Patient using Robitussin and Luis F's with some relief.      I have reviewed the information entered by the ancillary staff regarding the chief complaint as well as the related history.    89 year old female presents for 3 month follow up, however, she is upset about a cough that she has had for 2 weeks and wishes to discuss that today. Apparently she lives with a grandson who recently had the flu. Patient denies ever having any fever. She says they dont really cross paths a lot in the house. She has been taking an OTC cough medicine for relief. She does not know what kind of cough medicine.  Daughter notes patient hasn't had much of appetitie since her fall in November. Lots of weight loss.          Patient is a/an 89 y.o.  female       For complete problem list, past medical history, surgical history, social history, etc., see appropriate section in the electronic medical record    Review of Systems   Constitutional:  Positive for fatigue. Negative for fever.   Respiratory:  Positive for cough. Negative for shortness of breath.    Musculoskeletal:  Positive for myalgias.   All other systems reviewed and are negative.      Objective     Physical Exam  Vitals and nursing note reviewed.   Constitutional:       Appearance: She is not ill-appearing, toxic-appearing or diaphoretic.   HENT:      Head: Normocephalic and atraumatic.      Right Ear: External ear normal.      Left Ear: External ear normal.   Eyes:      General:         Right eye: No discharge.         Left eye: No discharge.      Extraocular Movements: Extraocular movements intact.      Conjunctiva/sclera: Conjunctivae normal.   Cardiovascular:      Rate and Rhythm: Normal rate and regular rhythm.   Pulmonary:      Effort: Pulmonary effort is normal.      Breath sounds: No wheezing, rhonchi or rales.   Skin:     General: Skin is warm and dry.      Coloration: Skin is not jaundiced or pale.   Neurological:      Mental  "Status: She is alert.       Vitals:    12/16/24 1511   BP: (!) 152/74   Patient Position: Sitting   Pulse: 75   Temp: 97.5 °F (36.4 °C)   SpO2: 95%   Weight: 58.4 kg (128 lb 13.7 oz)   Height: 4' 9.5" (1.461 m)           THIS DOCUMENT WAS MADE IN PART WITH VOICE RECOGNITION SOFTWARE.  OCCASIONALLY THIS SOFTWARE WILL MISINTERPRET WORDS OR PHRASES.  "

## 2024-12-16 NOTE — TELEPHONE ENCOUNTER
History of Present Illness:  26year old male here today for complete physical exam and to establish care. Overall health: fair    Acute issues: Has had chronic ongoing issues with arthritis but has been told different things. Was initially told he had psoriatic arthritis but a rheumatologist told him he did not have inflammatory arthritis. He has wondering joint pain that is worse with activity. It is almost daily and can be severe at times limiting at work. He has an active job. He also has concerns as his gf recently tested for some type of STD that rarely causes symptoms in men and wanted to get tested. He also has long standing anxiety and was recently started on treatment following a stint at [place] but has side effects from medication. He is not sure the name of his medications. Mood: as above, no depressive thoughts but long standing history of anxiety  Sleep: issues falling asleep, but then sleeps well  Stress: manageable  Work: maintenance  Diet: well balanced, no restrictions  Exercise: active at work, roller blading  Life and family: moved to [place] to be with gf    Past Medical History:   Diagnosis Date   â¢ Acute appendicitis with localized peritonitis, without perforation, abscess, or gangrene 10/15/2021   â¢ Primary generalized (osteo)arthritis      History reviewed. No pertinent surgical history. Family History   Problem Relation Age of Onset   â¢ Heart disease Mother    â¢ Cancer, Lung Maternal Grandfather      Social History     Tobacco Use   â¢ Smoking status: Every Day     Current packs/day: 0.00     Types: Cigarettes     Passive exposure: Current   â¢ Smokeless tobacco: Never   Substance Use Topics   â¢ Alcohol use: Not Currently   â¢ Drug use: Yes     Types: Marijuana       No current outpatient medications on file. No current facility-administered medications for this visit. Patient's medications were reviewed and updated as noted in the chart.     Review of Systems:  Review of Systems LOV: today  NOV: needs to be scheduled - waiting to hear from Sera on the consensus of when pt needs to be seen again  Last refill: 11/25 (22 days ago for a 22.5 day supply)   Constitutional: Negative for chills, diaphoresis, fatigue and fever. HENT: Negative for ear pain, hearing loss, rhinorrhea, sore throat and voice change. Eyes: Negative for pain and redness. Respiratory: Negative for cough and shortness of breath. Cardiovascular: Negative for chest pain, palpitations and leg swelling. Gastrointestinal: Negative for abdominal pain, constipation, diarrhea, nausea and vomiting. Endocrine: Negative for cold intolerance and heat intolerance. Genitourinary: Negative for dysuria, frequency and urgency. Musculoskeletal: Positive for arthralgias. Negative for joint swelling. Skin: Negative for rash. Allergic/Immunologic: Negative for environmental allergies. Neurological: Negative for dizziness, light-headedness and headaches. Hematological: Does not bruise/bleed easily. Psychiatric/Behavioral: Negative for agitation, confusion, decreased concentration, dysphoric mood, sleep disturbance and suicidal ideas. The patient is nervous/anxious. All other systems reviewed and are negative. Objective:  Vital Signs Reviewed per chart  Physical Exam  Vitals reviewed. Constitutional:       General: He is not in acute distress. Appearance: Normal appearance. He is well-developed. He is not ill-appearing. HENT:      Head: Normocephalic and atraumatic. Right Ear: Tympanic membrane, ear canal and external ear normal. There is no impacted cerumen. Left Ear: Tympanic membrane, ear canal and external ear normal. There is no impacted cerumen. Nose: Nose normal. No congestion or rhinorrhea. Mouth/Throat:      Mouth: Mucous membranes are moist.      Pharynx: Oropharynx is clear. No oropharyngeal exudate or posterior oropharyngeal erythema. Neck: Normal range of motion and neck supple. No rigidity. Eyes:      General: No scleral icterus. Right eye: No discharge. Left eye: No discharge.       Extraocular Movements: Extraocular movements intact. Conjunctiva/sclera: Conjunctivae normal.      Pupils: Pupils are equal, round, and reactive to light. Neck:      Thyroid: No thyromegaly. Cardiovascular:      Rate and Rhythm: Normal rate and regular rhythm. Heart sounds: Normal heart sounds. No murmur heard. No friction rub. No gallop. Pulmonary:      Effort: Pulmonary effort is normal. No respiratory distress. Breath sounds: Normal breath sounds. No wheezing or rales. Abdominal:      General: Bowel sounds are normal. There is no distension. Palpations: Abdomen is soft. There is no mass. Tenderness: There is no abdominal tenderness. There is no guarding. Musculoskeletal:         General: No swelling, tenderness or deformity. Normal range of motion. Right lower leg: No edema. Left lower leg: No edema. Lymphadenopathy:      Cervical: No cervical adenopathy. Skin:     General: Skin is warm and dry. Capillary Refill: Capillary refill takes less than 2 seconds. Findings: No erythema or rash. Neurological:      General: No focal deficit present. Mental Status: He is alert and oriented to person, place, and time. Cranial Nerves: No cranial nerve deficit. Sensory: No sensory deficit. Motor: No weakness or abnormal muscle tone. Coordination: Coordination normal.      Gait: Gait normal.      Deep Tendon Reflexes: Reflexes normal.   Psychiatric:         Mood and Affect: Mood normal.         Behavior: Behavior normal.         Thought Content:  Thought content normal.         Judgment: Judgment normal.          Health Maintenance Summary     Pneumococcal Vaccine 0-64 (1 - PCV)  Overdue - never done    DTaP/Tdap/Td Vaccine (7 - Td or Tdap)  Overdue since 7/20/2020    COVID-19 Vaccine (3 - Pfizer series)  Overdue since 7/13/2021    Influenza Vaccine (1)  Next due on 9/1/2023    Depression Screening (Yearly)  Next due on 8/17/2024    Hepatitis B Vaccine   Completed Meningococcal Vaccine   Completed    HPV Vaccine   Completed          Assessment/Plan:  Problem List Items Addressed This Visit     Health care maintenance     Up to date on screenings as above  Discussed maintaining healthy diet and exercise (150 mins/week) weight healthy, active  No difficulty with stress or sleep, mood as below  Screening labs ordered, STD screening  No age appropriate cancer screening, fam hx reviewed         Generalized anxiety disorder     Stable, but with side effects. Unsure of medication regimen but will call in to discuss and see if we can adjust         Psoriatic arthritis (CMD)     Unclear diagnosis, some hx of arthritis at young age with rashes, but varied opinions in the notes. Will refer to rheum for evaluation         Relevant Orders    SERVICE TO RHEUMATOLOGY IMMUNOLOGY   Other Visit Diagnoses     Annual physical exam    -  Primary    Screening examination for STD (sexually transmitted disease)        Relevant Orders    RPR (Rapid Plasma Reagin) Traditional Syphilis Screen Algorithm    HIV 1/HIV 2 Antigen/Antibody Screen    Hepatitis Serology Panel Acute with Reflex HCV PCR    Chlamydia/Gonorrhea by Nucleic Acid Amplification    Trichomonas vaginalis by Nucleic Acid Amplification        Recent Labs and Imaging reviewed with patient to ensure understanding. Patient educated on medications; timing and dosages reviewed.     David Most, DO

## 2024-12-17 RX ORDER — HYDROCODONE BITARTRATE AND ACETAMINOPHEN 10; 325 MG/1; MG/1
1 TABLET ORAL EVERY 6 HOURS PRN
Qty: 90 TABLET | Refills: 0 | Status: ON HOLD | OUTPATIENT
Start: 2024-12-17

## 2024-12-18 ENCOUNTER — TELEPHONE (OUTPATIENT)
Dept: PRIMARY CARE CLINIC | Facility: CLINIC | Age: 89
End: 2024-12-18
Payer: MEDICARE

## 2024-12-18 NOTE — ASSESSMENT & PLAN NOTE
Blood pressure stable, but elevated. This also could be due to cough medications. Daughter can not tell me which cough medication they are using.  Discussed that certain cough medications can elevate the blood pressure.  We will continue to monitor.

## 2024-12-21 PROBLEM — G93.41 ACUTE METABOLIC ENCEPHALOPATHY: Status: ACTIVE | Noted: 2024-12-21

## 2024-12-22 PROBLEM — E83.39 HYPOPHOSPHATEMIA: Status: ACTIVE | Noted: 2024-12-22

## 2024-12-22 PROBLEM — R09.02 HYPOXIA: Status: ACTIVE | Noted: 2024-12-22

## 2024-12-23 RX ORDER — ONDANSETRON 4 MG/1
TABLET, ORALLY DISINTEGRATING ORAL
Qty: 270 TABLET | Refills: 3 | OUTPATIENT
Start: 2024-12-23

## 2024-12-25 PROBLEM — Z97.8 NASOGASTRIC TUBE PRESENT: Status: ACTIVE | Noted: 2024-12-25

## 2024-12-25 PROBLEM — R29.6 FREQUENT FALLS: Status: RESOLVED | Noted: 2024-09-27 | Resolved: 2024-12-25

## 2024-12-28 PROBLEM — Z73.6 LIMITATION OF ACTIVITY DUE TO DISABILITY: Status: ACTIVE | Noted: 2024-12-28

## 2024-12-28 PROBLEM — R54 AGE-RELATED PHYSICAL DEBILITY: Status: ACTIVE | Noted: 2024-12-28

## 2025-01-02 ENCOUNTER — TELEPHONE (OUTPATIENT)
Dept: PRIMARY CARE CLINIC | Facility: CLINIC | Age: 89
End: 2025-01-02
Payer: MEDICARE

## 2025-01-02 DIAGNOSIS — M54.17 LUMBOSACRAL RADICULOPATHY: Primary | ICD-10-CM

## 2025-01-02 DIAGNOSIS — Z74.09 MOBILITY IMPAIRED: ICD-10-CM

## 2025-01-02 PROCEDURE — G0180 MD CERTIFICATION HHA PATIENT: HCPCS | Mod: ,,, | Performed by: FAMILY MEDICINE

## 2025-01-02 RX ORDER — BENZONATATE 200 MG/1
200 CAPSULE ORAL 3 TIMES DAILY PRN
Qty: 90 CAPSULE | Refills: 0 | Status: SHIPPED | OUTPATIENT
Start: 2025-01-02 | End: 2025-03-03

## 2025-01-02 NOTE — TELEPHONE ENCOUNTER
First Home Health visit. Patient is requesting something for her cough that she has had for a little while now.   Will send to Dr. Wilson to advise.

## 2025-01-06 ENCOUNTER — OFFICE VISIT (OUTPATIENT)
Dept: PRIMARY CARE CLINIC | Facility: CLINIC | Age: 89
End: 2025-01-06
Payer: MEDICARE

## 2025-01-06 VITALS
OXYGEN SATURATION: 97 % | HEART RATE: 83 BPM | WEIGHT: 126.63 LBS | DIASTOLIC BLOOD PRESSURE: 72 MMHG | BODY MASS INDEX: 26.93 KG/M2 | SYSTOLIC BLOOD PRESSURE: 135 MMHG

## 2025-01-06 DIAGNOSIS — R42 DIZZINESS: ICD-10-CM

## 2025-01-06 DIAGNOSIS — Z09 HOSPITAL DISCHARGE FOLLOW-UP: Primary | ICD-10-CM

## 2025-01-06 DIAGNOSIS — R33.9 URINARY RETENTION: ICD-10-CM

## 2025-01-06 DIAGNOSIS — F33.0 DEPRESSION, MAJOR, RECURRENT, MILD: ICD-10-CM

## 2025-01-06 PROCEDURE — 1101F PT FALLS ASSESS-DOCD LE1/YR: CPT | Mod: HCNC,CPTII,S$GLB, | Performed by: PHYSICIAN ASSISTANT

## 2025-01-06 PROCEDURE — 1159F MED LIST DOCD IN RCRD: CPT | Mod: HCNC,CPTII,S$GLB, | Performed by: PHYSICIAN ASSISTANT

## 2025-01-06 PROCEDURE — 3288F FALL RISK ASSESSMENT DOCD: CPT | Mod: HCNC,CPTII,S$GLB, | Performed by: PHYSICIAN ASSISTANT

## 2025-01-06 PROCEDURE — 1157F ADVNC CARE PLAN IN RCRD: CPT | Mod: HCNC,CPTII,S$GLB, | Performed by: PHYSICIAN ASSISTANT

## 2025-01-06 PROCEDURE — 1160F RVW MEDS BY RX/DR IN RCRD: CPT | Mod: HCNC,CPTII,S$GLB, | Performed by: PHYSICIAN ASSISTANT

## 2025-01-06 PROCEDURE — 93005 ELECTROCARDIOGRAM TRACING: CPT | Mod: HCNC,S$GLB,, | Performed by: PHYSICIAN ASSISTANT

## 2025-01-06 PROCEDURE — 99215 OFFICE O/P EST HI 40 MIN: CPT | Mod: HCNC,S$GLB,, | Performed by: PHYSICIAN ASSISTANT

## 2025-01-06 PROCEDURE — 93010 ELECTROCARDIOGRAM REPORT: CPT | Mod: HCNC,S$GLB,, | Performed by: INTERNAL MEDICINE

## 2025-01-06 PROCEDURE — 99999 PR PBB SHADOW E&M-EST. PATIENT-LVL IV: CPT | Mod: PBBFAC,HCNC,, | Performed by: PHYSICIAN ASSISTANT

## 2025-01-06 PROCEDURE — 1111F DSCHRG MED/CURRENT MED MERGE: CPT | Mod: HCNC,CPTII,S$GLB, | Performed by: PHYSICIAN ASSISTANT

## 2025-01-06 RX ORDER — AZELASTINE 1 MG/ML
1 SPRAY, METERED NASAL 2 TIMES DAILY
Qty: 30 ML | Refills: 0 | Status: SHIPPED | OUTPATIENT
Start: 2025-01-06 | End: 2026-01-06

## 2025-01-06 NOTE — PROGRESS NOTES
Subjective:      Patient ID: Summer Inman is a 89 y.o. female.    Vitals:  weight is 57.5 kg (126 lb 10.5 oz). Her blood pressure is 135/72 and her pulse is 83. Her oxygen saturation is 97%.     Chief Complaint: No chief complaint on file.    89 year old female presents for hospital follow up. Patient presented to the ED on 12/21/24 with complaint of lethargy and decreased appetite over the previous 4 days. Daughter reported cough and congestion for previous 6 days. She had an unwitnessed fall 3 days prior. She was on the ground for an unknown amount of time. O2 sats upon intake 88-93. Xrays and CT for trauma were unremarkable. Potassium was 2.9. UA was found positive for infection. Chest xray revealed infiltrate. Patient was started on IV Rocephin and admitted. Mental status did worsen during her stay which prompted urgent Neuro evaluation. Patient with acute metabolic encephalopathy. She was also given doxycycline. Dementia medications were discontinued. Patient eventually returned to her baseline and was discharged on 12/30/24.    Medications discontinued: aricept, namenda, neurontin, zanaflex. Zoloft was decreased from 150 to 50 mg. They would like to go back up to 100    She has had increasing dizziness over her baseline since 3 days after discharge (1/2/25). Describes it as room spinning. Not present all the time, mostly when laying down but sometimes when she just sits there.         Constitution: Negative for chills and fever.   Cardiovascular:  Negative for chest pain and palpitations.   Respiratory:  Positive for shortness of breath.    Gastrointestinal:  Positive for nausea. Negative for diarrhea.   Neurological:  Positive for dizziness. Negative for altered mental status.   Psychiatric/Behavioral:  Positive for sleep disturbance (sleeps too much). Negative for altered mental status.       Objective:     Physical Exam   Constitutional:  Non-toxic appearance.   HENT:   Head: Normocephalic and atraumatic.    Ears:   Right Ear: External ear normal.   Left Ear: External ear normal.   Eyes: Pupils are equal, round, and reactive to light. Right eye exhibits no discharge. Left eye exhibits no discharge. Extraocular movement intact   Cardiovascular: Normal rate and regular rhythm.   Pulmonary/Chest: Effort normal. No respiratory distress.   Abdominal: Normal appearance.   Neurological: She is alert. No cranial nerve deficit.   Skin: Skin is warm, dry, not diaphoretic and not pale. jaundice  Nursing note and vitals reviewed.      Assessment:     1. Hospital discharge follow-up    2. Dizziness    3. Urinary retention    4. Depression, major, recurrent, mild        Plan:       Hospital discharge follow-up  -     LAMOTRIGINE LEVEL; Future; Expected date: 01/06/2025  Will obtain levels about 6 weeks since change in dosage.    Dizziness  -     IN OFFICE EKG 12-LEAD (to Muse)  NSR rate of 82. Left axis deviation. LBBB.   Dizziness is the same as her normal dizziness, just more often and worse  Does have some congestion. No obvious serous OM. Still will start on astelin and continue with Zyrtec to see if we get any improvement. If not will then refer to either ENT or back to Neuro.    Urinary retention  -     Ambulatory referral/consult to Urology; Future; Expected date: 01/13/2025  Is able to urinate. Admits to less than ideal fluid intake. Daughter states urinary retention was a problem before hospital. Will refer to Urology for evaluation.    Depression, major, recurrent, mild        Would like to go up on Zoloft to 100. It looks like this may have been reduced because of hyponatremia. I see two occasions with decreased sodium at 134 and 135. I think it is safe to go back up to 100mg.    Other orders  -     azelastine (ASTELIN) 137 mcg (0.1 %) nasal spray; 1 spray (137 mcg total) by Nasal route 2 (two) times daily.  Dispense: 30 mL; Refill: 0       My total time spent on this encounter was 46 minutes which included  the  following activities: preparing to see the patient, performing a medically appropriate and/or evaluation, counseling and educating the patient and family/caregiver, ordering medications, tests, or procedures, referring and communicating with other healthcare providers, documenting clinical information in the electronic or other health record, and independently interpreting results. This time is independent and non-overlapping.

## 2025-01-07 LAB
OHS QRS DURATION: 134 MS
OHS QTC CALCULATION: 504 MS

## 2025-01-09 DIAGNOSIS — G89.29 CHRONIC PAIN OF MULTIPLE SITES: Chronic | ICD-10-CM

## 2025-01-09 DIAGNOSIS — R52 CHRONIC PAIN OF MULTIPLE SITES: Chronic | ICD-10-CM

## 2025-01-09 RX ORDER — HYDROCODONE BITARTRATE AND ACETAMINOPHEN 10; 325 MG/1; MG/1
1 TABLET ORAL EVERY 6 HOURS PRN
Qty: 90 TABLET | Refills: 0 | Status: SHIPPED | OUTPATIENT
Start: 2025-01-09

## 2025-01-15 ENCOUNTER — TELEPHONE (OUTPATIENT)
Dept: PRIMARY CARE CLINIC | Facility: CLINIC | Age: 89
End: 2025-01-15
Payer: MEDICARE

## 2025-01-17 ENCOUNTER — SSC ENCOUNTER (OUTPATIENT)
Dept: ADMINISTRATIVE | Facility: OTHER | Age: 89
End: 2025-01-17
Payer: MEDICARE

## 2025-01-23 ENCOUNTER — OUTPATIENT CASE MANAGEMENT (OUTPATIENT)
Dept: ADMINISTRATIVE | Facility: OTHER | Age: 89
End: 2025-01-23
Payer: MEDICARE

## 2025-01-23 NOTE — LETTER
Summer Inman  [address] [facility] [address]      Dear Summer Inman,     I work with [facility] Outpatient Care Management Department. We received a referral to call you to discuss your medical history. These services are free of charge and are offered to [facility] patients who have recently been discharged from any of our facilities or who have medical conditions that may require the skill of a nurse to assist with management.     I am a Registered Nurse who specializes in connecting patients with available medical and financial resources as well as addressing any educational needs that may be indicated.    I attempted to reach you by telephone, but I was unsuccessful. Please call our department so that we can go over some questions with you, regarding your health.    The Outpatient Care Management Department can be reached at [phone], from 8:00AM to 4:30 PM, on Monday thru Friday.     Additionally, [facility] also has a program where a nurse is available 24/7 to answer questions or provide medical advice, their number is [phone].      Thanks,        Caridad Eagle RN  Outpatient Care Management  Phone #: [phone]

## 2025-01-23 NOTE — PROGRESS NOTES
1/23/2025  1st attempt to complete Initial Assessment  for Outpatient Care Management, left message.  Will send via portal -  unable to assess letter.

## 2025-01-24 ENCOUNTER — OUTPATIENT CASE MANAGEMENT (OUTPATIENT)
Dept: ADMINISTRATIVE | Facility: OTHER | Age: 89
End: 2025-01-24
Payer: MEDICARE

## 2025-01-24 NOTE — PROGRESS NOTES
1/24/2025  2nd attempt to complete Initial Assessment  for Outpatient Care Management, left message.

## 2025-01-27 ENCOUNTER — OUTPATIENT CASE MANAGEMENT (OUTPATIENT)
Dept: ADMINISTRATIVE | Facility: OTHER | Age: 89
End: 2025-01-27
Payer: MEDICARE

## 2025-01-27 ENCOUNTER — OFFICE VISIT (OUTPATIENT)
Dept: PRIMARY CARE CLINIC | Facility: CLINIC | Age: 89
End: 2025-01-27
Payer: MEDICARE

## 2025-01-27 VITALS
WEIGHT: 125.25 LBS | OXYGEN SATURATION: 100 % | SYSTOLIC BLOOD PRESSURE: 114 MMHG | BODY MASS INDEX: 25.29 KG/M2 | DIASTOLIC BLOOD PRESSURE: 72 MMHG | HEART RATE: 73 BPM

## 2025-01-27 DIAGNOSIS — G89.29 CHRONIC PAIN OF MULTIPLE SITES: Primary | Chronic | ICD-10-CM

## 2025-01-27 DIAGNOSIS — I77.9 CAROTID ARTERY DISEASE, UNSPECIFIED LATERALITY, UNSPECIFIED TYPE: ICD-10-CM

## 2025-01-27 DIAGNOSIS — S42.001S CLOSED DISPLACED FRACTURE OF RIGHT CLAVICLE, UNSPECIFIED PART OF CLAVICLE, SEQUELA: ICD-10-CM

## 2025-01-27 DIAGNOSIS — R42 DIZZINESS: ICD-10-CM

## 2025-01-27 DIAGNOSIS — I65.22 OCCLUSION AND STENOSIS OF LEFT CAROTID ARTERY: ICD-10-CM

## 2025-01-27 DIAGNOSIS — Z09 HOSPITAL DISCHARGE FOLLOW-UP: Primary | ICD-10-CM

## 2025-01-27 DIAGNOSIS — R52 CHRONIC PAIN OF MULTIPLE SITES: Primary | Chronic | ICD-10-CM

## 2025-01-27 PROCEDURE — 1159F MED LIST DOCD IN RCRD: CPT | Mod: HCNC,CPTII,S$GLB, | Performed by: PHYSICIAN ASSISTANT

## 2025-01-27 PROCEDURE — 1111F DSCHRG MED/CURRENT MED MERGE: CPT | Mod: HCNC,CPTII,S$GLB, | Performed by: PHYSICIAN ASSISTANT

## 2025-01-27 PROCEDURE — 99999 PR PBB SHADOW E&M-EST. PATIENT-LVL III: CPT | Mod: PBBFAC,HCNC,, | Performed by: PHYSICIAN ASSISTANT

## 2025-01-27 PROCEDURE — 3288F FALL RISK ASSESSMENT DOCD: CPT | Mod: HCNC,CPTII,S$GLB, | Performed by: PHYSICIAN ASSISTANT

## 2025-01-27 PROCEDURE — 1157F ADVNC CARE PLAN IN RCRD: CPT | Mod: HCNC,CPTII,S$GLB, | Performed by: PHYSICIAN ASSISTANT

## 2025-01-27 PROCEDURE — 1160F RVW MEDS BY RX/DR IN RCRD: CPT | Mod: HCNC,CPTII,S$GLB, | Performed by: PHYSICIAN ASSISTANT

## 2025-01-27 PROCEDURE — 99215 OFFICE O/P EST HI 40 MIN: CPT | Mod: HCNC,S$GLB,, | Performed by: PHYSICIAN ASSISTANT

## 2025-01-27 PROCEDURE — 1100F PTFALLS ASSESS-DOCD GE2>/YR: CPT | Mod: HCNC,CPTII,S$GLB, | Performed by: PHYSICIAN ASSISTANT

## 2025-01-27 RX ORDER — HYDROCODONE BITARTRATE AND ACETAMINOPHEN 10; 325 MG/1; MG/1
1 TABLET ORAL EVERY 6 HOURS PRN
Qty: 120 TABLET | Refills: 0 | Status: SHIPPED | OUTPATIENT
Start: 2025-01-27

## 2025-01-27 NOTE — PROGRESS NOTES
"Subjective:      Patient ID: Summer Inman is a 89 y.o. female.    Vitals:  weight is 56.8 kg (125 lb 3.5 oz). Her blood pressure is 114/72 and her pulse is 73. Her oxygen saturation is 100%.     Chief Complaint: No chief complaint on file.    89 year old female presents for ER follow up. Patient presented to the ED on 1/14/25 after fall and right shoulder pain. Patient states she was getting out of bed and her feet got tangled up in a blanket that was on the floor. She landed on her right shoulder. She did hit her head, but denied losing consciousness. She had a pretty extensive workup with the only remarkable diagnosis being comminuted right clavicle fracture. She was placed in a shoulder sling and appointment was made to follow up with Dr. Hampton on 1/20.    At visit on 1/20, patient presented wearing shoulder sling improperly. Patient tells me today that she can not bear pain from straps going over the right shoulder. Dr. Hampton explained to patient that surgery was not a viable option. He best course of action was to wear the sling. He will be seeing her back in 2 weeks.    Although this fall was not due to her dizziness, she complains of worsening episodes that have cause her to fall 5-6 times this year. Patient states episodes can last from minutes to an hour at the most. The episodes are not when going from lying to sitting or sitting to standing. She can be walking and it occurs even when not turning her head. She does describe the episodes as room spinning. She denies any associated nausea, vomiting, tinnitus. After review of chart, she has intermittently complained of this since around 2015. While looking through records, I found a carotis US that read as "occluded left internal carotid". I do not believe this is the cause of her dizziness, but it should be followed up with another US.         Musculoskeletal:  Positive for trauma and joint pain.   Neurological:  Positive for dizziness. Negative for " passing out.      Objective:     Physical Exam   Constitutional:  Non-toxic appearance. No distress.   HENT:   Head: Normocephalic and atraumatic.   Ears:   Right Ear: External ear normal.   Left Ear: External ear normal.   Cardiovascular: Normal rate and regular rhythm.   Pulmonary/Chest: Effort normal. No respiratory distress.   Neurological: She is alert.   Skin: Skin is warm, dry and not diaphoretic.         Comments: Bruising noted right distal clavicle area jaundice  Psychiatric: Her behavior is normal. Mood, judgment and thought content normal.   Nursing note and vitals reviewed.      Assessment:     1. Hospital discharge follow-up    2. Dizziness    3. Occlusion and stenosis of left carotid artery    4. Carotid artery disease, unspecified laterality, unspecified type    5. Closed displaced fracture of right clavicle, unspecified part of clavicle, sequela        Plan:       Hospital discharge follow-up    Dizziness     Discussed the case with Dr. Wilson. She had been encouraged to discuss the dizziness with her Neurologist, but has only gone to appointments to discuss seizures. Discussed with her daughter that they should schedule a separate appointment to have an evaluation from Neuro standpoint. We would appreciate their input. Difficult exam, but I do not appreciate any nystagmus. She denies any tinnitus. Audiology referral and appointment never completed. She does have vascular disease, but likely if anything was found she would need maximization of medical therapy due to age and co morbidities. We discussed at length that if she begins feeling dizzy she needs to stop whatever she is doing and ask for help. She does admit to rushing things so we discussed slowing down to prevent any more injuries. She should be using cane/walker at all times.    Occlusion and stenosis of left carotid artery  -     CV Ultrasound Bilateral Doppler Carotid; Future    Carotid artery disease, unspecified laterality,  unspecified type  -     CV Ultrasound Bilateral Doppler Carotid; Future    Closed displaced fracture of right clavicle, unspecified part of clavicle, sequela      Patient acknowledges risks of not wearing the shoulder immobilizer. She is ok with the risks. It is too painful for her to wear. She will continue to follow with Ortho. Dr. Wilson will refill her Logan, but with acknowledgement that she should try to stick to 3 doses a day.    My total time spent on this encounter was 59 minutes which included  the following activities: preparing to see the patient, performing a medically appropriate and/or evaluation, counseling and educating the patient and family/caregiver, ordering medications, tests, or procedures, referring and communicating with other healthcare providers, documenting clinical information in the electronic or other health record, and independently interpreting results. This time is independent and non-overlapping.

## 2025-01-27 NOTE — PROGRESS NOTES
1/27/2025  3rd attempt to complete Initial Assessment  for Outpatient Care Management, left message.

## 2025-02-20 ENCOUNTER — OFFICE VISIT (OUTPATIENT)
Dept: PRIMARY CARE CLINIC | Facility: CLINIC | Age: 89
End: 2025-02-20
Payer: MEDICARE

## 2025-02-20 DIAGNOSIS — E78.5 HYPERLIPIDEMIA, UNSPECIFIED HYPERLIPIDEMIA TYPE: ICD-10-CM

## 2025-02-20 DIAGNOSIS — I95.1 ORTHOSTATIC HYPOTENSION: ICD-10-CM

## 2025-02-20 DIAGNOSIS — I10 ESSENTIAL HYPERTENSION: ICD-10-CM

## 2025-02-20 DIAGNOSIS — G40.909 SEIZURE DISORDER: Chronic | ICD-10-CM

## 2025-02-20 DIAGNOSIS — I25.10 ASCVD (ARTERIOSCLEROTIC CARDIOVASCULAR DISEASE): ICD-10-CM

## 2025-02-20 DIAGNOSIS — F11.20 CHRONIC NARCOTIC DEPENDENCE: ICD-10-CM

## 2025-02-20 DIAGNOSIS — J44.9 CHRONIC OBSTRUCTIVE PULMONARY DISEASE, UNSPECIFIED COPD TYPE: ICD-10-CM

## 2025-02-20 DIAGNOSIS — N18.32 STAGE 3B CHRONIC KIDNEY DISEASE: ICD-10-CM

## 2025-02-20 DIAGNOSIS — R73.03 PREDIABETES: Chronic | ICD-10-CM

## 2025-02-20 DIAGNOSIS — R42 DIZZINESS: Primary | ICD-10-CM

## 2025-02-20 PROCEDURE — 1160F RVW MEDS BY RX/DR IN RCRD: CPT | Mod: HCNC,CPTII,S$GLB, | Performed by: FAMILY MEDICINE

## 2025-02-20 PROCEDURE — 99214 OFFICE O/P EST MOD 30 MIN: CPT | Mod: HCNC,S$GLB,, | Performed by: FAMILY MEDICINE

## 2025-02-20 PROCEDURE — 1159F MED LIST DOCD IN RCRD: CPT | Mod: HCNC,CPTII,S$GLB, | Performed by: FAMILY MEDICINE

## 2025-02-20 PROCEDURE — 1125F AMNT PAIN NOTED PAIN PRSNT: CPT | Mod: HCNC,CPTII,S$GLB, | Performed by: FAMILY MEDICINE

## 2025-02-20 PROCEDURE — 99999 PR PBB SHADOW E&M-EST. PATIENT-LVL IV: CPT | Mod: PBBFAC,HCNC,, | Performed by: FAMILY MEDICINE

## 2025-02-20 PROCEDURE — 3288F FALL RISK ASSESSMENT DOCD: CPT | Mod: HCNC,CPTII,S$GLB, | Performed by: FAMILY MEDICINE

## 2025-02-20 PROCEDURE — 1123F ACP DISCUSS/DSCN MKR DOCD: CPT | Mod: HCNC,CPTII,S$GLB, | Performed by: FAMILY MEDICINE

## 2025-02-20 PROCEDURE — 1100F PTFALLS ASSESS-DOCD GE2>/YR: CPT | Mod: HCNC,CPTII,S$GLB, | Performed by: FAMILY MEDICINE

## 2025-02-20 NOTE — PROGRESS NOTES
Primary Care Provider Appointment   [facility], [place]       Patient ID: Summer Inman is a 89 y.o. female.    ASSESSMENT/PLAN by Problem List:    Assessment & Plan    IMPRESSION:  - Assessed ongoing dizziness as multifactorial, likely exacerbated by orthostatic hypotension and vascular disease  - Continued management of diffuse atherosclerotic vascular disease, including carotid, peripheral, and coronary artery disease  - Evaluated chronic musculoskeletal pain related to degenerative disc disease and osteoarthritis  - Considered increasing Rosuvastatin dosage to slow further plaque buildup, pending lab results  - Noted recent weight loss and fatigue, potentially related to reduced oral intake    CHRONIC NARCOTIC DEPENDENCE:  - Acknowledged patient's long-term use of hydrocodone for chronic pain management, with recent exacerbation due to fall and clavicle fracture.  - Continuing hydrocodone 10 mg, attempting to reduce frequency to 3 times daily (previous dosage).  - Provided pain medication refill   - Evaluated ongoing pain in the right shoulder from the fall.  - Instructed patient to follow up for reassessment of pain management and medication regimen.    ORTHOSTATIC HYPOTENSION:  - Explained lightheadedness when standing is due to blood rushing from brain, exacerbated by partial vascular blockages.  - Clarified difference between dizziness and lightheadedness.  - Discussed importance of proper hydration for maintaining blood flow to brain and reducing orthostatic hypotension.  - Recommend increasing water intake throughout the day and reducing Diet Coke consumption.  - orthostatic precautions  - Recommend wearing shoes or non-slip footwear instead of socks when walking.  - Gradually increase physical activity and time out of bed.    LIFESTYLE CHANGES:  Discussed healthy diet and nutrition    HYPERLIPIDEMIA:  - Increased Rosuvastatin from 5 mg to be determined after lab results.  -  Lipid panel ordered to be done on the day of neurology appointment in March.    SEIZURE DISORDER  Stable no recent seizures.  She continues on Lamictal    COPD  Stable, no recent exacerbation.  Continue to monitor    FOLLOW UP:  - Follow up in 2 months.  - Contact the office if dizziness worsens or new symptoms develop.         CODING, ORDERS, AND ADDITIONAL DOCUMENTATION RELATED TO THIS ENCOUNTER:  (note that the diagnoses and clinical summaries above were generated with the assistance of [employer] software and represents my assessment and plan.  This software does not always generate the precise nor most specific diagnosis codes.  Therefore the specific diagnosis codes and orders for billing purposes are detailed below along with any additional documentation if needed)      1. Dizziness    2. ASCVD (arteriosclerotic cardiovascular disease)    3. Orthostatic hypotension    4. Essential hypertension  -     Comprehensive Metabolic Panel; Future; Expected date: 02/20/2025    5. Hyperlipidemia, unspecified hyperlipidemia type  -     Lipid Panel; Future; Expected date: 02/20/2025    6. Stage 3b chronic kidney disease  -     Comprehensive Metabolic Panel; Future; Expected date: 02/20/2025  -     CBC Auto Differential; Future; Expected date: 02/20/2025    7. Prediabetes  Overview:  hbg a1c I 8/2020 - 6    Orders:  -     Hemoglobin A1C; Future; Expected date: 02/20/2025    8. Chronic narcotic dependence    9. Chronic obstructive pulmonary disease, unspecified COPD type    10. Seizure disorder           Follow Up:  Three months    Advance Care Planning     Date: 02/20/2025    ACP Reviewed/No Changes  Voluntary advance care planning discussion had today with patient. Previously completed LW in electronic medical record is current, no changes made.      A total of less than five min was spent on advance care planning, goals of care discussion, emotional support, formulating and communicating prognosis and exploring  burden/benefit of various approaches of treatment. This discussion occurred on a fully voluntary basis with the verbal consent of the patient and/or family.               Subjective:       HPI    Patient is a/an 89 y.o.  female     For complete problem list, past medical history, surgical history, social history, etc., see appropriate section in the electronic medical record    History of Present Illness    CHIEF COMPLAINT:  Ms. Inman presents today for follow up of multiple chronic conditions    NEUROLOGICAL SYMPTOMS:  She reports worsening dizziness, primarily occurring immediately upon standing and walking. She also experiences dizziness while sitting, though less severe. No clear pattern or trigger for these episodes has been identified.    RECENT HOSPITALIZATIONS:  She was recently hospitalized in ICU for UTI-induced encephalopathy. Prior to admission, she experienced 2.5 days of decreased mobility, poor oral intake, and lethargy.    MUSCULOSKELETAL:  She has a recent right clavicle fracture that continues to cause pain. X-rays showed improvement in fracture alignment at recent orthopedic follow-up. She has chronic pain managed with hydrocodone 10mg 3 times daily. She has been dependent on hydrocodone for several years, with temporary increased dosing following the clavicle fracture. She is currently working on returning to previous dosing schedule.    VASCULAR DISEASE:  She has blockages in the right leg.    DIET AND HYDRATION:  She consumes 3-4 bottles of Diet Coke daily. She eats two meals daily with family assistance. Her diet includes salad with cucumber and tomato, though she generally dislikes vegetables.    MEDICATIONS:  She takes Rosuvastatin 5mg since January with regular compliance.    SOCIAL HISTORY:  She alternates living arrangements weekly between her sister and daughter's homes, with each providing care during their respective weeks.      ROS:  Musculoskeletal: +joint pain, +muscle  "pain  Neurological: +dizziness, -weakness       Review of Systems   Constitutional:  Positive for unexpected weight change. Negative for activity change.   HENT:  Positive for trouble swallowing. Negative for hearing loss and rhinorrhea.    Eyes:  Negative for discharge and visual disturbance.   Respiratory:  Negative for chest tightness and wheezing.    Cardiovascular:  Negative for chest pain and palpitations.   Gastrointestinal:  Negative for blood in stool, constipation, diarrhea and vomiting.   Endocrine: Positive for polyuria. Negative for polydipsia.   Genitourinary:  Negative for difficulty urinating, dysuria, hematuria and menstrual problem.   Musculoskeletal:  Positive for neck pain. Negative for arthralgias and joint swelling.   Neurological:  Positive for weakness. Negative for headaches.   Psychiatric/Behavioral:  Positive for confusion. Negative for dysphoric mood.        Objective     Physical Exam  Constitutional:       General: She is not in acute distress.     Appearance: She is not toxic-appearing.   Eyes:      General: No scleral icterus.  Cardiovascular:      Rate and Rhythm: Normal rate and regular rhythm.      Heart sounds: Murmur heard.   Pulmonary:      Effort: No respiratory distress.      Breath sounds: No wheezing.      Comments: Diminished breath sounds bilaterally  Skin:     Findings: No rash.   Neurological:      General: No focal deficit present.      Mental Status: She is alert and oriented to person, place, and time.      Comments: Ambulating with a walker.  She does appear mildly orthostatic when 1st standing but also has poor balance.       Vitals:    02/20/25 1516 02/28/25 0841   BP: (!) 156/78 132/60   BP Location: Right arm    Pulse: 87    Resp: 20    Temp: 98.2 °F (36.8 °C)    TempSrc: Oral    SpO2: 97%    Weight: 55.7 kg (122 lb 12.7 oz)    Height: 4' 11" (1.499 m)      Physical Exam                This note was generated with the assistance of [employer] technology. " Verbal consent was obtained by the patient and accompanying visitor(s) for the recording of patient appointment to facilitate this note. I attest to having reviewed and edited the generated note for accuracy, though some syntax or spelling errors may persist. Please contact the author of this note for any clarification.  Parts of the note were also generated with voice recognition software.  Occasionally this software will misinterpreted words or phrases

## 2025-02-26 DIAGNOSIS — G89.29 CHRONIC PAIN OF MULTIPLE SITES: Chronic | ICD-10-CM

## 2025-02-26 DIAGNOSIS — R52 CHRONIC PAIN OF MULTIPLE SITES: Chronic | ICD-10-CM

## 2025-02-26 RX ORDER — HYDROCODONE BITARTRATE AND ACETAMINOPHEN 10; 325 MG/1; MG/1
1 TABLET ORAL EVERY 6 HOURS PRN
Qty: 120 TABLET | Refills: 0 | Status: SHIPPED | OUTPATIENT
Start: 2025-02-26

## 2025-02-28 VITALS
SYSTOLIC BLOOD PRESSURE: 132 MMHG | HEIGHT: 59 IN | HEART RATE: 87 BPM | OXYGEN SATURATION: 97 % | DIASTOLIC BLOOD PRESSURE: 60 MMHG | WEIGHT: 122.81 LBS | RESPIRATION RATE: 20 BRPM | TEMPERATURE: 98 F | BODY MASS INDEX: 24.76 KG/M2

## 2025-03-02 ENCOUNTER — OFFICE VISIT (OUTPATIENT)
Dept: URGENT CARE | Facility: CLINIC | Age: 89
End: 2025-03-02
Payer: MEDICARE

## 2025-03-02 VITALS
SYSTOLIC BLOOD PRESSURE: 156 MMHG | RESPIRATION RATE: 16 BRPM | OXYGEN SATURATION: 97 % | TEMPERATURE: 98 F | DIASTOLIC BLOOD PRESSURE: 88 MMHG | HEART RATE: 86 BPM

## 2025-03-02 DIAGNOSIS — N30.00 ACUTE CYSTITIS WITHOUT HEMATURIA: Primary | ICD-10-CM

## 2025-03-02 DIAGNOSIS — R30.0 DYSURIA: ICD-10-CM

## 2025-03-02 LAB
BILIRUBIN, UA POC OHS: NEGATIVE
BLOOD, UA POC OHS: ABNORMAL
CLARITY, UA POC OHS: ABNORMAL
COLOR, UA POC OHS: YELLOW
GLUCOSE, UA POC OHS: NEGATIVE
KETONES, UA POC OHS: NEGATIVE
LEUKOCYTES, UA POC OHS: ABNORMAL
NITRITE, UA POC OHS: NEGATIVE
PH, UA POC OHS: 5.5
PROTEIN, UA POC OHS: NEGATIVE
SPECIFIC GRAVITY, UA POC OHS: 1.01
UROBILINOGEN, UA POC OHS: 0.2

## 2025-03-02 PROCEDURE — 81003 URINALYSIS AUTO W/O SCOPE: CPT | Mod: QW,S$GLB,, | Performed by: PHYSICIAN ASSISTANT

## 2025-03-02 PROCEDURE — 87088 URINE BACTERIA CULTURE: CPT | Mod: HCNC | Performed by: PHYSICIAN ASSISTANT

## 2025-03-02 PROCEDURE — 1157F ADVNC CARE PLAN IN RCRD: CPT | Mod: CPTII,S$GLB,, | Performed by: PHYSICIAN ASSISTANT

## 2025-03-02 PROCEDURE — 99203 OFFICE O/P NEW LOW 30 MIN: CPT | Mod: S$GLB,,, | Performed by: PHYSICIAN ASSISTANT

## 2025-03-02 PROCEDURE — 87186 SC STD MICRODIL/AGAR DIL: CPT | Mod: HCNC | Performed by: PHYSICIAN ASSISTANT

## 2025-03-02 PROCEDURE — 87086 URINE CULTURE/COLONY COUNT: CPT | Mod: HCNC | Performed by: PHYSICIAN ASSISTANT

## 2025-03-02 RX ORDER — CEPHALEXIN 500 MG/1
500 CAPSULE ORAL EVERY 12 HOURS
Qty: 14 CAPSULE | Refills: 0 | Status: SHIPPED | OUTPATIENT
Start: 2025-03-02 | End: 2025-03-02 | Stop reason: ALTCHOICE

## 2025-03-02 RX ORDER — SULFAMETHOXAZOLE AND TRIMETHOPRIM 800; 160 MG/1; MG/1
1 TABLET ORAL 2 TIMES DAILY
Qty: 14 TABLET | Refills: 0 | Status: SHIPPED | OUTPATIENT
Start: 2025-03-02 | End: 2025-03-09

## 2025-03-02 NOTE — PROGRESS NOTES
Subjective:      Patient ID: Summer Inman is a 89 y.o. female.    Vitals:  temperature is 97.6 °F (36.4 °C). Her blood pressure is 156/88 (abnormal) and her pulse is 86. Her respiration is 16 and oxygen saturation is 97%.     Chief Complaint: Urinary Tract Infection    Pt presents with c/o lower back pain, frequent urination, confusion, pain while urinating Onset-Thursday. Pt states has taken AZO, no relief. Pain score 9/10    Urinary Tract Infection   This is a new problem. The current episode started in the past 7 days. The problem occurs every urination. The problem has been unchanged. The quality of the pain is described as burning. The pain is at a severity of 9/10. The pain is severe. There has been no fever. The fever has been present for Less than 1 day. She is Not sexually active. There is No history of pyelonephritis. Associated symptoms include behavior changes, frequency and urgency. Pertinent negatives include no chills, discharge, flank pain, hematuria, hesitancy, nausea, possible pregnancy, sweats, vomiting, weight loss, bubble bath use, constipation, rash or withholding. She has tried sitz baths for the symptoms. The treatment provided no relief. Her past medical history is significant for hypertension. There is no history of catheterization, diabetes insipidus, diabetes mellitus, genitourinary reflux, kidney stones, recurrent UTIs, a single kidney, STD, urinary stasis or a urological procedure.       Constitution: Negative for chills, sweating, fatigue and fever.   HENT:  Negative for ear pain, drooling, congestion, sore throat, trouble swallowing and voice change.    Neck: Negative for neck pain, neck stiffness, painful lymph nodes and neck swelling.   Cardiovascular:  Negative for chest pain, leg swelling, palpitations, sob on exertion and passing out.   Eyes:  Negative for eye pain, eye redness, photophobia, double vision, blurred vision and eyelid swelling.   Respiratory:  Negative for chest  tightness, cough, sputum production, bloody sputum, shortness of breath, stridor and wheezing.    Gastrointestinal:  Negative for abdominal pain, abdominal bloating, nausea, vomiting, constipation, diarrhea and heartburn.   Genitourinary:  Positive for dysuria, frequency and urgency. Negative for flank pain, hematuria, vaginal pain, vaginal discharge, vaginal bleeding, vaginal odor, painful intercourse, genital sore and pelvic pain.   Musculoskeletal:  Negative for joint pain, joint swelling, abnormal ROM of joint, back pain, muscle cramps and muscle ache.   Skin:  Negative for rash and hives.   Allergic/Immunologic: Negative for seasonal allergies, food allergies, hives, itching and sneezing.   Neurological:  Negative for dizziness, light-headedness, passing out, loss of balance, headaches, altered mental status, loss of consciousness and seizures.   Hematologic/Lymphatic: Negative for swollen lymph nodes.   Psychiatric/Behavioral:  Negative for altered mental status and nervous/anxious. The patient is not nervous/anxious.       Objective:     Physical Exam   Constitutional: She is oriented to person, place, and time. She appears well-developed. She is cooperative.  Non-toxic appearance. She does not appear ill. No distress.   HENT:   Head: Normocephalic and atraumatic.   Ears:   Right Ear: External ear normal.   Left Ear: External ear normal.   Nose: Nose normal. No nasal deformity. No epistaxis.   Mouth/Throat: Uvula is midline, oropharynx is clear and moist and mucous membranes are normal.   Eyes: Conjunctivae and lids are normal. No scleral icterus.   Neck: Trachea normal and phonation normal. Neck supple. No edema present. No erythema present. No neck rigidity present.   Cardiovascular: Normal rate, regular rhythm, normal heart sounds and normal pulses.   Pulmonary/Chest: Effort normal and breath sounds normal. No accessory muscle usage or stridor. No respiratory distress. She has no decreased breath sounds.  She has no wheezes. She has no rhonchi. She has no rales.   Abdominal: Normal appearance and bowel sounds are normal. Soft. There is no abdominal tenderness. There is no rebound, no guarding, no left CVA tenderness and no right CVA tenderness.   Musculoskeletal: Normal range of motion.         General: No deformity. Normal range of motion.   Neurological: She is alert and oriented to person, place, and time. She exhibits normal muscle tone. Coordination normal.   Skin: Skin is warm, dry, intact, not diaphoretic, not pale and no rash. Capillary refill takes less than 2 seconds.   Psychiatric: Her speech is normal and behavior is normal. Judgment and thought content normal.   Nursing note and vitals reviewed.    Results for orders placed or performed in visit on 03/02/25   POCT Urinalysis(Instrument)    Collection Time: 03/02/25  3:25 PM   Result Value Ref Range    Color, POC UA Yellow Yellow, Straw, Colorless    Clarity, POC UA Cloudy (A) Clear    Glucose, POC UA Negative Negative    Bilirubin, POC UA Negative Negative    Ketones, POC UA Negative Negative    Spec Grav POC UA 1.010 1.005 - 1.030    Blood, POC UA Trace-intact (A) Negative    pH, POC UA 5.5 5.0 - 8.0    Protein, POC UA Negative Negative    Urobilinogen, POC UA 0.2 <=1.0    Nitrite, POC UA Negative Negative    WBC, POC UA Large (A) Negative     *Note: Due to a large number of results and/or encounters for the requested time period, some results have not been displayed. A complete set of results can be found in Results Review.     Assessment:     1. Acute cystitis without hematuria    2. Dysuria        Plan:   Keep appointment with urology on march 10th. Becoming more frequent. Was hospitalized in December with encephalopathy  Pt unable to void. Will bring back urine today  Acute cystitis without hematuria    Dysuria  -     POCT Urinalysis(Instrument)    Other orders  -     cephALEXin (KEFLEX) 500 MG capsule; Take 1 capsule (500 mg total) by mouth every  12 (twelve) hours. for 7 days  Dispense: 14 capsule; Refill: 0      UTI Relief   - Increase water intake.  - Decrease sodas, tea, coffee and energy drinks until symptoms resolve.  - Cranberry products are thought to protect against UTI by inhibiting bacterial growth and adhesion to the bladder.  - Tylenol (acetaminophen) and/or NSAIDS (motrin, ibuprofen) as needed for discomfort.  - Timed voiding every 2-3 hours ( void every 2-3 hours even if you do not feel the urge).  - OTC AZO/pyridium if symptoms are persistent - this will turn your urine red/orange. This will help with symptomatic relief, but will not treat the infection.  - Avoid tight fitting cloths, douching, tampon use.  - Wipe front to back.   - Void prior to and after intercourse.   - Use probiotics especially with any antibiotic therapy.  Patient aware and verbalized understanding.   INSTRUCTIONS:  - Rest.  - Drink plenty of fluids.  - Take Tylenol and/or Ibuprofen as directed as needed for fever/pain.  Do not take more than the recommended dose.  - follow up with your PCP within the next 1-2 weeks as needed.  - You must understand that you have received an Urgent Care treatment only and that you may be released before all of your medical problems are known or treated.   - You, the patient, will arrange for follow up care as instructed.   - If your condition worsens or fails to improve we recommend that you receive another evaluation at the ER immediately or contact your PCP to discuss your concerns.   - You can call [phone] or [phone] to help schedule an appointment with the appropriate provider.     -If you smoke cigarettes, it would be beneficial for you to stop.

## 2025-03-05 LAB — BACTERIA UR CULT: ABNORMAL

## 2025-03-06 ENCOUNTER — TELEPHONE (OUTPATIENT)
Dept: URGENT CARE | Facility: CLINIC | Age: 89
End: 2025-03-06
Payer: MEDICARE

## 2025-03-06 ENCOUNTER — RESULTS FOLLOW-UP (OUTPATIENT)
Dept: URGENT CARE | Facility: CLINIC | Age: 89
End: 2025-03-06

## 2025-03-06 NOTE — TELEPHONE ENCOUNTER
Attempt #1 made to patient in regards her Urine Culture results, but no answer. Patient was already treated appropriately with antibiotics (Bactrim) at this time, but just wanted to inform patient of these results and see how she was feeling since her UC visit. Thanks!

## 2025-03-07 ENCOUNTER — EXTERNAL HOME HEALTH (OUTPATIENT)
Dept: HOME HEALTH SERVICES | Facility: HOSPITAL | Age: 89
End: 2025-03-07
Payer: MEDICARE

## 2025-03-10 ENCOUNTER — OFFICE VISIT (OUTPATIENT)
Dept: UROLOGY | Facility: CLINIC | Age: 89
End: 2025-03-10
Payer: MEDICARE

## 2025-03-10 ENCOUNTER — HOSPITAL ENCOUNTER (OUTPATIENT)
Dept: RADIOLOGY | Facility: HOSPITAL | Age: 89
Discharge: HOME | End: 2025-03-10
Attending: UROLOGY
Payer: MEDICARE

## 2025-03-10 VITALS — WEIGHT: 122.81 LBS | BODY MASS INDEX: 24.76 KG/M2 | HEIGHT: 59 IN

## 2025-03-10 DIAGNOSIS — R33.9 URINARY RETENTION: ICD-10-CM

## 2025-03-10 DIAGNOSIS — N39.0 FREQUENT UTI: Primary | ICD-10-CM

## 2025-03-10 DIAGNOSIS — N39.0 FREQUENT UTI: ICD-10-CM

## 2025-03-10 PROCEDURE — 76770 US EXAM ABDO BACK WALL COMP: CPT | Mod: TC,HCNC,PO

## 2025-03-10 PROCEDURE — 3288F FALL RISK ASSESSMENT DOCD: CPT | Mod: HCNC,CPTII,S$GLB, | Performed by: UROLOGY

## 2025-03-10 PROCEDURE — 51701 INSERT BLADDER CATHETER: CPT | Mod: HCNC,S$GLB,, | Performed by: UROLOGY

## 2025-03-10 PROCEDURE — 1100F PTFALLS ASSESS-DOCD GE2>/YR: CPT | Mod: HCNC,CPTII,S$GLB, | Performed by: UROLOGY

## 2025-03-10 PROCEDURE — 1157F ADVNC CARE PLAN IN RCRD: CPT | Mod: HCNC,CPTII,S$GLB, | Performed by: UROLOGY

## 2025-03-10 PROCEDURE — 1159F MED LIST DOCD IN RCRD: CPT | Mod: HCNC,CPTII,S$GLB, | Performed by: UROLOGY

## 2025-03-10 PROCEDURE — 1125F AMNT PAIN NOTED PAIN PRSNT: CPT | Mod: HCNC,CPTII,S$GLB, | Performed by: UROLOGY

## 2025-03-10 PROCEDURE — 99999 PR PBB SHADOW E&M-EST. PATIENT-LVL III: CPT | Mod: PBBFAC,HCNC,, | Performed by: UROLOGY

## 2025-03-10 PROCEDURE — 99204 OFFICE O/P NEW MOD 45 MIN: CPT | Mod: HCNC,25,S$GLB, | Performed by: UROLOGY

## 2025-03-10 PROCEDURE — 87086 URINE CULTURE/COLONY COUNT: CPT | Mod: HCNC | Performed by: UROLOGY

## 2025-03-10 PROCEDURE — 76770 US EXAM ABDO BACK WALL COMP: CPT | Mod: 26,HCNC,, | Performed by: RADIOLOGY

## 2025-03-10 RX ORDER — TRIMETHOPRIM 100 MG/1
100 TABLET ORAL NIGHTLY
Qty: 90 TABLET | Refills: 1 | Status: SHIPPED | OUTPATIENT
Start: 2025-03-10

## 2025-03-10 NOTE — PROGRESS NOTES
[facility]      New Recurrent Urinary Tract Infections Note    3/10/2025    Referred by:  Sera Brennan PA    The patient has not been previously seen by a specialist board-certified in [employer] in our system previously and is meeting with me today for specialty care.     History of Present Illness    CHIEF COMPLAINT:  Patient presents for evaluation of recurrent UTIs.    HPI:  Patient is an 89-year-old female with a history of recurrent urinary tract infections. She has had culture-proven Serratia UTIs in November 2024, December 2024, January 2025, and March 2nd, 2025. Her primary symptoms during UTIs include increased urinary frequency and burning sensation during urination. She also notes cloudy urine with a strong odor during these episodes.    Family members report that during UTI episodes, she becomes confused and has an increased frequency of falls. It is noted that confusion and falls are not reliable indicators of UTI in this patient.    During one severe episode, she developed encephalopathy as a complication of a UTI, resulting in a 9-day stay in the ICU at [facility]. The exact timing of this event is not specified.    Her primary source of hydration is Diet Coke, which she drinks frequently.    Currently, she reports back pain and mentions falling about 4 times in the last week. She describes significant pain with movement, expressing difficulty and discomfort when attempting to stand up.    She completed her last course of antibiotics yesterday morning, but it is unclear if she still has UTI symptoms today.  She denies having significant microscopic hematuria except for a very mild instance 3 months ago.    MEDICATIONS:  Patient recently completed a course of antibiotics for urinary tract infection, finishing yesterday morning. She consumes Diet Coke multiple times daily, which serves as her primary source of fluid intake.    MEDICAL HISTORY:  Patient has a history  of recurrent urinary tract infections and encephalopathy.    TEST RESULTS:  Patient has undergone several urine cultures. In November 2024, she had a culture-proven Serratia UTI. Subsequent cultures in December 2024 and January 2025 showed pan-sensitive Serratia. On March 2, 2025, her urine culture revealed mostly sensitive culture positive Serratia. A urinalysis conducted 3 months ago showed very mild microscopic hematuria. Multiple urinalysis instances have shown many white blood cells.          A review of 10+ systems was conducted with pertinent positive and negative findings documented in HPI with all other systems reviewed and negative.    Past medical, family, surgical and social history reviewed as documented in chart with pertinent positive medical, family, surgical and social history detailed in HPI.      Exam Findings:    Physical Exam    General: No acute distress. Nontoxic appearing.  HENT: Normocephalic. Atraumatic.  Respiratory: Normal respiratory effort. No conversational dyspnea. No audible wheezing.  Abdomen: No obvious distension.  Skin: No visible abnormalities.  Extremities: No edema upper extremities. No edema lower extremities.  Neurological: Alert and oriented x3. Normal speech.  Psychiatric: Normal mood. Normal affect. No evidence of SI.   : No POP; +vaginal atrophy, moderate; No urethral lesions  Urethral catheterization performed showing catheterized residual of 15 mL. Urine was additionally sent for culture.            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 based on multiple positive cultures  Excluded D-mannose as treatment option due to ineffectiveness against Serratia  Considered trimethoprim prophylaxis for  days to prevent recurrent UTIs  Planned renal ultrasound and office cystoscopy to evaluate for underlying causes of recurrent UTIs  Emphasized focus on specific UTI symptoms (frequency, dysuria) rather than non-specific  "symptoms (confusion, falls)  Considered vaginal estrogen cream as potential future treatment if cystoscopy and ultrasound are normal  No evidence of CUR    PLAN SUMMARY:  - Catheterized urine specimen and culture ordered  - Renal ultrasound ordered  - Started trimethoprim, low dose, nightly before bed for 90 days (may extend to 180 days)  - Plan to schedule cystoscopy and urodynamics  - Office cystoscopy to be performed at next visit  - Follow up for office cystoscopy    URINARY TRACT INFECTION:  - Explained that confusion and falls are not reliable indicators of UTI, emphasizing importance of specific urinary symptoms.  - Discussed that asymptomatic bacteriuria should not be treated with antibiotics.  - Explained possibility of bladder "pustules" as potential source of recurrent infections.  - Discussed that Diet Coke consumption is not likely causing UTIs, though caffeine and artificial sweeteners can be bladder irritants.  - Patient to maintain hydration.  - Started trimethoprim, low dose, to be taken nightly before bed for 90 days, may extend to 180 days.  - Renal ultrasound ordered.  - Office cystoscopy (to be performed at next visit).  - Catheterized urine specimen and culture ordered.    CHRONIC URINARY RETENTION: no evidence of catheterized PVR today. Not sure how patient received this diagnosis in her chart or if there is evidence supporting this.     LONG TERM USE OF ANTIBIOTICS:  - Started trimethoprim, low dose, to be taken nightly before bed for 90 days, may extend to 180 days.    FOLLOW-UP CARE:  - Follow up for office cystoscopy.  - Marixa to schedule cystoscopy and urodynamics.              In addition to the procedural service provided today, the patients condition required a significant, separately identified E/M service above and beyond the service provided including the usual preoperative and postoperative care associated with the procedure that was performed. This E/M service was associated " with a separate diagnosis, specifically Recurrent urinary tract infections ([place]).

## 2025-03-12 LAB — BACTERIA UR CULT: NO GROWTH

## 2025-03-17 ENCOUNTER — DOCUMENT SCAN (OUTPATIENT)
Dept: HOME HEALTH SERVICES | Facility: HOSPITAL | Age: 89
End: 2025-03-17
Payer: MEDICARE

## 2025-03-17 ENCOUNTER — PROCEDURE VISIT (OUTPATIENT)
Dept: UROLOGY | Facility: CLINIC | Age: 89
End: 2025-03-17
Payer: MEDICARE

## 2025-03-17 VITALS — WEIGHT: 122.81 LBS | HEIGHT: 59 IN | BODY MASS INDEX: 24.76 KG/M2

## 2025-03-17 DIAGNOSIS — N39.0 FREQUENT UTI: ICD-10-CM

## 2025-03-17 LAB
BILIRUBIN, UA POC OHS: NEGATIVE
BLOOD, UA POC OHS: NEGATIVE
CLARITY, UA POC OHS: CLEAR
COLOR, UA POC OHS: YELLOW
GLUCOSE, UA POC OHS: NEGATIVE
KETONES, UA POC OHS: NEGATIVE
LEUKOCYTES, UA POC OHS: ABNORMAL
NITRITE, UA POC OHS: NEGATIVE
PH, UA POC OHS: 5.5
PROTEIN, UA POC OHS: NEGATIVE
SPECIFIC GRAVITY, UA POC OHS: 1.02
UROBILINOGEN, UA POC OHS: 0.2

## 2025-03-17 PROCEDURE — 81003 URINALYSIS AUTO W/O SCOPE: CPT | Mod: QW,HCNC,S$GLB, | Performed by: UROLOGY

## 2025-03-17 PROCEDURE — 52000 CYSTOURETHROSCOPY: CPT | Mod: HCNC,S$GLB,, | Performed by: UROLOGY

## 2025-03-17 NOTE — PROCEDURES
Office Cystourethroscopy Note    Date: 3/17/2025   Referring Provider: Manolo Pederson MD     Reason for Cystoscopy: Recurrent UTI    Upper Tract Evaluation:   Renal U/S: 64 mm simple parapelvic cyst at the junction of the interpolar region and upper pole of the right kidney.    Procedure Details: Informed consent was obtained and she was sterily prepped and 1% lidocaine jelly was injected per urethra. A flexible cystoscope was inserted into the bladder via the urethra. There was no evidence of stricture, stenosis, lesions, other obstruction, or diverticulum. Significant findings included a normal unobstructed urethra only. Cystoscopic examination of the bladder revealed orthotopically positioned, normal bilateral ureteral orifices with clear yellow urine effluxing from each orifice. All mucosal surfaces were examined with no apparent stones, tumors, foreign bodies, erythema, trabeculation, diverticula, or ulcers. The procedure was concluded without complications. One very small area that may be an inflammatory lesion. Not willing to fulgurate this unless she does not respond to low dose antibiotic and topical estrogen. The patient was not administered a post-procedure antibiotic.     Specimens: No Specimens    Findings: Normal bladder and urethra    Other Findings:  none    Pelvic Exam:  Vaginal Mucosa: moderate atrophy  Anterior: none  Apical: no apical descent  Posterior: grade 1  Urethral Lesions: no lesions or masses     Assesment and Plan:   Recurrent Urinary Tract Infections: No primary bladder pathology identified today to explain her recurrent urinary tract infections. We have recommended continued treatment discussed at last clinic visit. Will add estrace. Would only fulgurate if no response to low dose prophylaxis and estrace.

## 2025-03-18 ENCOUNTER — TELEPHONE (OUTPATIENT)
Dept: NEUROLOGY | Facility: CLINIC | Age: 89
End: 2025-03-18
Payer: MEDICARE

## 2025-03-18 ENCOUNTER — LAB VISIT (OUTPATIENT)
Dept: LAB | Facility: HOSPITAL | Age: 89
End: 2025-03-18
Attending: FAMILY MEDICINE
Payer: MEDICARE

## 2025-03-18 DIAGNOSIS — E78.5 HYPERLIPIDEMIA, UNSPECIFIED HYPERLIPIDEMIA TYPE: ICD-10-CM

## 2025-03-18 DIAGNOSIS — N18.32 STAGE 3B CHRONIC KIDNEY DISEASE: ICD-10-CM

## 2025-03-18 DIAGNOSIS — R73.03 PREDIABETES: Chronic | ICD-10-CM

## 2025-03-18 DIAGNOSIS — I10 ESSENTIAL HYPERTENSION: ICD-10-CM

## 2025-03-18 LAB
ALBUMIN SERPL BCP-MCNC: 3.5 G/DL (ref 3.5–5.2)
ALP SERPL-CCNC: 228 U/L (ref 40–150)
ALT SERPL W/O P-5'-P-CCNC: 34 U/L (ref 10–44)
ANION GAP SERPL CALC-SCNC: 8 MMOL/L (ref 8–16)
AST SERPL-CCNC: 32 U/L (ref 10–40)
BASOPHILS # BLD AUTO: 0.05 K/UL (ref 0–0.2)
BASOPHILS NFR BLD: 0.6 % (ref 0–1.9)
BILIRUB SERPL-MCNC: 0.2 MG/DL (ref 0.1–1)
BUN SERPL-MCNC: 32 MG/DL (ref 8–23)
CALCIUM SERPL-MCNC: 9.3 MG/DL (ref 8.7–10.5)
CHLORIDE SERPL-SCNC: 108 MMOL/L (ref 95–110)
CHOLEST SERPL-MCNC: 109 MG/DL (ref 120–199)
CHOLEST/HDLC SERPL: 2.1 {RATIO} (ref 2–5)
CO2 SERPL-SCNC: 22 MMOL/L (ref 23–29)
CREAT SERPL-MCNC: 1.2 MG/DL (ref 0.5–1.4)
DIFFERENTIAL METHOD BLD: ABNORMAL
EOSINOPHIL # BLD AUTO: 0.4 K/UL (ref 0–0.5)
EOSINOPHIL NFR BLD: 4.9 % (ref 0–8)
ERYTHROCYTE [DISTWIDTH] IN BLOOD BY AUTOMATED COUNT: 16.6 % (ref 11.5–14.5)
EST. GFR  (NO RACE VARIABLE): 43.5 ML/MIN/1.73 M^2
ESTIMATED AVG GLUCOSE: 114 MG/DL (ref 68–131)
GLUCOSE SERPL-MCNC: 111 MG/DL (ref 70–110)
HBA1C MFR BLD: 5.6 % (ref 4–5.6)
HCT VFR BLD AUTO: 37.9 % (ref 37–48.5)
HDLC SERPL-MCNC: 51 MG/DL (ref 40–75)
HDLC SERPL: 46.8 % (ref 20–50)
HGB BLD-MCNC: 11.6 G/DL (ref 12–16)
IMM GRANULOCYTES # BLD AUTO: 0.04 K/UL (ref 0–0.04)
IMM GRANULOCYTES NFR BLD AUTO: 0.5 % (ref 0–0.5)
LDLC SERPL CALC-MCNC: 35.2 MG/DL (ref 63–159)
LYMPHOCYTES # BLD AUTO: 1.9 K/UL (ref 1–4.8)
LYMPHOCYTES NFR BLD: 22.1 % (ref 18–48)
MCH RBC QN AUTO: 26.7 PG (ref 27–31)
MCHC RBC AUTO-ENTMCNC: 30.6 G/DL (ref 32–36)
MCV RBC AUTO: 87 FL (ref 82–98)
MONOCYTES # BLD AUTO: 0.6 K/UL (ref 0.3–1)
MONOCYTES NFR BLD: 7 % (ref 4–15)
NEUTROPHILS # BLD AUTO: 5.6 K/UL (ref 1.8–7.7)
NEUTROPHILS NFR BLD: 64.9 % (ref 38–73)
NONHDLC SERPL-MCNC: 58 MG/DL
NRBC BLD-RTO: 0 /100 WBC
PLATELET # BLD AUTO: 301 K/UL (ref 150–450)
PMV BLD AUTO: 10.7 FL (ref 9.2–12.9)
POTASSIUM SERPL-SCNC: 4.3 MMOL/L (ref 3.5–5.1)
PROT SERPL-MCNC: 7 G/DL (ref 6–8.4)
RBC # BLD AUTO: 4.35 M/UL (ref 4–5.4)
SODIUM SERPL-SCNC: 138 MMOL/L (ref 136–145)
TRIGL SERPL-MCNC: 114 MG/DL (ref 30–150)
WBC # BLD AUTO: 8.59 K/UL (ref 3.9–12.7)

## 2025-03-18 PROCEDURE — 83036 HEMOGLOBIN GLYCOSYLATED A1C: CPT | Mod: HCNC | Performed by: FAMILY MEDICINE

## 2025-03-18 PROCEDURE — 36415 COLL VENOUS BLD VENIPUNCTURE: CPT | Mod: HCNC,PO | Performed by: FAMILY MEDICINE

## 2025-03-18 PROCEDURE — 80053 COMPREHEN METABOLIC PANEL: CPT | Mod: HCNC | Performed by: FAMILY MEDICINE

## 2025-03-18 PROCEDURE — 85025 COMPLETE CBC W/AUTO DIFF WBC: CPT | Mod: HCNC | Performed by: FAMILY MEDICINE

## 2025-03-18 PROCEDURE — 80061 LIPID PANEL: CPT | Mod: HCNC | Performed by: FAMILY MEDICINE

## 2025-03-18 RX ORDER — ESTRADIOL 0.1 MG/G
.5-1 CREAM VAGINAL
Qty: 42.5 G | Refills: 2 | Status: SHIPPED | OUTPATIENT
Start: 2025-03-19 | End: 2026-03-19

## 2025-03-22 DIAGNOSIS — G89.29 CHRONIC PAIN OF MULTIPLE SITES: Chronic | ICD-10-CM

## 2025-03-22 DIAGNOSIS — R52 CHRONIC PAIN OF MULTIPLE SITES: Chronic | ICD-10-CM

## 2025-03-24 RX ORDER — HYDROCODONE BITARTRATE AND ACETAMINOPHEN 10; 325 MG/1; MG/1
1 TABLET ORAL EVERY 6 HOURS PRN
Qty: 120 TABLET | Refills: 0 | Status: SHIPPED | OUTPATIENT
Start: 2025-03-24

## 2025-03-30 DIAGNOSIS — K21.9 GASTROESOPHAGEAL REFLUX DISEASE WITHOUT ESOPHAGITIS: ICD-10-CM

## 2025-03-30 DIAGNOSIS — K44.9 HIATAL HERNIA: ICD-10-CM

## 2025-03-30 DIAGNOSIS — Z87.19 HISTORY OF GASTRITIS: ICD-10-CM

## 2025-03-30 DIAGNOSIS — R10.13 EPIGASTRIC PAIN: ICD-10-CM

## 2025-03-31 RX ORDER — OMEPRAZOLE 40 MG/1
40 CAPSULE, DELAYED RELEASE ORAL
Qty: 90 CAPSULE | Refills: 3 | Status: SHIPPED | OUTPATIENT
Start: 2025-03-31

## 2025-04-11 ENCOUNTER — OFFICE VISIT (OUTPATIENT)
Dept: NEUROLOGY | Facility: CLINIC | Age: 89
End: 2025-04-11
Payer: MEDICARE

## 2025-04-11 VITALS — SYSTOLIC BLOOD PRESSURE: 94 MMHG | BODY MASS INDEX: 24.98 KG/M2 | DIASTOLIC BLOOD PRESSURE: 64 MMHG | WEIGHT: 123.69 LBS

## 2025-04-11 DIAGNOSIS — G40.909 SEIZURE DISORDER: Chronic | ICD-10-CM

## 2025-04-11 DIAGNOSIS — R42 DIZZINESS: Primary | ICD-10-CM

## 2025-04-11 PROCEDURE — 99999 PR PBB SHADOW E&M-EST. PATIENT-LVL V: CPT | Mod: PBBFAC,HCNC,, | Performed by: NURSE PRACTITIONER

## 2025-04-11 RX ORDER — CEPHALEXIN 500 MG/1
500 CAPSULE ORAL 2 TIMES DAILY
COMMUNITY
Start: 2025-03-02

## 2025-04-11 NOTE — ASSESSMENT & PLAN NOTE
Previous reported episodes consisting of blank stare and/or stiffening up.    - last known seizure many years ago  Pt maintained on Lamictal and tolerating well   - continue!

## 2025-04-11 NOTE — ASSESSMENT & PLAN NOTE
Chronic reports of dizziness that she describes as lightheadedness.   Occurs mainly when changing positions. No LOC but reports near syncope  Mild stenosis on recent CUS  Orthostatic Bps + today in clinic (44 pt drop in SBP)  Long discussion today regarding conservative measures including hydration, diet changes and compression socks  Will hold on neuro w/u  Pt to f/u with cards

## 2025-04-11 NOTE — PATIENT INSTRUCTIONS
INTERVENTIONS FOR ORTHOSTATIC HYPOTENSION     ---GENERAL RECOMMENDATIONS  1. Get up from bed slowly. Urinate in the sitting position to avoid syncopal events in the bathroom. Avoid exposure to heat. Eat small, frequent meals to avoid worsening of orthostatic hypotension after a meal.   2. Keep a blood pressure log by using an automated sphygmomanometer and take the blood pressure both supine and after standing for one minute in the following circumstances: (i) on awakening; (ii) after a meal; (iii) during the time of maximal orthostatic tolerance when the patient is feeling the best; (iv) during the time of poor orthostatic tolerance, when the symptoms are most severe; and (v) before and one hour after taking the medication. This will help determine whether the symptoms are due to orthostatic hypotension.    ---DIETARY CHANGES  1. Increase sodium and water intake. The patient should take at least 10 to 20 g of salt daily and drink at least 2 to 2.5 liters of water daily. Foods with high sodium content include fast foods, canned soups, ham, stout, chili, and additives such as soy sauce. Over-the counter salt tablets (0.5 to 1 grams of sodium chloride) may be used but may produce stomach upset in some patients. Thermotabs can be used, 2-4 of these at breakfast and lunch can be used.   2. The patient should have at least one glass of water per meal and at least twice at other times of the day.   3. High-potassium diet including fruit (especially bananas) and vegetables, which are rich in potassium. V8 juice may also provide a good source.   4. Take a snack at night, which may reduce supine hypertension.   5. Drink rapidly two glasses of water before any circumstance when the patient is most likely to have symptoms (for example, before a walk, exercise, or taking a shower).    ---POSTURAL CHANGES  1. Elevate the head of the bed four inches. This can help prevent supine hypertension and reduce the frequency of urination  at night.  2. Postural maneuvers such as toe-raise, leg crossing, bending at the waist, tight contraction, or propping a leg up on a chair or stool. This may transiently improve the time that the patient can tolerate the standing position and allow the patient to get to a place to sit down to avoid syncope.   3. Use of well-fitted waist-high compression stockings may be helpful. They have to be put on before getting out of bed. Some patients may find it difficult to put them on or uncomfortable in hot weather. Some examples are [employer] and Juzo ([employer]). Some patients may prefer the combination of an abdominal binder and leg stockings. Spandex-type elastic exercise shorts sized to fit tightly may be preferred.     ---MEDICATIONS  There are three main medications that can be used if all the measures described above are insufficient to control the orthostatic symptoms: fludrocortisone (Florinef), midodrine, and pyridostigmine (Mestinon).  1. Midodrine should be started at a small individual dose of 2.5 mg to assess tolerance, but the minimal effective dose is 5 mg and the optimal is 10 mg. The onset of action is between one-half and one hours, and its duration will be two to four hours. Thus, midodrine should initially be taken (i) about half an hour before getting out of bed and having breakfast, (ii) half an hour before lunch, and (iii) no later than 5 to 6 p.m. to avoid the main risk of the drug, which is supine hypertension. The optimal dose is typically 10 mg four times a day. The last dose should not be after 5 to 6 p.m. Side effects include scalp tingling or itching and goose bumps. The most dangerous side effect is supine hypertension. This can be avoided by sleeping with the head of the bed elevated and avoid taking midodrine after 5 to 6 p.m.   2. Fludrocortisone should be started at a low dose of one tablet of 0.1 mg taken before noon. The dose can be increased to 0.2 mg after one week.  Further increases should be made every two to three weeks. Doses higher than 0.4 to 0.5 mg (four to five tablets) rarely provide further benefit and should be avoided. Side effects include supine hypertension, ankle edema, hypokalemia, hypomagnesemia, and headache.  3. Pyridostigmine (Mestinon) is started at a dose of 30 mg two or three times a day, and the doses can be gradually increased up to 60 mg three times a day. It may be more effective when combining each dose with 5 mg of midodrine (before 5 to 6 p.m.). When a full dose is attained, Mestinon Timespan 180 mg once daily may be substituted. The main side effects are abdominal cramps, nausea, and diarrhea. This drug may also increase salivation or urinary urgency.    To prevent postprandial hypotension, the patient should avoid ingestion of large, carbohydrate-rich meals and the use alcohol. Caffeine, 100 to 250 mg three times a day, either as tablets or caffeinated beverages (one cup of coffee contains approximately 85 mg and one cup of tea 50 mg of caffeine) or ibuprofen (400 to 800 mg), may be used as an adjuvant.

## 2025-04-11 NOTE — PROGRESS NOTES
"  NEUROLOGY  Outpatient Follow Up    [facility]  [address] [facility], [place] [address]  [phone] (office) / [phone] (fax)    Patient Name:  Summer Inman  :  1935  MR #:  [id number]  Acct #:  [id number]    Date of Neurology Visit: 2025  Name of Provider: SILVANO Gorman    Other Physicians:  Ludwig Wilson MD (Primary Care Physician); No ref. provider found (Referring)      Chief Complaint: Dizziness      History of Present Illness (HPI):  As per Epic chart review:  (2018)  Ms. Inman is a 82 y.o. female who presents for follow up of seizures. The patient was accompanied by her son who also contributed to the following history. The history is difficult to obtain.      Since our last visit, we obtained MRI brain that did not show a definite cause for the seizures. 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 I recommended aspirin 81 mg daily be started and she has been taking that daily. The lamotrigine level was normal.      About 3 weeks ago, she had an unusual episode. She had a 9 hour long episode where she was very confused/delirious. She was yelling at people. She recalls that the TV was "rolling". No dizziness.  Her son says that she was confused and thought she was at [facility]. She was unable to stand as well. She kept sliding out of the bed and her grandson kept trying to pick her up. She is unclear if she had any seizure like activity. When she came out of it, she was totally fine. She denies that she took medication incorrectly.      No other episodes since that time. She has passed out in the past before but this was not a passing out episode. No seizures that her and her son.      She states she is taking lamictal 200 mg in the morning and 300 mg in the evening (previously reported that she was on 200 mg once daily). It is unclear what she is actually taking. Per my review of Dr. Souza's records he prescribed " "200mg/300mg. She has had a normal EEG with him.              Interval Hx 1/25/2021:   Patient is new to me  Patient is here today for follow up. She is accompanied by her daughter who supplies some information. Patient has been maintained on Aricept 10 mg daily and doesn't recall who or when she was started on this medication. Her daughter states she just started helping out with her medications about 6 mths ago.   Patient states she sleeps well at night. Patient denies hallucinations. Daughter states patient does not have any mood or behavioral issues. Patient denies depression. Daughter states somedays her memory is better than others. She seems to fluctuate. Patient states she gets frustrated often because she forgets items or forgets what she is talking about.   Patient states she does not eat much healthy foods but still has a good appetite. Patient is able to perform ADLs without assistance. She does use a cane for stability purposes.     Patient and daughter both deny any seizure activity. She has been maintained on Lamictal 200 mg in AM and 300 mg nightly.        Interval history 3/2022 (Dr. Inman): "She is on aricept 10 mg daily for memory started by Dr. Souza. She has forgetfulness but felt to be stable. MMSE is Jan 2021 was 26/30. She is also on namenda 5 mg BID started by Dr. Wilson.      She continues on lamictal 200mg in the morning and 300 mg at night for seizures. No seizure activity noted.      She used to have bad migraines in the past. She has ice pick stabbing headaches lately. She will get it on either side."      Interval Hx 10/12/2023:  Patient presents today for seizure follow up. She and daughter deny any recent seizures. A tytpical seizure consisted of freezing up and not being able to follow commands. Her last seizure was "years ago". She is maintained on Lamictal 200 mg in AM and 300 mg at night and tolerating it well.     She also reports intermittent diplopia that started about 6+ " months ago. 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 She is not interested in further workup for this at the present.     Interval Hx 10/2/2024:  Patient presents today for seizure follow up. She and daughter deny any recent seizures. Her last seizure was about 15 years ago. She is maintained on Lamictal 200 mg in AM and 300 mg at night and tolerating it well.     Patient reports forgetfulness that is not new. She takes Aricept and Namenda. She continues to have intermittent diplopia.  Patient states she sleeps well at night. Patient denies hallucinations. Daughter states patient does not have any mood or behavioral issues. Patient is grieving about the loss of her daughter.       Interval Hx 4/11/2025:  Patient presents today for dizziness. This is not new and likley ongoing for the last year. She gets very dizzy when changing positons and daughter states she changes positions too fast. She has had multiple falls from this. She maybe drinks 10 oz of water a day. Daughter bought her compression stockings but they do not fit well. She has an echo scheduled for next week.         Past Medical, Surgical, Family & Social History:   Reviewed and updated.    Home Medications:     Current Outpatient Medications:     aspirin (ECOTRIN) 81 MG EC tablet, Take 1 tablet (81 mg total) by mouth once daily., Disp: 30 tablet, Rfl: 11    cefpodoxime (VANTIN) 100 MG tablet, Take 100 mg by mouth 2 (two) times daily., Disp: , Rfl:     cephALEXin (KEFLEX) 500 MG capsule, Take 500 mg by mouth 2 (two) times daily., Disp: , Rfl:     cetirizine (ZYRTEC) 10 MG tablet, Take 1 tablet (10 mg total) by mouth once daily., Disp: 90 tablet, Rfl: 3    cranberry fruit concentrate (AZO CRANBERRY ORAL), Take 1 tablet by mouth 2 (two) times a day., Disp: , Rfl:     diclofenac sodium (VOLTAREN) 1 % Gel, Apply 2 g topically once daily., Disp: 300 g, Rfl: 1    ergocalciferol, vitamin D2, (VITAMIN D ORAL), Take 2,000  Units by mouth once daily at 6am., Disp: , Rfl:     estradioL (ESTRACE) 0.01 % (0.1 mg/gram) vaginal cream, Place 0.5-1 g vaginally 3 (three) times a week., Disp: 42.5 g, Rfl: 2    ezetimibe (ZETIA) 10 mg tablet, Take 1 tablet (10 mg total) by mouth once daily., Disp: 90 tablet, Rfl: 3    HYDROcodone-acetaminophen (NORCO)  mg per tablet, Take 1 tablet by mouth every 6 (six) hours as needed (pain)., Disp: 120 tablet, Rfl: 0    lamoTRIgine (LAMICTAL) 100 MG tablet, Take 1 tablet (100 mg total) by mouth 2 (two) times daily., Disp: 60 tablet, Rfl: 11    nitroGLYCERIN (NITROSTAT) 0.4 MG SL tablet, PLACE 1 TAB UNDER THE TONGUE EVERY 5 MIN (UP TO 3 DOSES) AS NEEDED FOR CHEST PAIN. CALL 911 AT 3RD TABLET, Disp: 25 tablet, Rfl: 11    omeprazole (PRILOSEC) 40 MG capsule, TAKE 1 CAPSULE EVERY DAY, Disp: 90 capsule, Rfl: 3    ondansetron (ZOFRAN-ODT) 4 MG TbDL, DISSOLVE 1 TABLET ON THE TONGUE EVERY 8 HOURS AS NEEDED, Disp: 270 tablet, Rfl: 1    polyethylene glycol (GLYCOLAX) 17 gram/dose powder, Take 17 g by mouth daily as needed for Constipation., Disp: , Rfl:     rosuvastatin (CRESTOR) 5 MG tablet, TAKE 1 TABLET ONE TIME DAILY, Disp: 90 tablet, Rfl: 4    sertraline (ZOLOFT) 50 MG tablet, Take 1 tablet (50 mg total) by mouth once daily., Disp: 30 tablet, Rfl: 11    sucralfate (CARAFATE) 1 gram tablet, TAKE 1 TABLET FOUR TIMES DAILY BEFORE MEALS AND AT NIGHT, Disp: 360 tablet, Rfl: 1    trimethoprim (TRIMPEX) 100 mg Tab, Take 1 tablet (100 mg total) by mouth every evening., Disp: 90 tablet, Rfl: 1    azelastine (ASTELIN) 137 mcg (0.1 %) nasal spray, 1 spray (137 mcg total) by Nasal route 2 (two) times daily. (Patient not taking: Reported on 4/11/2025), Disp: 30 mL, Rfl: 0    Physical Examination:  BP 94/64 (BP Location: Left arm, Patient Position: Standing)   Wt 56.1 kg (123 lb 10.9 oz)   BMI 24.98 kg/m²       GENERAL:  General appearance: Well, non-toxic appearing.  No apparent distress.  Neck: supple.      MENTAL  STATUS:  Alertness, attention span & concentration: normal.  Language: normal.  Orientation to self, place & time:  normal.  Memory, recent & remote: somewhat limited  Fund of knowledge: normal.  MMSE: 28/30 (2022)  26/30 (2021)    SPEECH:  Tremulous voice  Follows complex commands.    CRANIAL NERVES:  Cranial Nerves II-XII were examined.  II - Visual fields: normal.  III, IV, VI: PERRL, EOMI, No ptosis, No nystagmus.  V - Facial sensation: normal.  VII - Face symmetry & mobility: normal.  VIII - Hearing: normal.  IX, X - Palate: mobile & midline.  XI - Shoulder shrug: normal.  XII - Tongue protrusion: normal.    GROSS MOTOR:  Gait & station: slow, balance checks at times; uses cane/walker  Tone: normal.  Abnormal movements: none.  Finger-nose: normal.  Rapid alternating movements: normal.  Pronator drift: normal      MUSCLE STRENGTH:   4/5 throughout - pain limiting  Pain with ROM    REFLEXES:    RIGHT Reflex   LEFT   2+ Biceps 2+   2+ Brachiorad. 2+   2+ Triceps 2+          SENSORY:  Light touch: Normal throughout.                 Diagnostic Data Reviewed:          Lab Results   Component Value Date    WBC 8.59 03/18/2025    HGB 11.6 (L) 03/18/2025    HCT 37.9 03/18/2025    MCV 87 03/18/2025     03/18/2025        CMP  Sodium   Date Value Ref Range Status   03/18/2025 138 136 - 145 mmol/L Final     Potassium   Date Value Ref Range Status   03/18/2025 4.3 3.5 - 5.1 mmol/L Final     Chloride   Date Value Ref Range Status   03/18/2025 108 95 - 110 mmol/L Final     CO2   Date Value Ref Range Status   03/18/2025 22 (L) 23 - 29 mmol/L Final     Glucose   Date Value Ref Range Status   03/18/2025 111 (H) 70 - 110 mg/dL Final     BUN   Date Value Ref Range Status   03/18/2025 32 (H) 8 - 23 mg/dL Final     Creatinine   Date Value Ref Range Status   03/18/2025 1.2 0.5 - 1.4 mg/dL Final     Calcium   Date Value Ref Range Status   03/18/2025 9.3 8.7 - 10.5 mg/dL Final     Total Protein   Date Value Ref Range Status    03/18/2025 7.0 6.0 - 8.4 g/dL Final     Albumin   Date Value Ref Range Status   03/18/2025 3.5 3.5 - 5.2 g/dL Final     Total Bilirubin   Date Value Ref Range Status   03/18/2025 0.2 0.1 - 1.0 mg/dL Final     Comment:     For infants and newborns, interpretation of results should be based  on gestational age, weight and in agreement with clinical  observations.    Premature Infant recommended reference ranges:  Up to 24 hours.............<8.0 mg/dL  Up to 48 hours............<12.0 mg/dL  3-5 days..................<15.0 mg/dL  6-29 days.................<15.0 mg/dL       Alkaline Phosphatase   Date Value Ref Range Status   03/18/2025 228 (H) 40 - 150 U/L Final     AST ([place])   Date Value Ref Range Status   01/31/2016 24 14 - 36 U/L Final     AST   Date Value Ref Range Status   03/18/2025 32 10 - 40 U/L Final     ALT   Date Value Ref Range Status   03/18/2025 34 10 - 44 U/L Final     Anion Gap   Date Value Ref Range Status   03/18/2025 8 8 - 16 mmol/L Final     eGFR   Date Value Ref Range Status   03/18/2025 43.5 (A) >60 mL/min/1.73 m^2 Final     EEG 8/2018:  This is an abnormal EEG   during wakefulness, drowsiness and sleep with overall degree of slowing.  For   her given age, it is suggestive of a mild encephalopathy, nonspecific to the   cause.  There is no evidence of an epileptic process on this recording.  No   seizures were recorded during this study    24 hr EEG 12/2024  IMPRESSION:  This is an abnormal EEG during stupor state.  Diffuse disorganized amplitude slowing of the background was noted     CLINICAL CORRELATION:  The patient is an 89-year-old female with a history of renal disease who presented with lethargy and confusion.  The patient is currently maintained on lamotrigine. This is an abnormal EEG during stupor state.  The overall degree of slowing disorganization is suggestive of moderate to severe encephalopathy likely toxic metabolic encephalopathy.  There is no evidence of an epileptic  process on this recording.  No seizures were recorded during this study.    MRI Brain Without Contrast 12/2024    Narrative  EXAMINATION:  MRI BRAIN WITHOUT CONTRAST    CLINICAL HISTORY:  Mental status change, unknown cause.    TECHNIQUE:  Multiplanar, multisequence noncontrast MRI of the brain was performed.    COMPARISON:  CT head 12/22/2024    FINDINGS:  The study is degraded by motion artifact on multiple sequences.  No evidence of mass effect or midline deviation is seen.  No evidence of hydrocephalus or abnormal extra-axial fluid collection is visualized.  The basilar cisterns appear to be preserved.  There are mild-to-moderate scattered periventricular and deep white matter T2/FLAIR hyperintensities which are nonspecific, but they are most commonly associated with chronic microvascular ischemic changes. Differential considerations include sequelae of infectious or inflammatory processes. Similar findings have been reported in patients with migraine-type headaches.   The diffusion-weighted imaging demonstrates no evidence of acute ischemia/infarction.  The gradient imaging demonstrates no evidence of suspicious hemosiderin deposition.  Partial empty sella configuration type appearance is noted.Mild mucosal thickening in scattered ethmoid air cells is seen.The mastoid air cells appear to be grossly normal in signal intensity.    Impression  1. The study is limited by patient motion artifact.  No acute intracranial abnormality is visualized.  2. Additional findings and details as above.  3. The findings are concordant with [facility].        CT Head Without Contrast 1/2025    Narrative  EXAMINATION:  CT HEAD WITHOUT CONTRAST    CLINICAL HISTORY:  Head trauma, moderate-severe;Head trauma;    TECHNIQUE:  Low dose axial images were obtained through the head.  Coronal and sagittal reformations were also performed. Contrast was not administered.    Automatic exposure control (AEC) was utilized for dose  reduction.    Dose: 484.2 mGycm    COMPARISON:  12/24/2024    FINDINGS:  There is vascular calcification.  There is atrophy.  There is decreased attenuation in the periventricular white matter consistent with chronic ischemia.  There is no hemorrhage or extra-axial fluid collections noted.  No masses is seen no acute infarcts are noted.  There are changes consistent with empty sella syndrome.  The calvarium appears intact.  There is density in the left maxillary sinus consistent with sinusitis    Impression  Chronic changes, no acute disease is seen      MRI Brain W WO Contrast 2018    Narrative  EXAMINATION:  MRI BRAIN W WO CONTRAST    CLINICAL HISTORY:  Headache, chronic, new features or neuro deficit;.  Other headache syndrome    TECHNIQUE:  Multiplanar multisequence MR imaging of the brain was performed before and after the administration of 7 mL Gadavist intravenous contrast.  Diffusion-weighted imaging was performed.  ADC map was generated.    COMPARISON:  Head CT dated 04/13/2017    FINDINGS:  Intracranial compartment:    The study is motion degraded.  There is no acute abnormality.  There is mild volume loss and nonspecific white matter change.  The periventricular and scattered subcortical white matter T2 prolongation likely reflect sequelae of chronic microvascular ischemic disease.  There are no regions of restricted diffusion to suggest acute infarction.  There is a chronic lacunar infarction in the posterior right basal ganglia.  There is no hydrocephalus or midline shift.  There is no abnormal extra-axial fluid collection.  There is no pathologic enhancement.  The basilar cisterns are open.  Flow voids indicating patency are present in the major vessels at the base of the brain.  The cerebellar tonsils are in normal position at the level of the foramen magnum.  The sellar structures are normal.  The orbits are grossly normal.  There has been previous bilateral lens replacement  surgery.    Skull/extracranial contents:    Marrow signal intensity is grossly normal.  There is trace scattered mucosal thickening in the ethmoid air cells.  Otherwise, the paranasal sinuses and mastoid air cells are clear.    IMPRESSION:    1. Study is motion degraded.  There is no acute abnormality.  There is mild volume loss and nonspecific white matter change.  There is a small chronic lacunar infarction in the posterior right basal ganglia.  2. There is no intracranial hemorrhage, mass/mass effect or acute infarction.  There is no abnormal enhancement.  3. Please see above discussion.                  Assessment and Plan:    Problem List Items Addressed This Visit          Neuro    Seizure disorder (Chronic)    Current Assessment & Plan   Previous reported episodes consisting of blank stare and/or stiffening up.    - last known seizure many years ago  Pt maintained on Lamictal and tolerating well   - continue!            Other    Dizziness - Primary    Current Assessment & Plan   Chronic reports of dizziness that she describes as lightheadedness.   Occurs mainly when changing positions. No LOC but reports near syncope  Mild stenosis on recent CUS  Orthostatic Bps + today in clinic (44 pt drop in SBP)  Long discussion today regarding conservative measures including hydration, diet changes and compression socks  Will hold on neuro w/u  Pt to f/u with cards                                    Important to note, also  has a past medical history of Anticoagulant long-term use, Arthritis, Asthma, Atrial premature beats, Benign neoplasm of adrenal gland, Cholelithiasis, CKD (chronic kidney disease), Colon polyp, COPD (chronic obstructive pulmonary disease), Coronary artery disease, Depression, Dizziness, First degree AV block, Gastroesophageal reflux disease, General anesthetics causing adverse effect in therapeutic use, Hepatomegaly, Hyperlipidemia, Hypertension, Impaired mobility, Mild dementia, Neck pain,  Schatzki's ring, Seasonal allergies, Seizures, Stroke, Trouble in sleeping, Wears dentures, and Wears glasses.          The patient will return to clinic in 12 months.    All questions were answered and patient is comfortable with the plan.         Thank you very much for the opportunity to assist in this patient's care.    If you have any questions or concerns, please do not hesitate to contact me at any time.      Sincerely,     SILVANO Gorman  [facility] [place]           JACKLYN spent a total of 26 minutes on the day of the visit.This includes face to face time and non-face to face time preparing to see the patient (eg, review of tests), Obtaining and/or reviewing separately obtained history, Documenting clinical information in the electronic or other health record, Independently interpreting resultsand communicating results to the patient/family/caregiver, or Care coordination.

## 2025-04-17 ENCOUNTER — OFFICE VISIT (OUTPATIENT)
Dept: PRIMARY CARE CLINIC | Facility: CLINIC | Age: 89
End: 2025-04-17
Payer: MEDICARE

## 2025-04-17 VITALS
SYSTOLIC BLOOD PRESSURE: 126 MMHG | HEART RATE: 95 BPM | BODY MASS INDEX: 25.43 KG/M2 | WEIGHT: 126.13 LBS | DIASTOLIC BLOOD PRESSURE: 66 MMHG | OXYGEN SATURATION: 97 % | HEIGHT: 59 IN

## 2025-04-17 DIAGNOSIS — R42 DIZZINESS: Primary | ICD-10-CM

## 2025-04-17 DIAGNOSIS — R93.1 DECREASED CARDIAC EJECTION FRACTION: ICD-10-CM

## 2025-04-17 DIAGNOSIS — R52 CHRONIC PAIN OF MULTIPLE SITES: Chronic | ICD-10-CM

## 2025-04-17 DIAGNOSIS — G89.29 CHRONIC PAIN OF MULTIPLE SITES: Chronic | ICD-10-CM

## 2025-04-17 DIAGNOSIS — I95.1 ORTHOSTATIC HYPOTENSION: ICD-10-CM

## 2025-04-17 DIAGNOSIS — N28.1 RENAL CYST: ICD-10-CM

## 2025-04-17 PROCEDURE — 99999 PR PBB SHADOW E&M-EST. PATIENT-LVL V: CPT | Mod: PBBFAC,HCNC,, | Performed by: FAMILY MEDICINE

## 2025-04-17 RX ORDER — HYDROCODONE BITARTRATE AND ACETAMINOPHEN 10; 325 MG/1; MG/1
1 TABLET ORAL EVERY 6 HOURS PRN
Qty: 120 TABLET | Refills: 0 | Status: SHIPPED | OUTPATIENT
Start: 2025-04-22

## 2025-04-17 NOTE — PROGRESS NOTES
Primary Care Provider Appointment   [facility]       Patient ID: Summer Inman is a 89 y.o. female.    ASSESSMENT/PLAN by Problem List:    Assessment & Plan    IMPRESSION:  - I suspect her back pain is musculoskeletal.  Note she does have a large simple right renal cyst and has had a recent ultrasound and has seen Urology.  I do not suspect this is contributing to her pain but we will continue to monitor and she should continue to follow with Urology.  - Musculoskeletal pain may be related to muscle, nerve, or spine issues rather than kidney problems.  - Orthostatic hypotension likely primary cause of lightheadedness and dizziness, reinforced adequate fluid intake and orthostatic precautions  - Decreased EF on recent heart US potentially contributing to orthostatic hypotension.  She will follow with Cardiology  - No current evidence of fluid retention or congestive heart failure.  Would like to optimize medications to potentially reduce risk of congestive heart failure but given blood pressure already being low and symptomatic will continue to evaluate this cautiously and she will continue to follow with Cardiology    SKIN FRAGILITY:  - Explained thin, fragile skin can lead to easy bruising and bleeding, especially when on aspirin.    ORTHOSTATIC HYPOTENSION:  - Discussed importance of increased water intake for managing orthostatic hypotension and reducing risk of bladder infections.  - Ms. Inman advised to increase water intake throughout the day and stand up slowly to help manage this condition.    CHRONIC LOW BACK PAIN AND DEGENERATIVE DISC DISEASE  Persistent pain but she is content with current regimen, will continue hydrocodone and regular follow-up.    FLUID BALANCE:  - Ms. Inman to monitor for signs of fluid retention or significant changes in weight.    NUTRITION:  - Recommend adding protein to diet in addition to current food choices.         CODING, ORDERS, AND  ADDITIONAL DOCUMENTATION RELATED TO THIS ENCOUNTER:  (note that the diagnoses and clinical summaries above were generated with the assistance of ambient listening software and represents my assessment and plan.  This software does not always generate the precise nor most specific diagnosis codes.  Therefore the specific diagnosis codes and orders for billing purposes are detailed below along with any additional documentation if needed)      1. Dizziness    2. Orthostatic hypotension    3. Decreased cardiac ejection fraction    4. Chronic pain of multiple sites  -     HYDROcodone-acetaminophen (NORCO)  mg per tablet; Take 1 tablet by mouth every 6 (six) hours as needed (pain).  Dispense: 120 tablet; Refill: 0           Follow Up:  Three-month      Subjective:       Leg Pain         Patient is a/an 89 y.o.  female     For complete problem list, past medical history, surgical history, social history, etc., see appropriate section in the electronic medical record    History of Present Illness    CHIEF COMPLAINT:  Ms. Inman presents today with right-sided pain that is severe enough to require assistance getting out of bed.    RIGHT-SIDED PAIN:  She reports severe right-sided pain below the area of physician exam, requiring assistance getting out of bed and causing significant distress. Pain is absent when sitting still but significant with movement. Kidney ultrasound showed a large right renal cyst with right kidney measuring 10.3 cm and left kidney 9.3 cm, along with age-related changes.    CARDIOVASCULAR:  She has a history of congestive heart failure with previous EF of 25%, noting recent decrease from 5 years ago. She has a known left bundle branch block. She experiences orthostatic hypotension with blood pressure dropping from 138/80 to 95/53 when transitioning from lying to standing. Five years ago, she experienced a cardiac event with heart rate elevation to 190 bpm requiring emergency  cardioversion.    SKIN:  She reports multiple bruises and thin skin with poor adhesion between dermal layers. She notes numerous small spots that change from brown to bruise-like in appearance. Minor contact or friction causes bruising or bleeding. Some injuries may occur at night due to pain-related movement and gripping.    GENITOURINARY:  Recent urological exam was largely normal. She has been on prophylactic antibiotics for UTI prevention for 4 months with good response and no subsequent UTIs.    DIET AND HYDRATION:  She reports inadequate fluid intake of only 12 ounces of water daily. She primarily consumes 3-4 Cokes per day. Previous attempt with lemon tea was discontinued due to stomach discomfort from acidity.      ROS:  Cardiovascular: -lower extremity edema, +orthostatic symptoms  Respiratory: -shortness of breath, +productive cough  Musculoskeletal: +pain with movement, +back pain  Skin: +skin texture changes  Neurological: +lightheadedness  Psychiatric: +irritability       Review of Systems   Constitutional:  Positive for unexpected weight change. Negative for activity change.   HENT:  Positive for rhinorrhea and trouble swallowing. Negative for hearing loss.    Eyes:  Negative for discharge and visual disturbance.   Respiratory:  Negative for chest tightness and wheezing.    Cardiovascular:  Negative for chest pain and palpitations.   Gastrointestinal:  Positive for constipation. Negative for blood in stool, diarrhea and vomiting.   Endocrine: Negative for polydipsia and polyuria.   Genitourinary:  Negative for difficulty urinating, dysuria, hematuria and menstrual problem.   Musculoskeletal:  Positive for arthralgias, back pain and neck pain. Negative for joint swelling.   Neurological:  Positive for dizziness, weakness and light-headedness. Negative for headaches.   Psychiatric/Behavioral:  Positive for confusion. Negative for dysphoric mood.        Objective     Physical Exam  Vitals:    04/17/25  "1527   BP: 126/66   Patient Position: Sitting   Pulse: 95   SpO2: 97%   Weight: 57.2 kg (126 lb 1.7 oz)   Height: 4' 11" (1.499 m)     Physical Exam                This note was generated with the assistance of ambient listening technology. Verbal consent was obtained by the patient and accompanying visitor(s) for the recording of patient appointment to facilitate this note. I attest to having reviewed and edited the generated note for accuracy, though some syntax or spelling errors may persist. Please contact the author of this note for any clarification.  Parts of the note were also generated with voice recognition software.  Occasionally this software will misinterpreted words or phrases    "

## 2025-04-21 RX ORDER — SUCRALFATE 1 G/1
TABLET ORAL
Qty: 360 TABLET | Refills: 3 | Status: SHIPPED | OUTPATIENT
Start: 2025-04-21

## 2025-05-17 DIAGNOSIS — G89.29 CHRONIC PAIN OF MULTIPLE SITES: Chronic | ICD-10-CM

## 2025-05-17 DIAGNOSIS — R52 CHRONIC PAIN OF MULTIPLE SITES: Chronic | ICD-10-CM

## 2025-05-19 ENCOUNTER — LAB VISIT (OUTPATIENT)
Dept: LAB | Facility: HOSPITAL | Age: 89
End: 2025-05-19
Attending: FAMILY MEDICINE
Payer: MEDICARE

## 2025-05-19 DIAGNOSIS — I10 ESSENTIAL HYPERTENSION: Chronic | ICD-10-CM

## 2025-05-19 DIAGNOSIS — I25.10 CORONARY ARTERY DISEASE INVOLVING NATIVE CORONARY ARTERY OF NATIVE HEART WITHOUT ANGINA PECTORIS: Chronic | ICD-10-CM

## 2025-05-19 DIAGNOSIS — E78.1 PURE HYPERTRIGLYCERIDEMIA: Chronic | ICD-10-CM

## 2025-05-19 DIAGNOSIS — I70.0 ATHEROSCLEROSIS OF AORTA: Chronic | ICD-10-CM

## 2025-05-19 DIAGNOSIS — I73.9 PVD (PERIPHERAL VASCULAR DISEASE): ICD-10-CM

## 2025-05-19 DIAGNOSIS — I27.20 PULMONARY HYPERTENSION: Chronic | ICD-10-CM

## 2025-05-19 DIAGNOSIS — I47.10 SVT (SUPRAVENTRICULAR TACHYCARDIA): ICD-10-CM

## 2025-05-19 DIAGNOSIS — I65.23 BILATERAL CAROTID ARTERY OCCLUSION: ICD-10-CM

## 2025-05-19 LAB
ALBUMIN SERPL BCP-MCNC: 3.6 G/DL (ref 3.5–5.2)
ALP SERPL-CCNC: 170 UNIT/L (ref 40–150)
ALT SERPL W/O P-5'-P-CCNC: 33 UNIT/L (ref 10–44)
ANION GAP (OHS): 12 MMOL/L (ref 8–16)
AST SERPL-CCNC: 25 UNIT/L (ref 11–45)
BILIRUB SERPL-MCNC: 0.2 MG/DL (ref 0.1–1)
BUN SERPL-MCNC: 26 MG/DL (ref 8–23)
CALCIUM SERPL-MCNC: 9.9 MG/DL (ref 8.7–10.5)
CHLORIDE SERPL-SCNC: 107 MMOL/L (ref 95–110)
CHOLEST SERPL-MCNC: 152 MG/DL (ref 120–199)
CHOLEST/HDLC SERPL: 2.6 {RATIO} (ref 2–5)
CO2 SERPL-SCNC: 21 MMOL/L (ref 23–29)
CREAT SERPL-MCNC: 1.2 MG/DL (ref 0.5–1.4)
GFR SERPLBLD CREATININE-BSD FMLA CKD-EPI: 44 ML/MIN/1.73/M2
GLUCOSE SERPL-MCNC: 113 MG/DL (ref 70–110)
HDLC SERPL-MCNC: 59 MG/DL (ref 40–75)
HDLC SERPL: 38.8 % (ref 20–50)
LDLC SERPL CALC-MCNC: 54.8 MG/DL (ref 63–159)
NONHDLC SERPL-MCNC: 93 MG/DL
POTASSIUM SERPL-SCNC: 4.4 MMOL/L (ref 3.5–5.1)
PROT SERPL-MCNC: 7.2 GM/DL (ref 6–8.4)
SODIUM SERPL-SCNC: 140 MMOL/L (ref 136–145)
TRIGL SERPL-MCNC: 191 MG/DL (ref 30–150)

## 2025-05-19 PROCEDURE — 80053 COMPREHEN METABOLIC PANEL: CPT | Mod: HCNC

## 2025-05-19 PROCEDURE — 80061 LIPID PANEL: CPT | Mod: HCNC

## 2025-05-19 PROCEDURE — 36415 COLL VENOUS BLD VENIPUNCTURE: CPT | Mod: HCNC,PO

## 2025-05-19 RX ORDER — HYDROCODONE BITARTRATE AND ACETAMINOPHEN 10; 325 MG/1; MG/1
1 TABLET ORAL EVERY 6 HOURS PRN
Qty: 120 TABLET | Refills: 0 | Status: SHIPPED | OUTPATIENT
Start: 2025-05-19

## 2025-05-22 RX ORDER — TRIMETHOPRIM 100 MG/1
100 TABLET ORAL NIGHTLY
Qty: 90 TABLET | Refills: 1 | Status: SHIPPED | OUTPATIENT
Start: 2025-05-22

## 2025-06-11 RX ORDER — ESTRADIOL 0.1 MG/G
.5-1 CREAM VAGINAL
Qty: 42.5 G | Refills: 2 | Status: SHIPPED | OUTPATIENT
Start: 2025-06-11 | End: 2026-06-11

## 2025-06-16 DIAGNOSIS — R52 CHRONIC PAIN OF MULTIPLE SITES: Chronic | ICD-10-CM

## 2025-06-16 DIAGNOSIS — G89.29 CHRONIC PAIN OF MULTIPLE SITES: Chronic | ICD-10-CM

## 2025-06-16 RX ORDER — HYDROCODONE BITARTRATE AND ACETAMINOPHEN 10; 325 MG/1; MG/1
1 TABLET ORAL EVERY 6 HOURS PRN
Qty: 120 TABLET | Refills: 0 | Status: SHIPPED | OUTPATIENT
Start: 2025-06-16

## 2025-06-16 NOTE — TELEPHONE ENCOUNTER
Norco every 6 hours, last refilled 5/19/2025, 120 tabs, no refills.    LOV 4/28/2025  NOV 7/2/2025

## 2025-06-23 ENCOUNTER — OFFICE VISIT (OUTPATIENT)
Dept: UROLOGY | Facility: CLINIC | Age: 89
End: 2025-06-23
Payer: MEDICARE

## 2025-06-23 VITALS — HEIGHT: 57 IN | WEIGHT: 126.75 LBS | BODY MASS INDEX: 27.34 KG/M2

## 2025-06-23 DIAGNOSIS — N39.0 FREQUENT UTI: Primary | ICD-10-CM

## 2025-06-23 PROCEDURE — 1100F PTFALLS ASSESS-DOCD GE2>/YR: CPT | Mod: CPTII,HCNC,S$GLB, | Performed by: UROLOGY

## 2025-06-23 PROCEDURE — 1159F MED LIST DOCD IN RCRD: CPT | Mod: CPTII,HCNC,S$GLB, | Performed by: UROLOGY

## 2025-06-23 PROCEDURE — 1157F ADVNC CARE PLAN IN RCRD: CPT | Mod: CPTII,HCNC,S$GLB, | Performed by: UROLOGY

## 2025-06-23 PROCEDURE — 99999 PR PBB SHADOW E&M-EST. PATIENT-LVL III: CPT | Mod: PBBFAC,HCNC,, | Performed by: UROLOGY

## 2025-06-23 PROCEDURE — 3288F FALL RISK ASSESSMENT DOCD: CPT | Mod: CPTII,HCNC,S$GLB, | Performed by: UROLOGY

## 2025-06-23 PROCEDURE — G2211 COMPLEX E/M VISIT ADD ON: HCPCS | Mod: HCNC,S$GLB,, | Performed by: UROLOGY

## 2025-06-23 PROCEDURE — 1125F AMNT PAIN NOTED PAIN PRSNT: CPT | Mod: CPTII,HCNC,S$GLB, | Performed by: UROLOGY

## 2025-06-23 PROCEDURE — 99214 OFFICE O/P EST MOD 30 MIN: CPT | Mod: HCNC,S$GLB,, | Performed by: UROLOGY

## 2025-06-24 NOTE — PROGRESS NOTES
[facility]      Return Recurrent Urinary Tract Infections Note    6/23/2025    Referred by:  No ref. provider found    CHIEF COMPLAINT:  Patient presents for follow-up of recurrent urinary tract infections and to discuss management of her current treatment regimen.    HPI:  Patient is an 89-year-old woman initially evaluated in March for a history of recurrent urinary tract infections with primary symptom of dysuria. Since her last visit, she reports significant improvement in her condition. She has been taking a low-dose antibiotic, which was recently refilled. She was also prescribed vaginal estrogen cream to be used 3 times per week. There has been confusion about the application method, as her sister (a nurse) suggested applying the cream to the wrist instead of vaginally. Patient has not been using the cream vaginally as prescribed due to discomfort. She mentions wanting to lose weight, though this is not directly related to her primary condition. She denies having any current problems with urinary tract infections.    MEDICATIONS:  Patient is on a low-dose antibiotic for preventing urinary tract infections. She is also using vaginal estrogen cream, applying a pea-sized amount (half a g to a gram) 3 times per week (Monday, Wednesday, Friday) via vaginal route for vaginal atrophy and urinary tract infection prevention.    MEDICAL HISTORY:  Patient has a history of recurrent urinary tract infections, first seen in March. She has a chronic medical disease noted on renal ultrasound in March 2025. Patient has Grade 1 vaginal atrophy and posterior prolapse, both noted during cystourethroscopy in March 2025. Patient has gone through menopause.    SURGICAL HISTORY:  Patient underwent a cystourethroscopy in March 2025 for evaluation of recurrent urinary tract infections.    TEST RESULTS:  In March 2025, the patient underwent a cystourethroscopy. The procedure revealed moderate vaginal atrophy Grade 1  and posterior prolapse with no anterior prolapse. All mucosal surfaces were examined, and a very small area that could be an inflammatory lesion was noted. Renal US: March 2025, showed a parapelvic cyst in the upper pole of the right kidney and chronic medical disease but no evidence of hydronephrosis.          A review of 10+ systems was conducted with pertinent positive and negative findings documented in HPI with all other systems reviewed and negative.    Past medical, family, surgical and social history reviewed as documented in chart with pertinent positive medical, family, surgical and social history detailed in HPI.    Exam Findings:    Physical Exam    General: No acute distress. Nontoxic appearing.  HENT: Normocephalic. Atraumatic.  Respiratory: Normal respiratory effort. No conversational dyspnea. No audible wheezing.  Abdomen: No obvious distension.  Skin: No visible abnormalities.  Extremities: No edema upper extremities. No edema lower extremities.  Neurological: Alert and oriented x3. Normal speech.  Psychiatric: Normal mood. Normal affect. No evidence of SI.          Assessment/Plan:    Assessment & Plan    Continued low-dose antibiotic therapy to prevent recurrent UTIs while waiting for vaginal estrogen cream to take effect.  Determined response is good to current treatment plan for recurrent UTIs.    POSTMENOPAUSAL ATROPHIC VAGINITIS:  1. Explained that vaginal estrogen cream is not systemically absorbed when applied to wrist or ankles.  2. Clarified that vaginal estrogen cream is designed for local effect in the vagina and promotes growth of healthy bacteria.  3. Informed that vaginal estrogen cream takes weeks to months to become effective.  4. Continued vaginal estrogen cream: Apply half a g to 1 g (pea-sized amount) inside the vagina on Monday, Wednesday, and Friday.  5. Follow up in about 6 months.    PLAN SUMMARY:  1. Continue vaginal estrogen cream: Apply 0.5-1g intravaginally on Monday,  Wednesday, and Friday  2. Educated patient on vaginal estrogen cream's local effects and absorption  3. Follow-up in 6 months to assess effectiveness of vaginal estrogen cream              Visit complexity today is associated with medical care services that are part of the ongoing care related to the single serious and/or complex condition of Recurrent urinary tract infections ([place]). A longitudinal relationship exists or is being developed between the patient and this practitioner for the care of this condition.

## 2025-06-25 ENCOUNTER — TELEPHONE (OUTPATIENT)
Dept: PRIMARY CARE CLINIC | Facility: CLINIC | Age: 89
End: 2025-06-25
Payer: MEDICARE

## 2025-06-25 DIAGNOSIS — R05.9 COUGH, UNSPECIFIED TYPE: Primary | ICD-10-CM

## 2025-06-25 NOTE — TELEPHONE ENCOUNTER
Patient's daughter states she thinks her mother may have aspirated while taking her meds the night before last. Since that time, the patient has had a bad cough. She is coughing up thin, yellow sputum. Patient is afebrile.

## 2025-06-26 ENCOUNTER — RESULTS FOLLOW-UP (OUTPATIENT)
Dept: PRIMARY CARE CLINIC | Facility: CLINIC | Age: 89
End: 2025-06-26
Payer: MEDICARE

## 2025-06-26 DIAGNOSIS — R05.9 COUGH, UNSPECIFIED TYPE: Primary | ICD-10-CM

## 2025-06-27 RX ORDER — BENZONATATE 100 MG/1
100 CAPSULE ORAL 3 TIMES DAILY PRN
Qty: 30 CAPSULE | Refills: 0 | Status: SHIPPED | OUTPATIENT
Start: 2025-06-27 | End: 2025-07-07

## 2025-07-13 DIAGNOSIS — R52 CHRONIC PAIN OF MULTIPLE SITES: Chronic | ICD-10-CM

## 2025-07-13 DIAGNOSIS — G89.29 CHRONIC PAIN OF MULTIPLE SITES: Chronic | ICD-10-CM

## 2025-07-14 RX ORDER — HYDROCODONE BITARTRATE AND ACETAMINOPHEN 10; 325 MG/1; MG/1
1 TABLET ORAL EVERY 6 HOURS PRN
Qty: 120 TABLET | Refills: 0 | Status: SHIPPED | OUTPATIENT
Start: 2025-07-14

## 2025-07-25 ENCOUNTER — OFFICE VISIT (OUTPATIENT)
Dept: PRIMARY CARE CLINIC | Facility: CLINIC | Age: 89
End: 2025-07-25
Payer: MEDICARE

## 2025-07-25 VITALS
HEIGHT: 57 IN | DIASTOLIC BLOOD PRESSURE: 76 MMHG | HEART RATE: 65 BPM | BODY MASS INDEX: 30.36 KG/M2 | WEIGHT: 140.75 LBS | SYSTOLIC BLOOD PRESSURE: 130 MMHG | OXYGEN SATURATION: 95 %

## 2025-07-25 DIAGNOSIS — I25.10 CORONARY ARTERY DISEASE INVOLVING NATIVE CORONARY ARTERY OF NATIVE HEART WITHOUT ANGINA PECTORIS: Chronic | ICD-10-CM

## 2025-07-25 DIAGNOSIS — R05.9 COUGH, UNSPECIFIED TYPE: ICD-10-CM

## 2025-07-25 DIAGNOSIS — R30.0 DYSURIA: ICD-10-CM

## 2025-07-25 DIAGNOSIS — F11.20 CHRONIC NARCOTIC DEPENDENCE: Chronic | ICD-10-CM

## 2025-07-25 DIAGNOSIS — R52 CHRONIC PAIN OF MULTIPLE SITES: Chronic | ICD-10-CM

## 2025-07-25 DIAGNOSIS — G89.29 CHRONIC PAIN OF MULTIPLE SITES: Chronic | ICD-10-CM

## 2025-07-25 DIAGNOSIS — E78.5 HYPERLIPIDEMIA, UNSPECIFIED HYPERLIPIDEMIA TYPE: ICD-10-CM

## 2025-07-25 DIAGNOSIS — I50.9 CHF (NYHA CLASS I, ACC/AHA STAGE B): ICD-10-CM

## 2025-07-25 DIAGNOSIS — M51.362 DEGENERATION OF INTERVERTEBRAL DISC OF LUMBAR REGION WITH DISCOGENIC BACK PAIN AND LOWER EXTREMITY PAIN: Primary | Chronic | ICD-10-CM

## 2025-07-25 PROCEDURE — 1159F MED LIST DOCD IN RCRD: CPT | Mod: CPTII,HCNC,S$GLB, | Performed by: FAMILY MEDICINE

## 2025-07-25 PROCEDURE — 1100F PTFALLS ASSESS-DOCD GE2>/YR: CPT | Mod: CPTII,HCNC,S$GLB, | Performed by: FAMILY MEDICINE

## 2025-07-25 PROCEDURE — 3288F FALL RISK ASSESSMENT DOCD: CPT | Mod: CPTII,HCNC,S$GLB, | Performed by: FAMILY MEDICINE

## 2025-07-25 PROCEDURE — 99214 OFFICE O/P EST MOD 30 MIN: CPT | Mod: HCNC,S$GLB,, | Performed by: FAMILY MEDICINE

## 2025-07-25 PROCEDURE — 1170F FXNL STATUS ASSESSED: CPT | Mod: CPTII,HCNC,S$GLB, | Performed by: FAMILY MEDICINE

## 2025-07-25 PROCEDURE — 1160F RVW MEDS BY RX/DR IN RCRD: CPT | Mod: CPTII,HCNC,S$GLB, | Performed by: FAMILY MEDICINE

## 2025-07-25 PROCEDURE — 1125F AMNT PAIN NOTED PAIN PRSNT: CPT | Mod: CPTII,HCNC,S$GLB, | Performed by: FAMILY MEDICINE

## 2025-07-25 PROCEDURE — 99999 PR PBB SHADOW E&M-EST. PATIENT-LVL IV: CPT | Mod: PBBFAC,HCNC,, | Performed by: FAMILY MEDICINE

## 2025-07-25 PROCEDURE — 1157F ADVNC CARE PLAN IN RCRD: CPT | Mod: CPTII,HCNC,S$GLB, | Performed by: FAMILY MEDICINE

## 2025-07-25 PROCEDURE — G2211 COMPLEX E/M VISIT ADD ON: HCPCS | Mod: HCNC,S$GLB,, | Performed by: FAMILY MEDICINE

## 2025-07-25 RX ORDER — ROSUVASTATIN CALCIUM 10 MG/1
10 TABLET, COATED ORAL NIGHTLY
Qty: 90 TABLET | Refills: 1 | Status: SHIPPED | OUTPATIENT
Start: 2025-07-25

## 2025-07-25 RX ORDER — NITROGLYCERIN 0.4 MG/1
TABLET SUBLINGUAL
Qty: 25 TABLET | Refills: 2 | Status: SHIPPED | OUTPATIENT
Start: 2025-07-25

## 2025-07-25 RX ORDER — BENZONATATE 100 MG/1
100 CAPSULE ORAL 3 TIMES DAILY PRN
Qty: 30 CAPSULE | Refills: 2 | Status: SHIPPED | OUTPATIENT
Start: 2025-07-25 | End: 2025-08-04

## 2025-07-25 NOTE — PROGRESS NOTES
Primary Care Provider Appointment   [facility] [place]       Patient ID: Summer Inman is a 89 y.o. female.    ASSESSMENT/PLAN by Problem List:    Assessment & Plan    Wide you letter carry on like that IMPRESSION:  - Reviewed recent coronary angiogram showing 85% LAD blockage, treated with stent placement.  - EF of 35%.  - Evaluated persistent leg weakness and pain, considering circulatory and back issues as potential causes.  - Noted mild wheezing on lung exam, favoring COPD rather than CHF at this time  - Increased statin dose to slow plaque buildup progression.  - Determined antibiotic or steroid unnecessary for current respiratory symptoms, begin combivent inhaler.  If persistent consider maintenance inhaler, but would prefer updated PFTs 1st if possible.  Consider PFTs however patient not inclined at this time.    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  - Discussed significance of the blockage with the patient and explained angiogram results and stent placement procedure using visual aids.  - Emphasized importance of continuing to maximize medical therapy for risk factors  - Continued nitroglycerin as prescribed.    Congestive heart failure stage B, reduced ejection fraction  - Evaluated EF of 35%, indicating heart failure.  - Discussed this finding with the patient and considered medication options, noting concerns about potential kidney issues that may affect treatment choices.    PRESENCE OF CORONARY stent:  - Evaluated presence of stent placed in the mid LAD after angioplasty.  - Explained the procedure and its results to patient using visual aids.    ACUTE COUGH:  - Evaluated persistent and worsening cough that sometimes leads to choking.  - Continued benzonatate (cough pearls) as needed and prescribed Combivent inhaler for  cough and pulmonary issues.    DYSPNEA:  - Assessed persistent dyspnea that sometimes leads to choking.  - Started Combivent inhaler, 2 puffs every 6 hours as needed for respiratory symptoms.    HYPERLIPIDEMIA:  - Increased rosuvastatin from 5 mg to 10 mg daily due to progressing blockage.  - Discussed importance of cholesterol management in preventing further plaque buildup.  - Provided education on heart-healthy dietary choices, recommending reduced consumption of pork, beef, dairy products, and butter, while increasing intake of vegetables, beans, and lean white chicken (not fried).    FOLLOW-UP:  - Ordered urinalysis (clinic collect).           ASSOCIATED ORDERS:  1. Degeneration of intervertebral disc of lumbar region with discogenic back pain and lower extremity pain    2. Chronic pain of multiple sites    3. Chronic narcotic dependence    4. Dysuria  -     Cancel: Urinalysis, Reflex to Urine Culture Urine, Clean Catch; Future; Expected date: 07/25/2025  -     Urinalysis, Reflex to Urine Culture Urine, Clean Catch; Future; Expected date: 07/25/2025    5. Coronary artery disease involving native coronary artery of native heart without angina pectoris  Overview:  7/2/25  Indication:   Acute decrease in ejection fraction abnormal stress test.       Angiographic findings:   Left main coronary artery-normal-sized vessel mild disease less than 10%.    Left anterior descending-normal-sized vessel seen wrapping around the apex with several trivial diagonals.  There is a tortuous 85% lesion seen in the proximal to mid LAD.  Remaining LAD is normal patient's age.    Circumflex-small nondominant vessel normal appearance of patient's age.    Ramus intermedius-normal-sized vessel normal in appearance for patient's age.    Right coronary artery-normal-sized dominant vessel normal appearance of patient's age.       Intervention:   Successful angioplasty and stenting of the LAD using a 3.0 x 16 synergy stent post dilated to  3.5 proximally with 0% residual stenosis CITLALLI 3 flow pre and postprocedure.       Impression:   Successful angioplasty stenting LAD as above.       Plan:   Dual antiplatelet therapy minimum 3 months.      Date: 7/2/2025  Time: 3:53    Orders:  -     rosuvastatin (CRESTOR) 10 MG tablet; Take 1 tablet (10 mg total) by mouth every evening.  Dispense: 90 tablet; Refill: 1  -     Comprehensive Metabolic Panel; Future; Expected date: 07/25/2025  -     Lipid Panel; Future; Expected date: 07/25/2025    6. Hyperlipidemia, unspecified hyperlipidemia type  -     rosuvastatin (CRESTOR) 10 MG tablet; Take 1 tablet (10 mg total) by mouth every evening.  Dispense: 90 tablet; Refill: 1  -     Comprehensive Metabolic Panel; Future; Expected date: 07/25/2025  -     Lipid Panel; Future; Expected date: 07/25/2025    7. Cough, unspecified type  -     benzonatate (TESSALON) 100 MG capsule; Take 1 capsule (100 mg total) by mouth 3 (three) times daily as needed for Cough.  Dispense: 30 capsule; Refill: 2    8. CHF (NYHA class I, ACC/AHA stage B)    Other orders  -     ipratropium-albuteroL (COMBIVENT)  mcg/actuation inhaler; Inhale 1 puff into the lungs every 6 (six) hours as needed for Wheezing. Rescue  Dispense: 4 g; Refill: 3  -     nitroGLYCERIN (NITROSTAT) 0.4 MG SL tablet; PLACE 1 TAB UNDER THE TONGUE EVERY 5 MIN (UP TO 3 DOSES) AS NEEDED FOR CHEST PAIN.  Dispense: 25 tablet; Refill: 2           Follow Up:  Three months    Subjective:       HPI    Patient is a/an 89 y.o.  female     For complete problem list, past medical history, surgical history, social history, etc., see appropriate section in the electronic medical record    History of Present Illness    CHIEF COMPLAINT:  Summer presents today with leg weakness, numbness and pain.    CARDIOVASCULAR:  She underwent coronary heart catheterization on July 2nd with her first stent placement for an 85% blockage in the mid left anterior descending (LAD) artery, which was treated  with angioplasty and stent placement to prevent potential heart attack. Her EF was reported as 35%. Prior angiogram in 2014 showed only small blockages that did not require intervention at that time. She denies previous stent placement and reports no prior cardiac interventions beyond the 2014 angiogram. Approximately 5-6 years ago, she experienced a significant cardiac arrhythmia with heart rate escalating to 190 BPM requiring emergency room intervention to stop and restart her heart to restore normal cardiac rhythm.    RESPIRATORY:  She reports a persistent cough that is progressively worsening, characterized by episodes of choking. She experiences significant respiratory distress, particularly at night, waking up feeling like she is strangling and unable to return to sleep. She denies current or previous regular use of inhalers. Recent chest XR showed normal findings.    SLEEP:  She reports excessive sleep duration with prolonged periods when going to bed and continues to feel sleepy even after waking, remaining drowsy while sitting in a chair. She expresses frustration with persistent daytime somnolence.    MEDICATIONS:  She currently takes rosuvastatin 5 mg with plan to increase to 10 mg for cholesterol management, continues estradiol cream, and takes benzonatate pearls for persistent cough. She discontinued calcium with vitamin D supplement for the past 2 weeks due to large tablet size.      ROS:  ENT: +choking sensation  Respiratory: +cough, +wheezing  Musculoskeletal: +muscle weakness, +limb pain  Neurological: +numbness, +hypersomnia       Review of Systems   Constitutional:  Negative for chills and fever.   HENT: Negative.  Negative for trouble swallowing.    Respiratory:  Positive for cough. Negative for shortness of breath and wheezing.    Cardiovascular:  Negative for chest pain and leg swelling.   Gastrointestinal: Negative.    Genitourinary: Negative.    Musculoskeletal:  Positive for arthralgias,  "back pain and gait problem.   Skin:  Positive for wound.   Hematological:  Bruises/bleeds easily.   Psychiatric/Behavioral:  Positive for dysphoric mood (Persistent mild dysphoric mood.  No suicidal thoughts.  Overall improved on current medication will continue to monitor). Negative for decreased concentration and suicidal ideas. The patient is not nervous/anxious.        Objective     Physical Exam  Constitutional:       General: She is not in acute distress.     Appearance: She is not toxic-appearing.   Eyes:      General: No scleral icterus.  Cardiovascular:      Rate and Rhythm: Normal rate and regular rhythm.      Heart sounds: Murmur heard.   Pulmonary:      Effort: No respiratory distress.      Breath sounds: No wheezing or rales.      Comments: Diminished breath sounds bilaterally  Skin:     Findings: No rash.   Neurological:      General: No focal deficit present.      Mental Status: She is alert and oriented to person, place, and time.      Comments: Ambulating with a walker.         Vitals:    07/25/25 1522   BP: 130/76   Patient Position: Sitting   Pulse: 65   SpO2: 95%   Weight: 63.9 kg (140 lb 12.2 oz)   Height: 4' 9" (1.448 m)     Physical Exam                    This note was generated with the assistance of ambient listening technology. Verbal consent was obtained by the patient and accompanying visitor(s) for the recording of patient appointment to facilitate this note. I attest to having reviewed and edited the generated note for accuracy, though some syntax or spelling errors may persist. Please contact the author of this note for any clarification.  Parts of the note were also generated with voice recognition software.  Occasionally this software will misinterpreted words or phrases    "

## 2025-07-27 PROBLEM — I50.9 CHF (NYHA CLASS I, ACC/AHA STAGE B): Status: ACTIVE | Noted: 2025-07-27

## 2025-08-06 ENCOUNTER — PATIENT MESSAGE (OUTPATIENT)
Dept: PRIMARY CARE CLINIC | Facility: CLINIC | Age: 89
End: 2025-08-06
Payer: MEDICARE

## 2025-08-06 DIAGNOSIS — R13.10 DYSPHAGIA, UNSPECIFIED TYPE: Primary | ICD-10-CM

## 2025-08-06 RX ORDER — SUCRALFATE 1 G/10ML
1 SUSPENSION ORAL 4 TIMES DAILY
Qty: 3784 ML | Refills: 3 | Status: SHIPPED | OUTPATIENT
Start: 2025-08-06

## 2025-08-11 DIAGNOSIS — R52 CHRONIC PAIN OF MULTIPLE SITES: Chronic | ICD-10-CM

## 2025-08-11 DIAGNOSIS — G89.29 CHRONIC PAIN OF MULTIPLE SITES: Chronic | ICD-10-CM

## 2025-08-11 RX ORDER — HYDROCODONE BITARTRATE AND ACETAMINOPHEN 10; 325 MG/1; MG/1
1 TABLET ORAL EVERY 6 HOURS PRN
Qty: 120 TABLET | Refills: 0 | Status: SHIPPED | OUTPATIENT
Start: 2025-08-11

## 2025-08-20 ENCOUNTER — PATIENT MESSAGE (OUTPATIENT)
Dept: PRIMARY CARE CLINIC | Facility: CLINIC | Age: 89
End: 2025-08-20
Payer: MEDICARE

## 2025-08-20 DIAGNOSIS — M54.9 BACK PAIN, UNSPECIFIED BACK LOCATION, UNSPECIFIED BACK PAIN LATERALITY, UNSPECIFIED CHRONICITY: Primary | ICD-10-CM

## 2025-08-20 DIAGNOSIS — M79.606 PAIN OF LOWER EXTREMITY, UNSPECIFIED LATERALITY: ICD-10-CM

## 2025-08-21 ENCOUNTER — HOSPITAL ENCOUNTER (OUTPATIENT)
Dept: RADIOLOGY | Facility: HOSPITAL | Age: 89
Discharge: HOME | End: 2025-08-21
Attending: FAMILY MEDICINE
Payer: MEDICARE

## 2025-08-21 DIAGNOSIS — M54.9 BACK PAIN, UNSPECIFIED BACK LOCATION, UNSPECIFIED BACK PAIN LATERALITY, UNSPECIFIED CHRONICITY: ICD-10-CM

## 2025-08-21 DIAGNOSIS — M79.606 PAIN OF LOWER EXTREMITY, UNSPECIFIED LATERALITY: ICD-10-CM

## 2025-08-21 PROCEDURE — 72082 X-RAY EXAM ENTIRE SPI 2/3 VW: CPT | Mod: 26,HCNC,, | Performed by: RADIOLOGY

## 2025-08-21 PROCEDURE — 72082 X-RAY EXAM ENTIRE SPI 2/3 VW: CPT | Mod: TC,HCNC,PO

## 2025-08-22 ENCOUNTER — TELEPHONE (OUTPATIENT)
Dept: PRIMARY CARE CLINIC | Facility: CLINIC | Age: 89
End: 2025-08-22
Payer: MEDICARE

## 2025-08-28 ENCOUNTER — PATIENT MESSAGE (OUTPATIENT)
Dept: PRIMARY CARE CLINIC | Facility: CLINIC | Age: 89
End: 2025-08-28
Payer: MEDICARE

## 2025-08-28 DIAGNOSIS — S32.030D COMPRESSION FRACTURE OF L3 VERTEBRA WITH ROUTINE HEALING, SUBSEQUENT ENCOUNTER: Primary | ICD-10-CM

## 2025-09-03 DIAGNOSIS — G89.29 CHRONIC PAIN OF MULTIPLE SITES: Chronic | ICD-10-CM

## 2025-09-03 DIAGNOSIS — R52 CHRONIC PAIN OF MULTIPLE SITES: Chronic | ICD-10-CM

## 2025-09-03 RX ORDER — HYDROCODONE BITARTRATE AND ACETAMINOPHEN 10; 325 MG/1; MG/1
1 TABLET ORAL EVERY 6 HOURS PRN
Qty: 120 TABLET | Refills: 0 | Status: SHIPPED | OUTPATIENT
Start: 2025-09-08

## (undated) DEVICE — BAG TISS RETRV MONARCH 10MM

## (undated) DEVICE — TROCAR ENDOPATH XCEL 11MM 10CM

## (undated) DEVICE — SEE MEDLINE ITEM 157128

## (undated) DEVICE — NDL INSUF ULTRA VERESS 120MM

## (undated) DEVICE — SCISSOR 5MMX35CM DIRECT DRIVE

## (undated) DEVICE — KIT ANTIFOG

## (undated) DEVICE — APPLICATOR CHLORAPREP ORN 26ML

## (undated) DEVICE — GLOVE SURG BIOGEL LATEX SZ 7.5

## (undated) DEVICE — CLIP AUTO APPLIER ENDO

## (undated) DEVICE — SEE MEDLINE ITEM 152622

## (undated) DEVICE — SUT 0 VICRYL / UR6 (J603)

## (undated) DEVICE — TUBING HEATED INSUFFLATOR

## (undated) DEVICE — SEE L#120831

## (undated) DEVICE — Device

## (undated) DEVICE — SYR 10CC LUER LOCK

## (undated) DEVICE — SUT MONOCYRL 4-0 PS2 UND

## (undated) DEVICE — NEPTUNE 4 PORT MANIFOLD

## (undated) DEVICE — TROCAR ENDOPATH XCEL 5X75MM

## (undated) DEVICE — TROCAR ENDOPATH XCEL 5MM 7.5CM

## (undated) DEVICE — DRAPE ABDOMINAL TIBURON 14X11

## (undated) DEVICE — SOL IRR STRL WATER 500ML

## (undated) DEVICE — BLADE SURG CARBON STEEL SZ11

## (undated) DEVICE — ELECTRODE REM PLYHSV RETURN 9